# Patient Record
Sex: MALE | Race: WHITE | URBAN - METROPOLITAN AREA
[De-identification: names, ages, dates, MRNs, and addresses within clinical notes are randomized per-mention and may not be internally consistent; named-entity substitution may affect disease eponyms.]

---

## 2017-05-27 ENCOUNTER — OUTPATIENT (OUTPATIENT)
Dept: OUTPATIENT SERVICES | Facility: HOSPITAL | Age: 62
LOS: 1 days | Discharge: HOME | End: 2017-05-27

## 2017-06-28 DIAGNOSIS — E78.5 HYPERLIPIDEMIA, UNSPECIFIED: ICD-10-CM

## 2017-06-28 DIAGNOSIS — E11.65 TYPE 2 DIABETES MELLITUS WITH HYPERGLYCEMIA: ICD-10-CM

## 2017-08-29 ENCOUNTER — OUTPATIENT (OUTPATIENT)
Dept: OUTPATIENT SERVICES | Facility: HOSPITAL | Age: 62
LOS: 1 days | Discharge: HOME | End: 2017-08-29

## 2017-08-29 DIAGNOSIS — E21.5 DISORDER OF PARATHYROID GLAND, UNSPECIFIED: ICD-10-CM

## 2017-08-29 DIAGNOSIS — E11.65 TYPE 2 DIABETES MELLITUS WITH HYPERGLYCEMIA: ICD-10-CM

## 2017-08-29 DIAGNOSIS — E03.9 HYPOTHYROIDISM, UNSPECIFIED: ICD-10-CM

## 2017-08-29 DIAGNOSIS — M10.9 GOUT, UNSPECIFIED: ICD-10-CM

## 2017-08-29 DIAGNOSIS — E61.1 IRON DEFICIENCY: ICD-10-CM

## 2017-09-18 ENCOUNTER — OUTPATIENT (OUTPATIENT)
Dept: OUTPATIENT SERVICES | Facility: HOSPITAL | Age: 62
LOS: 1 days | Discharge: HOME | End: 2017-09-18

## 2017-09-18 DIAGNOSIS — I10 ESSENTIAL (PRIMARY) HYPERTENSION: ICD-10-CM

## 2017-09-18 DIAGNOSIS — E11.9 TYPE 2 DIABETES MELLITUS WITHOUT COMPLICATIONS: ICD-10-CM

## 2017-09-18 DIAGNOSIS — Z90.5 ACQUIRED ABSENCE OF KIDNEY: ICD-10-CM

## 2017-09-18 DIAGNOSIS — N20.0 CALCULUS OF KIDNEY: ICD-10-CM

## 2017-12-02 ENCOUNTER — OUTPATIENT (OUTPATIENT)
Dept: OUTPATIENT SERVICES | Facility: HOSPITAL | Age: 62
LOS: 1 days | Discharge: HOME | End: 2017-12-02

## 2017-12-02 DIAGNOSIS — M10.9 GOUT, UNSPECIFIED: ICD-10-CM

## 2017-12-02 DIAGNOSIS — E78.5 HYPERLIPIDEMIA, UNSPECIFIED: ICD-10-CM

## 2017-12-02 DIAGNOSIS — E11.65 TYPE 2 DIABETES MELLITUS WITH HYPERGLYCEMIA: ICD-10-CM

## 2018-05-26 ENCOUNTER — OUTPATIENT (OUTPATIENT)
Dept: OUTPATIENT SERVICES | Facility: HOSPITAL | Age: 63
LOS: 1 days | Discharge: HOME | End: 2018-05-26

## 2018-05-26 DIAGNOSIS — E78.5 HYPERLIPIDEMIA, UNSPECIFIED: ICD-10-CM

## 2018-05-26 DIAGNOSIS — E03.9 HYPOTHYROIDISM, UNSPECIFIED: ICD-10-CM

## 2018-05-26 DIAGNOSIS — E11.65 TYPE 2 DIABETES MELLITUS WITH HYPERGLYCEMIA: ICD-10-CM

## 2018-06-07 ENCOUNTER — OUTPATIENT (OUTPATIENT)
Dept: OUTPATIENT SERVICES | Facility: HOSPITAL | Age: 63
LOS: 1 days | Discharge: HOME | End: 2018-06-07

## 2018-06-07 DIAGNOSIS — N18.3 CHRONIC KIDNEY DISEASE, STAGE 3 (MODERATE): ICD-10-CM

## 2018-06-07 DIAGNOSIS — I10 ESSENTIAL (PRIMARY) HYPERTENSION: ICD-10-CM

## 2018-06-07 DIAGNOSIS — E87.5 HYPERKALEMIA: ICD-10-CM

## 2018-06-07 DIAGNOSIS — E11.9 TYPE 2 DIABETES MELLITUS WITHOUT COMPLICATIONS: ICD-10-CM

## 2018-06-20 ENCOUNTER — OUTPATIENT (OUTPATIENT)
Dept: OUTPATIENT SERVICES | Facility: HOSPITAL | Age: 63
LOS: 1 days | Discharge: HOME | End: 2018-06-20

## 2018-06-20 DIAGNOSIS — N20.0 CALCULUS OF KIDNEY: ICD-10-CM

## 2018-06-20 DIAGNOSIS — N18.3 CHRONIC KIDNEY DISEASE, STAGE 3 (MODERATE): ICD-10-CM

## 2018-06-20 DIAGNOSIS — I10 ESSENTIAL (PRIMARY) HYPERTENSION: ICD-10-CM

## 2018-08-24 ENCOUNTER — OUTPATIENT (OUTPATIENT)
Dept: OUTPATIENT SERVICES | Facility: HOSPITAL | Age: 63
LOS: 1 days | Discharge: HOME | End: 2018-08-24

## 2018-08-24 DIAGNOSIS — E21.5 DISORDER OF PARATHYROID GLAND, UNSPECIFIED: ICD-10-CM

## 2018-08-24 DIAGNOSIS — M10.9 GOUT, UNSPECIFIED: ICD-10-CM

## 2018-08-24 DIAGNOSIS — E83.42 HYPOMAGNESEMIA: ICD-10-CM

## 2018-08-24 DIAGNOSIS — E78.5 HYPERLIPIDEMIA, UNSPECIFIED: ICD-10-CM

## 2018-08-24 DIAGNOSIS — E11.65 TYPE 2 DIABETES MELLITUS WITH HYPERGLYCEMIA: ICD-10-CM

## 2019-03-07 ENCOUNTER — OUTPATIENT (OUTPATIENT)
Dept: OUTPATIENT SERVICES | Facility: HOSPITAL | Age: 64
LOS: 1 days | Discharge: HOME | End: 2019-03-07

## 2019-03-07 DIAGNOSIS — M10.9 GOUT, UNSPECIFIED: ICD-10-CM

## 2019-03-07 DIAGNOSIS — E11.65 TYPE 2 DIABETES MELLITUS WITH HYPERGLYCEMIA: ICD-10-CM

## 2019-03-07 DIAGNOSIS — E03.9 HYPOTHYROIDISM, UNSPECIFIED: ICD-10-CM

## 2019-03-07 DIAGNOSIS — E83.42 HYPOMAGNESEMIA: ICD-10-CM

## 2019-06-20 ENCOUNTER — OUTPATIENT (OUTPATIENT)
Dept: OUTPATIENT SERVICES | Facility: HOSPITAL | Age: 64
LOS: 1 days | Discharge: HOME | End: 2019-06-20

## 2019-06-20 DIAGNOSIS — E21.5 DISORDER OF PARATHYROID GLAND, UNSPECIFIED: ICD-10-CM

## 2019-06-20 DIAGNOSIS — E83.42 HYPOMAGNESEMIA: ICD-10-CM

## 2019-06-20 DIAGNOSIS — M10.9 GOUT, UNSPECIFIED: ICD-10-CM

## 2019-06-20 DIAGNOSIS — E55.9 VITAMIN D DEFICIENCY, UNSPECIFIED: ICD-10-CM

## 2019-06-20 DIAGNOSIS — E78.5 HYPERLIPIDEMIA, UNSPECIFIED: ICD-10-CM

## 2019-06-20 DIAGNOSIS — E11.65 TYPE 2 DIABETES MELLITUS WITH HYPERGLYCEMIA: ICD-10-CM

## 2019-11-01 ENCOUNTER — INPATIENT (INPATIENT)
Facility: HOSPITAL | Age: 64
LOS: 1 days | Discharge: HOME | End: 2019-11-03
Attending: INTERNAL MEDICINE | Admitting: INTERNAL MEDICINE
Payer: MEDICARE

## 2019-11-01 ENCOUNTER — TRANSCRIPTION ENCOUNTER (OUTPATIENT)
Age: 64
End: 2019-11-01

## 2019-11-01 VITALS
TEMPERATURE: 98 F | OXYGEN SATURATION: 97 % | DIASTOLIC BLOOD PRESSURE: 91 MMHG | RESPIRATION RATE: 20 BRPM | HEART RATE: 101 BPM | SYSTOLIC BLOOD PRESSURE: 187 MMHG

## 2019-11-01 DIAGNOSIS — Z90.5 ACQUIRED ABSENCE OF KIDNEY: Chronic | ICD-10-CM

## 2019-11-01 LAB
ALBUMIN SERPL ELPH-MCNC: 3.9 G/DL — SIGNIFICANT CHANGE UP (ref 3.5–5.2)
ALP SERPL-CCNC: 57 U/L — SIGNIFICANT CHANGE UP (ref 30–115)
ALT FLD-CCNC: 19 U/L — SIGNIFICANT CHANGE UP (ref 0–41)
ANION GAP SERPL CALC-SCNC: 17 MMOL/L — HIGH (ref 7–14)
APPEARANCE UR: CLEAR — SIGNIFICANT CHANGE UP
APTT BLD: 28.4 SEC — SIGNIFICANT CHANGE UP (ref 27–39.2)
AST SERPL-CCNC: 23 U/L — SIGNIFICANT CHANGE UP (ref 0–41)
BACTERIA # UR AUTO: NEGATIVE — SIGNIFICANT CHANGE UP
BASOPHILS # BLD AUTO: 0.05 K/UL — SIGNIFICANT CHANGE UP (ref 0–0.2)
BASOPHILS NFR BLD AUTO: 0.5 % — SIGNIFICANT CHANGE UP (ref 0–1)
BILIRUB SERPL-MCNC: 0.7 MG/DL — SIGNIFICANT CHANGE UP (ref 0.2–1.2)
BILIRUB UR-MCNC: NEGATIVE — SIGNIFICANT CHANGE UP
BUN SERPL-MCNC: 39 MG/DL — HIGH (ref 10–20)
CALCIUM SERPL-MCNC: 9 MG/DL — SIGNIFICANT CHANGE UP (ref 8.5–10.1)
CHLORIDE SERPL-SCNC: 100 MMOL/L — SIGNIFICANT CHANGE UP (ref 98–110)
CO2 SERPL-SCNC: 19 MMOL/L — SIGNIFICANT CHANGE UP (ref 17–32)
COLOR SPEC: YELLOW — SIGNIFICANT CHANGE UP
CREAT SERPL-MCNC: 2.4 MG/DL — HIGH (ref 0.7–1.5)
DIFF PNL FLD: ABNORMAL
EOSINOPHIL # BLD AUTO: 0.05 K/UL — SIGNIFICANT CHANGE UP (ref 0–0.7)
EOSINOPHIL NFR BLD AUTO: 0.5 % — SIGNIFICANT CHANGE UP (ref 0–8)
EPI CELLS # UR: 5 /HPF — SIGNIFICANT CHANGE UP (ref 0–5)
GLUCOSE BLDC GLUCOMTR-MCNC: 173 MG/DL — HIGH (ref 70–99)
GLUCOSE BLDC GLUCOMTR-MCNC: 183 MG/DL — HIGH (ref 70–99)
GLUCOSE SERPL-MCNC: 177 MG/DL — HIGH (ref 70–99)
GLUCOSE UR QL: SIGNIFICANT CHANGE UP
HCT VFR BLD CALC: 41.7 % — LOW (ref 42–52)
HGB BLD-MCNC: 13.6 G/DL — LOW (ref 14–18)
HYALINE CASTS # UR AUTO: 11 /LPF — HIGH (ref 0–7)
IMM GRANULOCYTES NFR BLD AUTO: 0.5 % — HIGH (ref 0.1–0.3)
INR BLD: 1.15 RATIO — SIGNIFICANT CHANGE UP (ref 0.65–1.3)
KETONES UR-MCNC: NEGATIVE — SIGNIFICANT CHANGE UP
LACTATE SERPL-SCNC: 1.5 MMOL/L — SIGNIFICANT CHANGE UP (ref 0.5–2.2)
LEUKOCYTE ESTERASE UR-ACNC: NEGATIVE — SIGNIFICANT CHANGE UP
LYMPHOCYTES # BLD AUTO: 1.3 K/UL — SIGNIFICANT CHANGE UP (ref 1.2–3.4)
LYMPHOCYTES # BLD AUTO: 13.8 % — LOW (ref 20.5–51.1)
MCHC RBC-ENTMCNC: 29.3 PG — SIGNIFICANT CHANGE UP (ref 27–31)
MCHC RBC-ENTMCNC: 32.6 G/DL — SIGNIFICANT CHANGE UP (ref 32–37)
MCV RBC AUTO: 89.9 FL — SIGNIFICANT CHANGE UP (ref 80–94)
MONOCYTES # BLD AUTO: 1.31 K/UL — HIGH (ref 0.1–0.6)
MONOCYTES NFR BLD AUTO: 13.9 % — HIGH (ref 1.7–9.3)
NEUTROPHILS # BLD AUTO: 6.67 K/UL — HIGH (ref 1.4–6.5)
NEUTROPHILS NFR BLD AUTO: 70.8 % — SIGNIFICANT CHANGE UP (ref 42.2–75.2)
NITRITE UR-MCNC: NEGATIVE — SIGNIFICANT CHANGE UP
NRBC # BLD: 0 /100 WBCS — SIGNIFICANT CHANGE UP (ref 0–0)
PH UR: 6 — SIGNIFICANT CHANGE UP (ref 5–8)
PLATELET # BLD AUTO: 164 K/UL — SIGNIFICANT CHANGE UP (ref 130–400)
POTASSIUM SERPL-MCNC: 4.9 MMOL/L — SIGNIFICANT CHANGE UP (ref 3.5–5)
POTASSIUM SERPL-SCNC: 4.9 MMOL/L — SIGNIFICANT CHANGE UP (ref 3.5–5)
PROT SERPL-MCNC: 7 G/DL — SIGNIFICANT CHANGE UP (ref 6–8)
PROT UR-MCNC: ABNORMAL
PROTHROM AB SERPL-ACNC: 13.2 SEC — HIGH (ref 9.95–12.87)
RBC # BLD: 4.64 M/UL — LOW (ref 4.7–6.1)
RBC # FLD: 13.4 % — SIGNIFICANT CHANGE UP (ref 11.5–14.5)
RBC CASTS # UR COMP ASSIST: 5 /HPF — HIGH (ref 0–4)
SODIUM SERPL-SCNC: 136 MMOL/L — SIGNIFICANT CHANGE UP (ref 135–146)
SP GR SPEC: 1.02 — SIGNIFICANT CHANGE UP (ref 1.01–1.02)
UROBILINOGEN FLD QL: SIGNIFICANT CHANGE UP
WBC # BLD: 9.43 K/UL — SIGNIFICANT CHANGE UP (ref 4.8–10.8)
WBC # FLD AUTO: 9.43 K/UL — SIGNIFICANT CHANGE UP (ref 4.8–10.8)
WBC UR QL: 7 /HPF — HIGH (ref 0–5)

## 2019-11-01 PROCEDURE — 93970 EXTREMITY STUDY: CPT | Mod: 26

## 2019-11-01 PROCEDURE — 99285 EMERGENCY DEPT VISIT HI MDM: CPT

## 2019-11-01 PROCEDURE — 73630 X-RAY EXAM OF FOOT: CPT | Mod: 26,RT

## 2019-11-01 PROCEDURE — 93010 ELECTROCARDIOGRAM REPORT: CPT

## 2019-11-01 PROCEDURE — 73590 X-RAY EXAM OF LOWER LEG: CPT | Mod: 26,RT

## 2019-11-01 RX ORDER — AMLODIPINE BESYLATE 2.5 MG/1
10 TABLET ORAL DAILY
Refills: 0 | Status: DISCONTINUED | OUTPATIENT
Start: 2019-11-01 | End: 2019-11-03

## 2019-11-01 RX ORDER — INSULIN LISPRO 100/ML
VIAL (ML) SUBCUTANEOUS
Refills: 0 | Status: DISCONTINUED | OUTPATIENT
Start: 2019-11-01 | End: 2019-11-03

## 2019-11-01 RX ORDER — DEXTROSE 50 % IN WATER 50 %
12.5 SYRINGE (ML) INTRAVENOUS ONCE
Refills: 0 | Status: DISCONTINUED | OUTPATIENT
Start: 2019-11-01 | End: 2019-11-03

## 2019-11-01 RX ORDER — INSULIN GLARGINE 100 [IU]/ML
70 INJECTION, SOLUTION SUBCUTANEOUS EVERY MORNING
Refills: 0 | Status: DISCONTINUED | OUTPATIENT
Start: 2019-11-01 | End: 2019-11-03

## 2019-11-01 RX ORDER — ACETAMINOPHEN 500 MG
650 TABLET ORAL ONCE
Refills: 0 | Status: COMPLETED | OUTPATIENT
Start: 2019-11-01 | End: 2019-11-01

## 2019-11-01 RX ORDER — ATORVASTATIN CALCIUM 80 MG/1
20 TABLET, FILM COATED ORAL AT BEDTIME
Refills: 0 | Status: DISCONTINUED | OUTPATIENT
Start: 2019-11-01 | End: 2019-11-03

## 2019-11-01 RX ORDER — INSULIN GLARGINE 100 [IU]/ML
40 INJECTION, SOLUTION SUBCUTANEOUS AT BEDTIME
Refills: 0 | Status: DISCONTINUED | OUTPATIENT
Start: 2019-11-01 | End: 2019-11-03

## 2019-11-01 RX ORDER — CEFAZOLIN SODIUM 1 G
VIAL (EA) INJECTION
Refills: 0 | Status: DISCONTINUED | OUTPATIENT
Start: 2019-11-01 | End: 2019-11-03

## 2019-11-01 RX ORDER — INFLUENZA VIRUS VACCINE 15; 15; 15; 15 UG/.5ML; UG/.5ML; UG/.5ML; UG/.5ML
0.5 SUSPENSION INTRAMUSCULAR ONCE
Refills: 0 | Status: DISCONTINUED | OUTPATIENT
Start: 2019-11-01 | End: 2019-11-03

## 2019-11-01 RX ORDER — DORZOLAMIDE HYDROCHLORIDE TIMOLOL MALEATE 20; 5 MG/ML; MG/ML
1 SOLUTION/ DROPS OPHTHALMIC
Refills: 0 | Status: DISCONTINUED | OUTPATIENT
Start: 2019-11-01 | End: 2019-11-03

## 2019-11-01 RX ORDER — CEFAZOLIN SODIUM 1 G
1000 VIAL (EA) INJECTION EVERY 12 HOURS
Refills: 0 | Status: DISCONTINUED | OUTPATIENT
Start: 2019-11-02 | End: 2019-11-03

## 2019-11-01 RX ORDER — INSULIN LISPRO 100/ML
30 VIAL (ML) SUBCUTANEOUS
Refills: 0 | Status: DISCONTINUED | OUTPATIENT
Start: 2019-11-01 | End: 2019-11-03

## 2019-11-01 RX ORDER — SODIUM CHLORIDE 9 MG/ML
1000 INJECTION INTRAMUSCULAR; INTRAVENOUS; SUBCUTANEOUS
Refills: 0 | Status: DISCONTINUED | OUTPATIENT
Start: 2019-11-01 | End: 2019-11-02

## 2019-11-01 RX ORDER — SODIUM CHLORIDE 9 MG/ML
1000 INJECTION, SOLUTION INTRAVENOUS
Refills: 0 | Status: DISCONTINUED | OUTPATIENT
Start: 2019-11-01 | End: 2019-11-03

## 2019-11-01 RX ORDER — METRONIDAZOLE 500 MG
500 TABLET ORAL ONCE
Refills: 0 | Status: COMPLETED | OUTPATIENT
Start: 2019-11-01 | End: 2019-11-01

## 2019-11-01 RX ORDER — GLUCAGON INJECTION, SOLUTION 0.5 MG/.1ML
1 INJECTION, SOLUTION SUBCUTANEOUS ONCE
Refills: 0 | Status: DISCONTINUED | OUTPATIENT
Start: 2019-11-01 | End: 2019-11-03

## 2019-11-01 RX ORDER — CEFAZOLIN SODIUM 1 G
1000 VIAL (EA) INJECTION ONCE
Refills: 0 | Status: COMPLETED | OUTPATIENT
Start: 2019-11-01 | End: 2019-11-01

## 2019-11-01 RX ORDER — SODIUM CHLORIDE 9 MG/ML
1000 INJECTION, SOLUTION INTRAVENOUS ONCE
Refills: 0 | Status: COMPLETED | OUTPATIENT
Start: 2019-11-01 | End: 2019-11-01

## 2019-11-01 RX ORDER — DEXTROSE 50 % IN WATER 50 %
15 SYRINGE (ML) INTRAVENOUS ONCE
Refills: 0 | Status: DISCONTINUED | OUTPATIENT
Start: 2019-11-01 | End: 2019-11-03

## 2019-11-01 RX ORDER — CEFTRIAXONE 500 MG/1
2000 INJECTION, POWDER, FOR SOLUTION INTRAMUSCULAR; INTRAVENOUS ONCE
Refills: 0 | Status: COMPLETED | OUTPATIENT
Start: 2019-11-01 | End: 2019-11-01

## 2019-11-01 RX ADMIN — ATORVASTATIN CALCIUM 20 MILLIGRAM(S): 80 TABLET, FILM COATED ORAL at 21:35

## 2019-11-01 RX ADMIN — Medication 650 MILLIGRAM(S): at 18:01

## 2019-11-01 RX ADMIN — Medication 650 MILLIGRAM(S): at 13:15

## 2019-11-01 RX ADMIN — SODIUM CHLORIDE 1000 MILLILITER(S): 9 INJECTION, SOLUTION INTRAVENOUS at 13:15

## 2019-11-01 RX ADMIN — CEFTRIAXONE 100 MILLIGRAM(S): 500 INJECTION, POWDER, FOR SOLUTION INTRAMUSCULAR; INTRAVENOUS at 18:24

## 2019-11-01 RX ADMIN — AMLODIPINE BESYLATE 10 MILLIGRAM(S): 2.5 TABLET ORAL at 18:23

## 2019-11-01 RX ADMIN — INSULIN GLARGINE 40 UNIT(S): 100 INJECTION, SOLUTION SUBCUTANEOUS at 21:36

## 2019-11-01 RX ADMIN — SODIUM CHLORIDE 75 MILLILITER(S): 9 INJECTION INTRAMUSCULAR; INTRAVENOUS; SUBCUTANEOUS at 22:02

## 2019-11-01 RX ADMIN — Medication 100 MILLIGRAM(S): at 17:28

## 2019-11-01 NOTE — ED PROVIDER NOTE - CLINICAL SUMMARY MEDICAL DECISION MAKING FREE TEXT BOX
patient evaluated for lower ext swelling, dvt u/s negative, given how rapid swelling has occurred patient admitted for iv abx, patient already on PO abx. found to have BATOOL on ckd with single kidney. will require iv hydration repeat crt, iv abx.

## 2019-11-01 NOTE — ED ADULT NURSE NOTE - OBJECTIVE STATEMENT
Pt complaint of RLE and R foot red, warm and swollen onset 2 days ago. Pt states he saw podiatrist recently and had shavings done. No fever suspected.

## 2019-11-01 NOTE — H&P ADULT - NSICDXPASTMEDICALHX_GEN_ALL_CORE_FT
PAST MEDICAL HISTORY:  CKD (chronic kidney disease)     Diabetes mellitus     DVT (deep venous thrombosis)     Glaucoma     HTN (hypertension)

## 2019-11-01 NOTE — ED PROVIDER NOTE - PMH
Diabetes mellitus CKD (chronic kidney disease)    Diabetes mellitus    DVT (deep venous thrombosis)    Glaucoma    HTN (hypertension)

## 2019-11-01 NOTE — ED PROVIDER NOTE - NS ED ROS FT
Constitutional: No fevers.   Eyes:  No visual changes, eye pain or discharge.  ENMT:  No hearing changes, pain, no sore throat or runny nose, no difficulty swallowing  Cardiac:  No chest pain, SOB.   Respiratory:  No cough or respiratory distress. No hemoptysis.   GI:  No nausea, vomiting, diarrhea or abdominal pain.  :  No dysuria, frequency or burning. +nephrectomy.   MS:  RLE is edematous, erythematous, painful.   Neuro:  No headache or weakness.  No LOC.  Skin:  erythema to the RLE.   Endocrine: +hx of DM.

## 2019-11-01 NOTE — ED PROVIDER NOTE - ATTENDING CONTRIBUTION TO CARE
right leg swelling for 3 days dx with cellulitis, now with worsening swelling, no orthopnea, no chest pain. no fevers but reports chills. on exam right leg 4+ edema, abd soft, lungs cta, there is skin breakdown medial 1st digit. plan is to evaluate dfor dvt. possible cellulitis.

## 2019-11-01 NOTE — H&P ADULT - ASSESSMENT
63 Y/O male with PMHx of DM, HTN, Glaucoma, s/p right nephrectomy for renal mass, CKD, hx of provoked DVT in the past presented to the hospital for swelling and redness in his RLE X 3 days.    # RLE swelling with associated redness and chronic callus formation.  - likely cellulitis, start Doxycycline 100mg BID  - f/u xray right foot  - warm compresses, leg elevation  - f/u official duplex report  - if no improvement consider podiatry/ID consult.    # BATOOL on CKD III  - cret 2.4 baseline creatinine 1.8  - Hold ARB (olmesartan)  - f/u urine lytes, avoid nephrotoxic meds  - strict Is and Os, f/u retroperitoneal sono  - will start hydration with I/V fluids NS @ 75/hr  - if no improvement consider renal consult for Dr Chung    # HTN  - Increase Amlodipine to 10mg qd, add lhydralazine if needed  - hold ARBs.    # DM on Insulin at home  - c/w lantus/lispro/correctional scale  - monitor fingerstick AC+HS    # Glaucoma  - c/w cosopt 1 drop BID    DVT PPx: Hep 5000 s/c   GI PPX: not indicated  Diet: DASH/Carb consistent  Activity: Increase as tolerated  Dispo: from home, no needs  Code Status: full code 65 Y/O male with PMHx of DM, HTN, Glaucoma, s/p right nephrectomy for renal mass, CKD, hx of provoked DVT in the past presented to the hospital for swelling and redness in his RLE X 3 days.    # RLE swelling with associated redness and chronic callus formation.  - likely cellulitis, start Ancef 1gm BID (renal dose)  - no pain or decreased ROM (diabetic)  - f/u xray right foot to r/o underlying fluid collection or effusion if present consider aspiration.  - warm compresses, leg elevation  - f/u official duplex report  - if no improvement consider podiatry/ID consult.    # BATOOL on CKD III  - cret 2.4 baseline creatinine 1.8  - Hold ARB (olmesartan)  - f/u urine lytes, avoid nephrotoxic meds  - strict Is and Os, f/u retroperitoneal sono  - will start hydration with I/V fluids NS @ 75/hr  - if no improvement consider renal consult for Dr Chung    # HTN  - Increase Amlodipine to 10mg qd, add lhydralazine if needed  - hold ARBs.    # DM on Insulin at home  - c/w lantus/lispro/correctional scale  - monitor fingerstick AC+HS    # Glaucoma  - c/w cosopt 1 drop BID    DVT PPx: Hep 5000 s/c   GI PPX: not indicated  Diet: DASH/Carb consistent  Activity: Increase as tolerated  Dispo: from home, no needs  Code Status: full code

## 2019-11-01 NOTE — H&P ADULT - NSHPLABSRESULTS_GEN_ALL_CORE
CBC Full  -  ( 01 Nov 2019 13:36 )  WBC Count : 9.43 K/uL  RBC Count : 4.64 M/uL  Hemoglobin : 13.6 g/dL  Hematocrit : 41.7 %  Platelet Count - Automated : 164 K/uL  Mean Cell Volume : 89.9 fL  Mean Cell Hemoglobin : 29.3 pg  Mean Cell Hemoglobin Concentration : 32.6 g/dL  Auto Neutrophil # : 6.67 K/uL  Auto Lymphocyte # : 1.30 K/uL  Auto Monocyte # : 1.31 K/uL  Auto Eosinophil # : 0.05 K/uL  Auto Basophil # : 0.05 K/uL  Auto Neutrophil % : 70.8 %  Auto Lymphocyte % : 13.8 %  Auto Monocyte % : 13.9 %  Auto Eosinophil % : 0.5 %  Auto Basophil % : 0.5 %  11-01    136  |  100  |  39<H>  ----------------------------<  177<H>  4.9   |  19  |  2.4<H>    Ca    9.0      01 Nov 2019 13:36    TPro  7.0  /  Alb  3.9  /  TBili  0.7  /  DBili  x   /  AST  23  /  ALT  19  /  AlkPhos  57  11-01  < from: Xray Tibia + Fibula 2 Views, Right (11.01.19 @ 14:37) >    IMPRESSION:  No acute fracture or dislocation.    < end of copied text >

## 2019-11-01 NOTE — H&P ADULT - HISTORY OF PRESENT ILLNESS
65 Y/O male with PMHx of DM, HTN, Glaucoma, s/p right nephrectomy for renal mass, CKD, hx of provoked DVT in the past presented to the hospital for swelling and redness in his RLE X 3 days. Per pt he has chronic callus formation in his RLE and follows up with podiatry for that. Last time he followed up was 1 month ago and they removed the callous. Per pt he noticed increased swelling in his RLE which started 3 days ago, swelling was up to his right knee with some associated redness and chills but no fever so he went to Urgent care and he was given bactrim. His swelling did not improve so he came to the ER. He denied any hx of fever, recent travel, insect bite or recent trauma. He denied any hx of discharge for the callus site. He denied any hx of nausea, vomiting, diarrhea, constipation, melena, pain in abdomen, sob, chest pain, cough, dysuria, headache, lightheadedness, dizziness, vertigo, localized weakness or numbness/tingling.    In the ER his vitals were BP: 187/91 mmHg, /min. R/R 20/min, Temp: 97.9, SPO2 97% on RA. He had duplex LE which was neg for DVT (prelim).

## 2019-11-01 NOTE — ED PROVIDER NOTE - PHYSICAL EXAMINATION
CONSTITUTIONAL: Well-developed; well-nourished; in no acute distress.   SKIN: warm, dry.   HEAD: Normocephalic; atraumatic.  EYES: PERRL, EOMI, no conjunctival erythema  ENT: No nasal discharge; airway clear.  NECK: Supple; non tender.  CARD: S1, S2 normal; no murmurs, gallops, or rubs. Regular rate and rhythm.   RESP: No wheezes, rales or rhonchi.  ABD: soft ntnd  EXT: RLE is edematous, erythema, tense to knee; pulses 2+ in bilateral LEs. Callus to right toe withou tenderness or discharge from the area.   NEURO: sensation to light touch intact to RLE.   PSYCH: Cooperative, appropriate.

## 2019-11-01 NOTE — H&P ADULT - ATTENDING COMMENTS
The patient was seen and examined at bedside independently.  Agree with above except the portion updated on my progress note from today 11/02.    Will follow.

## 2019-11-01 NOTE — H&P ADULT - NSHPPHYSICALEXAM_GEN_ALL_CORE
PHYSICAL EXAM:  GENERAL: NAD, lying in bed comfortably, obese  HEAD:  Atraumatic, Normocephalic  EYES: EOMI, PERRLA, conjunctiva and sclera clear  ENT: Moist mucous membranes  NECK:  No JVD  CHEST/LUNG: Clear to auscultation bilaterally; No rales, rhonchi, wheezing, or rubs. Unlabored respirations  HEART: Regular rate and rhythm; No murmurs, rubs, or gallops  ABDOMEN: Bowel sounds present; Soft, Nontender, Nondistended. No hepatomegaly  EXTREMITIES:  + Peripheral Pulses, brisk capillary refill. No clubbing, cyanosis. RLE swelling with pitting edema, erythematous rash above ankle, tender to palpation, right big toe callus formation with skin break and surrounding erythema on the planter aspect.   NERVOUS SYSTEM:  Alert & Oriented X3, speech clear. No deficits   MSK: FROM all 4 extremities, full and equal strength  SKIN: No rashes or lesions

## 2019-11-01 NOTE — ED PROVIDER NOTE - OBJECTIVE STATEMENT
Patient is a 63 yo M w/ hx of DM, HTN, Glaucoma, s/p right nephrectomy for renal mass, CKD, hx of provoked DVT 2 yrs prior p/w swelling and redness to the RLE. Patient developed gradual onset, progressive, dull, constant, mild RLE pain x 3 days associated with swelling and redness to the skin. Patient went to [facility], prescribed bactrim but was concern for renal safety of bactrim dose; denies fevers, denies recent travel/surgery, chest pain, sob, trauma to the LE, numbness/tingling to the leg.

## 2019-11-02 LAB
ALBUMIN SERPL ELPH-MCNC: 3.5 G/DL — SIGNIFICANT CHANGE UP (ref 3.5–5.2)
ALP SERPL-CCNC: 52 U/L — SIGNIFICANT CHANGE UP (ref 30–115)
ALT FLD-CCNC: 17 U/L — SIGNIFICANT CHANGE UP (ref 0–41)
ANION GAP SERPL CALC-SCNC: 14 MMOL/L — SIGNIFICANT CHANGE UP (ref 7–14)
APPEARANCE UR: CLEAR — SIGNIFICANT CHANGE UP
AST SERPL-CCNC: 23 U/L — SIGNIFICANT CHANGE UP (ref 0–41)
BACTERIA # UR AUTO: NEGATIVE — SIGNIFICANT CHANGE UP
BASOPHILS # BLD AUTO: 0.08 K/UL — SIGNIFICANT CHANGE UP (ref 0–0.2)
BASOPHILS NFR BLD AUTO: 0.9 % — SIGNIFICANT CHANGE UP (ref 0–1)
BILIRUB SERPL-MCNC: 0.5 MG/DL — SIGNIFICANT CHANGE UP (ref 0.2–1.2)
BILIRUB UR-MCNC: NEGATIVE — SIGNIFICANT CHANGE UP
BUN SERPL-MCNC: 39 MG/DL — HIGH (ref 10–20)
CALCIUM SERPL-MCNC: 8.5 MG/DL — SIGNIFICANT CHANGE UP (ref 8.5–10.1)
CHLORIDE SERPL-SCNC: 104 MMOL/L — SIGNIFICANT CHANGE UP (ref 98–110)
CO2 SERPL-SCNC: 20 MMOL/L — SIGNIFICANT CHANGE UP (ref 17–32)
COLOR SPEC: YELLOW — SIGNIFICANT CHANGE UP
CREAT ?TM UR-MCNC: 186 MG/DL — SIGNIFICANT CHANGE UP
CREAT SERPL-MCNC: 2.2 MG/DL — HIGH (ref 0.7–1.5)
DIFF PNL FLD: SIGNIFICANT CHANGE UP
EOSINOPHIL # BLD AUTO: 0.18 K/UL — SIGNIFICANT CHANGE UP (ref 0–0.7)
EOSINOPHIL NFR BLD AUTO: 2.1 % — SIGNIFICANT CHANGE UP (ref 0–8)
EPI CELLS # UR: 2 /HPF — SIGNIFICANT CHANGE UP (ref 0–5)
GLUCOSE BLDC GLUCOMTR-MCNC: 151 MG/DL — HIGH (ref 70–99)
GLUCOSE BLDC GLUCOMTR-MCNC: 155 MG/DL — HIGH (ref 70–99)
GLUCOSE BLDC GLUCOMTR-MCNC: 164 MG/DL — HIGH (ref 70–99)
GLUCOSE BLDC GLUCOMTR-MCNC: 71 MG/DL — SIGNIFICANT CHANGE UP (ref 70–99)
GLUCOSE BLDC GLUCOMTR-MCNC: 86 MG/DL — SIGNIFICANT CHANGE UP (ref 70–99)
GLUCOSE SERPL-MCNC: 175 MG/DL — HIGH (ref 70–99)
GLUCOSE UR QL: NEGATIVE — SIGNIFICANT CHANGE UP
HCT VFR BLD CALC: 37.7 % — LOW (ref 42–52)
HGB BLD-MCNC: 12.1 G/DL — LOW (ref 14–18)
HYALINE CASTS # UR AUTO: 3 /LPF — SIGNIFICANT CHANGE UP (ref 0–7)
IMM GRANULOCYTES NFR BLD AUTO: 0.5 % — HIGH (ref 0.1–0.3)
KETONES UR-MCNC: SIGNIFICANT CHANGE UP
LACTATE SERPL-SCNC: 0.9 MMOL/L — SIGNIFICANT CHANGE UP (ref 0.5–2.2)
LEUKOCYTE ESTERASE UR-ACNC: NEGATIVE — SIGNIFICANT CHANGE UP
LYMPHOCYTES # BLD AUTO: 1.32 K/UL — SIGNIFICANT CHANGE UP (ref 1.2–3.4)
LYMPHOCYTES # BLD AUTO: 15.2 % — LOW (ref 20.5–51.1)
MAGNESIUM SERPL-MCNC: 1.9 MG/DL — SIGNIFICANT CHANGE UP (ref 1.8–2.4)
MCHC RBC-ENTMCNC: 28.7 PG — SIGNIFICANT CHANGE UP (ref 27–31)
MCHC RBC-ENTMCNC: 32.1 G/DL — SIGNIFICANT CHANGE UP (ref 32–37)
MCV RBC AUTO: 89.5 FL — SIGNIFICANT CHANGE UP (ref 80–94)
MONOCYTES # BLD AUTO: 1.07 K/UL — HIGH (ref 0.1–0.6)
MONOCYTES NFR BLD AUTO: 12.3 % — HIGH (ref 1.7–9.3)
NEUTROPHILS # BLD AUTO: 6.01 K/UL — SIGNIFICANT CHANGE UP (ref 1.4–6.5)
NEUTROPHILS NFR BLD AUTO: 69 % — SIGNIFICANT CHANGE UP (ref 42.2–75.2)
NITRITE UR-MCNC: NEGATIVE — SIGNIFICANT CHANGE UP
NRBC # BLD: 0 /100 WBCS — SIGNIFICANT CHANGE UP (ref 0–0)
OSMOLALITY UR: 506 MOS/KG — SIGNIFICANT CHANGE UP (ref 50–1400)
PH UR: 6 — SIGNIFICANT CHANGE UP (ref 5–8)
PLATELET # BLD AUTO: 167 K/UL — SIGNIFICANT CHANGE UP (ref 130–400)
POTASSIUM SERPL-MCNC: 4.7 MMOL/L — SIGNIFICANT CHANGE UP (ref 3.5–5)
POTASSIUM SERPL-SCNC: 4.7 MMOL/L — SIGNIFICANT CHANGE UP (ref 3.5–5)
PROT ?TM UR-MCNC: 83 MG/DLG/24H — SIGNIFICANT CHANGE UP
PROT SERPL-MCNC: 6.5 G/DL — SIGNIFICANT CHANGE UP (ref 6–8)
PROT UR-MCNC: ABNORMAL
PROT/CREAT UR-RTO: 0.4 RATIO — HIGH (ref 0–0.2)
RBC # BLD: 4.21 M/UL — LOW (ref 4.7–6.1)
RBC # FLD: 13.2 % — SIGNIFICANT CHANGE UP (ref 11.5–14.5)
RBC CASTS # UR COMP ASSIST: 6 /HPF — HIGH (ref 0–4)
SODIUM SERPL-SCNC: 138 MMOL/L — SIGNIFICANT CHANGE UP (ref 135–146)
SODIUM UR-SCNC: 40 MMOL/L — SIGNIFICANT CHANGE UP
SP GR SPEC: 1.02 — SIGNIFICANT CHANGE UP (ref 1.01–1.02)
UROBILINOGEN FLD QL: SIGNIFICANT CHANGE UP
WBC # BLD: 8.7 K/UL — SIGNIFICANT CHANGE UP (ref 4.8–10.8)
WBC # FLD AUTO: 8.7 K/UL — SIGNIFICANT CHANGE UP (ref 4.8–10.8)
WBC UR QL: 1 /HPF — SIGNIFICANT CHANGE UP (ref 0–5)

## 2019-11-02 PROCEDURE — 99223 1ST HOSP IP/OBS HIGH 75: CPT | Mod: AI

## 2019-11-02 PROCEDURE — 76770 US EXAM ABDO BACK WALL COMP: CPT | Mod: 26

## 2019-11-02 RX ORDER — HEPARIN SODIUM 5000 [USP'U]/ML
5000 INJECTION INTRAVENOUS; SUBCUTANEOUS EVERY 8 HOURS
Refills: 0 | Status: DISCONTINUED | OUTPATIENT
Start: 2019-11-02 | End: 2019-11-03

## 2019-11-02 RX ORDER — SODIUM CHLORIDE 9 MG/ML
1000 INJECTION INTRAMUSCULAR; INTRAVENOUS; SUBCUTANEOUS
Refills: 0 | Status: DISCONTINUED | OUTPATIENT
Start: 2019-11-02 | End: 2019-11-03

## 2019-11-02 RX ADMIN — Medication 30 UNIT(S): at 11:54

## 2019-11-02 RX ADMIN — HEPARIN SODIUM 5000 UNIT(S): 5000 INJECTION INTRAVENOUS; SUBCUTANEOUS at 21:49

## 2019-11-02 RX ADMIN — SODIUM CHLORIDE 75 MILLILITER(S): 9 INJECTION INTRAMUSCULAR; INTRAVENOUS; SUBCUTANEOUS at 11:54

## 2019-11-02 RX ADMIN — Medication 30 UNIT(S): at 07:48

## 2019-11-02 RX ADMIN — INSULIN GLARGINE 40 UNIT(S): 100 INJECTION, SOLUTION SUBCUTANEOUS at 21:49

## 2019-11-02 RX ADMIN — DORZOLAMIDE HYDROCHLORIDE TIMOLOL MALEATE 1 DROP(S): 20; 5 SOLUTION/ DROPS OPHTHALMIC at 17:07

## 2019-11-02 RX ADMIN — Medication 2: at 07:47

## 2019-11-02 RX ADMIN — Medication 100 MILLIGRAM(S): at 05:24

## 2019-11-02 RX ADMIN — Medication 100 MILLIGRAM(S): at 17:08

## 2019-11-02 RX ADMIN — DORZOLAMIDE HYDROCHLORIDE TIMOLOL MALEATE 1 DROP(S): 20; 5 SOLUTION/ DROPS OPHTHALMIC at 05:25

## 2019-11-02 RX ADMIN — INSULIN GLARGINE 70 UNIT(S): 100 INJECTION, SOLUTION SUBCUTANEOUS at 09:13

## 2019-11-02 RX ADMIN — ATORVASTATIN CALCIUM 20 MILLIGRAM(S): 80 TABLET, FILM COATED ORAL at 21:49

## 2019-11-02 RX ADMIN — AMLODIPINE BESYLATE 10 MILLIGRAM(S): 2.5 TABLET ORAL at 05:25

## 2019-11-02 RX ADMIN — HEPARIN SODIUM 5000 UNIT(S): 5000 INJECTION INTRAVENOUS; SUBCUTANEOUS at 13:49

## 2019-11-02 NOTE — PROGRESS NOTE ADULT - ASSESSMENT
65 Y/O male with PMHx of DM, HTN, Glaucoma, s/p right nephrectomy for renal mass, CKD, hx of provoked DVT in the past presented to the hospital for swelling and redness in his RLE X 3 days.    # RLE swelling with associated redness and chronic callus formation.  - likely cellulitis, started  Ancef 1gm BID (renal dose) >> improving   - no pain or decreased ROM (diabetic)  - f/u xray right foot to r/o underlying fluid collection or effusion if present consider aspiration.  - warm compresses, leg elevation  - f/u official duplex report  - if no improvement consider podiatry/ID consult.    # BATOOL on CKD III  - cret 2.4 >> 2.2 today    baseline creatinine 1.8  - Hold ARB (olmesartan)  - f/u urine lytes, avoid nephrotoxic meds  -  f/u retroperitoneal sono >> no hydro , left staghorn calculi,   - c/w  hydration with I/V fluids NS @ 75/hr  - if no improvement consider renal consult for Dr Chung    # HTN  - Increase Amlodipine to 10mg qd, add hydralazine if needed  - hold ARBs.    # DM on Insulin at home  - c/w lantus/lispro/correctional scale  - monitor fingerstick AC+HS    # Glaucoma  - c/w cosopt 1 drop BID    # Morbid obesity >> Counselled about life style modification.     DVT PPx: Hep 5000 s/c   GI PPX: not indicated  Diet: DASH/Carb consistent  Activity: Increase as tolerated  Dispo: from home, no needs  Code Status: full code    #Progress Note Handoff  Pending (specify):  BATOOL/ CELLULITIS to get better  Family discussion: na, d/w the patient.   Disposition: Home_

## 2019-11-02 NOTE — DIETITIAN INITIAL EVALUATION ADULT. - PHYSICAL APPEARANCE
obese/alert and oriented. BMI: 55.3, IBW: 166#, unsure of UBW, but denies any recent wt loss. 2+ edema to B/L ankle. skin intact.

## 2019-11-02 NOTE — PROGRESS NOTE ADULT - SUBJECTIVE AND OBJECTIVE BOX
JOSHUA HAM  64y  Male      Patient is a 64y old  Male who presents with a chief complaint of right lower extremity swelling and redness. (2019 16:45)      INTERVAL HPI/OVERNIGHT EVENTS:      ******************************* REVIEW OF SYSTEMS:**********************************************    resting ion bed.   All other review of systems negative    *********************** VITALS ******************************************    T(F): 96.3 (19 @ 06:07)  HR: 75 (19 @ 06:07) (72 - 79)  BP: 129/64 (19 @ 06:07) (129/64 - 152/72)  RR: 18 (19 @ 06:07) (18 - 18)  SpO2: 93% (19 @ 07:40) (93% - 98%)            ******************************** PHYSICAL EXAM:**************************************************  GENERAL: NAD    PSYCH: no agitation, baseline mentation  HEENT:     NERVOUS SYSTEM:  Alert & Oriented X3,   PULMONARY: ALEX, CTA    CARDIOVASCULAR: S1S2 RRR    GI: Soft, NT, ND; BS present.    EXTREMITIES:  improved RLE erythema/edema/ soreness    LYMPH: No lymphadenopathy noted    SKIN: No rashes or lesions    ******************************************************************************************      **************************** LABS *******************************************************                          12.1   8.70  )-----------( 167      ( 2019 06:07 )             37.7         138  |  104  |  39<H>  ----------------------------<  175<H>  4.7   |  20  |  2.2<H>    Ca    8.5      2019 06:07  Mg     1.9         TPro  6.5  /  Alb  3.5  /  TBili  0.5  /  DBili  x   /  AST  23  /  ALT  17  /  AlkPhos  52        Urinalysis Basic - ( 2019 07:10 )    Color: Yellow / Appearance: Clear / S.018 / pH: x  Gluc: x / Ketone: Trace  / Bili: Negative / Urobili: <2 mg/dL   Blood: x / Protein: 100 mg/dL / Nitrite: Negative   Leuk Esterase: Negative / RBC: 6 /HPF / WBC 1 /HPF   Sq Epi: x / Non Sq Epi: 2 /HPF / Bacteria: Negative      PT/INR - ( 2019 13:36 )   PT: 13.20 sec;   INR: 1.15 ratio         PTT - ( 2019 13:36 )  PTT:28.4 sec  Lactate Trend   @ 06:07 Lactate:0.9    @ 13:36 Lactate:1.5         CAPILLARY BLOOD GLUCOSE      POCT Blood Glucose.: 86 mg/dL (2019 11:25)          **************************Active Medications *******************************************  No Known Allergies      amLODIPine   Tablet 10 milliGRAM(s) Oral daily  atorvastatin 20 milliGRAM(s) Oral at bedtime  ceFAZolin   IVPB 1000 milliGRAM(s) IV Intermittent every 12 hours  ceFAZolin   IVPB      dextrose 40% Gel 15 Gram(s) Oral once PRN  dextrose 5%. 1000 milliLiter(s) IV Continuous <Continuous>  dextrose 50% Injectable 12.5 Gram(s) IV Push once  dorzolamide 2%/timolol 0.5% Ophthalmic Solution 1 Drop(s) Both EYES two times a day  glucagon  Injectable 1 milliGRAM(s) IntraMuscular once PRN  heparin  Injectable 5000 Unit(s) SubCutaneous every 8 hours  influenza   Vaccine 0.5 milliLiter(s) IntraMuscular once  insulin glargine Injectable (LANTUS) 70 Unit(s) SubCutaneous every morning  insulin glargine Injectable (LANTUS) 40 Unit(s) SubCutaneous at bedtime  insulin lispro (HumaLOG) corrective regimen sliding scale   SubCutaneous three times a day before meals  insulin lispro Injectable (HumaLOG) 30 Unit(s) SubCutaneous three times a day before meals  sodium chloride 0.9%. 1000 milliLiter(s) IV Continuous <Continuous>      ***************************************************  RADIOLOGY & ADDITIONAL TESTS:    Imaging Personally Reviewed:  [ ] YES  [ ] NO    HEALTH ISSUES - PROBLEM Dx:

## 2019-11-02 NOTE — DIETITIAN INITIAL EVALUATION ADULT. - OTHER INFO
Nutrition Hx PTA: Pt with excellent appetite/po intake, typically cnsume s3 meals + snacks daily. Will occasionally drink a plant based protein shake, but not regularly. Denies any cultural/Yazidism restrictions and has NKFA. Pt admits to following a diabetic diet at home.     Pt p/w swelling and redness in his RLE X 3 days likely cellulitis--- f/u official duplex report; if no improvement consider podiatry/ID consult. BATOOL on CKD III--if no improvement consider renal consult for Dr Chung.

## 2019-11-02 NOTE — DIETITIAN INITIAL EVALUATION ADULT. - ENERGY NEEDS
Calories: 3339-2277 kcal/day (25-30 kcal/kg IBW)--aim towards lower end of range as morbid obesity considered  Protein: 60-68 gm/day (0.8-0.9 gm/kg IBW)--BATOOL on CKD III noted, will monitor renal fxn and adjust prn   Fluid: per LIP

## 2019-11-02 NOTE — CONSULT NOTE ADULT - SUBJECTIVE AND OBJECTIVE BOX
HPI:   65 Y/O male with PMHx of DM, HTN, obesity,  Glaucoma, s/p right nephrectomy for renal mass, CKD, hx of provoked DVT in the past presented to the hospital for swelling and redness of  RLE X 3 days. HE has chronic callus of R hallux. Recently seen by podiatry who removed the callus last month. 3 days ago, he developed swelling and redness of right foot and leg.     He denied any hx of nausea, vomiting, diarrhea, fevers or chills.     Allergies: No Known Allergies:     amLODIPine   Tablet 10 milliGRAM(s) Oral daily  atorvastatin 20 milliGRAM(s) Oral at bedtime  ceFAZolin   IVPB 1000 milliGRAM(s) IV Intermittent every 12 hours  ceFAZolin   IVPB      dextrose 40% Gel 15 Gram(s) Oral once PRN  dextrose 5%. 1000 milliLiter(s) IV Continuous <Continuous>  dextrose 50% Injectable 12.5 Gram(s) IV Push once  dorzolamide 2%/timolol 0.5% Ophthalmic Solution 1 Drop(s) Both EYES two times a day  glucagon  Injectable 1 milliGRAM(s) IntraMuscular once PRN  heparin  Injectable 5000 Unit(s) SubCutaneous every 8 hours  influenza   Vaccine 0.5 milliLiter(s) IntraMuscular once  insulin glargine Injectable (LANTUS) 70 Unit(s) SubCutaneous every morning  insulin glargine Injectable (LANTUS) 40 Unit(s) SubCutaneous at bedtime  insulin lispro (HumaLOG) corrective regimen sliding scale   SubCutaneous three times a day before meals  insulin lispro Injectable (HumaLOG) 30 Unit(s) SubCutaneous three times a day before meals  sodium chloride 0.9%. 1000 milliLiter(s) IV Continuous <Continuous>    ceFAZolin   IVPB 1000 milliGRAM(s) IV Intermittent every 12 hours  ceFAZolin   IVPB        AO x 3 , s1s2, lungs clear, obese, abd soft, non tender  R foot mild edema, erythema up to mid calf , warm.   No open wounds or pus.   Erythema is improving.   Callus- dry    T(C): 35.8 (19 @ 13:50), Max: 36.8 (19 @ 21:05)  HR: 72 (19 @ 13:50) (72 - 75)  BP: 129/65 (19 @ 13:50) (129/64 - 152/72)  RR: 18 (19 @ 13:50) (18 - 18)  SpO2: 93% (19 @ 07:40) (93% - 98%)                            12.1   8.70  )-----------( 167      ( 2019 06:07 )             37.7           138  |  104  |  39<H>  ----------------------------<  175<H>  4.7   |  20  |  2.2<H>    Ca    8.5      2019 06:07  Mg     1.9         TPro  6.5  /  Alb  3.5  /  TBili  0.5  /  DBili  x   /  AST  23  /  ALT  17  /  AlkPhos  52          Urinalysis Basic - ( 2019 07:10 )    Color: Yellow / Appearance: Clear / S.018 / pH: x  Gluc: x / Ketone: Trace  / Bili: Negative / Urobili: <2 mg/dL   Blood: x / Protein: 100 mg/dL / Nitrite: Negative   Leuk Esterase: Negative / RBC: 6 /HPF / WBC 1 /HPF   Sq Epi: x / Non Sq Epi: 2 /HPF / Bacteria: Negative      EXAM:  XR FOOT COMP MIN 3 VIEWS RT            PROCEDURE DATE:  2019       INTERPRETATION:  Clinical History / Reason for exam: Evaluate for septic   arthritis, diabetic foot  TECHNIQUE:3 views of the right foot    FINDINGS: Apparent softtissue ulcer present along the medial distal   border of hallux. No definite bone erosion or joint space narrowing to   suggest septic arthritis. There is also abductovalgus deformity of fourth   PIP joint with soft tissue swelling and no radiographic evidence of bone   erosion. Otherwise midfoot degenerative change with neuropathic changes   and vascular calcification. Moderate hallux MTP degenerative changes   present. No subcutaneous emphysema. Plantar calcaneal heel spur and   insertional Achilles enthesopathy. Plantar subcutaneous dystrophic soft   tissue calcification.    IMPRESSION: Hallux distal phalanx soft tissue ulcer without radiographic   evidence of osteomyelitis. Consider MR evaluation as clinically warranted    Fourth PIP deformity with mild soft tissue swelling and no discrete soft   tissue ulcer, correlate clinically    Hind and midfoot neuropathic arthropathy

## 2019-11-02 NOTE — DIETITIAN INITIAL EVALUATION ADULT. - NAME AND PHONE
Pt to maintain 100% po intake of meals and snacks over the next 7 days. RD to monitor energy intake, glucose and renal profiles, nutrition focused physical findings, body composition

## 2019-11-02 NOTE — CONSULT NOTE ADULT - ASSESSMENT
65 Y/O male with PMHx of DM, obesity, HTN, Glaucoma, s/p right nephrectomy for renal mass, CKD, hx of provoked DVT in the past presented to the hospital for swelling and redness of RLE X 3 days.    # RLE cellulitis   - No response to oral Bactrim   - No OM on Xray  - Responding to IV ANcef. Continue for 24 - 48 hours  - Then switch to Keflex 500 mg q 12 for 10 days.    # BATOOL on CKD III / HTN / DM / obesity

## 2019-11-03 ENCOUNTER — TRANSCRIPTION ENCOUNTER (OUTPATIENT)
Age: 64
End: 2019-11-03

## 2019-11-03 VITALS
TEMPERATURE: 96 F | DIASTOLIC BLOOD PRESSURE: 60 MMHG | HEART RATE: 75 BPM | RESPIRATION RATE: 18 BRPM | SYSTOLIC BLOOD PRESSURE: 128 MMHG

## 2019-11-03 LAB
ANION GAP SERPL CALC-SCNC: 13 MMOL/L — SIGNIFICANT CHANGE UP (ref 7–14)
BUN SERPL-MCNC: 39 MG/DL — HIGH (ref 10–20)
CALCIUM SERPL-MCNC: 8.1 MG/DL — LOW (ref 8.5–10.1)
CHLORIDE SERPL-SCNC: 107 MMOL/L — SIGNIFICANT CHANGE UP (ref 98–110)
CO2 SERPL-SCNC: 18 MMOL/L — SIGNIFICANT CHANGE UP (ref 17–32)
CREAT SERPL-MCNC: 2.1 MG/DL — HIGH (ref 0.7–1.5)
GLUCOSE BLDC GLUCOMTR-MCNC: 137 MG/DL — HIGH (ref 70–99)
GLUCOSE BLDC GLUCOMTR-MCNC: 153 MG/DL — HIGH (ref 70–99)
GLUCOSE SERPL-MCNC: 130 MG/DL — HIGH (ref 70–99)
HCV AB S/CO SERPL IA: 0.11 S/CO — SIGNIFICANT CHANGE UP (ref 0–0.99)
HCV AB SERPL-IMP: SIGNIFICANT CHANGE UP
POTASSIUM SERPL-MCNC: 4.5 MMOL/L — SIGNIFICANT CHANGE UP (ref 3.5–5)
POTASSIUM SERPL-SCNC: 4.5 MMOL/L — SIGNIFICANT CHANGE UP (ref 3.5–5)
SODIUM SERPL-SCNC: 138 MMOL/L — SIGNIFICANT CHANGE UP (ref 135–146)

## 2019-11-03 PROCEDURE — 99233 SBSQ HOSP IP/OBS HIGH 50: CPT

## 2019-11-03 RX ORDER — CEPHALEXIN 500 MG
1 CAPSULE ORAL
Qty: 20 | Refills: 0
Start: 2019-11-03 | End: 2019-11-12

## 2019-11-03 RX ADMIN — INSULIN GLARGINE 70 UNIT(S): 100 INJECTION, SOLUTION SUBCUTANEOUS at 07:53

## 2019-11-03 RX ADMIN — DORZOLAMIDE HYDROCHLORIDE TIMOLOL MALEATE 1 DROP(S): 20; 5 SOLUTION/ DROPS OPHTHALMIC at 05:20

## 2019-11-03 RX ADMIN — Medication 2: at 07:52

## 2019-11-03 RX ADMIN — AMLODIPINE BESYLATE 10 MILLIGRAM(S): 2.5 TABLET ORAL at 05:22

## 2019-11-03 RX ADMIN — SODIUM CHLORIDE 75 MILLILITER(S): 9 INJECTION INTRAMUSCULAR; INTRAVENOUS; SUBCUTANEOUS at 07:53

## 2019-11-03 RX ADMIN — HEPARIN SODIUM 5000 UNIT(S): 5000 INJECTION INTRAVENOUS; SUBCUTANEOUS at 05:22

## 2019-11-03 RX ADMIN — Medication 100 MILLIGRAM(S): at 05:22

## 2019-11-03 RX ADMIN — Medication 30 UNIT(S): at 07:52

## 2019-11-03 RX ADMIN — Medication 30 UNIT(S): at 11:47

## 2019-11-03 NOTE — DISCHARGE NOTE PROVIDER - NSDCCPCAREPLAN_GEN_ALL_CORE_FT
PRINCIPAL DISCHARGE DIAGNOSIS  Diagnosis: Cellulitis of leg, right  Assessment and Plan of Treatment: You were diagnosied with cellulitis of your right leg. This is a soft tissue infection, and has been improving with antibiotics. There were no blood clots found in your legs, and your xrays were negative for evidence of infection in your bones. Please continue to take the Keflex two times daily for the next 10 days. Please follow up with your PCP to ensure resolution of the infection and for further management.      SECONDARY DISCHARGE DIAGNOSES  Diagnosis: BATOOL (acute kidney injury)  Assessment and Plan of Treatment: You were noted to have a temporary insult to your kidney function either at the time that you arrived at the hospital or during your stay here. We have monitored your kidney function with blood work during your time here and you are at a level that no longer requires continued hospital level care, but we do recommend that you follow up to continually have your kidney function checked. You can follow up with your primary care doctor, or, if recommended in the discharge paperwork, you should follow up with a Kidney specialist called a Nephrologist.    Diagnosis: Type 2 diabetes mellitus  Assessment and Plan of Treatment: Good control of your blood sugar is important to prevent future infections, including osteomyelitis, an infection of the bone, as well as additional comorbidities associated with diabetes and hyperglycemia. Please continue to follow up with the healthcare provider who manages your diabetes. Please follow up with your podiatrist and your ophthalmologist per routine.    Diagnosis: HTN (hypertension)  Assessment and Plan of Treatment: Hypertension, commonly called high blood pressure, is when the force of blood pumping through your arteries is too strong. Hypertension forces your heart to work harder to pump blood. Your arteries may become narrow or stiff. Having untreated or uncontrolled hypertension for a long period of time can cause heart attack, stroke, kidney disease, and other problems. If started on a medication, take exactly as prescribed by your health care professional. Maintain a healthy lifestyle and follow up with your primary care physician.  SEEK IMMEDIATE MEDICAL CARE IF YOU HAVE ANY OF THE FOLLOWING SYMPTOMS: severe headache, confusion, chest pain, abdominal pain, vomiting, or shortness of breath.

## 2019-11-03 NOTE — PROGRESS NOTE ADULT - SUBJECTIVE AND OBJECTIVE BOX
Hospital Day:  2d    Subjective:    Patient is a 64y old  Male who presents with a chief complaint of right lower extremity swelling and redness. (2019 17:05)    There were no acute overnight events. The patient was seen and examined at the bedside. No complaints. Denies fever, chills, headache, dizziness, chest pain, palpitations, shortness of breath, abdominal pain, nausea, vomiting, diarrhea.    Past Medical Hx:   CKD (chronic kidney disease)  DVT (deep venous thrombosis)  Glaucoma  HTN (hypertension)  Diabetes mellitus    Past Sx:  H/O right nephrectomy    Allergies:  No Known Allergies    Current Meds:   Standng Meds:  amLODIPine   Tablet 10 milliGRAM(s) Oral daily  atorvastatin 20 milliGRAM(s) Oral at bedtime  ceFAZolin   IVPB 1000 milliGRAM(s) IV Intermittent every 12 hours  ceFAZolin   IVPB      dextrose 5%. 1000 milliLiter(s) (50 mL/Hr) IV Continuous <Continuous>  dextrose 50% Injectable 12.5 Gram(s) IV Push once  dorzolamide 2%/timolol 0.5% Ophthalmic Solution 1 Drop(s) Both EYES two times a day  heparin  Injectable 5000 Unit(s) SubCutaneous every 8 hours  influenza   Vaccine 0.5 milliLiter(s) IntraMuscular once  insulin glargine Injectable (LANTUS) 70 Unit(s) SubCutaneous every morning  insulin glargine Injectable (LANTUS) 40 Unit(s) SubCutaneous at bedtime  insulin lispro (HumaLOG) corrective regimen sliding scale   SubCutaneous three times a day before meals  insulin lispro Injectable (HumaLOG) 30 Unit(s) SubCutaneous three times a day before meals  sodium chloride 0.9%. 1000 milliLiter(s) (75 mL/Hr) IV Continuous <Continuous>    PRN Meds:  dextrose 40% Gel 15 Gram(s) Oral once PRN Blood Glucose LESS THAN 70 milliGRAM(s)/deciliter  glucagon  Injectable 1 milliGRAM(s) IntraMuscular once PRN Glucose LESS THAN 70 milligrams/deciliter    HOME MEDICATIONS:  amlodipine-olmesartan 5 mg-40 mg oral tablet: 1 tab(s) orally once a day  atorvastatin 20 mg oral tablet: 1 tab(s) orally once a day  Cosopt 2.23%-0.68% ophthalmic solution: 1 drop(s) to each affected eye 2 times a day  HumaLOG 100 units/mL subcutaneous solution: 30 unit(s) subcutaneous 3 times a day (before meals)  insulin glargine 100 units/mL subcutaneous solution: 70 units in morning and 40 units at night      Vital Signs:   T(F): 97.4 (19 @ 05:30), Max: 97.8 (19 @ 21:31)  HR: 71 (19 @ 05:30) (71 - 75)  BP: 123/64 (19 @ 05:30) (123/64 - 144/72)  RR: 18 (19 @ 05:30) (18 - 18)  SpO2: --        Physical Exam:   General: Patient resting comfortably in bed, NAD  HEENT: NCAT, conjunctiva clear, sclera white, PEERLA, EOMI, moist mucous membranes, normal orophaynx  Neck: No masses, no JVD, no cervical lymphadenopathy  Respiratory: good inspiratory effort; lungs clear to auscultation bilaterally  CV: Normal rate, regular rhythm, normal S1/S2, no rubs, gallops, murmurs  GI: abdomen soft, non-tender, non-distended, no masses, normal bowel sounds  Extremities: Well-perfused, no clubbing, no cyanosis, no lower extremity edema bilaterally  Skin: warm and dry  Neurology: AAOx3, mentating appropriately, follows commands, nonfocal    Labs:                         12.1   8.70  )-----------( 167      ( 2019 06:07 )             37.7       2019 06:07    138    |  104    |  39     ----------------------------<  175    4.7     |  20     |  2.2      Ca    8.5        2019 06:07  Mg     1.9       2019 06:07    TPro  6.5    /  Alb  3.5    /  TBili  0.5    /  DBili  x      /  AST  23     /  ALT  17     /  AlkPhos  52     2019 06:07                    Urinalysis Basic - ( 2019 07:10 )    Color: Yellow / Appearance: Clear / S.018 / pH: x  Gluc: x / Ketone: Trace  / Bili: Negative / Urobili: <2 mg/dL   Blood: x / Protein: 100 mg/dL / Nitrite: Negative   Leuk Esterase: Negative / RBC: 6 /HPF / WBC 1 /HPF   Sq Epi: x / Non Sq Epi: 2 /HPF / Bacteria: Negative          Culture - Blood (collected 19 @ 13:40)  Source: .Blood Blood  Preliminary Report (19 @ 23:01):    No growth to date.    Culture - Blood (collected 19 @ 13:40)  Source: .Blood Blood  Preliminary Report (19 @ 23:01):    No growth to date.        Radiology:   < from: US Retroperitoneal Complete (19 @ 10:11) >    EXAM:  US RETROPERITONEAL COMPLETE            PROCEDURE DATE:  2019    < from: [facility] Duplex Lower Ext Vein Scan, Bilat (19 @ 15:37) >    ******PRELIMINARY REPORT******    ******PRELIMINARY REPORT******          EXAM:  DUPLEX SCAN EXT VEINS LOWER BI            PROCEDURE DATE:  2019          ******PRELIMINARY REPORT******    ******PRELIMINARY REPORT******          INTERPRETATION:  Clinical History / Reason for exam: The patient is a   64-year-old male with lower extremity swelling. A venous duplex   examination was performed to evaluate the patient for deep venous   thrombosis of the lower extremities.    The common femoral,great saphenous, femoral, popliteal and small   saphenous veins were visualized bilaterally with no evidence of deep   venous thrombosis    All veins were fully compressible.  There was presence of spontaneous   flow, augmentation with distal compression and phasicity.    The anterior tibial veins were patent bilaterally    The posterior tibial veins were patent bilaterally     The peroneal veins were patent bilaterally    Impression:    No evidence of deep venous thrombosis or superficial thrombophlebitis in   the bilateral lower extremities.    ICD-10:M79.89    < from: Xray Foot AP + Lateral + Oblique, Right (19 @ 21:51) >    EXAM:  XR FOOT COMP MIN 3 VIEWS RT            PROCEDURE DATE:  2019            INTERPRETATION:  Clinical History / Reason for exam: Evaluate for septic   arthritis, diabetic foot  TECHNIQUE:3 views of the right foot    FINDINGS: Apparent softtissue ulcer present along the medial distal   border of hallux. No definite bone erosion or joint space narrowing to   suggest septic arthritis. There is also abductovalgus deformity of fourth   PIP joint with soft tissue swelling and no radiographic evidence of bone   erosion. Otherwise midfoot degenerative change with neuropathic changes   and vascular calcification. Moderate hallux MTP degenerative changes   present. No subcutaneous emphysema. Plantar calcaneal heel spur and   insertional Achilles enthesopathy. Plantar subcutaneous dystrophic soft   tissue calcification.    IMPRESSION: Hallux distal phalanx soft tissue ulcer without radiographic   evidence of osteomyelitis. Consider MR evaluation as clinically warranted    Fourth PIP deformity with mild soft tissue swelling and no discrete soft   tissue ulcer, correlate clinically    Hind and midfoot neuropathic arthropathy                   ADRIANA KEITA M.D., ATTENDING RADIOLOGIST  This document has been electronically signed. 2019  1:57PM                < end of copied text >          ******PRELIMINARY REPORT******    ******PRELIMINARY REPORT******          JARET KING M.D., RESIDENT RADIOLOGIST                      < end of copied text >          INTERPRETATION:  CLINICAL HISTORY: Acute versus chronic kidney injury.   Right nephrectomy    COMPARISON: Note is made of CT abdomen and pelvis dated 4/3/2017    PROCEDURE: Retroperitoneal Ultrasound was performed.    FINDINGS:    RIGHT KIDNEY: Right nephrectomy.    LEFT KIDNEY: Normal in echogenicity, size measuring 16 cm in length.   There is a duplicated left renal collecting system. There is no   hydronephrosis. There is a staghorn calculus of the lower pole moiety   measuring at least 2.8 cm. 3.5 cm lower pole cyst.    URINARY BLADDER: The urinary bladder is empty, limiting evaluation.    IMPRESSION:    Duplicated left renal collecting system with staghorn calculus of the   lower pole moiety, similar to prior CT examination. No hydronephrosis.   Right nephrectomy.                  KELY HASKINS M.D., ATTENDING RADIOLOGIST  This document has been electronically signed. 2019 11:23AM                < end of copied text > Hospital Day:  2d    Subjective:    Patient is a 64y old  Male who presents with a chief complaint of right lower extremity swelling and redness. (2019 17:05)    There were no acute overnight events. The patient was seen and examined at the bedside. Patient reports feeling better. He was able to ambulate yesterday with minimal discomfort. Denies fever, chills, headache, dizziness, chest pain, palpitations, shortness of breath, abdominal pain, nausea, vomiting, diarrhea.    Past Medical Hx:   CKD (chronic kidney disease)  DVT (deep venous thrombosis)  Glaucoma  HTN (hypertension)  Diabetes mellitus    Past Sx:  H/O right nephrectomy    Allergies:  No Known Allergies    Current Meds:   Standng Meds:  amLODIPine   Tablet 10 milliGRAM(s) Oral daily  atorvastatin 20 milliGRAM(s) Oral at bedtime  ceFAZolin   IVPB 1000 milliGRAM(s) IV Intermittent every 12 hours  ceFAZolin   IVPB      dextrose 5%. 1000 milliLiter(s) (50 mL/Hr) IV Continuous <Continuous>  dextrose 50% Injectable 12.5 Gram(s) IV Push once  dorzolamide 2%/timolol 0.5% Ophthalmic Solution 1 Drop(s) Both EYES two times a day  heparin  Injectable 5000 Unit(s) SubCutaneous every 8 hours  influenza   Vaccine 0.5 milliLiter(s) IntraMuscular once  insulin glargine Injectable (LANTUS) 70 Unit(s) SubCutaneous every morning  insulin glargine Injectable (LANTUS) 40 Unit(s) SubCutaneous at bedtime  insulin lispro (HumaLOG) corrective regimen sliding scale   SubCutaneous three times a day before meals  insulin lispro Injectable (HumaLOG) 30 Unit(s) SubCutaneous three times a day before meals  sodium chloride 0.9%. 1000 milliLiter(s) (75 mL/Hr) IV Continuous <Continuous>    PRN Meds:  dextrose 40% Gel 15 Gram(s) Oral once PRN Blood Glucose LESS THAN 70 milliGRAM(s)/deciliter  glucagon  Injectable 1 milliGRAM(s) IntraMuscular once PRN Glucose LESS THAN 70 milligrams/deciliter    HOME MEDICATIONS:  amlodipine-olmesartan 5 mg-40 mg oral tablet: 1 tab(s) orally once a day  atorvastatin 20 mg oral tablet: 1 tab(s) orally once a day  Cosopt 2.23%-0.68% ophthalmic solution: 1 drop(s) to each affected eye 2 times a day  HumaLOG 100 units/mL subcutaneous solution: 30 unit(s) subcutaneous 3 times a day (before meals)  insulin glargine 100 units/mL subcutaneous solution: 70 units in morning and 40 units at night      Vital Signs:   T(F): 97.4 (19 @ 05:30), Max: 97.8 (19 @ 21:31)  HR: 71 (19 @ 05:30) (71 - 75)  BP: 123/64 (19 @ 05:30) (123/64 - 144/72)  RR: 18 (19 @ 05:30) (18 - 18)  SpO2: --        Physical Exam:   General: Patient resting comfortably in bed, morbidly obses, NAD  HEENT: NCAT, conjunctiva clear, sclera white, PEERLA, EOMI, moist mucous membranes, normal orophaynx  Neck: No masses, no JVD, no cervical lymphadenopathy  Respiratory: lungs clear to auscultation bilaterally  CV: Normal rate, regular rhythm, normal S1/S2, no rubs, gallops, murmurs  GI: abdomen obese, soft, non-tender, non-distended, normal bowel sounds  Extremities: Well-perfused, no clubbing, no cyanosis; no LLE edema; resolving erythema, edema in RLE  Skin: warm and dry  Neurology: AAOx3, mentating appropriately, follows commands, nonfocal    Labs:                         12.1   8.70  )-----------( 167      ( 2019 06:07 )             37.7       2019 06:07    138    |  104    |  39     ----------------------------<  175    4.7     |  20     |  2.2      Ca    8.5        2019 06:07  Mg     1.9       2019 06:07    TPro  6.5    /  Alb  3.5    /  TBili  0.5    /  DBili  x      /  AST  23     /  ALT  17     /  AlkPhos  52     2019 06:07                    Urinalysis Basic - ( 2019 07:10 )    Color: Yellow / Appearance: Clear / S.018 / pH: x  Gluc: x / Ketone: Trace  / Bili: Negative / Urobili: <2 mg/dL   Blood: x / Protein: 100 mg/dL / Nitrite: Negative   Leuk Esterase: Negative / RBC: 6 /HPF / WBC 1 /HPF   Sq Epi: x / Non Sq Epi: 2 /HPF / Bacteria: Negative          Culture - Blood (collected 19 @ 13:40)  Source: .Blood Blood  Preliminary Report (19 @ 23:01):    No growth to date.    Culture - Blood (collected 19 @ 13:40)  Source: .Blood Blood  Preliminary Report (19 @ 23:01):    No growth to date.        Radiology:   < from: US Retroperitoneal Complete (19 @ 10:11) >    EXAM:  US RETROPERITONEAL COMPLETE            PROCEDURE DATE:  2019    < from: [facility] Duplex Lower Ext Vein Scan, Bilat (19 @ 15:37) >    ******PRELIMINARY REPORT******    ******PRELIMINARY REPORT******          EXAM:  DUPLEX SCAN EXT VEINS LOWER BI            PROCEDURE DATE:  2019          ******PRELIMINARY REPORT******    ******PRELIMINARY REPORT******          INTERPRETATION:  Clinical History / Reason for exam: The patient is a   64-year-old male with lower extremity swelling. A venous duplex   examination was performed to evaluate the patient for deep venous   thrombosis of the lower extremities.    The common femoral,great saphenous, femoral, popliteal and small   saphenous veins were visualized bilaterally with no evidence of deep   venous thrombosis    All veins were fully compressible.  There was presence of spontaneous   flow, augmentation with distal compression and phasicity.    The anterior tibial veins were patent bilaterally    The posterior tibial veins were patent bilaterally     The peroneal veins were patent bilaterally    Impression:    No evidence of deep venous thrombosis or superficial thrombophlebitis in   the bilateral lower extremities.    ICD-10:M79.89    < from: Xray Foot AP + Lateral + Oblique, Right (19 @ 21:51) >    EXAM:  XR FOOT COMP MIN 3 VIEWS RT            PROCEDURE DATE:  2019            INTERPRETATION:  Clinical History / Reason for exam: Evaluate for septic   arthritis, diabetic foot  TECHNIQUE:3 views of the right foot    FINDINGS: Apparent softtissue ulcer present along the medial distal   border of hallux. No definite bone erosion or joint space narrowing to   suggest septic arthritis. There is also abductovalgus deformity of fourth   PIP joint with soft tissue swelling and no radiographic evidence of bone   erosion. Otherwise midfoot degenerative change with neuropathic changes   and vascular calcification. Moderate hallux MTP degenerative changes   present. No subcutaneous emphysema. Plantar calcaneal heel spur and   insertional Achilles enthesopathy. Plantar subcutaneous dystrophic soft   tissue calcification.    IMPRESSION: Hallux distal phalanx soft tissue ulcer without radiographic   evidence of osteomyelitis. Consider MR evaluation as clinically warranted    Fourth PIP deformity with mild soft tissue swelling and no discrete soft   tissue ulcer, correlate clinically    Hind and midfoot neuropathic arthropathy                   ADRIANA KEITA M.D., ATTENDING RADIOLOGIST  This document has been electronically signed. 2019  1:57PM                < end of copied text >          ******PRELIMINARY REPORT******    ******PRELIMINARY REPORT******          JARET KING M.D., RESIDENT RADIOLOGIST                      < end of copied text >          INTERPRETATION:  CLINICAL HISTORY: Acute versus chronic kidney injury.   Right nephrectomy    COMPARISON: Note is made of CT abdomen and pelvis dated 4/3/2017    PROCEDURE: Retroperitoneal Ultrasound was performed.    FINDINGS:    RIGHT KIDNEY: Right nephrectomy.    LEFT KIDNEY: Normal in echogenicity, size measuring 16 cm in length.   There is a duplicated left renal collecting system. There is no   hydronephrosis. There is a staghorn calculus of the lower pole moiety   measuring at least 2.8 cm. 3.5 cm lower pole cyst.    URINARY BLADDER: The urinary bladder is empty, limiting evaluation.    IMPRESSION:    Duplicated left renal collecting system with staghorn calculus of the   lower pole moiety, similar to prior CT examination. No hydronephrosis.   Right nephrectomy.                  KELY HASKINS M.D., ATTENDING RADIOLOGIST  This document has been electronically signed. 2019 11:23AM                < end of copied text >

## 2019-11-03 NOTE — DISCHARGE NOTE PROVIDER - HOSPITAL COURSE
63 Y/O male with PMHx of DM, HTN, Glaucoma, s/p right nephrectomy for renal mass, CKD, hx of provoked DVT in the past presented to the hospital for swelling and redness in his RLE X 3 days. Per pt he has chronic callus formation in his RLE and follows up with podiatry for that. Last time he followed up was 1 month ago and they removed the callous. Per pt he noticed increased swelling in his RLE which started 3 days ago, swelling was up to his right knee with some associated redness and chills but no fever so he went to Urgent care and he was given bactrim. His swelling did not improve so he came to the ER. The patient was admitted for RLE cellulitis. He improved on IV Ancef and is currently stable for discharge on Keflex 500 mg q 12 for 10 days.

## 2019-11-03 NOTE — DISCHARGE NOTE PROVIDER - CARE PROVIDER_API CALL
Abdon Dyson)  Family Medicine  [address] [place] [address]  Phone: [phone]  Fax: [phone]  Follow Up Time: 2 weeks
none

## 2019-11-03 NOTE — PROGRESS NOTE ADULT - ASSESSMENT
65 Y/O male with PMHx of DM, HTN, Glaucoma, s/p right nephrectomy for renal mass, CKD, hx of provoked DVT in the past presented to the hospital for swelling and redness in his RLE X 3 days.    # RLE swelling with associated redness and chronic callus formation.  - likely cellulitis, ON Ancef 1gm BID (renal dose) >> improving clinically.  - no pain or decreased ROM (diabetic)  -  xray right foot  >> NO OM, No effusion  - LE duplex  > Neg for DVT  - will switch to PO abx and do d/c planning.     # BATOOL on CKD III  - cret 2.4 >> 2.2 yesterday , f/u BMP today.    baseline creatinine 1.8  - keep  Holding  ARB (olmesartan)  -  retroperitoneal sono >> no hydro , left staghorn calculi,  - on IV hydration .     # HTN  - Increase Amlodipine to 10mg qd, add hydralazine if needed  - hold ARBs.    # DM on Insulin at home  - c/w lantus/lispro/correctional scale  - monitor fingerstick AC+HS    # Glaucoma  - c/w cosopt 1 drop BID    # Morbid obesity >> Counselled about life style modification.     DVT PPx: Hep 5000 s/c   GI PPX: not indicated  Diet: DASH/Carb consistent  Activity: Increase as tolerated  Dispo: from home, no needs  Code Status: full code    #Progress Note Handoff  Pending (specify):  BATOOL/   Family discussion: na, d/w the patient.   Disposition: Home_

## 2019-11-03 NOTE — DISCHARGE NOTE NURSING/CASE MANAGEMENT/SOCIAL WORK - PATIENT PORTAL LINK FT
You can access the FollowMyHealth Patient Portal offered by [facility] by registering at the following website: [email or web address]. By joining [facility]’s FollowMyHealth portal, you will also be able to view your health information using other applications (apps) compatible with our system.

## 2019-11-03 NOTE — PROGRESS NOTE ADULT - SUBJECTIVE AND OBJECTIVE BOX
JOSHUA HAM  64y  Male      Patient is a 64y old  Male who presents with a chief complaint of right lower extremity swelling and redness. (2019 08:55)      INTERVAL HPI/OVERNIGHT EVENTS:      ******************************* REVIEW OF SYSTEMS:**********************************************    keeps feeling better.   All other review of systems negative    *********************** VITALS ******************************************    T(F): 97.4 (19 @ 05:30)  HR: 71 (19 @ 05:30) (71 - 75)  BP: 123/64 (19 @ 05:30) (123/64 - 144/72)  RR: 18 (19 @ 05:30) (18 - 18)  SpO2: --            ******************************** PHYSICAL EXAM:**************************************************  GENERAL: NAD    PSYCH: no agitation, baseline mentation  HEENT:     NERVOUS SYSTEM:  Alert & Oriented X3,   PULMONARY: ALEX, CTA    CARDIOVASCULAR: S1S2 RRR    GI: Soft, NT, ND; BS present.    EXTREMITIES: improving RLE edema/erythema with dry callus over tip of hallux    LYMPH: No lymphadenopathy noted    SKIN: as above    ******************************************************************************************        **************************** LABS *******************************************************                          12.1   8.70  )-----------( 167      ( 2019 06:07 )             37.7         138  |  104  |  39<H>  ----------------------------<  175<H>  4.7   |  20  |  2.2<H>    Ca    8.5      2019 06:07  Mg     1.9         TPro  6.5  /  Alb  3.5  /  TBili  0.5  /  DBili  x   /  AST  23  /  ALT  17  /  AlkPhos  52        Urinalysis Basic - ( 2019 07:10 )    Color: Yellow / Appearance: Clear / S.018 / pH: x  Gluc: x / Ketone: Trace  / Bili: Negative / Urobili: <2 mg/dL   Blood: x / Protein: 100 mg/dL / Nitrite: Negative   Leuk Esterase: Negative / RBC: 6 /HPF / WBC 1 /HPF   Sq Epi: x / Non Sq Epi: 2 /HPF / Bacteria: Negative      PT/INR - ( 2019 13:36 )   PT: 13.20 sec;   INR: 1.15 ratio         PTT - ( 2019 13:36 )  PTT:28.4 sec  Lactate Trend   @ 06:07 Lactate:0.9    @ 13:36 Lactate:1.5         CAPILLARY BLOOD GLUCOSE      POCT Blood Glucose.: 153 mg/dL (2019 07:49)          **************************Active Medications *******************************************  No Known Allergies      amLODIPine   Tablet 10 milliGRAM(s) Oral daily  atorvastatin 20 milliGRAM(s) Oral at bedtime  ceFAZolin   IVPB 1000 milliGRAM(s) IV Intermittent every 12 hours  ceFAZolin   IVPB      dextrose 40% Gel 15 Gram(s) Oral once PRN  dextrose 5%. 1000 milliLiter(s) IV Continuous <Continuous>  dextrose 50% Injectable 12.5 Gram(s) IV Push once  dorzolamide 2%/timolol 0.5% Ophthalmic Solution 1 Drop(s) Both EYES two times a day  glucagon  Injectable 1 milliGRAM(s) IntraMuscular once PRN  heparin  Injectable 5000 Unit(s) SubCutaneous every 8 hours  influenza   Vaccine 0.5 milliLiter(s) IntraMuscular once  insulin glargine Injectable (LANTUS) 70 Unit(s) SubCutaneous every morning  insulin glargine Injectable (LANTUS) 40 Unit(s) SubCutaneous at bedtime  insulin lispro (HumaLOG) corrective regimen sliding scale   SubCutaneous three times a day before meals  insulin lispro Injectable (HumaLOG) 30 Unit(s) SubCutaneous three times a day before meals  sodium chloride 0.9%. 1000 milliLiter(s) IV Continuous <Continuous>      ***************************************************  RADIOLOGY & ADDITIONAL TESTS:    Imaging Personally Reviewed:  [ ] YES  [ ] NO    HEALTH ISSUES - PROBLEM Dx:

## 2019-11-03 NOTE — PROGRESS NOTE ADULT - ASSESSMENT
63 Y/O male with PMHx of DM, HTN, Glaucoma, s/p right nephrectomy for renal mass, CKD, hx of provoked DVT in the past presented to the hospital for swelling and redness in his RLE X 3 days.    # RLE swelling with associated redness and chronic callus formation.  - likely cellulitis, started  Ancef 1gm BID (renal dose) >> improving   - no pain or decreased ROM (diabetic)  - f/u xray right foot to r/o underlying fluid collection or effusion if present consider aspiration.  - warm compresses, leg elevation  - f/u official duplex report  - Per ID: Continue IV ANcef for 24 - 48 hours, then switch to Keflex 500 mg q 12 for 10 days.    # BATOOL on CKD III  - cret 2.4 >> 2.2 today    baseline creatinine 1.8  - Hold ARB (olmesartan)  - f/u urine lytes, avoid nephrotoxic meds  -  f/u retroperitoneal sono >> no hydro , left staghorn calculi,   - c/w  hydration with I/V fluids NS @ 75/hr  - if no improvement consider renal consult for Dr Chung    # HTN  - Increase Amlodipine to 10mg qd, add hydralazine if needed  - hold ARBs.    # DM on Insulin at home  - c/w lantus/lispro/correctional scale  - monitor fingerstick AC+HS    # Glaucoma  - c/w cosopt 1 drop BID    # Morbid obesity >> Counselled about life style modification.     DVT PPx: Hep 5000 s/c   GI PPX: not indicated  Diet: DASH/Carb consistent  Activity: Increase as tolerated  Dispo: from home, no needs  Code Status: full code 63 Y/O male with PMHx of DM, HTN, Glaucoma, s/p right nephrectomy for renal mass, CKD, hx of provoked DVT in the past presented to the hospital for swelling and redness in his RLE X 3 days.    # RLE swelling with associated redness and chronic callus formation.  - likely cellulitis, started  Ancef 1gm BID (renal dose) >> improving   - no pain or decreased ROM (diabetic)  - f/u xray right foot to r/o underlying fluid collection or effusion if present consider aspiration.  - warm compresses, leg elevation  - f/u official duplex report  - Per ID: can switch to Keflex 500 mg q 12 for 10 days.    # BATOOL on CKD III  - cret 2.4 >> 2.2 today; baseline creatinine 1.8  - Hold ARB (olmesartan)  - f/u urine lytes, avoid nephrotoxic meds  - f/u retroperitoneal sono >> no hydro , left staghorn calculi,   - c/w  hydration with I/V fluids NS @ 75/hr  - if no improvement consider renal consult for Dr Chung    # HTN  - continue Amlodipine to 10mg qd  - currently normotensive on increased dose  - add hydralazine if needed  - hold ARBs.    # DM on Insulin at home  - c/w lantus/lispro/correctional scale  - monitor fingerstick AC+HS    # Glaucoma  - c/w cosopt 1 drop BID    # Morbid obesity >> Counselled about life style modification.     DVT PPx: Hep 5000 s/c   GI PPX: not indicated  Diet: DASH/Carb consistent  Activity: Increase as tolerated  Dispo: from home, no needs  Code Status: full code

## 2019-11-05 LAB
ESTIMATED AVERAGE GLUCOSE: 186 MG/DL — HIGH (ref 68–114)
HBA1C BLD-MCNC: 8.1 % — HIGH (ref 4–5.6)

## 2019-11-06 DIAGNOSIS — Z79.4 LONG TERM (CURRENT) USE OF INSULIN: ICD-10-CM

## 2019-11-06 DIAGNOSIS — E66.01 MORBID (SEVERE) OBESITY DUE TO EXCESS CALORIES: ICD-10-CM

## 2019-11-06 DIAGNOSIS — H40.9 UNSPECIFIED GLAUCOMA: ICD-10-CM

## 2019-11-06 DIAGNOSIS — E11.22 TYPE 2 DIABETES MELLITUS WITH DIABETIC CHRONIC KIDNEY DISEASE: ICD-10-CM

## 2019-11-06 DIAGNOSIS — Z90.5 ACQUIRED ABSENCE OF KIDNEY: ICD-10-CM

## 2019-11-06 DIAGNOSIS — Z86.718 PERSONAL HISTORY OF OTHER VENOUS THROMBOSIS AND EMBOLISM: ICD-10-CM

## 2019-11-06 DIAGNOSIS — L03.115 CELLULITIS OF RIGHT LOWER LIMB: ICD-10-CM

## 2019-11-06 DIAGNOSIS — I12.9 HYPERTENSIVE CHRONIC KIDNEY DISEASE WITH STAGE 1 THROUGH STAGE 4 CHRONIC KIDNEY DISEASE, OR UNSPECIFIED CHRONIC KIDNEY DISEASE: ICD-10-CM

## 2019-11-06 DIAGNOSIS — N17.9 ACUTE KIDNEY FAILURE, UNSPECIFIED: ICD-10-CM

## 2019-11-06 DIAGNOSIS — N18.3 CHRONIC KIDNEY DISEASE, STAGE 3 (MODERATE): ICD-10-CM

## 2019-11-06 LAB
CULTURE RESULTS: SIGNIFICANT CHANGE UP
CULTURE RESULTS: SIGNIFICANT CHANGE UP
SPECIMEN SOURCE: SIGNIFICANT CHANGE UP
SPECIMEN SOURCE: SIGNIFICANT CHANGE UP

## 2020-05-20 PROBLEM — I82.409 ACUTE EMBOLISM AND THROMBOSIS OF UNSPECIFIED DEEP VEINS OF UNSPECIFIED LOWER EXTREMITY: Chronic | Status: ACTIVE | Noted: 2019-11-01

## 2020-05-20 PROBLEM — N18.9 CHRONIC KIDNEY DISEASE, UNSPECIFIED: Chronic | Status: ACTIVE | Noted: 2019-11-01

## 2020-05-20 PROBLEM — I10 ESSENTIAL (PRIMARY) HYPERTENSION: Chronic | Status: ACTIVE | Noted: 2019-11-01

## 2020-05-20 PROBLEM — E11.9 TYPE 2 DIABETES MELLITUS WITHOUT COMPLICATIONS: Chronic | Status: ACTIVE | Noted: 2019-11-01

## 2020-05-20 PROBLEM — H40.9 UNSPECIFIED GLAUCOMA: Chronic | Status: ACTIVE | Noted: 2019-11-01

## 2020-08-13 ENCOUNTER — APPOINTMENT (OUTPATIENT)
Dept: OTOLARYNGOLOGY | Facility: CLINIC | Age: 65
End: 2020-08-13

## 2020-11-10 PROBLEM — Z00.00 ENCOUNTER FOR PREVENTIVE HEALTH EXAMINATION: Status: ACTIVE | Noted: 2020-11-10

## 2020-11-16 ENCOUNTER — APPOINTMENT (OUTPATIENT)
Dept: VASCULAR SURGERY | Facility: CLINIC | Age: 65
End: 2020-11-16
Payer: MEDICARE

## 2020-11-16 VITALS
DIASTOLIC BLOOD PRESSURE: 78 MMHG | WEIGHT: 315 LBS | SYSTOLIC BLOOD PRESSURE: 135 MMHG | HEIGHT: 70 IN | BODY MASS INDEX: 45.1 KG/M2 | TEMPERATURE: 97.1 F

## 2020-11-16 DIAGNOSIS — E11.9 TYPE 2 DIABETES MELLITUS W/OUT COMPLICATIONS: ICD-10-CM

## 2020-11-16 DIAGNOSIS — Z85.528 PERSONAL HISTORY OF OTHER MALIGNANT NEOPLASM OF KIDNEY: ICD-10-CM

## 2020-11-16 DIAGNOSIS — I10 ESSENTIAL (PRIMARY) HYPERTENSION: ICD-10-CM

## 2020-11-16 DIAGNOSIS — E78.00 PURE HYPERCHOLESTEROLEMIA, UNSPECIFIED: ICD-10-CM

## 2020-11-16 DIAGNOSIS — M79.89 OTHER SPECIFIED SOFT TISSUE DISORDERS: ICD-10-CM

## 2020-11-16 PROCEDURE — 99072 ADDL SUPL MATRL&STAF TM PHE: CPT

## 2020-11-16 PROCEDURE — 93970 EXTREMITY STUDY: CPT

## 2020-11-16 PROCEDURE — 99203 OFFICE O/P NEW LOW 30 MIN: CPT

## 2020-11-16 RX ORDER — BIMATOPROST 0.1 MG/ML
0.01 SOLUTION/ DROPS OPHTHALMIC
Refills: 0 | Status: ACTIVE | COMMUNITY

## 2020-11-16 RX ORDER — ATORVASTATIN CALCIUM 20 MG/1
20 TABLET, FILM COATED ORAL
Refills: 0 | Status: ACTIVE | COMMUNITY

## 2020-11-16 RX ORDER — INSULIN LISPRO 100 [IU]/ML
INJECTION, SOLUTION INTRAVENOUS; SUBCUTANEOUS
Refills: 0 | Status: ACTIVE | COMMUNITY

## 2020-11-16 RX ORDER — UBIDECARENONE 200 MG
CAPSULE ORAL
Refills: 0 | Status: ACTIVE | COMMUNITY

## 2020-11-16 RX ORDER — AMLODIPINE AND OLMESARTAN MEDOXOMIL 5; 40 MG/1; MG/1
5-40 TABLET ORAL
Refills: 0 | Status: ACTIVE | COMMUNITY

## 2020-11-16 RX ORDER — TIMOLOL MALEATE 2.5 MG/ML
0.25 SOLUTION/ DROPS OPHTHALMIC
Refills: 0 | Status: ACTIVE | COMMUNITY

## 2020-11-16 NOTE — HISTORY OF PRESENT ILLNESS
[FreeTextEntry1] : 64 y/o gentleman with morbid obesity, h/o right leg injury and hematoma evacuation several years ago, presents with chronic right leg swelling intermittently for thepast few years, now worsening on left leg also, has h/o right nephrectomy in 2017 after which he was on anticoagulation for 3 months for right leg DVT. Denies any leg pain. C/o numbness to feet and leg at times.

## 2020-12-04 ENCOUNTER — INPATIENT (INPATIENT)
Facility: HOSPITAL | Age: 65
LOS: 10 days | Discharge: HOME HEALTH | End: 2020-12-15
Attending: HOSPITALIST | Admitting: HOSPITALIST
Payer: MEDICARE

## 2020-12-04 VITALS
OXYGEN SATURATION: 99 % | HEART RATE: 126 BPM | DIASTOLIC BLOOD PRESSURE: 80 MMHG | SYSTOLIC BLOOD PRESSURE: 150 MMHG | HEIGHT: 70 IN | RESPIRATION RATE: 22 BRPM | TEMPERATURE: 103 F | WEIGHT: 315 LBS

## 2020-12-04 DIAGNOSIS — Z90.5 ACQUIRED ABSENCE OF KIDNEY: Chronic | ICD-10-CM

## 2020-12-04 DIAGNOSIS — R09.89 OTHER SPECIFIED SYMPTOMS AND SIGNS INVOLVING THE CIRCULATORY AND RESPIRATORY SYSTEMS: ICD-10-CM

## 2020-12-04 LAB
ALBUMIN SERPL ELPH-MCNC: 4.1 G/DL — SIGNIFICANT CHANGE UP (ref 3.5–5.2)
ALP SERPL-CCNC: 69 U/L — SIGNIFICANT CHANGE UP (ref 30–115)
ALT FLD-CCNC: 27 U/L — SIGNIFICANT CHANGE UP (ref 0–41)
ANION GAP SERPL CALC-SCNC: 14 MMOL/L — SIGNIFICANT CHANGE UP (ref 7–14)
APPEARANCE UR: CLEAR — SIGNIFICANT CHANGE UP
APTT BLD: 31.3 SEC — SIGNIFICANT CHANGE UP (ref 27–39.2)
APTT BLD: 31.4 SEC — SIGNIFICANT CHANGE UP (ref 27–39.2)
AST SERPL-CCNC: 31 U/L — SIGNIFICANT CHANGE UP (ref 0–41)
BACTERIA # UR AUTO: NEGATIVE — SIGNIFICANT CHANGE UP
BASE EXCESS BLDV CALC-SCNC: -4.3 MMOL/L — LOW (ref -2–2)
BASOPHILS # BLD AUTO: 0.04 K/UL — SIGNIFICANT CHANGE UP (ref 0–0.2)
BASOPHILS NFR BLD AUTO: 0.4 % — SIGNIFICANT CHANGE UP (ref 0–1)
BILIRUB SERPL-MCNC: 0.5 MG/DL — SIGNIFICANT CHANGE UP (ref 0.2–1.2)
BILIRUB UR-MCNC: NEGATIVE — SIGNIFICANT CHANGE UP
BUN SERPL-MCNC: 47 MG/DL — HIGH (ref 10–20)
CA-I SERPL-SCNC: 1.2 MMOL/L — SIGNIFICANT CHANGE UP (ref 1.12–1.3)
CALCIUM SERPL-MCNC: 9.3 MG/DL — SIGNIFICANT CHANGE UP (ref 8.5–10.1)
CHLORIDE SERPL-SCNC: 105 MMOL/L — SIGNIFICANT CHANGE UP (ref 98–110)
CO2 SERPL-SCNC: 20 MMOL/L — SIGNIFICANT CHANGE UP (ref 17–32)
COLOR SPEC: YELLOW — SIGNIFICANT CHANGE UP
CREAT SERPL-MCNC: 2.1 MG/DL — HIGH (ref 0.7–1.5)
CRP SERPL-MCNC: 0.61 MG/DL — HIGH (ref 0–0.4)
D DIMER BLD IA.RAPID-MCNC: 848 NG/ML DDU — HIGH (ref 0–230)
DIFF PNL FLD: ABNORMAL
EOSINOPHIL # BLD AUTO: 0.06 K/UL — SIGNIFICANT CHANGE UP (ref 0–0.7)
EOSINOPHIL NFR BLD AUTO: 0.6 % — SIGNIFICANT CHANGE UP (ref 0–8)
EPI CELLS # UR: 3 /HPF — SIGNIFICANT CHANGE UP (ref 0–5)
FERRITIN SERPL-MCNC: 374 NG/ML — SIGNIFICANT CHANGE UP (ref 30–400)
FIBRINOGEN PPP-MCNC: 540 MG/DL — SIGNIFICANT CHANGE UP (ref 204.4–570.6)
GAS PNL BLDV: 138 MMOL/L — SIGNIFICANT CHANGE UP (ref 136–145)
GAS PNL BLDV: SIGNIFICANT CHANGE UP
GLUCOSE BLDC GLUCOMTR-MCNC: 138 MG/DL — HIGH (ref 70–99)
GLUCOSE BLDC GLUCOMTR-MCNC: 163 MG/DL — HIGH (ref 70–99)
GLUCOSE BLDC GLUCOMTR-MCNC: 166 MG/DL — HIGH (ref 70–99)
GLUCOSE BLDC GLUCOMTR-MCNC: 216 MG/DL — HIGH (ref 70–99)
GLUCOSE SERPL-MCNC: 154 MG/DL — HIGH (ref 70–99)
GLUCOSE UR QL: SIGNIFICANT CHANGE UP
HCO3 BLDV-SCNC: 23 MMOL/L — SIGNIFICANT CHANGE UP (ref 22–29)
HCT VFR BLD CALC: 42.1 % — SIGNIFICANT CHANGE UP (ref 42–52)
HCT VFR BLDA CALC: 56.9 % — HIGH (ref 34–44)
HGB BLD CALC-MCNC: 18.6 G/DL — HIGH (ref 14–18)
HGB BLD-MCNC: 13.6 G/DL — LOW (ref 14–18)
HYALINE CASTS # UR AUTO: 6 /LPF — SIGNIFICANT CHANGE UP (ref 0–7)
IMM GRANULOCYTES NFR BLD AUTO: 0.5 % — HIGH (ref 0.1–0.3)
INR BLD: 0.96 RATIO — SIGNIFICANT CHANGE UP (ref 0.65–1.3)
KETONES UR-MCNC: SIGNIFICANT CHANGE UP
LACTATE BLDV-MCNC: 2.6 MMOL/L — HIGH (ref 0.5–1.6)
LACTATE SERPL-SCNC: 1.7 MMOL/L — SIGNIFICANT CHANGE UP (ref 0.7–2)
LACTATE SERPL-SCNC: 2.9 MMOL/L — HIGH (ref 0.7–2)
LDH SERPL L TO P-CCNC: 373 U/L — HIGH (ref 50–242)
LEUKOCYTE ESTERASE UR-ACNC: NEGATIVE — SIGNIFICANT CHANGE UP
LYMPHOCYTES # BLD AUTO: 0.65 K/UL — LOW (ref 1.2–3.4)
LYMPHOCYTES # BLD AUTO: 6 % — LOW (ref 20.5–51.1)
MCHC RBC-ENTMCNC: 29.3 PG — SIGNIFICANT CHANGE UP (ref 27–31)
MCHC RBC-ENTMCNC: 32.3 G/DL — SIGNIFICANT CHANGE UP (ref 32–37)
MCV RBC AUTO: 90.7 FL — SIGNIFICANT CHANGE UP (ref 80–94)
MONOCYTES # BLD AUTO: 0.67 K/UL — HIGH (ref 0.1–0.6)
MONOCYTES NFR BLD AUTO: 6.2 % — SIGNIFICANT CHANGE UP (ref 1.7–9.3)
NEUTROPHILS # BLD AUTO: 9.31 K/UL — HIGH (ref 1.4–6.5)
NEUTROPHILS NFR BLD AUTO: 86.3 % — HIGH (ref 42.2–75.2)
NITRITE UR-MCNC: NEGATIVE — SIGNIFICANT CHANGE UP
NRBC # BLD: 0 /100 WBCS — SIGNIFICANT CHANGE UP (ref 0–0)
NT-PROBNP SERPL-SCNC: 82 PG/ML — SIGNIFICANT CHANGE UP (ref 0–300)
PCO2 BLDV: 47 MMHG — SIGNIFICANT CHANGE UP (ref 41–51)
PH BLDV: 7.29 — SIGNIFICANT CHANGE UP (ref 7.26–7.43)
PH UR: 6 — SIGNIFICANT CHANGE UP (ref 5–8)
PLATELET # BLD AUTO: 195 K/UL — SIGNIFICANT CHANGE UP (ref 130–400)
PO2 BLDV: 27 MMHG — SIGNIFICANT CHANGE UP (ref 20–40)
POTASSIUM BLDV-SCNC: 4.6 MMOL/L — SIGNIFICANT CHANGE UP (ref 3.3–5.6)
POTASSIUM SERPL-MCNC: 5.3 MMOL/L — HIGH (ref 3.5–5)
POTASSIUM SERPL-SCNC: 5.3 MMOL/L — HIGH (ref 3.5–5)
PROT SERPL-MCNC: 7.7 G/DL — SIGNIFICANT CHANGE UP (ref 6–8)
PROT UR-MCNC: ABNORMAL
PROTHROM AB SERPL-ACNC: 11 SEC — SIGNIFICANT CHANGE UP (ref 9.95–12.87)
RAPID RVP RESULT: SIGNIFICANT CHANGE UP
RAPID RVP RESULT: SIGNIFICANT CHANGE UP
RBC # BLD: 4.64 M/UL — LOW (ref 4.7–6.1)
RBC # FLD: 13.9 % — SIGNIFICANT CHANGE UP (ref 11.5–14.5)
RBC CASTS # UR COMP ASSIST: 8 /HPF — HIGH (ref 0–4)
SAO2 % BLDV: 42 % — SIGNIFICANT CHANGE UP
SARS-COV-2 RNA SPEC QL NAA+PROBE: SIGNIFICANT CHANGE UP
SARS-COV-2 RNA SPEC QL NAA+PROBE: SIGNIFICANT CHANGE UP
SODIUM SERPL-SCNC: 139 MMOL/L — SIGNIFICANT CHANGE UP (ref 135–146)
SP GR SPEC: 1.02 — SIGNIFICANT CHANGE UP (ref 1.01–1.03)
TROPONIN T SERPL-MCNC: 0.03 NG/ML — CRITICAL HIGH
TROPONIN T SERPL-MCNC: 0.04 NG/ML — CRITICAL HIGH
UROBILINOGEN FLD QL: SIGNIFICANT CHANGE UP
WBC # BLD: 10.78 K/UL — SIGNIFICANT CHANGE UP (ref 4.8–10.8)
WBC # FLD AUTO: 10.78 K/UL — SIGNIFICANT CHANGE UP (ref 4.8–10.8)
WBC UR QL: 4 /HPF — SIGNIFICANT CHANGE UP (ref 0–5)

## 2020-12-04 PROCEDURE — 74176 CT ABD & PELVIS W/O CONTRAST: CPT | Mod: 26

## 2020-12-04 PROCEDURE — 93010 ELECTROCARDIOGRAM REPORT: CPT

## 2020-12-04 PROCEDURE — 99285 EMERGENCY DEPT VISIT HI MDM: CPT

## 2020-12-04 PROCEDURE — 78580 LUNG PERFUSION IMAGING: CPT | Mod: 26

## 2020-12-04 PROCEDURE — 99223 1ST HOSP IP/OBS HIGH 75: CPT

## 2020-12-04 PROCEDURE — 71045 X-RAY EXAM CHEST 1 VIEW: CPT | Mod: 26

## 2020-12-04 PROCEDURE — 71250 CT THORAX DX C-: CPT | Mod: 26

## 2020-12-04 RX ORDER — SODIUM CHLORIDE 9 MG/ML
1000 INJECTION, SOLUTION INTRAVENOUS
Refills: 0 | Status: DISCONTINUED | OUTPATIENT
Start: 2020-12-04 | End: 2020-12-15

## 2020-12-04 RX ORDER — HEPARIN SODIUM 5000 [USP'U]/ML
10000 INJECTION INTRAVENOUS; SUBCUTANEOUS EVERY 6 HOURS
Refills: 0 | Status: DISCONTINUED | OUTPATIENT
Start: 2020-12-04 | End: 2020-12-04

## 2020-12-04 RX ORDER — SODIUM CHLORIDE 9 MG/ML
1000 INJECTION INTRAMUSCULAR; INTRAVENOUS; SUBCUTANEOUS
Refills: 0 | Status: DISCONTINUED | OUTPATIENT
Start: 2020-12-04 | End: 2020-12-07

## 2020-12-04 RX ORDER — ATORVASTATIN CALCIUM 80 MG/1
20 TABLET, FILM COATED ORAL AT BEDTIME
Refills: 0 | Status: DISCONTINUED | OUTPATIENT
Start: 2020-12-04 | End: 2020-12-05

## 2020-12-04 RX ORDER — ACETAMINOPHEN 500 MG
650 TABLET ORAL ONCE
Refills: 0 | Status: COMPLETED | OUTPATIENT
Start: 2020-12-04 | End: 2020-12-04

## 2020-12-04 RX ORDER — AMLODIPINE BESYLATE 2.5 MG/1
5 TABLET ORAL DAILY
Refills: 0 | Status: DISCONTINUED | OUTPATIENT
Start: 2020-12-04 | End: 2020-12-11

## 2020-12-04 RX ORDER — PANTOPRAZOLE SODIUM 20 MG/1
40 TABLET, DELAYED RELEASE ORAL
Refills: 0 | Status: DISCONTINUED | OUTPATIENT
Start: 2020-12-04 | End: 2020-12-15

## 2020-12-04 RX ORDER — INSULIN LISPRO 100/ML
VIAL (ML) SUBCUTANEOUS
Refills: 0 | Status: DISCONTINUED | OUTPATIENT
Start: 2020-12-04 | End: 2020-12-15

## 2020-12-04 RX ORDER — DEXTROSE 50 % IN WATER 50 %
25 SYRINGE (ML) INTRAVENOUS ONCE
Refills: 0 | Status: DISCONTINUED | OUTPATIENT
Start: 2020-12-04 | End: 2020-12-15

## 2020-12-04 RX ORDER — HEPARIN SODIUM 5000 [USP'U]/ML
1000 INJECTION INTRAVENOUS; SUBCUTANEOUS
Qty: 25000 | Refills: 0 | Status: DISCONTINUED | OUTPATIENT
Start: 2020-12-04 | End: 2020-12-04

## 2020-12-04 RX ORDER — DEXTROSE 50 % IN WATER 50 %
15 SYRINGE (ML) INTRAVENOUS ONCE
Refills: 0 | Status: DISCONTINUED | OUTPATIENT
Start: 2020-12-04 | End: 2020-12-15

## 2020-12-04 RX ORDER — INSULIN LISPRO 100/ML
14 VIAL (ML) SUBCUTANEOUS
Refills: 0 | Status: DISCONTINUED | OUTPATIENT
Start: 2020-12-04 | End: 2020-12-15

## 2020-12-04 RX ORDER — HEPARIN SODIUM 5000 [USP'U]/ML
1200 INJECTION INTRAVENOUS; SUBCUTANEOUS
Qty: 25000 | Refills: 0 | Status: DISCONTINUED | OUTPATIENT
Start: 2020-12-04 | End: 2020-12-05

## 2020-12-04 RX ORDER — CHLORHEXIDINE GLUCONATE 213 G/1000ML
1 SOLUTION TOPICAL
Refills: 0 | Status: DISCONTINUED | OUTPATIENT
Start: 2020-12-04 | End: 2020-12-15

## 2020-12-04 RX ORDER — FAMOTIDINE 10 MG/ML
20 INJECTION INTRAVENOUS ONCE
Refills: 0 | Status: COMPLETED | OUTPATIENT
Start: 2020-12-04 | End: 2020-12-04

## 2020-12-04 RX ORDER — AMLODIPINE BESYLATE AND BENAZEPRIL HYDROCHLORIDE 10; 20 MG/1; MG/1
1 CAPSULE ORAL
Qty: 0 | Refills: 0 | DISCHARGE

## 2020-12-04 RX ORDER — VANCOMYCIN HCL 1 G
1000 VIAL (EA) INTRAVENOUS ONCE
Refills: 0 | Status: COMPLETED | OUTPATIENT
Start: 2020-12-04 | End: 2020-12-04

## 2020-12-04 RX ORDER — CEFEPIME 1 G/1
2000 INJECTION, POWDER, FOR SOLUTION INTRAMUSCULAR; INTRAVENOUS ONCE
Refills: 0 | Status: COMPLETED | OUTPATIENT
Start: 2020-12-04 | End: 2020-12-04

## 2020-12-04 RX ORDER — DORZOLAMIDE HYDROCHLORIDE TIMOLOL MALEATE 20; 5 MG/ML; MG/ML
1 SOLUTION/ DROPS OPHTHALMIC
Refills: 0 | Status: DISCONTINUED | OUTPATIENT
Start: 2020-12-04 | End: 2020-12-05

## 2020-12-04 RX ORDER — ACETAMINOPHEN 500 MG
975 TABLET ORAL ONCE
Refills: 0 | Status: COMPLETED | OUTPATIENT
Start: 2020-12-04 | End: 2020-12-04

## 2020-12-04 RX ORDER — SODIUM CHLORIDE 9 MG/ML
1000 INJECTION, SOLUTION INTRAVENOUS ONCE
Refills: 0 | Status: COMPLETED | OUTPATIENT
Start: 2020-12-04 | End: 2020-12-04

## 2020-12-04 RX ORDER — SODIUM CHLORIDE 9 MG/ML
2000 INJECTION, SOLUTION INTRAVENOUS ONCE
Refills: 0 | Status: DISCONTINUED | OUTPATIENT
Start: 2020-12-04 | End: 2020-12-04

## 2020-12-04 RX ORDER — HEPARIN SODIUM 5000 [USP'U]/ML
5000 INJECTION INTRAVENOUS; SUBCUTANEOUS EVERY 6 HOURS
Refills: 0 | Status: DISCONTINUED | OUTPATIENT
Start: 2020-12-04 | End: 2020-12-04

## 2020-12-04 RX ORDER — DEXTROSE 50 % IN WATER 50 %
12.5 SYRINGE (ML) INTRAVENOUS ONCE
Refills: 0 | Status: DISCONTINUED | OUTPATIENT
Start: 2020-12-04 | End: 2020-12-15

## 2020-12-04 RX ORDER — ONDANSETRON 8 MG/1
4 TABLET, FILM COATED ORAL ONCE
Refills: 0 | Status: COMPLETED | OUTPATIENT
Start: 2020-12-04 | End: 2020-12-04

## 2020-12-04 RX ORDER — GLUCAGON INJECTION, SOLUTION 0.5 MG/.1ML
1 INJECTION, SOLUTION SUBCUTANEOUS ONCE
Refills: 0 | Status: DISCONTINUED | OUTPATIENT
Start: 2020-12-04 | End: 2020-12-15

## 2020-12-04 RX ORDER — INSULIN GLARGINE 100 [IU]/ML
40 INJECTION, SOLUTION SUBCUTANEOUS AT BEDTIME
Refills: 0 | Status: DISCONTINUED | OUTPATIENT
Start: 2020-12-04 | End: 2020-12-14

## 2020-12-04 RX ORDER — INFLUENZA VIRUS VACCINE 15; 15; 15; 15 UG/.5ML; UG/.5ML; UG/.5ML; UG/.5ML
0.5 SUSPENSION INTRAMUSCULAR ONCE
Refills: 0 | Status: COMPLETED | OUTPATIENT
Start: 2020-12-04 | End: 2020-12-04

## 2020-12-04 RX ORDER — LOSARTAN POTASSIUM 100 MG/1
50 TABLET, FILM COATED ORAL DAILY
Refills: 0 | Status: DISCONTINUED | OUTPATIENT
Start: 2020-12-04 | End: 2020-12-09

## 2020-12-04 RX ORDER — SODIUM CHLORIDE 9 MG/ML
1200 INJECTION, SOLUTION INTRAVENOUS ONCE
Refills: 0 | Status: COMPLETED | OUTPATIENT
Start: 2020-12-04 | End: 2020-12-04

## 2020-12-04 RX ADMIN — Medication 975 MILLIGRAM(S): at 02:21

## 2020-12-04 RX ADMIN — Medication 1: at 18:09

## 2020-12-04 RX ADMIN — Medication 250 MILLIGRAM(S): at 07:37

## 2020-12-04 RX ADMIN — Medication 650 MILLIGRAM(S): at 21:10

## 2020-12-04 RX ADMIN — ATORVASTATIN CALCIUM 20 MILLIGRAM(S): 80 TABLET, FILM COATED ORAL at 21:13

## 2020-12-04 RX ADMIN — ONDANSETRON 4 MILLIGRAM(S): 8 TABLET, FILM COATED ORAL at 02:21

## 2020-12-04 RX ADMIN — Medication 2: at 12:06

## 2020-12-04 RX ADMIN — INSULIN GLARGINE 40 UNIT(S): 100 INJECTION, SOLUTION SUBCUTANEOUS at 22:17

## 2020-12-04 RX ADMIN — FAMOTIDINE 20 MILLIGRAM(S): 10 INJECTION INTRAVENOUS at 05:55

## 2020-12-04 RX ADMIN — PANTOPRAZOLE SODIUM 40 MILLIGRAM(S): 20 TABLET, DELAYED RELEASE ORAL at 14:32

## 2020-12-04 RX ADMIN — Medication 14 UNIT(S): at 12:06

## 2020-12-04 RX ADMIN — SODIUM CHLORIDE 75 MILLILITER(S): 9 INJECTION INTRAMUSCULAR; INTRAVENOUS; SUBCUTANEOUS at 22:51

## 2020-12-04 RX ADMIN — SODIUM CHLORIDE 75 MILLILITER(S): 9 INJECTION INTRAMUSCULAR; INTRAVENOUS; SUBCUTANEOUS at 10:58

## 2020-12-04 RX ADMIN — SODIUM CHLORIDE 500 MILLILITER(S): 9 INJECTION, SOLUTION INTRAVENOUS at 04:09

## 2020-12-04 RX ADMIN — LOSARTAN POTASSIUM 50 MILLIGRAM(S): 100 TABLET, FILM COATED ORAL at 14:32

## 2020-12-04 RX ADMIN — Medication 14 UNIT(S): at 18:09

## 2020-12-04 RX ADMIN — AMLODIPINE BESYLATE 5 MILLIGRAM(S): 2.5 TABLET ORAL at 14:32

## 2020-12-04 RX ADMIN — Medication 650 MILLIGRAM(S): at 21:40

## 2020-12-04 RX ADMIN — SODIUM CHLORIDE 1200 MILLILITER(S): 9 INJECTION, SOLUTION INTRAVENOUS at 07:37

## 2020-12-04 RX ADMIN — HEPARIN SODIUM 1000 UNIT(S)/HR: 5000 INJECTION INTRAVENOUS; SUBCUTANEOUS at 07:45

## 2020-12-04 RX ADMIN — CEFEPIME 100 MILLIGRAM(S): 1 INJECTION, POWDER, FOR SOLUTION INTRAMUSCULAR; INTRAVENOUS at 07:37

## 2020-12-04 RX ADMIN — HEPARIN SODIUM 12 UNIT(S)/HR: 5000 INJECTION INTRAVENOUS; SUBCUTANEOUS at 22:52

## 2020-12-04 NOTE — H&P ADULT - ATTENDING COMMENTS
Patient declines need to contact family at this time.    PHYSICAL EXAM:    CONSTITUTIONAL: NAD, obese  ENMT: EOMI, PERRLA, No tonsillar erythema, exudates, or enlargement, neck supple, No JVD  PSYCH: Alert & Oriented X3  RESPIRATORY: Clear to percussion bilaterally; No rales, rhonchi, wheezing, or rubs  CARDIOVASCULAR: Regular rate and rhythm; No murmurs, rubs, or gallops, negative edema  GASTROINTESTINAL: Soft, Nontender, Nondistended; Bowel sounds present  EXTREMITIES:  2+ Peripheral Pulses, No clubbing, cyanosis  SKIN: No rashes or lesions    64 yo M with PMHx of DM II, HTN, HLD, Glaucoma, Renal Mass s/p Right Nephrectomy, Nephrolithiasis, CKD III, RLE Cellulitis , with PMHx of DM, HTN, HLD, 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, s/p right nephrectomy for renal mass, CKD, questionable hx of DVT in the past presented to the hospital for chills, SOB, weakness, and AMS found to have positive SIRS criteria    #Chills  #Weakness  #SIRS  #Lactic acidosis  SIRS + with T 102.6F, , RR 22 on admisison  Required 16L NRB on admisison  Currently afebrile, normal HR, saturating at 93% on RA  Reports that the weakness, SOB, and chills have all resolved in the ED  Lactate on admission 2.9, 2.6 on repeat s/p 1L LR  Unclear source of infection  UA negative for infection, positive for protein and blood (appears to be chronic)  - f/u blood cultures  - f/u chest xray  - f/u crp, ferritin, procalcitonin  - IVF  - Hold off antibiotics until source identified  - Monitor fever curve    #SOB  As per EMS 87% on RA when arrived at house  Currently 93% on RA  DDimer elevated to 848  Patient refused CT with contrast  Discussed with patient about getting v/q scan; patient said he'll consider it  Legs not currently swollen  Hx of bleeding at the surgical site after having a renal mass removed  Wells criteria 1.5  - Start on heparin drip in the setting of CKD  - f/u PTT  - monitor for any signs of bleeding or SOB    #AMS  Likely secondary to infection  Currently resolved  - Monitor    #Troponemia  Trop 0.03  EKG shows sinus tachycardia  Likely secondary to CKD  - trend trops    #DM  - Lantus/ Lispro  - f/u HbA1c  - fs ahs    #HTN  - c/w home amlodipine and ARB    #HLD  -c/w atorvostatin    #Glaucoma  - continue with home meds    #CKD  s/p R nephrectomy  BUN/creatinine today 47/2.1  BUN/creatinine 2019 39/2.1  UA shows protein and blood   - Renally adjust meds  - Avoid nephrotoxic meds  - Monitor BMP    Diet: DASH/CC  Activity: AAT  DVT Prophylaxis: Heparin drip  GI Prophylaxis: PPX  CHG Order YES  Disposition: Acute Patient declines need to contact family at this time.    PHYSICAL EXAM:    CONSTITUTIONAL: NAD, obese  ENMT: EOMI, PERRLA, No tonsillar erythema, exudates, or enlargement, neck supple, No JVD  PSYCH: Alert & Oriented X3  RESPIRATORY: Clear to percussion bilaterally; No rales, rhonchi, wheezing, or rubs  CARDIOVASCULAR: Regular rate and rhythm; No murmurs, rubs, or gallops, negative edema  GASTROINTESTINAL: Soft, Nontender, Nondistended; Bowel sounds present  EXTREMITIES:  2+ Peripheral Pulses, No clubbing, cyanosis, right calf tenderness to palpation, chronically swollen as per patient  SKIN: No rashes or lesions    64 yo M with PMHx of DM II, HTN, HLD, Glaucoma, Renal Mass s/p Right Nephrectomy, Nephrolithiasis, CKD III, RLE Cellulitis, brought in by EMS for altered mental status, as per patient he felt onset of chills and shortness of breath lasting one hour when he became disoriented, as per patient his daughter said he looked pale and unwell thereby prompting EMS, patient denies recent sick contacts however daughters had COVID-19 back in March (daughters are employed at [employer]). Patient was found to be hypoxic to low 80s in ambulance, currently 95% on room air. Patient denies loss of consciousness, headache, history of seizures, states whenever he is "fighting an infection" he experiences intense chills similar to those on day of presentation. Patient's mental status currently at baseline. Remaining ROS unrevealing.    #Sepsis present on Admission, Acute Hypoxemic Respiratory Failure (in conjunction with elevated D-Dimer, patient was treated for possible pulmonary embolism), AMS likely secondary to Delirium resolved: Patient declined CT Angio Chest in light of nephrectomy and CKD, CT reviewed, patient agreeable to V/Q scan, discontinue heparin drip if scan negative, monitor PTT, f/u venous duplex for right calf tenderness on palpation rule out DVT, awaiting ID evaluation for possible COVID-19 infection in light of elevated inflammatory markers, s/p Cefepime and Vancomycin in ED, f/u blood and urine cultures, send RVP, if recurrent AMS proceed with CT Head and EEG     #Elevated Lactic Acid: 2.2L LR bolus in ED, f/u repeat level     #Troponemia: EKG sinus tachycardia, f/u repeat cardiac enzymes, telemetry monitoring     #DM II: monitor fingersticks, basal bolus insulin    #HTN/HLD/Glaucoma: continue home medications     #Left Staghorn Calculi: Awaiting Urology assessment     #CKD III: Creatinine at baseline     Disposition: Acute Patient declines need to contact family at this time.    PHYSICAL EXAM:    CONSTITUTIONAL: NAD, obese  ENMT: EOMI, PERRLA, No tonsillar erythema, exudates, or enlargement, neck supple, No JVD  PSYCH: Alert & Oriented X3  RESPIRATORY: Clear to percussion bilaterally; No rales, rhonchi, wheezing, or rubs  CARDIOVASCULAR: Regular rate and rhythm; No murmurs, rubs, or gallops, negative edema  GASTROINTESTINAL: Soft, Nontender, Nondistended; Bowel sounds present  EXTREMITIES:  2+ Peripheral Pulses, No clubbing, cyanosis, right calf tenderness to palpation, chronically swollen as per patient  SKIN: No rashes or lesions    66 yo M with PMHx of DM II, HTN, HLD, Glaucoma, Renal Mass s/p Right Nephrectomy, Nephrolithiasis, CKD III, RLE Cellulitis, brought in by EMS for altered mental status, as per patient he felt onset of chills and shortness of breath lasting one hour when he became disoriented, as per patient his daughter said he looked pale and unwell thereby prompting EMS, patient denies recent sick contacts however daughters had COVID-19 back in March (daughters are employed at [employer]). Patient was found to be hypoxic to low 80s in ambulance, currently 95% on room air. Patient denies loss of consciousness, headache, history of seizures, states whenever he is "fighting an infection" he experiences intense chills similar to those on day of presentation. Patient's mental status currently at baseline. Remaining ROS unrevealing.    #Sepsis present on Admission, Acute Hypoxemic Respiratory Failure (in conjunction with elevated D-Dimer, treatment initiated for possible pulmonary embolism), AMS likely secondary to Delirium resolved: Patient declined CT Angio Chest in light of nephrectomy and CKD, CT reviewed, patient agreeable to V/Q scan, discontinue heparin drip if scan negative, monitor PTT, f/u venous duplex for right calf tenderness on palpation rule out DVT, awaiting ID evaluation for possible COVID-19 infection in light of elevated inflammatory markers, s/p Cefepime and Vancomycin in ED, f/u blood and urine cultures, send RVP, if recurrent AMS proceed with CT Head and EEG     #Elevated Lactic Acid: 2.2L LR bolus in ED, f/u repeat level     #Troponemia: EKG sinus tachycardia, f/u repeat cardiac enzymes, telemetry monitoring     #DM II: monitor fingersticks, basal bolus insulin    #HTN/HLD/Glaucoma: continue home medications     #Left Staghorn Calculi: Awaiting Urology assessment     #CKD III: Creatinine at baseline     Disposition: Acute Patient declines need to contact family at this time.    PHYSICAL EXAM:    CONSTITUTIONAL: NAD, obese  ENMT: EOMI, PERRLA, No tonsillar erythema, exudates, or enlargement, neck supple, No JVD  PSYCH: Alert & Oriented X3  RESPIRATORY: Clear to percussion bilaterally; No rales, rhonchi, wheezing, or rubs  CARDIOVASCULAR: Regular rate and rhythm; No murmurs, rubs, or gallops, negative edema  GASTROINTESTINAL: Soft, Nontender, Nondistended; Bowel sounds present  EXTREMITIES:  2+ Peripheral Pulses, No clubbing, cyanosis, right calf tenderness to palpation, chronically swollen as per patient  SKIN: No rashes or lesions    66 yo M with PMHx of DM II, HTN, HLD, Glaucoma, Renal Mass s/p Right Nephrectomy, Nephrolithiasis, CKD III, RLE Cellulitis, brought in by EMS for altered mental status, as per patient he felt onset of chills and shortness of breath lasting one hour when he became disoriented, as per patient his daughter said he looked pale and unwell thereby prompting EMS, patient denies recent sick contacts however daughters had COVID-19 back in March (daughters are employed at [employer]). Patient was found to be hypoxic to low 80s in ambulance, currently 95% on room air. Patient denies loss of consciousness, headache, history of seizures, states whenever he is "fighting an infection" he experiences intense chills similar to those on day of presentation. Patient's mental status currently at baseline. Remaining ROS unrevealing.    #Sepsis present on Admission, Acute Hypoxemic Respiratory Failure (in conjunction with elevated D-Dimer, treatment initiated for possible pulmonary embolism), AMS likely secondary to Delirium resolved: Patient declined CT Angio Chest in light of nephrectomy and CKD, CT reviewed, patient agreeable to V/Q scan, discontinue heparin drip if scan negative, monitor PTT, f/u venous duplex for right calf tenderness on palpation rule out DVT, awaiting ID evaluation for possible COVID-19 infection in light of elevated inflammatory markers, lymphopenia--will place in private room, s/p Cefepime and Vancomycin in ED, f/u blood and urine cultures, send RVP, if recurrent AMS proceed with CT Head and EEG     #Elevated Lactic Acid: 2.2L LR bolus in ED, f/u repeat level     #Troponemia: EKG sinus tachycardia, f/u repeat cardiac enzymes, telemetry monitoring     #DM II: monitor fingersticks, basal bolus insulin    #HTN/HLD/Glaucoma: continue home medications     #Left Staghorn Calculi: Awaiting Urology assessment     #CKD III: Creatinine at baseline     Disposition: Acute Patient declines need to contact family at this time.    PHYSICAL EXAM:    CONSTITUTIONAL: NAD, obese  ENMT: EOMI, PERRLA, No tonsillar erythema, exudates, or enlargement, neck supple, No JVD  PSYCH: Alert & Oriented X3  RESPIRATORY: Clear to percussion bilaterally; No rales, rhonchi, wheezing, or rubs  CARDIOVASCULAR: Regular rate and rhythm; No murmurs, rubs, or gallops, negative edema  GASTROINTESTINAL: Soft, Nontender, Nondistended; Bowel sounds present  EXTREMITIES:  2+ Peripheral Pulses, No clubbing, cyanosis, right calf tenderness to palpation, chronically swollen as per patient  SKIN: No rashes or lesions    64 yo M with PMHx of DM II, HTN, HLD, Glaucoma, Renal Mass s/p Right Nephrectomy, Nephrolithiasis, CKD III, RLE Cellulitis, brought in by EMS for altered mental status, as per patient he felt onset of chills and shortness of breath lasting one hour when he became disoriented, as per patient his daughter said he looked pale and unwell thereby prompting EMS, patient denies recent sick contacts however daughters had COVID-19 back in March (daughters are employed at [employer]). Patient was found to be hypoxic to low 80s in ambulance, currently 95% on room air. Patient denies loss of consciousness, headache, history of seizures, states whenever he is "fighting an infection" he experiences intense chills similar to those on day of presentation. Patient's mental status currently at baseline. Remaining ROS unrevealing.    #Sepsis present on Admission, Acute Hypoxemic Respiratory Failure (in conjunction with elevated D-Dimer, treatment initiated for possible pulmonary embolism), AMS likely secondary to Delirium resolved: Patient declined CT Angio Chest in light of nephrectomy and CKD, CT reviewed, patient agreeable to V/Q scan, discontinue heparin drip if scan negative, monitor PTT, f/u venous duplex for right calf tenderness on palpation rule out DVT, awaiting ID evaluation for possible COVID-19 infection in light of elevated inflammatory markers, lymphopenia--will place in private room, s/p Cefepime and Vancomycin in ED, f/u blood and urine cultures, if recurrent AMS proceed with CT Head and EEG     #Elevated Lactic Acid: 2.2L LR bolus in ED, f/u repeat level     #Troponemia: EKG sinus tachycardia, f/u repeat cardiac enzymes, telemetry monitoring     #DM II: monitor fingersticks, basal bolus insulin    #HTN/HLD/Glaucoma: continue home medications     #Left Staghorn Calculi: Awaiting Urology assessment     #CKD III: Creatinine at baseline     Disposition: Acute Patient declines need to contact family at this time.    PHYSICAL EXAM:    CONSTITUTIONAL: NAD, obese  ENMT: EOMI, PERRLA, No tonsillar erythema, exudates, or enlargement, neck supple, No JVD  PSYCH: Alert & Oriented X3  RESPIRATORY: Clear to percussion bilaterally; No rales, rhonchi, wheezing, or rubs  CARDIOVASCULAR: Regular rate and rhythm; No murmurs, rubs, or gallops, negative edema  GASTROINTESTINAL: Soft, Nontender, Nondistended; Bowel sounds present  EXTREMITIES:  2+ Peripheral Pulses, No clubbing, cyanosis, right calf tenderness to palpation, chronically swollen as per patient  SKIN: No rashes or lesions    64 yo M with PMHx of DM II, HTN, HLD, Glaucoma, Renal Mass s/p Right Nephrectomy, Nephrolithiasis, CKD III, RLE Cellulitis, brought in by EMS for altered mental status, as per patient he felt onset of chills and shortness of breath lasting one hour when he became disoriented, as per patient his daughter said he looked pale and unwell thereby prompting EMS, patient denies recent sick contacts however daughters had COVID-19 back in March (daughters are employed at [employer]). Patient was found to be hypoxic to low 80s in ambulance, currently 95% on room air. Patient denies loss of consciousness, headache, history of seizures, states whenever he is "fighting an infection" he experiences intense chills similar to those on day of presentation. Patient's mental status currently at baseline. Remaining ROS unrevealing.    #Sepsis present on Admission, Acute Hypoxemic Respiratory Failure (in conjunction with elevated D-Dimer, treatment initiated for possible pulmonary embolism), AMS likely secondary to Delirium resolved, High Suspicion for COVID-19 in light of elevated inflammatory markers, lymphopenia--will resend COVID-19 PCR: Patient declined CT Angio Chest in light of nephrectomy and CKD, CT reviewed, patient agreeable to V/Q scan, discontinue heparin drip if scan negative, monitor PTT, f/u venous duplex for right calf tenderness on palpation rule out DVT, awaiting ID evaluation, s/p Cefepime and Vancomycin in ED, f/u blood and urine cultures, if recurrent AMS proceed with CT Head and EEG     #Elevated Lactic Acid: 2.2L LR bolus in ED, f/u repeat level     #Troponemia: EKG sinus tachycardia, f/u repeat cardiac enzymes, telemetry monitoring     #DM II: monitor fingersticks, basal bolus insulin    #HTN/HLD/Glaucoma: continue home medications     #Left Staghorn Calculi: Awaiting Urology assessment     #CKD III: Creatinine at baseline     Disposition: Acute

## 2020-12-04 NOTE — H&P ADULT - ASSESSMENT
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, s/p right nephrectomy for renal mass, CKD, questionable hx of DVT in the past presented to the hospital for chills, SOB, weakness, and AMS found to have positive SIRS criteria    #Chills  #Weakness  #SIRS  SIRS + with T 102.6F, , RR 22 on admisison  Required 16L NRB on admisison  Currently afebrile, normal HR, saturating at 93% on RA  Reports that the weakness, SOB, and chills have all resolved in the ED  Lactate on admission 2.9, 2.6 on repeat  Unclear source of infection      #SOB    #AMS    #Troponemia    #DM    #HTN    #HLD    #Glaucoma    #CKD  s/p R nephrectomy    Diet:  Activity:  DVT Prophylaxis:  GI Prophylaxis:  CHG Order  Code Status:  Disposition:   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, s/p right nephrectomy for renal mass, CKD, questionable hx of DVT in the past presented to the hospital for chills, SOB, weakness, and AMS found to have positive SIRS criteria    #Chills  #Weakness  #SIRS  #Lactic acidosis  SIRS + with T 102.6F, , RR 22 on admisison  Required 16L NRB on admisison  Currently afebrile, normal HR, saturating at 93% on RA  Reports that the weakness, SOB, and chills have all resolved in the ED  Lactate on admission 2.9, 2.6 on repeat s/p 1L LR  Unclear source of infection  UA negative for infection, positive for protein and blood (appears to be chronic)  - f/u blood cultures  - f/u chest xray  - f/u crp, ferritin, procalcitonin  - IVF  - Hold off antibiotics until source identified  - Monitor fever curve    #SOB  As per EMS 87% on RA when arrived at house  Currently 93% on RA  DDimer elevated to 848  Patient refused CT with contrast  Discussed with patient about getting v/q scan; patient said he'll consider it  Legs not currently swollen  Hx of bleeding at the surgical site after having a renal mass removed  Wells criteria 1.5  - Start on heparin drip in the setting of CKD  - f/u PTT  - monitor for any signs of bleeding or SOB    #AMS  Likely secondary to infection  Currently resolved  - Monitor    #Troponemia  Trop 0.03  EKG shows sinus tachycardia  Likely secondary to CKD  - trend trops    #DM  - Lantus/ Lispro  - f/u HbA1c  - fs ahs    #HTN  - c/w    #HLD  -c/w atorvostatin    #Glaucoma  - continue with    #CKD  s/p R nephrectomy  BUN/creatinine today 47/2.1  BUN/creatinine 2019 39/2.1  UA shows protein and blood   - Renally adjust meds  - Avoid nephrotoxic meds  - Monitor BMP    Diet: DASH/CC  Activity: AAT  DVT Prophylaxis: Heparin drip  GI Prophylaxis: PPX  CHG Order YES  Disposition: Acute   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, s/p right nephrectomy for renal mass, CKD, questionable hx of DVT in the past presented to the hospital for chills, SOB, weakness, and AMS found to have positive SIRS criteria    #Chills  #Weakness  #SIRS  #Lactic acidosis  SIRS + with T 102.6F, , RR 22 on admisison  Required 16L NRB on admisison  Currently afebrile, normal HR, saturating at 93% on RA  Reports that the weakness, SOB, and chills have all resolved in the ED  Lactate on admission 2.9, 2.6 on repeat s/p 1L LR  Unclear source of infection  UA negative for infection, positive for protein and blood (appears to be chronic)  - f/u blood cultures  - f/u chest xray  - f/u crp, ferritin, procalcitonin  - IVF  - Hold off antibiotics until source identified  - Monitor fever curve    #SOB  As per EMS 87% on RA when arrived at house  Currently 93% on RA  DDimer elevated to 848  Patient refused CT with contrast  Discussed with patient about getting v/q scan; patient said he'll consider it  Legs not currently swollen  Hx of bleeding at the surgical site after having a renal mass removed  Wells criteria 1.5  - Start on heparin drip in the setting of CKD  - f/u PTT  - monitor for any signs of bleeding or SOB    #AMS  Likely secondary to infection  Currently resolved  - Monitor    #Troponemia  Trop 0.03  EKG shows sinus tachycardia  Likely secondary to CKD  - trend trops    #DM  - Lantus/ Lispro  - f/u HbA1c  - fs ahs    #HTN  - c/w home amlodipine and ARB    #HLD  -c/w atorvostatin    #Glaucoma  - continue with home meds    #CKD  s/p R nephrectomy  BUN/creatinine today 47/2.1  BUN/creatinine 2019 39/2.1  UA shows protein and blood   - Renally adjust meds  - Avoid nephrotoxic meds  - Monitor BMP    Diet: DASH/CC  Activity: AAT  DVT Prophylaxis: Heparin drip  GI Prophylaxis: PPX  CHG Order YES  Disposition: Acute   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, s/p right nephrectomy for renal mass, CKD, questionable hx of DVT in the past presented to the hospital for chills, SOB, weakness, and AMS     #Chills  #Weakness  #SIRS  #Lactic acidosis   T 102.6F, , RR 22 on admisison  Required 16L NRB on admisison  Currently afebrile, normal HR, saturating at 93% on RA  Reports that the weakness, SOB, and chills have all resolved in the ED  Lactate on admission 2.9, 2.6 on repeat s/p 1L LR  Unclear source of infection  UA negative for infection, positive for protein and blood (appears to be chronic)  - f/u blood cultures  - f/u chest xray  - f/u crp, ferritin, procalcitonin  - IVF  - Hold off antibiotics until source identified  - Monitor fever curve    #SOB  As per EMS 87% on RA when arrived at house  Currently 93% on RA  DDimer elevated to 848  Patient refused CT with contrast  Discussed with patient about getting v/q scan; patient said he'll consider it  Legs not currently swollen  Hx of bleeding at the surgical site after having a renal mass removed  Wells criteria 1.5  - Start on heparin drip in the setting of CKD  - f/u PTT  - monitor for any signs of bleeding or SOB    #AMS  Likely secondary to infection  Currently resolved  - Monitor    #Troponemia  Trop 0.03  EKG shows sinus tachycardia  Likely secondary to CKD  - trend trops    #DM  - Lantus/ Lispro  - f/u HbA1c  - fs ahs    #HTN  - c/w home amlodipine and ARB    #HLD  -c/w atorvostatin    #Glaucoma  - continue with home meds    #CKD  s/p R nephrectomy  BUN/creatinine today 47/2.1  BUN/creatinine 2019 39/2.1  UA shows protein and blood   - Renally adjust meds  - Avoid nephrotoxic meds  - Monitor BMP    Diet: DASH/CC  Activity: AAT  DVT Prophylaxis: Heparin drip  GI Prophylaxis: PPX  CHG Order YES  Disposition: Acute

## 2020-12-04 NOTE — H&P ADULT - HISTORY OF PRESENT ILLNESS
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 At around 11:30 last night patient developed sudden onset chills while lying in bed. He tried to get out of bed but was unable to as he became very weak and SOB. His daughters called EMS. After that point he does not remember what happened but the chart documents that he was altered, AAOx2. Of note patient reports having a similar episode of chills 5 years ago but that resolved by itself within minutes. Also of note patient reports having history of swelling in his lower extremities but reports that multiple duplex US have been negative. He denied any hx of recent travel, or recent illness. He denied any hx of nausea, vomiting, diarrhea, constipation, melena, pain in abdomen, chest pain, cough, dysuria, headache, lightheadedness, dizziness, or numbness/tingling.    In the ED patient met SIRS criteria with T 102.6F, , RR 22, /80. SpO2 99 % on 16L NRB. Labs were significant for WBC 10.78, DDimer 848, BUN/creatinine 47/2.1, Lactate 2.9 that is 2.6 on repeat ABG, and troponin of 0.03. COVID negative. EKG showed sinus tachycardia, and CT chest, abdomen, and pelvis was negative for acute pathology.     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 At around 11:30 last night patient developed sudden onset chills while lying in bed. He tried to get out of bed but was unable to as he became very weak and SOB. His daughters called EMS. After that point he does not remember what happened but the chart documents that he was altered, AAOx2. Of note patient reports having a similar episode of chills 5 years ago but that resolved by itself within minutes. Also of note patient reports having history of swelling in his lower extremities but reports that multiple duplex US have been negative. He denied any hx of recent travel, or recent illness. He denied any hx of nausea, vomiting, diarrhea, constipation, melena, pain in abdomen, chest pain, cough, dysuria, headache, lightheadedness, dizziness, or numbness/tingling.    In the ED patient met SIRS criteria with T 102.6F, , RR 22, /80. SpO2 99 % on 16L NRB. Labs were significant for WBC 10.78, DDimer 848, BUN/creatinine 47/2.1, Lactate 2.9 that is 2.6 on repeat ABG, and troponin of 0.03. COVID negative. EKG showed sinus tachycardia, and CT chest, abdomen, and pelvis was negative for acute pathology. Patient refused CTA chest. Received 1 dose cefepime and vancomycin and was started on a heparin drip.  Also received 1L LR, zofran, and tylenol.

## 2020-12-04 NOTE — ED PROVIDER NOTE - NS ED ROS FT
Constitutional: (+) fever  Eyes/ENT: (-) blurry vision, (-) epistaxis  Cardiovascular: (-) chest pain, (-) syncope  Respiratory: (-) cough, (+) shortness of breath  Gastrointestinal: (+) vomiting, (-) diarrhea  Musculoskeletal: (-) neck pain, (-) back pain, (-) joint pain  Integumentary: (-) rash, (-) edema  Neurological: (-) headache, (+) altered mental status  Psychiatric: (-) hallucinations  Allergic/Immunologic: (-) pruritus

## 2020-12-04 NOTE — ED PROVIDER NOTE - PROGRESS NOTE DETAILS
Pt meets sepsis criteria, but starting with gentle hydration pending covid status. Pt remains breathing comfortably on RA. D-dimer elevated w/ negative covid test. Pt refused CT contrast due to hx of nephrectomy, understands that we cannot rule out PE. CT non-con chest + abd/pelvis unremarkable. Discussed anticoagulation and admission, pt agrees. Calculated fluids based on ideal body weight since pt is ~400 lbs. Pt ideal body weight 72kg. Calculated sepsis fluids based on IBW

## 2020-12-04 NOTE — ED PROVIDER NOTE - ATTENDING CONTRIBUTION TO CARE
pt with fever, sob, n, v x1 nbnb pta.  no ha, neck pain. sat low with ems. no cp, back pain. no ab pain.  pt in nad, well appearing, perrl, eomi ,neck sup, ctab, rrr, ab soft, nt. no focal def. aaox3.  will get labs, imaging, fluids, antipyretics.

## 2020-12-04 NOTE — ED PROVIDER NOTE - CARE PLAN
Principal Discharge DX:	Sepsis  Secondary Diagnosis:	Emesis  Secondary Diagnosis:	Elevated d-dimer  Secondary Diagnosis:	Shortness of breath

## 2020-12-04 NOTE — ED PROVIDER NOTE - OBJECTIVE STATEMENT
The pt is a 65y M w/ hx of DM, HTN, kidney mass s/p nephrectomy presenting with SOB, fever, AMS x1 days. Per EMS, wife said pt was confused. He was complaining of SOB and experienced 1 episodes of nausea, NBNB emesis on the way to the hospital. He was also initially hypoxic to low 80s. In triage, satting 99% on NRB. Then taken off NRB and satting 98% on RA. In the ED, denies headache, chest pain, SOB, abdominal pain, diarrhea. No sick contacts at home. The pt is a 65y M w/ hx of DM, HTN, HLD, obesity, kidney mass s/p nephrectomy presenting with SOB, fever, AMS x1 days. Per EMS, wife said pt was confused. He was complaining of SOB and experienced 1 episodes of nausea, NBNB emesis on the way to the hospital. He was also initially hypoxic to low 80s. In triage, satting 99% on NRB. Then taken off NRB and satting 98% on RA. In the ED, denies headache, chest pain, SOB, abdominal pain, diarrhea. No sick contacts at home.

## 2020-12-04 NOTE — H&P ADULT - NSHPPHYSICALEXAM_GEN_ALL_CORE
GENERAL: NAD, lying in bed comfortably  HEAD:  Atraumatic, Normocephalic  EYES: EOMI, PERRLA, conjunctiva and sclera clear  ENT: Moist mucous membranes  NECK: Supple, No JVD  CHEST/LUNG: Clear to auscultation bilaterally; No rales, rhonchi, wheezing, or rubs. Unlabored respirations  HEART: Regular rate and rhythm; No murmurs, rubs, or gallops  ABDOMEN: Bowel sounds present; Soft, Nontender, Nondistended. No hepatomegaly  EXTREMITIES:  2+ Peripheral Pulses, brisk capillary refill. No clubbing, cyanosis, or edema  NERVOUS SYSTEM:  Alert & Oriented X3, speech clear. No deficits   MSK: FROM all 4 extremities, full and equal strength  SKIN: No rashes or lesions

## 2020-12-04 NOTE — H&P ADULT - NSHPLABSRESULTS_GEN_ALL_CORE
LABS/RADIOLOGY RESULTS:                          13.6   10.78 )-----------( 195      ( 04 Dec 2020 02:00 )             42.1   12-    139  |  105  |  47<H>  ----------------------------<  154<H>  5.3<H>   |  20  |  2.1<H>    Ca    9.3      04 Dec 2020 02:00    TPro  7.7  /  Alb  4.1  /  TBili  0.5  /  DBili  x   /  AST  31  /  ALT  27  /  AlkPhos  69  12-04  PT/INR - ( 04 Dec 2020 02:00 )   PT: 11.00 sec;   INR: 0.96 ratio         PTT - ( 04 Dec 2020 02:00 )  PTT:31.4 secBlood Cultures    Urinalysis Basic - ( 04 Dec 2020 07:30 )    Color: Yellow / Appearance: Clear / S.021 / pH:   Gluc:  / Ketone: Trace  / Bili: Negative / Urobili: <2 mg/dL   Blood:  / Protein: 100 mg/dL / Nitrite: Negative   Leuk Esterase: Negative / RBC: 8 /HPF / WBC 4 /HPF   Sq Epi:  / Non Sq Epi: 3 /HPF / Bacteria: Negative    < from: CT Abdomen and Pelvis No Cont (20 @ 05:57) >    EXAM:  CT ABDOMEN AND PELVIS        EXAM:  CT CHEST            PROCEDURE DATE:  2020            INTERPRETATION:  CLINICAL HISTORY / REASON FOR EXAM: Sepsis    TECHNIQUE: Contiguous axial CT images were obtained from the thoracic inlet to the pubic symphysis without intravenous contrast. Oral contrast was not administered. Reformatted images in the coronal and sagittal planes were acquired.    COMPARISON CT: Chest dated 2017, abdomen and pelvis dated 4/3/2017      FINDINGS:    CHEST:    LUNGS, PLEURA, AND AIRWAYS: Patent central airways. Scattered areas of atelectasis/scarring. Tubular branching structure in the right lower lobe unchanged from multiple prior studies. Scattered calcified granulomas. No pulmonary consolidation pleural effusion or pneumothorax.    MEDIASTINUM/THORACIC NODES: Elevated right hemidiaphragm. Calcified right hilar lymph nodes again seen. No mediastinal or axillary lymphadenopathy. Nodular enlarged right thyroid lobe. Underdistended esophagus precluding assessment.    HEART/GREAT VESSELS: No pericardial effusion. Heart size unremarkable. Coronary artery and thoracic aorta atherosclerotic calcifications. No aneurysmal dilation of the thoracic aorta.      ABDOMEN/PELVIS:    HEPATOBILIARY: Cholelithiasis.    SPLEEN: Redemonstrated splenic artery embolization coils.    PANCREAS: Unremarkable.    ADRENAL GLANDS: Unremarkable.    KIDNEYS: Prior right nephrectomy. Staghorn calculus in the left renal pelvis redemonstrated measuring up to 3.8 cm. No hydronephrosis. 2.9 cm lower pole cyst. Additional left lower pole renal hypodensity to small to characterize.    ABDOMINOPELVIC NODES: No lymphadenopathy.    PELVIC ORGANS: Unremarkable.    PERITONEUM/MESENTERY/BOWEL:  No evidence of bowel obstruction, ascites or free air. Normal caliber appendix. Status post gastric banding.    BONES/SOFT TISSUES:  Degenerative changes of the spine and hips.    VASCULAR: Calcified atherosclerotic disease of the abdominal aorta.      IMPRESSION:    1.  No CT evidence of acute intrathoracic or abdominopelvic pathology.  2.  Nodular enlarged right thyroid lobe. Recommend evaluation outpatient sonography.  3.  Additional findings in the body of the report.    < end of copied text >

## 2020-12-04 NOTE — ED ADULT TRIAGE NOTE - CHIEF COMPLAINT QUOTE
pt BIBA for AMS SOB TACHY. wife called ems for ams. pt onseen pt was a/ox2, o2 sat 87 on ra. bgt 300, insulin provided, placed on 100% NRB. tachy from 120-170 abd SOB.

## 2020-12-04 NOTE — ED PROVIDER NOTE - PHYSICAL EXAMINATION
Vital Signs: I have reviewed the initial vital signs.  Constitutional: well-nourished, appears stated age, no acute distress  Cardiovascular: (+) tachycardic, regular rhythm, well-perfused extremities  Respiratory: unlabored respiratory effort, clear to auscultation bilaterally  Gastrointestinal: soft, non-tender abdomen  Musculoskeletal: supple neck, (+) mild lower extremity edema  Integumentary: warm, dry, no rash  Neurologic: awake, alert, cranial nerves II-XII grossly intact, extremities’ motor and sensory functions grossly intact  Psychiatric: appropriate mood, appropriate affect

## 2020-12-04 NOTE — H&P ADULT - NSICDXPASTMEDICALHX_GEN_ALL_CORE_FT
PAST MEDICAL HISTORY:  CKD (chronic kidney disease)     Diabetes mellitus     DVT (deep venous thrombosis)     Glaucoma     HLD (hyperlipidemia)     HTN (hypertension)

## 2020-12-04 NOTE — CONSULT NOTE ADULT - SUBJECTIVE AND OBJECTIVE BOX
JOSHUA HAM  65y, Male  Allergy: No Known Allergies      CHIEF COMPLAINT: SOB (04 Dec 2020 08:21)      LOS      HPI:  65 yr old male with PMHx of DM, HTN, 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, s/p right nephrectomy for bleeding from surgical sites s/p renal mass resection, CKD, questionable hx of DVT in the past presented to the hospital for chills, SOB, weakness, and AMS that started last night. At around 11:30 last night patient developed sudden onset chills while lying in bed. He tried to get out of bed but was unable to as he became very weak and SOB. His daughters called EMS. After that point he does not remember what happened but the chart documents that he was altered, AAOx2. Of note patient reports having a similar episode of chills 5 years ago but that resolved by itself within minutes. Also of note patient reports having history of swelling in his lower extremities but reports that multiple duplex US have been negative. He denied any hx of recent travel, or recent illness. He denied any hx of nausea, vomiting, diarrhea, constipation, melena, pain in abdomen, chest pain, cough, dysuria, headache, lightheadedness, dizziness, or numbness/tingling.    In the ED patient met SIRS criteria with T 102.6F, , RR 22, /80. SpO2 99 % on 16L NRB. Labs were significant for WBC 10.78, DDimer 848, BUN/creatinine 47/2.1, Lactate 2.9 that is 2.6 on repeat ABG, and troponin of 0.03. COVID negative. EKG showed sinus tachycardia, and CT chest, abdomen, and pelvis was negative for acute pathology. Patient refused CTA chest. Received 1 dose cefepime and vancomycin and was started on a heparin drip.  Also received 1L LR, zofran, and tylenol.     (04 Dec 2020 08:21)      INFECTIOUS DISEASE HISTORY:  History as above.  Reports he was in normal health prior to events.   Had [place] food last night.   That evening, had sudden onset chills associated with dyspnea.   Febrile in ED.     PAST MEDICAL & SURGICAL HISTORY:  HLD (hyperlipidemia)  CKD (chronic kidney disease)  DVT (deep venous thrombosis)  Glaucoma  HTN (hypertension)  Diabetes mellitus  H/O right nephrectomy        FAMILY HISTORY  FH: lung cancer    No pertinent family history in first degree relatives        SOCIAL HISTORY  Social History:  Living Situation: Lives with daughters  Occupation: Retired  Tobacco Use: Denies  Alcohol Use: Denies  Drug Use: Denies (04 Dec 2020 08:21)        ROS  General: Denies rigors, nightsweats  HEENT: Denies headache, rhinorrhea, sore throat, eye pain  CV: Denies CP, palpitations  PULM: Denies wheezing, hemoptysis  GI: Denies hematemesis, hematochezia, melena  : Denies discharge, hematuria  MSK: Denies arthralgias, myalgias  SKIN: Denies rash, lesions  NEURO: Denies paresthesias, weakness  PSYCH: Denies depression, anxiety    VITALS:  T(F): 99.3, Max: 102.6 (20 @ 01:47)  HR: 87  BP: 151/67  RR: 20Vital Signs Last 24 Hrs  T(C): 37.4 (04 Dec 2020 14:05), Max: 39.2 (04 Dec 2020 01:47)  T(F): 99.3 (04 Dec 2020 14:05), Max: 102.6 (04 Dec 2020 01:47)  HR: 87 (04 Dec 2020 14:05) (85 - 126)  BP: 151/67 (04 Dec 2020 14:05) (103/51 - 151/67)  BP(mean): 72 (04 Dec 2020 07:40) (72 - 72)  RR: 20 (04 Dec 2020 14:05) (16 - 22)  SpO2: 97% (04 Dec 2020 10:30) (93% - 99%)    PHYSICAL EXAM:  Gen: NAD, resting in bed  HEENT: Normocephalic, atraumatic  Neck: supple, no lymphadenopathy  CV: Regular rate & regular rhythm  Lungs: decreased BS at bases, no fremitus  Abdomen: Soft, BS present  Ext: Warm, well perfused  Neuro: non focal, awake  Skin: no rash, no erythema  Lines: no phlebitis    TESTS & MEASUREMENTS:                        13.6   10.78 )-----------( 195      ( 04 Dec 2020 02:00 )             42.1     12    139  |  105  |  47<H>  ----------------------------<  154<H>  5.3<H>   |  20  |  2.1<H>    Ca    9.3      04 Dec 2020 02:00    TPro  7.7  /  Alb  4.1  /  TBili  0.5  /  DBili  x   /  AST  31  /  ALT  27  /  AlkPhos  69      eGFR if Non African American: 32 mL/min/1.73M2 (20 @ 02:00)  eGFR if African American: 37 mL/min/1.73M2 (20 @ 02:00)    LIVER FUNCTIONS - ( 04 Dec 2020 02:00 )  Alb: 4.1 g/dL / Pro: 7.7 g/dL / ALK PHOS: 69 U/L / ALT: 27 U/L / AST: 31 U/L / GGT: x           Urinalysis Basic - ( 04 Dec 2020 07:30 )    Color: Yellow / Appearance: Clear / S.021 / pH: x  Gluc: x / Ketone: Trace  / Bili: Negative / Urobili: <2 mg/dL   Blood: x / Protein: 100 mg/dL / Nitrite: Negative   Leuk Esterase: Negative / RBC: 8 /HPF / WBC 4 /HPF   Sq Epi: x / Non Sq Epi: 3 /HPF / Bacteria: Negative          Lactate, Blood: 1.7 mmol/L (20 @ 11:48)  Blood Gas Venous - Lactate: 2.6 mmoL/L (20 @ 02:19)  Lactate, Blood: 2.9 mmol/L (20 @ 02:00)      INFECTIOUS DISEASES TESTING      RADIOLOGY & ADDITIONAL TESTS:  I have personally reviewed the last Chest xray  CXR  Xray Chest 1 View- PORTABLE-Urgent:   EXAM:  XR CHEST PORTABLE URGENT 1V            PROCEDURE DATE:  2020            INTERPRETATION:  Clinical History / Reason for exam: Shortness of breath, fever.    Comparison : Chest radiograph April 3, 2017.    Technique/Positioning: Frontal portable, low lung volumes.    Findings:    Support devices: Telemetry leads overlie the thorax.    Cardiac/mediastinum/hilum: Unremarkable.    Lung parenchyma/Pleura: Elevation of the right hemidiaphragm    Skeleton/soft tissues: Unremarkable.    Impression:    Elevation the right hemidiaphragm. Unchanged.                  SPARKLE CORRALES MD; Attending Interventional Radiologist  This document has been electronically signed. Dec  4 2020  9:52AM (20 @ 04:15)      CT  CT Chest No Cont:   EXAM:  CT ABDOMEN AND PELVIS        EXAM:  CT CHEST            PROCEDURE DATE:  2020            INTERPRETATION:  CLINICAL HISTORY / REASON FOR EXAM: Sepsis    TECHNIQUE: Contiguous axial CT images were obtained from the thoracic inlet to the pubic symphysis without intravenous contrast. Oral contrast was not administered. Reformatted images in the coronal and sagittal planes were acquired.    COMPARISON CT: Chest dated 2017, abdomen and pelvis dated 4/3/2017      FINDINGS:    CHEST:    LUNGS, PLEURA, AND AIRWAYS: Patent central airways. Scattered areas of atelectasis/scarring. Tubular branching structure in the right lower lobe unchanged from multiple prior studies. Scattered calcified granulomas. No pulmonary consolidation pleural effusion or pneumothorax.    MEDIASTINUM/THORACIC NODES: Elevated right hemidiaphragm. Calcified right hilar lymph nodes again seen. No mediastinal or axillary lymphadenopathy. Nodular enlarged right thyroid lobe. Underdistended esophagus precluding assessment.    HEART/GREAT VESSELS: No pericardial effusion. Heart size unremarkable. Coronary artery and thoracic aorta atherosclerotic calcifications. No aneurysmal dilation of the thoracic aorta.      ABDOMEN/PELVIS:    HEPATOBILIARY: Cholelithiasis.    SPLEEN: Redemonstrated splenic artery embolization coils.    PANCREAS: Unremarkable.    ADRENAL GLANDS: Unremarkable.    KIDNEYS: Prior right nephrectomy. Staghorn calculus in the left renal pelvis redemonstrated measuring up to 3.8 cm. No hydronephrosis. 2.9 cm lower pole cyst. Additional left lower pole renal hypodensity to small to characterize.    ABDOMINOPELVIC NODES: No lymphadenopathy.    PELVIC ORGANS: Unremarkable.    PERITONEUM/MESENTERY/BOWEL:  No evidence of bowel obstruction, ascites or free air. Normal caliber appendix. Status post gastric banding.    BONES/SOFT TISSUES:  Degenerative changes of the spine and hips.    VASCULAR: Calcified atherosclerotic disease of the abdominal aorta.      IMPRESSION:    1.  No CT evidence of acute intrathoracic or abdominopelvic pathology.  2.  Nodular enlarged right thyroid lobe. Recommend evaluation outpatient sonography.  3.  Additional findings in the body of the report.                    FRANCHESKA HORNE M.D., RESIDENT RADIOLOGIST  This document has been electronically signed.  ANTHONY MEZA MD; Attending Radiologist  This document has been electronically signed. Dec  4 2020  6:55AM (20 @ 05:57)      CARDIOLOGY TESTING  12 Lead ECG:   Ventricular Rate 110 BPM    Atrial Rate 110 BPM    P-R Interval 216 ms    QRS Duration 174 ms    Q-T Interval 344 ms    QTC Calculation(Bazett) 465 ms    P Axis 11 degrees    R Axis -50 degrees    T Axis 2 degrees    Diagnosis Line Sinus tachycardia with 1st degree A-V block  Right bundle branch block  Left anterior fascicular block  *** Bifascicular block ***  Inferior infarct , age undetermined  Abnormal ECG    Confirmed by JANNETH MENON MD ([id number]) on 2020 7:45:29 AM (20 @ 04:36)      MEDICATIONS  amLODIPine   Tablet 5 Oral daily  atorvastatin 20 Oral at bedtime  chlorhexidine 4% Liquid 1 Topical <User Schedule>  dextrose 40% Gel 15 Oral once  dextrose 5%. 1000 IV Continuous <Continuous>  dextrose 5%. 1000 IV Continuous <Continuous>  dextrose 50% Injectable 25 IV Push once  dextrose 50% Injectable 12.5 IV Push once  dextrose 50% Injectable 25 IV Push once  dorzolamide 2%/timolol 0.5% Ophthalmic Solution 1 Right EYE two times a day  glucagon  Injectable 1 IntraMuscular once  heparin  Infusion 1200 IV Continuous <Continuous>  influenza   Vaccine 0.5 IntraMuscular once  insulin glargine Injectable (LANTUS) 40 SubCutaneous at bedtime  insulin lispro (ADMELOG) corrective regimen sliding scale  SubCutaneous three times a day before meals  insulin lispro Injectable (ADMELOG) 14 SubCutaneous three times a day before meals  losartan 50 Oral daily  pantoprazole    Tablet 40 Oral before breakfast  sodium chloride 0.9%. 1000 IV Continuous <Continuous>      Weight  Weight (kg): 175 (20 @ 10:30)    ANTIBIOTICS:      ALLERGIES:  No Known Allergies

## 2020-12-04 NOTE — ED ADULT NURSE NOTE - OBJECTIVE STATEMENT
Pt presents to ED with complaints of SOB and altered mental status @ home. Pt a and o x 2 upon arrival, now a and o x 3 upon assessment. Denies any complaints of cough or congestion. Pt sating 98% on room air. Denies any SOB, no distress noted. Complains of nausea, episode of vomiting noted x 1.

## 2020-12-04 NOTE — ED ADULT NURSE NOTE - PMH
CKD (chronic kidney disease)    Diabetes mellitus    DVT (deep venous thrombosis)    Glaucoma    HTN (hypertension)

## 2020-12-04 NOTE — H&P ADULT - NSHPSOCIALHISTORY_GEN_ALL_CORE
Living Situation: Lives with daughters  Occupation: Retired  Tobacco Use: Denies  Alcohol Use: Denies  Drug Use: Denies

## 2020-12-04 NOTE — CHART NOTE - NSCHARTNOTEFT_GEN_A_CORE
Patient refused V/Q, rediscussed with patient who is no agreeable, spoke to nuclear medicine will resend transport for him, asked patient if I may update one of his daughters, contacted patient's daughter Sumaya and discussed case with him, Sumaya will advise patient to comply with further imaging and testing if needed. Patient refused V/Q, rediscussed with patient who is no agreeable, spoke to nuclear medicine will resend transport for him, asked patient if I may update one of his daughters, contacted patient's daughter Sumaya and discussed case with her, Sumaya will advise patient to comply with further imaging and testing if needed.

## 2020-12-04 NOTE — H&P ADULT - NSHPREVIEWOFSYSTEMS_GEN_ALL_CORE
CONSTITUTIONAL: No weakness or fevers  EYES/ENT: No visual changes;  No vertigo or throat pain   NECK: No pain or stiffness  RESPIRATORY: No cough, wheezing, hemoptysis  CARDIOVASCULAR: No chest pain or palpitations  GASTROINTESTINAL: No abdominal or epigastric pain. No nausea, vomiting, or hematemesis; No diarrhea or constipation. No melena or hematochezia.  GENITOURINARY: No dysuria, frequency or hematuria  NEUROLOGICAL: No numbness or weakness  SKIN: No itching, rashes

## 2020-12-05 LAB
A1C WITH ESTIMATED AVERAGE GLUCOSE RESULT: 7.8 % — HIGH (ref 4–5.6)
ALBUMIN SERPL ELPH-MCNC: 3.6 G/DL — SIGNIFICANT CHANGE UP (ref 3.5–5.2)
ALP SERPL-CCNC: 50 U/L — SIGNIFICANT CHANGE UP (ref 30–115)
ALT FLD-CCNC: 29 U/L — SIGNIFICANT CHANGE UP (ref 0–41)
ANION GAP SERPL CALC-SCNC: 16 MMOL/L — HIGH (ref 7–14)
APTT BLD: 185.9 SEC — CRITICAL HIGH (ref 27–39.2)
AST SERPL-CCNC: 45 U/L — HIGH (ref 0–41)
BASOPHILS # BLD AUTO: 0.05 K/UL — SIGNIFICANT CHANGE UP (ref 0–0.2)
BASOPHILS NFR BLD AUTO: 0.7 % — SIGNIFICANT CHANGE UP (ref 0–1)
BILIRUB SERPL-MCNC: 0.7 MG/DL — SIGNIFICANT CHANGE UP (ref 0.2–1.2)
BUN SERPL-MCNC: 38 MG/DL — HIGH (ref 10–20)
CALCIUM SERPL-MCNC: 8.6 MG/DL — SIGNIFICANT CHANGE UP (ref 8.5–10.1)
CHLORIDE SERPL-SCNC: 101 MMOL/L — SIGNIFICANT CHANGE UP (ref 98–110)
CK MB CFR SERPL CALC: 5.4 NG/ML — SIGNIFICANT CHANGE UP (ref 0.6–6.3)
CK SERPL-CCNC: 873 U/L — HIGH (ref 0–225)
CO2 SERPL-SCNC: 16 MMOL/L — LOW (ref 17–32)
CREAT SERPL-MCNC: 1.9 MG/DL — HIGH (ref 0.7–1.5)
EOSINOPHIL # BLD AUTO: 0 K/UL — SIGNIFICANT CHANGE UP (ref 0–0.7)
EOSINOPHIL NFR BLD AUTO: 0 % — SIGNIFICANT CHANGE UP (ref 0–8)
ESTIMATED AVERAGE GLUCOSE: 177 MG/DL — HIGH (ref 68–114)
GLUCOSE BLDC GLUCOMTR-MCNC: 174 MG/DL — HIGH (ref 70–99)
GLUCOSE BLDC GLUCOMTR-MCNC: 183 MG/DL — HIGH (ref 70–99)
GLUCOSE BLDC GLUCOMTR-MCNC: 194 MG/DL — HIGH (ref 70–99)
GLUCOSE BLDC GLUCOMTR-MCNC: 230 MG/DL — HIGH (ref 70–99)
GLUCOSE SERPL-MCNC: 279 MG/DL — HIGH (ref 70–99)
GRAM STN FLD: SIGNIFICANT CHANGE UP
GRAM STN FLD: SIGNIFICANT CHANGE UP
HCT VFR BLD CALC: 38.6 % — LOW (ref 42–52)
HGB BLD-MCNC: 12.4 G/DL — LOW (ref 14–18)
IMM GRANULOCYTES NFR BLD AUTO: 0.5 % — HIGH (ref 0.1–0.3)
INR BLD: 1.16 RATIO — SIGNIFICANT CHANGE UP (ref 0.65–1.3)
LYMPHOCYTES # BLD AUTO: 0.46 K/UL — LOW (ref 1.2–3.4)
LYMPHOCYTES # BLD AUTO: 6.1 % — LOW (ref 20.5–51.1)
MAGNESIUM SERPL-MCNC: 1.7 MG/DL — LOW (ref 1.8–2.4)
MCHC RBC-ENTMCNC: 29.1 PG — SIGNIFICANT CHANGE UP (ref 27–31)
MCHC RBC-ENTMCNC: 32.1 G/DL — SIGNIFICANT CHANGE UP (ref 32–37)
MCV RBC AUTO: 90.6 FL — SIGNIFICANT CHANGE UP (ref 80–94)
METHOD TYPE: SIGNIFICANT CHANGE UP
MONOCYTES # BLD AUTO: 0.74 K/UL — HIGH (ref 0.1–0.6)
MONOCYTES NFR BLD AUTO: 9.7 % — HIGH (ref 1.7–9.3)
MSSA DNA SPEC QL NAA+PROBE: SIGNIFICANT CHANGE UP
NEUTROPHILS # BLD AUTO: 6.3 K/UL — SIGNIFICANT CHANGE UP (ref 1.4–6.5)
NEUTROPHILS NFR BLD AUTO: 83 % — HIGH (ref 42.2–75.2)
NRBC # BLD: 0 /100 WBCS — SIGNIFICANT CHANGE UP (ref 0–0)
PLATELET # BLD AUTO: 152 K/UL — SIGNIFICANT CHANGE UP (ref 130–400)
POTASSIUM SERPL-MCNC: 4.5 MMOL/L — SIGNIFICANT CHANGE UP (ref 3.5–5)
POTASSIUM SERPL-SCNC: 4.5 MMOL/L — SIGNIFICANT CHANGE UP (ref 3.5–5)
PROCALCITONIN SERPL-MCNC: 0.18 NG/ML — HIGH (ref 0.02–0.1)
PROT SERPL-MCNC: 6.8 G/DL — SIGNIFICANT CHANGE UP (ref 6–8)
PROTHROM AB SERPL-ACNC: 13.3 SEC — HIGH (ref 9.95–12.87)
RBC # BLD: 4.26 M/UL — LOW (ref 4.7–6.1)
RBC # FLD: 14.5 % — SIGNIFICANT CHANGE UP (ref 11.5–14.5)
SODIUM SERPL-SCNC: 133 MMOL/L — LOW (ref 135–146)
SPECIMEN SOURCE: SIGNIFICANT CHANGE UP
TROPONIN T SERPL-MCNC: 0.06 NG/ML — CRITICAL HIGH
WBC # BLD: 7.59 K/UL — SIGNIFICANT CHANGE UP (ref 4.8–10.8)
WBC # FLD AUTO: 7.59 K/UL — SIGNIFICANT CHANGE UP (ref 4.8–10.8)

## 2020-12-05 PROCEDURE — 99233 SBSQ HOSP IP/OBS HIGH 50: CPT

## 2020-12-05 RX ORDER — ACETAMINOPHEN 500 MG
650 TABLET ORAL EVERY 6 HOURS
Refills: 0 | Status: DISCONTINUED | OUTPATIENT
Start: 2020-12-05 | End: 2020-12-05

## 2020-12-05 RX ORDER — ACETAMINOPHEN 500 MG
650 TABLET ORAL EVERY 6 HOURS
Refills: 0 | Status: DISCONTINUED | OUTPATIENT
Start: 2020-12-05 | End: 2020-12-15

## 2020-12-05 RX ORDER — METOPROLOL TARTRATE 50 MG
25 TABLET ORAL DAILY
Refills: 0 | Status: DISCONTINUED | OUTPATIENT
Start: 2020-12-05 | End: 2020-12-06

## 2020-12-05 RX ORDER — ACETAMINOPHEN 500 MG
650 TABLET ORAL ONCE
Refills: 0 | Status: COMPLETED | OUTPATIENT
Start: 2020-12-05 | End: 2020-12-05

## 2020-12-05 RX ORDER — TIMOLOL 0.5 %
1 DROPS OPHTHALMIC (EYE) DAILY
Refills: 0 | Status: DISCONTINUED | OUTPATIENT
Start: 2020-12-05 | End: 2020-12-08

## 2020-12-05 RX ORDER — MAGNESIUM SULFATE 500 MG/ML
2 VIAL (ML) INJECTION ONCE
Refills: 0 | Status: COMPLETED | OUTPATIENT
Start: 2020-12-05 | End: 2020-12-05

## 2020-12-05 RX ORDER — VANCOMYCIN HCL 1 G
1000 VIAL (EA) INTRAVENOUS EVERY 12 HOURS
Refills: 0 | Status: DISCONTINUED | OUTPATIENT
Start: 2020-12-05 | End: 2020-12-07

## 2020-12-05 RX ORDER — VANCOMYCIN HCL 1 G
VIAL (EA) INTRAVENOUS
Refills: 0 | Status: DISCONTINUED | OUTPATIENT
Start: 2020-12-05 | End: 2020-12-05

## 2020-12-05 RX ORDER — DORZOLAMIDE HYDROCHLORIDE 20 MG/ML
1 SOLUTION/ DROPS OPHTHALMIC ONCE
Refills: 0 | Status: COMPLETED | OUTPATIENT
Start: 2020-12-05 | End: 2020-12-05

## 2020-12-05 RX ORDER — ATORVASTATIN CALCIUM 80 MG/1
80 TABLET, FILM COATED ORAL AT BEDTIME
Refills: 0 | Status: DISCONTINUED | OUTPATIENT
Start: 2020-12-05 | End: 2020-12-15

## 2020-12-05 RX ADMIN — Medication 250 MILLIGRAM(S): at 17:40

## 2020-12-05 RX ADMIN — LOSARTAN POTASSIUM 50 MILLIGRAM(S): 100 TABLET, FILM COATED ORAL at 05:33

## 2020-12-05 RX ADMIN — Medication 650 MILLIGRAM(S): at 12:30

## 2020-12-05 RX ADMIN — Medication 650 MILLIGRAM(S): at 06:52

## 2020-12-05 RX ADMIN — DORZOLAMIDE HYDROCHLORIDE 1 DROP(S): 20 SOLUTION/ DROPS OPHTHALMIC at 02:20

## 2020-12-05 RX ADMIN — Medication 1: at 11:30

## 2020-12-05 RX ADMIN — CHLORHEXIDINE GLUCONATE 1 APPLICATION(S): 213 SOLUTION TOPICAL at 05:33

## 2020-12-05 RX ADMIN — Medication 650 MILLIGRAM(S): at 05:32

## 2020-12-05 RX ADMIN — Medication 1: at 08:05

## 2020-12-05 RX ADMIN — Medication 650 MILLIGRAM(S): at 14:08

## 2020-12-05 RX ADMIN — Medication 14 UNIT(S): at 11:30

## 2020-12-05 RX ADMIN — Medication 650 MILLIGRAM(S): at 21:01

## 2020-12-05 RX ADMIN — Medication 50 GRAM(S): at 11:28

## 2020-12-05 RX ADMIN — PANTOPRAZOLE SODIUM 40 MILLIGRAM(S): 20 TABLET, DELAYED RELEASE ORAL at 05:33

## 2020-12-05 RX ADMIN — ATORVASTATIN CALCIUM 80 MILLIGRAM(S): 80 TABLET, FILM COATED ORAL at 21:39

## 2020-12-05 RX ADMIN — Medication 14 UNIT(S): at 16:28

## 2020-12-05 RX ADMIN — Medication 1: at 16:28

## 2020-12-05 RX ADMIN — Medication 1 DROP(S): at 02:20

## 2020-12-05 RX ADMIN — INSULIN GLARGINE 40 UNIT(S): 100 INJECTION, SOLUTION SUBCUTANEOUS at 21:39

## 2020-12-05 RX ADMIN — AMLODIPINE BESYLATE 5 MILLIGRAM(S): 2.5 TABLET ORAL at 05:33

## 2020-12-05 RX ADMIN — Medication 14 UNIT(S): at 08:04

## 2020-12-05 RX ADMIN — Medication 650 MILLIGRAM(S): at 21:31

## 2020-12-05 NOTE — PROGRESS NOTE ADULT - ASSESSMENT
66 yo M PMHx DM II, HTN, HLD, glaucoma, renal mass s/p right nephrectomy, nephrolithiasis, CKD III, and RLE Cellulitis was brought in by EMS for evaluation of altered mental status. As per EMR, patient developed chills and shortness of breath (which he attributed to the shivering) for about one hour. After that he became confused and doesn't remember anything until he received o2 in the ER. Upon EMS arrival, patient was found to be hypoxic to low 80s. He has had 2 negative covid tests and was found to have staph aureus in blood.    Sepsis due to staph bacteremia  - c/w vancomycin  - f/u MRSA vs MSSA  - check vanco trough  - pt remains febrile  - repeat cultures until negative  - check echo r/o endocarditis  - unclear source of infection. Pt states last week he had an injection for glaucoma and his optho noticed he had an eczema outbreak. Perhaps cracked skin with diabetes it the source? Pt states 3 weeks ago he had a callus removed by his podiatrist. It currently is oozing blooding. This could be another source of infection.    Altered mental status  - likely metabolic encephalopathy due to sepsis  - resolved    Acute hypoxic respiratory failure  - unclear etiology  - resolved  - perhaps related to sepsis  - v/q scan negative for PE (was on heparin gtt but was d/c)    Callus removed 3 weeks ago:  - Still oozing blood.   - podiatry consult appreciated, check xray of foot, attending to round    Troponemia:  - EKG sinus tachycardia  - f/u repeat cardiac enzymes 0.03-0.04-0.06  - telemetry monitoring   - likely due to increased demand  - will increase lipitor dose and start metoprolol    Hypomagnesmia  - replenish mg  - check am levels    Left Staghorn Calculi  - asymptomatic    DM II  - c/w insulin  - FS controlled  - goal 110-180    HTN  - controlled  - c/w amlodipine, losartan    HLD  -c/w atorvastatin    Glaucoma  - continue with timolol    CKD III  - s/p R nephrectomy  - stable at baseline  - Renally adjust meds  - Avoid nephrotoxic meds  - Monitor BMP    DVT px  Full Code  From home    #Progress Note Handoff:  Pending (specify):  podiatry, xray, echo, negative blood cultures  Family discussion: discussed blood cultured, podiatry  Disposition: Home__x_/SNF___/Other________/Unknown at this time________

## 2020-12-05 NOTE — PROGRESS NOTE ADULT - SUBJECTIVE AND OBJECTIVE BOX
JOSHUA HAM 65y Male  MRN#: [id number]   CODE STATUS: Full code      SUBJECTIVE  Patient is a 65y old Male who presents with a chief complaint of SOB (04 Dec 2020 18:59)  Currently admitted to medicine with the primary diagnosis of Sepsis    Today is hospital day 1d, and this morning he is still having fever. Blood culture positive for staph aureus, pending sensitivities.   pt is started on Vancomycin for now.   covid tests negative*2. will repeat test one more time.   pt refused Duplex US. VQ scan negative for PE. Heparin drip stopped.           OBJECTIVE  PAST MEDICAL & SURGICAL HISTORY  HLD (hyperlipidemia)    CKD (chronic kidney disease)    DVT (deep venous thrombosis)    Glaucoma    HTN (hypertension)    Diabetes mellitus    H/O right nephrectomy      ALLERGIES:  No Known Allergies    MEDICATIONS:  STANDING MEDICATIONS  amLODIPine   Tablet 5 milliGRAM(s) Oral daily  atorvastatin 20 milliGRAM(s) Oral at bedtime  chlorhexidine 4% Liquid 1 Application(s) Topical <User Schedule>  dextrose 40% Gel 15 Gram(s) Oral once  dextrose 5%. 1000 milliLiter(s) IV Continuous <Continuous>  dextrose 5%. 1000 milliLiter(s) IV Continuous <Continuous>  dextrose 50% Injectable 25 Gram(s) IV Push once  dextrose 50% Injectable 12.5 Gram(s) IV Push once  dextrose 50% Injectable 25 Gram(s) IV Push once  glucagon  Injectable 1 milliGRAM(s) IntraMuscular once  influenza   Vaccine 0.5 milliLiter(s) IntraMuscular once  insulin glargine Injectable (LANTUS) 40 Unit(s) SubCutaneous at bedtime  insulin lispro (ADMELOG) corrective regimen sliding scale   SubCutaneous three times a day before meals  insulin lispro Injectable (ADMELOG) 14 Unit(s) SubCutaneous three times a day before meals  losartan 50 milliGRAM(s) Oral daily  pantoprazole    Tablet 40 milliGRAM(s) Oral before breakfast  sodium chloride 0.9%. 1000 milliLiter(s) IV Continuous <Continuous>  timolol 0.5% Solution 1 Drop(s) Right EYE daily  vancomycin  IVPB        PRN MEDICATIONS      VITAL SIGNS: Last 24 Hours  T(C): 37.7 (05 Dec 2020 06:52), Max: 38.8 (04 Dec 2020 20:45)  T(F): 99.8 (05 Dec 2020 06:52), Max: 101.9 (04 Dec 2020 20:45)  HR: 104 (05 Dec 2020 05:24) (85 - 104)  BP: 158/67 (05 Dec 2020 05:24) (141/64 - 158/67)  BP(mean): --  RR: 18 (05 Dec 2020 05:24) (18 - 20)  SpO2: 96% (04 Dec 2020 22:05) (96% - 97%)    LABS:                        12.4   7.59  )-----------( 152      ( 05 Dec 2020 04:30 )             38.6     12-    139  |  105  |  47<H>  ----------------------------<  154<H>  5.3<H>   |  20  |  2.1<H>    Ca    9.3      04 Dec 2020 02:00    TPro  7.7  /  Alb  4.1  /  TBili  0.5  /  DBili  x   /  AST  31  /  ALT  27  /  AlkPhos  69  12-04    PT/INR - ( 05 Dec 2020 04:30 )   PT: 13.30 sec;   INR: 1.16 ratio         PTT - ( 05 Dec 2020 04:30 )  PTT:185.9 sec  Urinalysis Basic - ( 04 Dec 2020 07:30 )    Color: Yellow / Appearance: Clear / S.021 / pH: x  Gluc: x / Ketone: Trace  / Bili: Negative / Urobili: <2 mg/dL   Blood: x / Protein: 100 mg/dL / Nitrite: Negative   Leuk Esterase: Negative / RBC: 8 /HPF / WBC 4 /HPF   Sq Epi: x / Non Sq Epi: 3 /HPF / Bacteria: Negative        Troponin T, Serum: 0.06 ng/mL <HH> (20 @ 04:30)  Lactate, Blood: 1.7 mmol/L (20 @ 11:48)  Troponin T, Serum: 0.04 ng/mL <HH> (20 @ 11:48)      Culture - Blood (collected 04 Dec 2020 02:00)  Source: .Blood Blood-Peripheral  Gram Stain (05 Dec 2020 03:48):    Growth in anaerobic bottle:    Gram Positive Cocci in Clusters  Preliminary Report (05 Dec 2020 03:48):    Growth in anaerobic bottle:    Gram Positive Cocci in Clusters    "Due to technical problems, Proteus sp. will Not be reported as part of    the BCID panel until further notice"    ***Blood Panel PCR results on this specimen are available    approximately 3 hours after the Gram stain result.***    Gram stain, PCR, and/or culture results may not always    correspond due to difference in methodologies.    ************************************************************    This PCR assay was performed using [employer].    The following targets are tested for: Enterococcus,    vancomycin resistant enterococci, Listeria monocytogenes,    coagulase negative staphylococci, S. aureus,    methicillin resistant S. aureus, Streptococcus agalactiae    (Group B), S. pneumoniae,S. pyogenes (Group A),    Acinetobacter baumannii, Enterobacter cloacae, E. coli,    Klebsiella oxytoca, K. pneumoniae, Proteus sp.,    Serratia marcescens, Haemophilus influenzae,    Neisseria meningitidis, Pseudomonas aeruginosa, Candida    albicans, C. glabrata, C krusei, C parapsilosis,    C. tropicalis and the KPC resistance gene.  Organism: Blood Culture PCR (05 Dec 2020 04:58)  Organism: Blood Culture PCR (05 Dec 2020 04:58)      CARDIAC MARKERS ( 05 Dec 2020 04:30 )  x     / 0.06 ng/mL / x     / x     / 5.4 ng/mL  CARDIAC MARKERS ( 04 Dec 2020 11:48 )  x     / 0.04 ng/mL / x     / x     / x      CARDIAC MARKERS ( 04 Dec 2020 02:00 )  x     / 0.03 ng/mL / x     / x     / x          RADIOLOGY:      PHYSICAL EXAM:    GENERAL: NAD, well-developed, AAOx3, morbidly obese  HEENT:  Atraumatic, Normocephalic. EOMI, PERRLA, conjunctiva and sclera clear, No JVD  PULMONARY: Clear to auscultation bilaterally; No wheeze  CARDIOVASCULAR: Regular rate and rhythm; No murmurs, rubs, or gallops  GASTROINTESTINAL: Soft, Nontender, Nondistended; Bowel sounds present  MUSCULOSKELETAL:  2+ Peripheral Pulses, No clubbing, cyanosis, or edema  NEUROLOGY: non-focal  SKIN: No rashes or lesions      ADMISSION SUMMARY    64 yo M with PMHx of DM II, HTN, HLD, Glaucoma, Renal Mass s/p Right Nephrectomy, Nephrolithiasis, CKD III, RLE Cellulitis, brought in by EMS for altered mental status, as per patient he felt onset of chills and shortness of breath lasting one hour when he became disoriented, as per patient his daughter said he looked pale and unwell thereby prompting EMS, patient denies recent sick contacts however daughters had COVID-19 back in March (daughters are employed at [employer]). Patient was found to be hypoxic to low 80s in ambulance, currently 95% on room air. Patient denies loss of consciousness, headache, history of seizures, states whenever he is "fighting an infection" he experiences intense chills similar to those on day of presentation. Patient's mental status currently at baseline. Remaining ROS unrevealing.      #Sepsis present on Admission, Acute Hypoxemic Respiratory Failure (in conjunction with elevated D-Dimer:  #Lactic acidosis   T 102.6F, , RR 22 on admisison  Required 16L NRB on admisison  Currently afebrile, normal HR, saturating at 93% on RA  Reports that the weakness, SOB, and chills have all resolved in the ED  Lactate on admission 2.9, 2.6 on repeat s/p 1L LR  Blood culture positive for Staph aureus.   UA negative for infection, positive for protein and blood (appears to be chronic)  VQ scan negative   - f/u blood cultures  - f/u crp, ferritin, procalcitonin  - IVF  - Started on Vancomycin   - F/U with ID  - Vanc trough before 4 th dose.     #SOB  As per EMS 87% on RA when arrived at house  Currently 93% on RA  DDimer elevated to 848  Patient refused CT with contrast  VQ scan negative  Legs not currently swollen  Hx of bleeding at the surgical site after having a renal mass removed  Wells criteria 1.5  - Start on heparin drip in the setting of CKD, stopped since vq is negative  - monitor for any signs of bleeding or SOB      #Troponemia:  - EKG sinus tachycardia  - f/u repeat cardiac enzymes 0.03-0.04-0.06  - telemetry monitoring     #Left Staghorn Calculi: Awaiting Urology assessment     #AMS  Likely secondary to infection  Currently resolved  - Monitor    #DM  - Lantus/ Lispro  - f/u HbA1c  - fs ahs    #HTN  - c/w home amlodipine and ARB    #HLD  -c/w atorvostatin    #Glaucoma  - continue with home meds    #CKD  s/p R nephrectomy  BUN/creatinine today 47/2.1  BUN/creatinine 2019 39/2.1  UA shows protein and blood   - Renally adjust meds  - Avoid nephrotoxic meds  - Monitor BMP    Diet: DASH/CC  Activity: AAT  DVT Prophylaxis: Heparin drip  GI Prophylaxis: PPX  CHG Order YES  Disposition: Acute         JOSHUA HAM 65y Male  MRN#: [id number]   CODE STATUS: Full code      SUBJECTIVE  Patient is a 65y old Male who presents with a chief complaint of SOB (04 Dec 2020 18:59)  Currently admitted to medicine with the primary diagnosis of Sepsis    Today is hospital day 1d, and this morning he is still having fever. Blood culture positive for staph aureus, pending sensitivities.   pt is started on Vancomycin for now.   covid tests negative*2. will repeat test one more time.   pt refused Duplex US. VQ scan negative for PE. Heparin drip stopped.   TTE ordered.         OBJECTIVE  PAST MEDICAL & SURGICAL HISTORY  HLD (hyperlipidemia)    CKD (chronic kidney disease)    DVT (deep venous thrombosis)    Glaucoma    HTN (hypertension)    Diabetes mellitus    H/O right nephrectomy      ALLERGIES:  No Known Allergies    MEDICATIONS:  STANDING MEDICATIONS  amLODIPine   Tablet 5 milliGRAM(s) Oral daily  atorvastatin 20 milliGRAM(s) Oral at bedtime  chlorhexidine 4% Liquid 1 Application(s) Topical <User Schedule>  dextrose 40% Gel 15 Gram(s) Oral once  dextrose 5%. 1000 milliLiter(s) IV Continuous <Continuous>  dextrose 5%. 1000 milliLiter(s) IV Continuous <Continuous>  dextrose 50% Injectable 25 Gram(s) IV Push once  dextrose 50% Injectable 12.5 Gram(s) IV Push once  dextrose 50% Injectable 25 Gram(s) IV Push once  glucagon  Injectable 1 milliGRAM(s) IntraMuscular once  influenza   Vaccine 0.5 milliLiter(s) IntraMuscular once  insulin glargine Injectable (LANTUS) 40 Unit(s) SubCutaneous at bedtime  insulin lispro (ADMELOG) corrective regimen sliding scale   SubCutaneous three times a day before meals  insulin lispro Injectable (ADMELOG) 14 Unit(s) SubCutaneous three times a day before meals  losartan 50 milliGRAM(s) Oral daily  pantoprazole    Tablet 40 milliGRAM(s) Oral before breakfast  sodium chloride 0.9%. 1000 milliLiter(s) IV Continuous <Continuous>  timolol 0.5% Solution 1 Drop(s) Right EYE daily  vancomycin  IVPB        PRN MEDICATIONS      VITAL SIGNS: Last 24 Hours  T(C): 37.7 (05 Dec 2020 06:52), Max: 38.8 (04 Dec 2020 20:45)  T(F): 99.8 (05 Dec 2020 06:52), Max: 101.9 (04 Dec 2020 20:45)  HR: 104 (05 Dec 2020 05:24) (85 - 104)  BP: 158/67 (05 Dec 2020 05:24) (141/64 - 158/67)  BP(mean): --  RR: 18 (05 Dec 2020 05:24) (18 - 20)  SpO2: 96% (04 Dec 2020 22:05) (96% - 97%)    LABS:                        12.4   7.59  )-----------( 152      ( 05 Dec 2020 04:30 )             38.6     12-    139  |  105  |  47<H>  ----------------------------<  154<H>  5.3<H>   |  20  |  2.1<H>    Ca    9.3      04 Dec 2020 02:00    TPro  7.7  /  Alb  4.1  /  TBili  0.5  /  DBili  x   /  AST  31  /  ALT  27  /  AlkPhos  69  12-04    PT/INR - ( 05 Dec 2020 04:30 )   PT: 13.30 sec;   INR: 1.16 ratio         PTT - ( 05 Dec 2020 04:30 )  PTT:185.9 sec  Urinalysis Basic - ( 04 Dec 2020 07:30 )    Color: Yellow / Appearance: Clear / S.021 / pH: x  Gluc: x / Ketone: Trace  / Bili: Negative / Urobili: <2 mg/dL   Blood: x / Protein: 100 mg/dL / Nitrite: Negative   Leuk Esterase: Negative / RBC: 8 /HPF / WBC 4 /HPF   Sq Epi: x / Non Sq Epi: 3 /HPF / Bacteria: Negative        Troponin T, Serum: 0.06 ng/mL <HH> (20 @ 04:30)  Lactate, Blood: 1.7 mmol/L (20 @ 11:48)  Troponin T, Serum: 0.04 ng/mL <HH> (20 @ 11:48)      Culture - Blood (collected 04 Dec 2020 02:00)  Source: .Blood Blood-Peripheral  Gram Stain (05 Dec 2020 03:48):    Growth in anaerobic bottle:    Gram Positive Cocci in Clusters  Preliminary Report (05 Dec 2020 03:48):    Growth in anaerobic bottle:    Gram Positive Cocci in Clusters    "Due to technical problems, Proteus sp. will Not be reported as part of    the BCID panel until further notice"    ***Blood Panel PCR results on this specimen are available    approximately 3 hours after the Gram stain result.***    Gram stain, PCR, and/or culture results may not always    correspond due to difference in methodologies.    ************************************************************    This PCR assay was performed using "[employer].    The following targets are tested for: Enterococcus,    vancomycin resistant enterococci, Listeria monocytogenes,    coagulase negative staphylococci, S. aureus,    methicillin resistant S. aureus, Streptococcus agalactiae    (Group B), S. pneumoniae,S. pyogenes (Group A),    Acinetobacter baumannii, Enterobacter cloacae, E. coli,    Klebsiella oxytoca, K. pneumoniae, Proteus sp.,    Serratia marcescens, Haemophilus influenzae,    Neisseria meningitidis, Pseudomonas aeruginosa, Candida    albicans, C. glabrata, C krusei, C parapsilosis,    C. tropicalis and the KPC resistance gene.  Organism: Blood Culture PCR (05 Dec 2020 04:58)  Organism: Blood Culture PCR (05 Dec 2020 04:58)      CARDIAC MARKERS ( 05 Dec 2020 04:30 )  x     / 0.06 ng/mL / x     / x     / 5.4 ng/mL  CARDIAC MARKERS ( 04 Dec 2020 11:48 )  x     / 0.04 ng/mL / x     / x     / x      CARDIAC MARKERS ( 04 Dec 2020 02:00 )  x     / 0.03 ng/mL / x     / x     / x          RADIOLOGY:      PHYSICAL EXAM:    GENERAL: NAD, well-developed, AAOx3, morbidly obese  HEENT:  Atraumatic, Normocephalic. EOMI, PERRLA, conjunctiva and sclera clear, No JVD  PULMONARY: Clear to auscultation bilaterally; No wheeze  CARDIOVASCULAR: Regular rate and rhythm; No murmurs, rubs, or gallops  GASTROINTESTINAL: Soft, Nontender, Nondistended; Bowel sounds present  MUSCULOSKELETAL:  2+ Peripheral Pulses, No clubbing, cyanosis, or edema  NEUROLOGY: non-focal  SKIN: No rashes or lesions      ADMISSION SUMMARY    64 yo M with PMHx of DM II, HTN, HLD, Glaucoma, Renal Mass s/p Right Nephrectomy, Nephrolithiasis, CKD III, RLE Cellulitis, brought in by EMS for altered mental status, as per patient he felt onset of chills and shortness of breath lasting one hour when he became disoriented, as per patient his daughter said he looked pale and unwell thereby prompting EMS, patient denies recent sick contacts however daughters had COVID-19 back in March (daughters are employed at [employer]). Patient was found to be hypoxic to low 80s in ambulance, currently 95% on room air. Patient denies loss of consciousness, headache, history of seizures, states whenever he is "fighting an infection" he experiences intense chills similar to those on day of presentation. Patient's mental status currently at baseline. Remaining ROS unrevealing.      #Sepsis present on Admission, Acute Hypoxemic Respiratory Failure (in conjunction with elevated D-Dimer:  #Lactic acidosis   T 102.6F, , RR 22 on admisison  Required 16L NRB on admisison  Currently afebrile, normal HR, saturating at 93% on RA  Reports that the weakness, SOB, and chills have all resolved in the ED  Lactate on admission 2.9, 2.6 on repeat s/p 1L LR  Blood culture positive for Staph aureus.   UA negative for infection, positive for protein and blood (appears to be chronic)  VQ scan negative   - f/u blood cultures  - f/u crp, ferritin, procalcitonin  - IVF  - Started on Vancomycin   - F/U with ID  - Vanc trough before 4 th dose.   - f/u TTE     #SOB  As per EMS 87% on RA when arrived at house  Currently 93% on RA  DDimer elevated to 848  Patient refused CT with contrast  VQ scan negative  Legs not currently swollen  Hx of bleeding at the surgical site after having a renal mass removed  Wells criteria 1.5  - Start on heparin drip in the setting of CKD, stopped since vq is negative  - monitor for any signs of bleeding or SOB      #Troponemia:  - EKG sinus tachycardia  - f/u repeat cardiac enzymes 0.03-0.04-0.06  - telemetry monitoring     #Left Staghorn Calculi: Awaiting Urology assessment     #AMS  Likely secondary to infection  Currently resolved  - Monitor    #DM  - Lantus/ Lispro  - f/u HbA1c  - fs ahs    #HTN  - c/w home amlodipine and ARB    #HLD  -c/w atorvostatin    #Glaucoma  - continue with home meds    #CKD  s/p R nephrectomy  BUN/creatinine today 47/2.1  BUN/creatinine  39/2.1  UA shows protein and blood   - Renally adjust meds  - Avoid nephrotoxic meds  - Monitor BMP    Diet: DASH/CC  Activity: AAT  DVT Prophylaxis: Heparin drip  GI Prophylaxis: PPX  CHG Order YES  Disposition: Acute         JOSHUA HAM 65y Male  MRN#: [id number]   CODE STATUS: Full code      SUBJECTIVE  Patient is a 65y old Male who presents with a chief complaint of SOB (04 Dec 2020 18:59)  Currently admitted to medicine with the primary diagnosis of Sepsis    Today is hospital day 1d, and this morning he is still having fever. Blood culture positive for staph aureus, pending sensitivities.   pt is started on Vancomycin for now.   covid tests negative*2. will repeat test one more time.   pt refused Duplex US. VQ scan negative for PE. Heparin drip stopped.   TTE ordered.         OBJECTIVE  PAST MEDICAL & SURGICAL HISTORY  HLD (hyperlipidemia)    CKD (chronic kidney disease)    DVT (deep venous thrombosis)    Glaucoma    HTN (hypertension)    Diabetes mellitus    H/O right nephrectomy      ALLERGIES:  No Known Allergies    MEDICATIONS:  STANDING MEDICATIONS  amLODIPine   Tablet 5 milliGRAM(s) Oral daily  atorvastatin 20 milliGRAM(s) Oral at bedtime  chlorhexidine 4% Liquid 1 Application(s) Topical <User Schedule>  dextrose 40% Gel 15 Gram(s) Oral once  dextrose 5%. 1000 milliLiter(s) IV Continuous <Continuous>  dextrose 5%. 1000 milliLiter(s) IV Continuous <Continuous>  dextrose 50% Injectable 25 Gram(s) IV Push once  dextrose 50% Injectable 12.5 Gram(s) IV Push once  dextrose 50% Injectable 25 Gram(s) IV Push once  glucagon  Injectable 1 milliGRAM(s) IntraMuscular once  influenza   Vaccine 0.5 milliLiter(s) IntraMuscular once  insulin glargine Injectable (LANTUS) 40 Unit(s) SubCutaneous at bedtime  insulin lispro (ADMELOG) corrective regimen sliding scale   SubCutaneous three times a day before meals  insulin lispro Injectable (ADMELOG) 14 Unit(s) SubCutaneous three times a day before meals  losartan 50 milliGRAM(s) Oral daily  pantoprazole    Tablet 40 milliGRAM(s) Oral before breakfast  sodium chloride 0.9%. 1000 milliLiter(s) IV Continuous <Continuous>  timolol 0.5% Solution 1 Drop(s) Right EYE daily  vancomycin  IVPB        PRN MEDICATIONS      VITAL SIGNS: Last 24 Hours  T(C): 37.7 (05 Dec 2020 06:52), Max: 38.8 (04 Dec 2020 20:45)  T(F): 99.8 (05 Dec 2020 06:52), Max: 101.9 (04 Dec 2020 20:45)  HR: 104 (05 Dec 2020 05:24) (85 - 104)  BP: 158/67 (05 Dec 2020 05:24) (141/64 - 158/67)  BP(mean): --  RR: 18 (05 Dec 2020 05:24) (18 - 20)  SpO2: 96% (04 Dec 2020 22:05) (96% - 97%)    LABS:                        12.4   7.59  )-----------( 152      ( 05 Dec 2020 04:30 )             38.6     12-    139  |  105  |  47<H>  ----------------------------<  154<H>  5.3<H>   |  20  |  2.1<H>    Ca    9.3      04 Dec 2020 02:00    TPro  7.7  /  Alb  4.1  /  TBili  0.5  /  DBili  x   /  AST  31  /  ALT  27  /  AlkPhos  69  12-04    PT/INR - ( 05 Dec 2020 04:30 )   PT: 13.30 sec;   INR: 1.16 ratio         PTT - ( 05 Dec 2020 04:30 )  PTT:185.9 sec  Urinalysis Basic - ( 04 Dec 2020 07:30 )    Color: Yellow / Appearance: Clear / S.021 / pH: x  Gluc: x / Ketone: Trace  / Bili: Negative / Urobili: <2 mg/dL   Blood: x / Protein: 100 mg/dL / Nitrite: Negative   Leuk Esterase: Negative / RBC: 8 /HPF / WBC 4 /HPF   Sq Epi: x / Non Sq Epi: 3 /HPF / Bacteria: Negative        Troponin T, Serum: 0.06 ng/mL <HH> (20 @ 04:30)  Lactate, Blood: 1.7 mmol/L (20 @ 11:48)  Troponin T, Serum: 0.04 ng/mL <HH> (20 @ 11:48)      Culture - Blood (collected 04 Dec 2020 02:00)  Source: .Blood Blood-Peripheral  Gram Stain (05 Dec 2020 03:48):    Growth in anaerobic bottle:    Gram Positive Cocci in Clusters  Preliminary Report (05 Dec 2020 03:48):    Growth in anaerobic bottle:    Gram Positive Cocci in Clusters    "Due to technical problems, Proteus sp. will Not be reported as part of    the BCID panel until further notice"    ***Blood Panel PCR results on this specimen are available    approximately 3 hours after the Gram stain result.***    Gram stain, PCR, and/or culture results may not always    correspond due to difference in methodologies.    ************************************************************    This PCR assay was performed using [employer].    The following targets are tested for: Enterococcus,    vancomycin resistant enterococci, Listeria monocytogenes,    coagulase negative staphylococci, S. aureus,    methicillin resistant S. aureus, Streptococcus agalactiae    (Group B), S. pneumoniae,S. pyogenes (Group A),    Acinetobacter baumannii, Enterobacter cloacae, E. coli,    Klebsiella oxytoca, K. pneumoniae, Proteus sp.,    Serratia marcescens, Haemophilus influenzae,    Neisseria meningitidis, Pseudomonas aeruginosa, Candida    albicans, C. glabrata, C krusei, C parapsilosis,    C. tropicalis and the KPC resistance gene.  Organism: Blood Culture PCR (05 Dec 2020 04:58)  Organism: Blood Culture PCR (05 Dec 2020 04:58)      CARDIAC MARKERS ( 05 Dec 2020 04:30 )  x     / 0.06 ng/mL / x     / x     / 5.4 ng/mL  CARDIAC MARKERS ( 04 Dec 2020 11:48 )  x     / 0.04 ng/mL / x     / x     / x      CARDIAC MARKERS ( 04 Dec 2020 02:00 )  x     / 0.03 ng/mL / x     / x     / x          RADIOLOGY:      PHYSICAL EXAM:    GENERAL: NAD, well-developed, AAOx3, morbidly obese  HEENT:  Atraumatic, Normocephalic. EOMI, PERRLA, conjunctiva and sclera clear, No JVD  PULMONARY: Clear to auscultation bilaterally; No wheeze  CARDIOVASCULAR: Regular rate and rhythm; No murmurs, rubs, or gallops  GASTROINTESTINAL: Soft, Nontender, Nondistended; Bowel sounds present  MUSCULOSKELETAL:  2+ Peripheral Pulses, No clubbing, cyanosis, or edema  NEUROLOGY: non-focal  SKIN: No rashes or lesions      ADMISSION SUMMARY    64 yo M with PMHx of DM II, HTN, HLD, Glaucoma, Renal Mass s/p Right Nephrectomy, Nephrolithiasis, CKD III, RLE Cellulitis, brought in by EMS for altered mental status, as per patient he felt onset of chills and shortness of breath lasting one hour when he became disoriented, as per patient his daughter said he looked pale and unwell thereby prompting EMS, patient denies recent sick contacts however daughters had COVID-19 back in March (daughters are employed at [employer]). Patient was found to be hypoxic to low 80s in ambulance, currently 95% on room air. Patient denies loss of consciousness, headache, history of seizures, states whenever he is "fighting an infection" he experiences intense chills similar to those on day of presentation. Patient's mental status currently at baseline. Remaining ROS unrevealing.      #Sepsis present on Admission, Acute Hypoxemic Respiratory Failure (in conjunction with elevated D-Dimer:  #Lactic acidosis   T 102.6F, , RR 22 on admisison  Required 16L NRB on admisison  Currently afebrile, normal HR, saturating at 93% on RA  Reports that the weakness, SOB, and chills have all resolved in the ED  Lactate on admission 2.9, 2.6 on repeat s/p 1L LR  Blood culture positive for Staph aureus.   UA negative for infection, positive for protein and blood (appears to be chronic)  VQ scan negative   - f/u blood cultures  - f/u crp, ferritin, procalcitonin  - IVF  - Started on Vancomycin   - F/U with ID  - Vanc trough before 4 th dose.   - f/u TTE     #Callus removed 3 weeks ago:  - Still oozing blood.   - Will consult podiatry.    #SOB  As per EMS 87% on RA when arrived at house  Currently 93% on RA  DDimer elevated to 848  Patient refused CT with contrast  VQ scan negative  Legs not currently swollen  Hx of bleeding at the surgical site after having a renal mass removed  Wells criteria 1.5  - Start on heparin drip in the setting of CKD, stopped since vq is negative  - monitor for any signs of bleeding or SOB      #Troponemia:  - EKG sinus tachycardia  - f/u repeat cardiac enzymes 0.03-0.04-0.06  - telemetry monitoring     #Left Staghorn Calculi: Awaiting Urology assessment     #AMS  Likely secondary to infection  Currently resolved  - Monitor    #DM  - Lantus/ Lispro  - f/u HbA1c  - fs ahs    #HTN  - c/w home amlodipine and ARB    #HLD  -c/w atorvostatin    #Glaucoma  - continue with home meds    #CKD  s/p R nephrectomy  BUN/creatinine today 47/2.1  BUN/creatinine  39/2.1  UA shows protein and blood   - Renally adjust meds  - Avoid nephrotoxic meds  - Monitor BMP    Diet: DASH/CC  Activity: AAT  DVT Prophylaxis: Heparin drip  GI Prophylaxis: PPX  CHG Order YES  Disposition: Acute

## 2020-12-05 NOTE — PROGRESS NOTE ADULT - SUBJECTIVE AND OBJECTIVE BOX
CHIEF COMPLAINT:    Patient is a 65y old  Male who presents with a chief complaint of SOB       INTERVAL HPI/OVERNIGHT EVENTS:    Patient seen and examined at bedside. No acute overnight events occurred.    ROS: Denies SOB, chest pain. All other systems are negative.    Vital Signs:    T(F): 100.7 (20 @ 12:29), Max: 101.9 (20 @ 20:45)  HR: 90 (20 @ 13:17) (90 - 104)  BP: 142/64 (20 @ 13:17) (142/64 - 158/67)  RR: 20 (20 @ 13:17) (18 - 20)  SpO2: 96% (20 @ 22:05) (96% - 96%)  I&O's Summary    04 Dec 2020 07:01  -  05 Dec 2020 07:00  --------------------------------------------------------  IN: 300 mL / OUT: 0 mL / NET: 300 mL    05 Dec 2020 07:01  -  05 Dec 2020 15:35  --------------------------------------------------------  IN: 210 mL / OUT: 0 mL / NET: 210 mL      Daily     Daily Weight in k (05 Dec 2020 05:24)  CAPILLARY BLOOD GLUCOSE      POCT Blood Glucose.: 174 mg/dL (05 Dec 2020 11:19)  POCT Blood Glucose.: 194 mg/dL (05 Dec 2020 07:21)  POCT Blood Glucose.: 138 mg/dL (04 Dec 2020 21:47)  POCT Blood Glucose.: 166 mg/dL (04 Dec 2020 17:56)  POCT Blood Glucose.: 163 mg/dL (04 Dec 2020 16:41)      PHYSICAL EXAM:  GENERAL:  NAD, obese  SKIN: No rashes or lesions  HEENT: Atraumatic. Normocephalic. Anicteric  NECK:  No JVD.   PULMONARY: Clear to ausculation bilaterally. No wheezing. No rales  CVS: Normal S1, S2. Regular rate and rhythm. No murmurs.  ABDOMEN/GI: Soft, Nontender, Nondistended; Bowel sounds are present  EXTREMITIES:  No edema B/L LE. Right great toe callus with central scabbing. overlying sock with blood.  NEUROLOGIC:  No motor deficit.  PSYCH: Alert & oriented x 3, normal affect    Consultant(s) Notes Reviewed:  [x ] YES  [ ] NO      LABS:                        12.4   7.59  )-----------( 152      ( 05 Dec 2020 04:30 )             38.6     12    133<L>  |  101  |  38<H>  ----------------------------<  279<H>  4.5   |  16<L>  |  1.9<H>    Ca    8.6      05 Dec 2020 04:30  Mg     1.7         TPro  6.8  /  Alb  3.6  /  TBili  0.7  /  DBili  x   /  AST  45<H>  /  ALT  29  /  AlkPhos  50  12    PT/INR - ( 05 Dec 2020 04:30 )   PT: 13.30 sec;   INR: 1.16 ratio         PTT - ( 05 Dec 2020 04:30 )  PTT:185.9 sec  Serum Pro-Brain Natriuretic Peptide: 82 pg/mL (20 @ 02:00)    Trop 0.06, CKMB 5.4, , 20 @ 04:30  Trop 0.04, CKMB --, CK --, 20 @ 11:48  Trop 0.03, CKMB --, CK --, 20 @ 02:00      Culture - Blood (collected 04 Dec 2020 02:00)  Source: .Blood Blood-Peripheral  Gram Stain (05 Dec 2020 03:48):    Growth in anaerobic bottle:    Gram Positive Cocci in Clusters  Preliminary Report (05 Dec 2020 03:48):    Growth in anaerobic bottle:    Gram Positive Cocci in Clusters    "Due to technical problems, Proteus sp. will Not be reported as part of    the BCID panel until further notice"    ***Blood Panel PCR results on this specimen are available    approximately 3 hours after the Gram stain result.***    Gram stain, PCR, and/or culture results may not always    correspond due to difference in methodologies.    ************************************************************    This PCR assay was performed using [employer].    The following targets are tested for: Enterococcus,    vancomycin resistant enterococci, Listeria monocytogenes,    coagulase negative staphylococci, S. aureus,    methicillin resistant S. aureus, Streptococcus agalactiae    (Group B), S. pneumoniae,S. pyogenes (Group A),    Acinetobacter baumannii, Enterobacter cloacae, E. coli,    Klebsiella oxytoca, K. pneumoniae, Proteus sp.,    Serratia marcescens, Haemophilus influenzae,    Neisseria meningitidis, Pseudomonas aeruginosa, Candida    albicans, C. glabrata, C krusei, C parapsilosis,    C. tropicalis and the KPC resistance gene.  Organism: Blood Culture PCR (05 Dec 2020 04:58)  Organism: Blood Culture PCR (05 Dec 2020 04:58)    Culture - Blood (collected 04 Dec 2020 02:00)  Source: .Blood Blood-Peripheral  Preliminary Report (05 Dec 2020 13:01):    No growth to date.        RADIOLOGY & ADDITIONAL TESTS:  No new imaging    Medications:  Standing  amLODIPine   Tablet 5 milliGRAM(s) Oral daily  atorvastatin 20 milliGRAM(s) Oral at bedtime  chlorhexidine 4% Liquid 1 Application(s) Topical <User Schedule>  dextrose 40% Gel 15 Gram(s) Oral once  dextrose 5%. 1000 milliLiter(s) IV Continuous <Continuous>  dextrose 5%. 1000 milliLiter(s) IV Continuous <Continuous>  dextrose 50% Injectable 25 Gram(s) IV Push once  dextrose 50% Injectable 12.5 Gram(s) IV Push once  dextrose 50% Injectable 25 Gram(s) IV Push once  glucagon  Injectable 1 milliGRAM(s) IntraMuscular once  influenza   Vaccine 0.5 milliLiter(s) IntraMuscular once  insulin glargine Injectable (LANTUS) 40 Unit(s) SubCutaneous at bedtime  insulin lispro (ADMELOG) corrective regimen sliding scale   SubCutaneous three times a day before meals  insulin lispro Injectable (ADMELOG) 14 Unit(s) SubCutaneous three times a day before meals  losartan 50 milliGRAM(s) Oral daily  pantoprazole    Tablet 40 milliGRAM(s) Oral before breakfast  sodium chloride 0.9%. 1000 milliLiter(s) IV Continuous <Continuous>  timolol 0.5% Solution 1 Drop(s) Right EYE daily  vancomycin  IVPB 1000 milliGRAM(s) IV Intermittent every 12 hours    PRN Meds  acetaminophen   Tablet .. 650 milliGRAM(s) Oral every 6 hours PRN      Case discussed with resident  Care discussed with pt

## 2020-12-05 NOTE — CONSULT NOTE ADULT - SUBJECTIVE AND OBJECTIVE BOX
Podiatry Consult Note    Subjective:  JOSHUA HAM is a pleasant well-nourished, well developed 65y Male in no acute distress, alert awake, and oriented to person, place and time.   Patient is a 65y old  Male who presents with a chief complaint of SOB (05 Dec 2020 08:33)  Seen by bedside; has been seeing Dr. Gutierrez as a podiatrist for 6 weeks to manage right hallux ulcer; patient is saying the size and severity of right hallux ulcer is decreasing, and has upcoming appointment with Dr. Gutierrez; however, patient wants second opinion from in-house podiatrist as well.     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 At around 11:30 last night patient developed sudden onset chills while lying in bed. He tried to get out of bed but was unable to as he became very weak and SOB. His daughters called EMS. After that point he does not remember what happened but the chart documents that he was altered, AAOx2. Of note patient reports having a similar episode of chills 5 years ago but that resolved by itself within minutes. Also of note patient reports having history of swelling in his lower extremities but reports that multiple duplex US have been negative. He denied any hx of recent travel, or recent illness. He denied any hx of nausea, vomiting, diarrhea, constipation, melena, pain in abdomen, chest pain, cough, dysuria, headache, lightheadedness, dizziness, or numbness/tingling.    In the ED patient met SIRS criteria with T 102.6F, , RR 22, /80. SpO2 99 % on 16L NRB. Labs were significant for WBC 10.78, DDimer 848, BUN/creatinine 47/2.1, Lactate 2.9 that is 2.6 on repeat ABG, and troponin of 0.03. COVID negative. EKG showed sinus tachycardia, and CT chest, abdomen, and pelvis was negative for acute pathology. Patient refused CTA chest. Received 1 dose cefepime and vancomycin and was started on a heparin drip.  Also received 1L LR, zofran, and tylenol.     (04 Dec 2020 08:21)    PAST MEDICAL & SURGICAL HISTORY:  HLD (hyperlipidemia)  CKD (chronic kidney disease)  DVT (deep venous thrombosis)  Glaucoma  HTN (hypertension)  Diabetes mellitus  H/O right nephrectomy    Objective:  Vital Signs Last 24 Hrs  T(C): 38.2 (05 Dec 2020 12:29), Max: 38.8 (04 Dec 2020 20:45)  T(F): 100.7 (05 Dec 2020 12:29), Max: 101.9 (04 Dec 2020 20:45)  HR: 90 (05 Dec 2020 13:17) (90 - 104)  BP: 142/64 (05 Dec 2020 13:17) (142/64 - 158/67)  BP(mean): --  RR: 20 (05 Dec 2020 13:17) (18 - 20)  SpO2: 96% (04 Dec 2020 22:05) (96% - 96%)                        12.4   7.59  )-----------( 152      ( 05 Dec 2020 04:30 )             38.6                 12-05    133<L>  |  101  |  38<H>  ----------------------------<  279<H>  4.5   |  16<L>  |  1.9<H>    Ca    8.6      05 Dec 2020 04:30  Mg     1.7     12-05    TPro  6.8  /  Alb  3.6  /  TBili  0.7  /  DBili  x   /  AST  45<H>  /  ALT  29  /  AlkPhos  50  12-05    Physical Exam - Lower Extremity Focused:   Derm:   Right medioplantar hallux ulcer; mild drainage, no active bleeding, no malodor; hyperkeratotic periwound; not probing to bone;     Vascular: DP and PT Pulses Diminished;   Neuro: Protective Sensation Diminished;  MSK: Mild discomfort on right hallux ulcer upon palpation    Assessment:  Right hallux plantomedial ulcer; not probing to bone;     Plan:  Chart reviewed and Patient evaluated. All Questions and Concerns Addressed and Answered  Discussed diagnosis and treatment with patient  Right hallux ulcer debridement; not probing to bone;   Wound Flushed w/ normal saline; betadine / DSD / Misty;   Local Wound Care; As Stated Above; q24  Request XR Imaging Right Foot to rule out possible osteomyelitis; will follow up with Xray report;   If positive for osteomyelitis; possible surgical intervention;  If negative for osteomyelitis; will continue with local wound care;   Attending to round on Monday 12/7;  Discussed Plan w/ Dr. Tong    Podiatry

## 2020-12-06 LAB
ANION GAP SERPL CALC-SCNC: 9 MMOL/L — SIGNIFICANT CHANGE UP (ref 7–14)
BASOPHILS # BLD AUTO: 0.06 K/UL — SIGNIFICANT CHANGE UP (ref 0–0.2)
BASOPHILS NFR BLD AUTO: 0.9 % — SIGNIFICANT CHANGE UP (ref 0–1)
BUN SERPL-MCNC: 35 MG/DL — HIGH (ref 10–20)
BURR CELLS BLD QL SMEAR: PRESENT — SIGNIFICANT CHANGE UP
CALCIUM SERPL-MCNC: 8.2 MG/DL — LOW (ref 8.5–10.1)
CHLORIDE SERPL-SCNC: 104 MMOL/L — SIGNIFICANT CHANGE UP (ref 98–110)
CO2 SERPL-SCNC: 22 MMOL/L — SIGNIFICANT CHANGE UP (ref 17–32)
CREAT SERPL-MCNC: 1.9 MG/DL — HIGH (ref 0.7–1.5)
EOSINOPHIL # BLD AUTO: 0.12 K/UL — SIGNIFICANT CHANGE UP (ref 0–0.7)
EOSINOPHIL NFR BLD AUTO: 1.7 % — SIGNIFICANT CHANGE UP (ref 0–8)
GLUCOSE BLDC GLUCOMTR-MCNC: 167 MG/DL — HIGH (ref 70–99)
GLUCOSE BLDC GLUCOMTR-MCNC: 190 MG/DL — HIGH (ref 70–99)
GLUCOSE BLDC GLUCOMTR-MCNC: 226 MG/DL — HIGH (ref 70–99)
GLUCOSE BLDC GLUCOMTR-MCNC: 226 MG/DL — HIGH (ref 70–99)
GLUCOSE SERPL-MCNC: 237 MG/DL — HIGH (ref 70–99)
HCT VFR BLD CALC: 37 % — LOW (ref 42–52)
HGB BLD-MCNC: 11.8 G/DL — LOW (ref 14–18)
LYMPHOCYTES # BLD AUTO: 0.65 K/UL — LOW (ref 1.2–3.4)
LYMPHOCYTES # BLD AUTO: 9.4 % — LOW (ref 20.5–51.1)
MAGNESIUM SERPL-MCNC: 2.2 MG/DL — SIGNIFICANT CHANGE UP (ref 1.8–2.4)
MANUAL SMEAR VERIFICATION: SIGNIFICANT CHANGE UP
MCHC RBC-ENTMCNC: 28.7 PG — SIGNIFICANT CHANGE UP (ref 27–31)
MCHC RBC-ENTMCNC: 31.9 G/DL — LOW (ref 32–37)
MCV RBC AUTO: 90 FL — SIGNIFICANT CHANGE UP (ref 80–94)
MONOCYTES # BLD AUTO: 1.12 K/UL — HIGH (ref 0.1–0.6)
MONOCYTES NFR BLD AUTO: 16.2 % — HIGH (ref 1.7–9.3)
NEUTROPHILS # BLD AUTO: 4.95 K/UL — SIGNIFICANT CHANGE UP (ref 1.4–6.5)
NEUTROPHILS NFR BLD AUTO: 70.9 % — SIGNIFICANT CHANGE UP (ref 42.2–75.2)
NEUTS BAND # BLD: 0.9 % — SIGNIFICANT CHANGE UP (ref 0–6)
PLAT MORPH BLD: NORMAL — SIGNIFICANT CHANGE UP
PLATELET # BLD AUTO: 146 K/UL — SIGNIFICANT CHANGE UP (ref 130–400)
POIKILOCYTOSIS BLD QL AUTO: SIGNIFICANT CHANGE UP
POLYCHROMASIA BLD QL SMEAR: SLIGHT — SIGNIFICANT CHANGE UP
POTASSIUM SERPL-MCNC: 4.9 MMOL/L — SIGNIFICANT CHANGE UP (ref 3.5–5)
POTASSIUM SERPL-SCNC: 4.9 MMOL/L — SIGNIFICANT CHANGE UP (ref 3.5–5)
RBC # BLD: 4.11 M/UL — LOW (ref 4.7–6.1)
RBC # FLD: 14.4 % — SIGNIFICANT CHANGE UP (ref 11.5–14.5)
RBC BLD AUTO: ABNORMAL
SODIUM SERPL-SCNC: 135 MMOL/L — SIGNIFICANT CHANGE UP (ref 135–146)
TROPONIN T SERPL-MCNC: 0.03 NG/ML — CRITICAL HIGH
WBC # BLD: 6.9 K/UL — SIGNIFICANT CHANGE UP (ref 4.8–10.8)
WBC # FLD AUTO: 6.9 K/UL — SIGNIFICANT CHANGE UP (ref 4.8–10.8)

## 2020-12-06 PROCEDURE — 99233 SBSQ HOSP IP/OBS HIGH 50: CPT

## 2020-12-06 PROCEDURE — 99222 1ST HOSP IP/OBS MODERATE 55: CPT

## 2020-12-06 PROCEDURE — 93010 ELECTROCARDIOGRAM REPORT: CPT

## 2020-12-06 RX ADMIN — Medication 1: at 17:16

## 2020-12-06 RX ADMIN — CHLORHEXIDINE GLUCONATE 1 APPLICATION(S): 213 SOLUTION TOPICAL at 05:16

## 2020-12-06 RX ADMIN — Medication 14 UNIT(S): at 17:17

## 2020-12-06 RX ADMIN — Medication 250 MILLIGRAM(S): at 05:16

## 2020-12-06 RX ADMIN — AMLODIPINE BESYLATE 5 MILLIGRAM(S): 2.5 TABLET ORAL at 05:17

## 2020-12-06 RX ADMIN — Medication 1: at 11:28

## 2020-12-06 RX ADMIN — Medication 25 MILLIGRAM(S): at 05:17

## 2020-12-06 RX ADMIN — INSULIN GLARGINE 40 UNIT(S): 100 INJECTION, SOLUTION SUBCUTANEOUS at 21:26

## 2020-12-06 RX ADMIN — LOSARTAN POTASSIUM 50 MILLIGRAM(S): 100 TABLET, FILM COATED ORAL at 05:17

## 2020-12-06 RX ADMIN — Medication 14 UNIT(S): at 11:28

## 2020-12-06 RX ADMIN — SODIUM CHLORIDE 75 MILLILITER(S): 9 INJECTION INTRAMUSCULAR; INTRAVENOUS; SUBCUTANEOUS at 05:16

## 2020-12-06 RX ADMIN — PANTOPRAZOLE SODIUM 40 MILLIGRAM(S): 20 TABLET, DELAYED RELEASE ORAL at 05:17

## 2020-12-06 RX ADMIN — Medication 14 UNIT(S): at 07:58

## 2020-12-06 RX ADMIN — ATORVASTATIN CALCIUM 80 MILLIGRAM(S): 80 TABLET, FILM COATED ORAL at 21:26

## 2020-12-06 RX ADMIN — Medication 2: at 07:58

## 2020-12-06 RX ADMIN — Medication 1 DROP(S): at 11:28

## 2020-12-06 RX ADMIN — Medication 250 MILLIGRAM(S): at 17:16

## 2020-12-06 NOTE — PROGRESS NOTE ADULT - SUBJECTIVE AND OBJECTIVE BOX
CHIEF COMPLAINT:    Patient is a 65y old  Male who presents with a chief complaint of SOB     INTERVAL HPI/OVERNIGHT EVENTS:    Patient seen and examined at bedside. No acute overnight events occurred.    ROS: Denies SOB, foot pain. All other systems are negative.    Vital Signs:    T(F): 97 (20 @ 05:32), Max: 100.7 (20 @ 12:29)  HR: 80 (20 @ 05:32) (80 - 90)  BP: 133/70 (20 @ 05:32) (133/70 - 153/67)  RR: 19 (20 @ 20:37) (19 - 20)  SpO2: --  I&O's Summary    05 Dec 2020 07:01  -  06 Dec 2020 07:00  --------------------------------------------------------  IN: 210 mL / OUT: 475 mL / NET: -265 mL      Daily     Daily Weight in k.6 (06 Dec 2020 05:32)  CAPILLARY BLOOD GLUCOSE      POCT Blood Glucose.: 190 mg/dL (06 Dec 2020 11:20)  POCT Blood Glucose.: 226 mg/dL (06 Dec 2020 07:17)  POCT Blood Glucose.: 230 mg/dL (05 Dec 2020 21:38)  POCT Blood Glucose.: 183 mg/dL (05 Dec 2020 16:17)      PHYSICAL EXAM:  GENERAL:  NAD, obese  SKIN: No rashes or lesions  HEENT: Atraumatic. Normocephalic. Anicteric  NECK:  No JVD.   PULMONARY: Clear to ausculation bilaterally. No wheezing. No rales  CVS: Normal S1, S2. Regular rate and rhythm. No murmurs.  ABDOMEN/GI: Soft, Nontender, Nondistended; Bowel sounds are present  EXTREMITIES:  No edema B/L LE. Right hallux callus  NEUROLOGIC:  No motor deficit.  PSYCH: Alert & oriented x 3, normal affect    Consultant(s) Notes Reviewed:  [x ] YES  [ ] NO    LABS:                        11.8   6.90  )-----------( 146      ( 06 Dec 2020 05:37 )             37.0     12-06    135  |  104  |  35<H>  ----------------------------<  237<H>  4.9   |  22  |  1.9<H>    Ca    8.2<L>      06 Dec 2020 05:37  Mg     2.2         TPro  6.8  /  Alb  3.6  /  TBili  0.7  /  DBili  x   /  AST  45<H>  /  ALT  29  /  AlkPhos  50      PT/INR - ( 05 Dec 2020 04:30 )   PT: 13.30 sec;   INR: 1.16 ratio         PTT - ( 05 Dec 2020 04:30 )  PTT:185.9 sec  Serum Pro-Brain Natriuretic Peptide: 82 pg/mL (20 @ 02:00)    Trop 0.03, CKMB --, CK --, 20 @ 05:37  Trop 0.06, CKMB 5.4, , 20 @ 04:30  Trop 0.04, CKMB --, CK --, 20 @ 11:48  Trop 0.03, CKMB --, CK --, 20 @ 02:00      Culture - Blood (collected 04 Dec 2020 02:00)  Source: .Blood Blood-Peripheral  Gram Stain (05 Dec 2020 22:46):    Growth in anaerobic bottle:    Gram Positive Cocci in Clusters    Growth in aerobic bottle: Gram Positive Cocci in Clusters  Preliminary Report (06 Dec 2020 10:55):    Growth in anaerobic bottle: Staphylococcus aureus    Growth in aerobic bottle: Gram Positive Cocci in Clusters    "Due to technical problems, Proteus sp. will Not be reported as part of    the BCID panel until further notice"    ***Blood Panel PCR results on this specimen are available    approximately 3 hours after the Gram stain result.***    Gram stain, PCR, and/or culture results may not always    correspond due to difference in methodologies.    ************************************************************    This PCR assay was performed using "[employer].    The following targets are tested for: Enterococcus,    vancomycin resistant enterococci, Listeria monocytogenes,    coagulase negative staphylococci, S. aureus,    methicillin resistant S. aureus, Streptococcus agalactiae    (Group B), S. pneumoniae, S. pyogenes (Group A),    Acinetobacter baumannii, Enterobacter cloacae, E. coli,    Klebsiella oxytoca, K. pneumoniae, Proteus sp.,    Serratia marcescens, Haemophilus influenzae,    Neisseria meningitidis, Pseudomonas aeruginosa, Candida    albicans, C. glabrata, C krusei, C parapsilosis,    C. tropicalis and the KPC resistance gene.  Organism: Blood Culture PCR (05 Dec 2020 04:58)  Organism: Blood Culture PCR (05 Dec 2020 04:58)    Culture - Blood (collected 04 Dec 2020 02:00)  Source: .Blood Blood-Peripheral  Preliminary Report (05 Dec 2020 13:01):    No growth to date.        RADIOLOGY & ADDITIONAL TESTS:  Imaging or report Personally Reviewed:  [ ] YES  [ ] NO    Telemetry reviewed independently - no events    Medications:  Standing  amLODIPine   Tablet 5 milliGRAM(s) Oral daily  atorvastatin 80 milliGRAM(s) Oral at bedtime  chlorhexidine 4% Liquid 1 Application(s) Topical <User Schedule>  dextrose 40% Gel 15 Gram(s) Oral once  dextrose 5%. 1000 milliLiter(s) IV Continuous <Continuous>  dextrose 5%. 1000 milliLiter(s) IV Continuous <Continuous>  dextrose 50% Injectable 25 Gram(s) IV Push once  dextrose 50% Injectable 12.5 Gram(s) IV Push once  dextrose 50% Injectable 25 Gram(s) IV Push once  glucagon  Injectable 1 milliGRAM(s) IntraMuscular once  influenza   Vaccine 0.5 milliLiter(s) IntraMuscular once  insulin glargine Injectable (LANTUS) 40 Unit(s) SubCutaneous at bedtime  insulin lispro (ADMELOG) corrective regimen sliding scale   SubCutaneous three times a day before meals  insulin lispro Injectable (ADMELOG) 14 Unit(s) SubCutaneous three times a day before meals  losartan 50 milliGRAM(s) Oral daily  metoprolol succinate ER 25 milliGRAM(s) Oral daily  pantoprazole    Tablet 40 milliGRAM(s) Oral before breakfast  sodium chloride 0.9%. 1000 milliLiter(s) IV Continuous <Continuous>  timolol 0.5% Solution 1 Drop(s) Right EYE daily  vancomycin  IVPB 1000 milliGRAM(s) IV Intermittent every 12 hours    PRN Meds  acetaminophen   Tablet .. 650 milliGRAM(s) Oral every 6 hours PRN

## 2020-12-06 NOTE — CONSULT NOTE ADULT - SUBJECTIVE AND OBJECTIVE BOX
Date of Admission: 12-04-20    CHIEF COMPLAINT: Patient is a 65y old  Male who presents with a chief complaint of SOB (06 Dec 2020 11:47)      HPI:  65 yr old male with PMHx of DM, HTN, 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, s/p right nephrectomy for bleeding from surgical sites s/p renal mass resection, CKD, questionable hx of DVT in the past presented to the hospital for chills, SOB, weakness, and AMS that started last night. At around 11:30 last night patient developed sudden onset chills while lying in bed. He tried to get out of bed but was unable to as he became very weak and SOB. His daughters called EMS. After that point he does not remember what happened but the chart documents that he was altered, AAOx2. Of note patient reports having a similar episode of chills 5 years ago but that resolved by itself within minutes. Also of note patient reports having history of swelling in his lower extremities but reports that multiple duplex US have been negative. He denied any hx of recent travel, or recent illness. He denied any hx of nausea, vomiting, diarrhea, constipation, melena, pain in abdomen, chest pain, cough, dysuria, headache, lightheadedness, dizziness, or numbness/tingling.    In the ED patient met SIRS criteria with T 102.6F, , RR 22, /80. SpO2 99 % on 16L NRB. Labs were significant for WBC 10.78, DDimer 848, BUN/creatinine 47/2.1, Lactate 2.9 that is 2.6 on repeat ABG, and troponin of 0.03. COVID negative. EKG showed sinus tachycardia, and CT chest, abdomen, and pelvis was negative for acute pathology. Patient refused CTA chest. Received 1 dose cefepime and vancomycin and was started on a heparin drip.  Also received 1L LR, zofran, and tylenol.     (04 Dec 2020 08:21)      PAST MEDICAL & SURGICAL HISTORY:  HLD (hyperlipidemia)    CKD (chronic kidney disease)    DVT (deep venous thrombosis)    Glaucoma    HTN (hypertension)    Diabetes mellitus    H/O right nephrectomy        FAMILY HISTORY:  FH: lung cancer  Father    SOCIAL HISTORY:    [x] Non-smoker  [x] No alcohol use  [x] No illicit drug use    Allergies    No Known Allergies    Intolerances    	    REVIEW OF SYSTEMS:  CONSTITUTIONAL: (+) Fever, chills, weakness. No weight loss, or fatigue  CARDIOLOGY: No chest pain, dyspnea of exertion, or syncopal episodes.   RESPIRATORY: (+) SOB. No cough, wheezing.   NEUROLOGICAL: No weakness, no focal deficits to report.  GI: No BRBPR, no N,V,diarrhea.    PSYCHIATRY: Normal mood and affect.  HEENT: No nasal discharge, no ecchymosis  SKIN: No ecchymosis, no breakdown  MUSCULOSKELETAL: Full range of motion x4.   EXTREM: No leg swelling or erythema. (+) Right toe wound.    PHYSICAL EXAM:  T(C): 36.3 (12-06-20 @ 13:32), Max: 38.1 (12-05-20 @ 21:00)  HR: 77 (12-06-20 @ 13:32) (77 - 86)  BP: 117/81 (12-06-20 @ 13:32) (117/81 - 153/67)  RR: 18 (12-06-20 @ 13:32) (18 - 19)  SpO2: --  Wt(kg): --  I&O's Summary    05 Dec 2020 07:01  -  06 Dec 2020 07:00  --------------------------------------------------------  IN: 210 mL / OUT: 475 mL / NET: -265 mL        General Appearance: NAD, normal for age and gender. 	  Neck: Normal JVP, no bruit.   Eyes: No xanthomalasia, Extra Ocular muscles intact.   Cardiovascular: Regular rate and rhythm S1 S2, No JVD, No murmurs.  Respiratory: Lungs clear to auscultation. No wheezes, rales or rhonchi.  Psychiatry: Alert and oriented x 3, Mood & affect appropriate  Gastrointestinal:  Soft, Non-tender  Skin/Integumen: No ecchymoses, No cyanosis. (+) Right hallux wound. 	  Neurologic: Non-focal deficits.  Musculoskeletal/ extremities: Normal range of motion, No clubbing, cyanosis or edema  Vascular: Peripheral pulses palpable bilaterally    LABS:	 	                        11.8   6.90  )-----------( 146      ( 06 Dec 2020 05:37 )             37.0     12-06    135  |  104  |  35<H>  ----------------------------<  237<H>  4.9   |  22  |  1.9<H>    Ca    8.2<L>      06 Dec 2020 05:37  Mg     2.2     12-06    TPro  6.8  /  Alb  3.6  /  TBili  0.7  /  DBili  x   /  AST  45<H>  /  ALT  29  /  AlkPhos  50  12-05    CARDIAC MARKERS ( 06 Dec 2020 05:37 )  x     / 0.03 ng/mL / x     / x     / x      CARDIAC MARKERS ( 05 Dec 2020 04:30 )  x     / 0.06 ng/mL / 873 U/L / x     / 5.4 ng/mL      PT/INR - ( 05 Dec 2020 04:30 )   PT: 13.30 sec;   INR: 1.16 ratio    PTT - ( 05 Dec 2020 04:30 )  PTT:185.9 sec    TELEMETRY EVENTS: 	    ECG: < from: 12 Lead ECG (12.06.20 @ 01:53) >  Diagnosis Line Sinus rhythm [id number] degree A-V block  Indeterminate axis  Right bundle branch block  Abnormal ECG   	  RADIOLOGY: < from: CT Chest No Cont (12.04.20 @ 05:57) >  1.  No CT evidence of acute intrathoracic or abdominopelvic pathology.  2.  Nodular enlarged right thyroid lobe. Recommend evaluation outpatient sonography.  3.  Additional findings in the body of the report.    < from: Xray Chest 1 View- PORTABLE-Urgent (12.04.20 @ 04:15) >  Elevation the right hemidiaphragm. Unchanged.    OTHER: 	    PREVIOUS DIAGNOSTIC TESTING:    [ ] Echocardiogram:   [ ] Catheterization:   [ ] Stress Test:  	  	  Home Medications:  amlodipine-olmesartan 5 mg-40 mg oral tablet: 1 tab(s) orally once a day (04 Dec 2020 09:48)  atorvastatin 20 mg oral tablet: 1 tab(s) orally once a day (04 Dec 2020 09:48)  Cosopt 2.23%-0.68% ophthalmic solution: 1 drop(s) to each affected eye 2 times a day (04 Dec 2020 09:48)  HumaLOG 100 units/mL subcutaneous solution: 30 unit(s) subcutaneous 3 times a day (before meals) (04 Dec 2020 09:48)  insulin glargine 100 units/mL subcutaneous solution: 70 units in morning and 40 units at night (04 Dec 2020 09:48)    MEDICATIONS  (STANDING):  amLODIPine   Tablet 5 milliGRAM(s) Oral daily  atorvastatin 80 milliGRAM(s) Oral at bedtime  chlorhexidine 4% Liquid 1 Application(s) Topical <User Schedule>  dextrose 40% Gel 15 Gram(s) Oral once  dextrose 5%. 1000 milliLiter(s) (50 mL/Hr) IV Continuous <Continuous>  dextrose 5%. 1000 milliLiter(s) (100 mL/Hr) IV Continuous <Continuous>  dextrose 50% Injectable 25 Gram(s) IV Push once  dextrose 50% Injectable 12.5 Gram(s) IV Push once  dextrose 50% Injectable 25 Gram(s) IV Push once  glucagon  Injectable 1 milliGRAM(s) IntraMuscular once  influenza   Vaccine 0.5 milliLiter(s) IntraMuscular once  insulin glargine Injectable (LANTUS) 40 Unit(s) SubCutaneous at bedtime  insulin lispro (ADMELOG) corrective regimen sliding scale   SubCutaneous three times a day before meals  insulin lispro Injectable (ADMELOG) 14 Unit(s) SubCutaneous three times a day before meals  losartan 50 milliGRAM(s) Oral daily  pantoprazole    Tablet 40 milliGRAM(s) Oral before breakfast  sodium chloride 0.9%. 1000 milliLiter(s) (75 mL/Hr) IV Continuous <Continuous>  timolol 0.5% Solution 1 Drop(s) Right EYE daily  vancomycin  IVPB 1000 milliGRAM(s) IV Intermittent every 12 hours    MEDICATIONS  (PRN):  acetaminophen   Tablet .. 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 At around 11:30 last night patient developed sudden onset chills while lying in bed. He tried to get out of bed but was unable to as he became very weak and SOB. His daughters called EMS. After that point he does not remember what happened but the chart documents that he was altered, AAOx2. Of note patient reports having a similar episode of chills 5 years ago but that resolved by itself within minutes. Also of note patient reports having history of swelling in his lower extremities but reports that multiple duplex US have been negative. He denied any hx of recent travel, or recent illness. He denied any hx of nausea, vomiting, diarrhea, constipation, melena, pain in abdomen, chest pain, cough, dysuria, headache, lightheadedness, dizziness, or numbness/tingling.    In the ED patient met SIRS criteria with T 102.6F, , RR 22, /80. SpO2 99 % on 16L NRB. Labs were significant for WBC 10.78, DDimer 848, BUN/creatinine 47/2.1, Lactate 2.9 that is 2.6 on repeat ABG, and troponin of 0.03. COVID negative. EKG showed sinus tachycardia, and CT chest, abdomen, and pelvis was negative for acute pathology. Patient refused CTA chest. Received 1 dose cefepime and vancomycin and was started on a heparin drip.  Also received 1L LR, zofran, and tylenol.     (04 Dec 2020 08:21)      PAST MEDICAL & SURGICAL HISTORY:  HLD (hyperlipidemia)    CKD (chronic kidney disease)    DVT (deep venous thrombosis)    Glaucoma    HTN (hypertension)    Diabetes mellitus    H/O right nephrectomy        FAMILY HISTORY:  FH: lung cancer  Father    SOCIAL HISTORY:    [x] Non-smoker  [x] No alcohol use  [x] No illicit drug use    Allergies    No Known Allergies    Intolerances    	    REVIEW OF SYSTEMS:  CONSTITUTIONAL: (+) Fever, chills, weakness. No weight loss, or fatigue  CARDIOLOGY: No chest pain, dyspnea of exertion, or syncopal episodes.   RESPIRATORY: (+) SOB. No cough, wheezing.   NEUROLOGICAL: No weakness, no focal deficits to report.  GI: No BRBPR, no N,V,diarrhea.    PSYCHIATRY: Normal mood and affect.  HEENT: No nasal discharge, no ecchymosis  SKIN: No ecchymosis, no breakdown  MUSCULOSKELETAL: Full range of motion x4.   EXTREM: No leg swelling or erythema. (+) Right toe wound.      PHYSICAL EXAM:  T(C): 36.3 (12-06-20 @ 13:32), Max: 38.1 (12-05-20 @ 21:00)  HR: 77 (12-06-20 @ 13:32) (77 - 86)  BP: 117/81 (12-06-20 @ 13:32) (117/81 - 153/67)  RR: 18 (12-06-20 @ 13:32) (18 - 19)  SpO2: --  Wt(kg): --  I&O's Summary    05 Dec 2020 07:01  -  06 Dec 2020 07:00  --------------------------------------------------------  IN: 210 mL / OUT: 475 mL / NET: -265 mL        General Appearance: NAD, normal for age and gender. 	  Neck: Normal JVP, no bruit.   Eyes: No xanthomalasia, Extra Ocular muscles intact.   Cardiovascular: Regular rate and rhythm S1 S2, No JVD, No murmurs.  Respiratory: Lungs clear to auscultation. No wheezes, rales or rhonchi.  Psychiatry: Alert and oriented x 3, Mood & affect appropriate  Gastrointestinal:  Soft, Non-tender  Skin/Integumen: No ecchymoses, No cyanosis. (+) Right hallux wound. 	  Neurologic: Non-focal deficits.  Musculoskeletal/ extremities: Normal range of motion, No clubbing, cyanosis or edema  Vascular: Peripheral pulses palpable bilaterally    LABS:	 	                        11.8   6.90  )-----------( 146      ( 06 Dec 2020 05:37 )             37.0     12-06    135  |  104  |  35<H>  ----------------------------<  237<H>  4.9   |  22  |  1.9<H>    Ca    8.2<L>      06 Dec 2020 05:37  Mg     2.2     12-06    TPro  6.8  /  Alb  3.6  /  TBili  0.7  /  DBili  x   /  AST  45<H>  /  ALT  29  /  AlkPhos  50  12-05    CARDIAC MARKERS ( 06 Dec 2020 05:37 )  x     / 0.03 ng/mL / x     / x     / x      CARDIAC MARKERS ( 05 Dec 2020 04:30 )  x     / 0.06 ng/mL / 873 U/L / x     / 5.4 ng/mL      PT/INR - ( 05 Dec 2020 04:30 )   PT: 13.30 sec;   INR: 1.16 ratio    PTT - ( 05 Dec 2020 04:30 )  PTT:185.9 sec    TELEMETRY EVENTS: 	    ECG: < from: 12 Lead ECG (12.06.20 @ 01:53) >  Diagnosis Line Sinus rhythm [id number] degree A-V block  Indeterminate axis  Right bundle branch block  Abnormal ECG   	  RADIOLOGY: < from: CT Chest No Cont (12.04.20 @ 05:57) >  1.  No CT evidence of acute intrathoracic or abdominopelvic pathology.  2.  Nodular enlarged right thyroid lobe. Recommend evaluation outpatient sonography.  3.  Additional findings in the body of the report.    < from: Xray Chest 1 View- PORTABLE-Urgent (12.04.20 @ 04:15) >  Elevation the right hemidiaphragm. Unchanged.    OTHER: 	    PREVIOUS DIAGNOSTIC TESTING:    [ ] Echocardiogram:   [ ] Catheterization:   [ ] Stress Test:  	  	  Home Medications:  amlodipine-olmesartan 5 mg-40 mg oral tablet: 1 tab(s) orally once a day (04 Dec 2020 09:48)  atorvastatin 20 mg oral tablet: 1 tab(s) orally once a day (04 Dec 2020 09:48)  Cosopt 2.23%-0.68% ophthalmic solution: 1 drop(s) to each affected eye 2 times a day (04 Dec 2020 09:48)  HumaLOG 100 units/mL subcutaneous solution: 30 unit(s) subcutaneous 3 times a day (before meals) (04 Dec 2020 09:48)  insulin glargine 100 units/mL subcutaneous solution: 70 units in morning and 40 units at night (04 Dec 2020 09:48)    MEDICATIONS  (STANDING):  amLODIPine   Tablet 5 milliGRAM(s) Oral daily  atorvastatin 80 milliGRAM(s) Oral at bedtime  chlorhexidine 4% Liquid 1 Application(s) Topical <User Schedule>  dextrose 40% Gel 15 Gram(s) Oral once  dextrose 5%. 1000 milliLiter(s) (50 mL/Hr) IV Continuous <Continuous>  dextrose 5%. 1000 milliLiter(s) (100 mL/Hr) IV Continuous <Continuous>  dextrose 50% Injectable 25 Gram(s) IV Push once  dextrose 50% Injectable 12.5 Gram(s) IV Push once  dextrose 50% Injectable 25 Gram(s) IV Push once  glucagon  Injectable 1 milliGRAM(s) IntraMuscular once  influenza   Vaccine 0.5 milliLiter(s) IntraMuscular once  insulin glargine Injectable (LANTUS) 40 Unit(s) SubCutaneous at bedtime  insulin lispro (ADMELOG) corrective regimen sliding scale   SubCutaneous three times a day before meals  insulin lispro Injectable (ADMELOG) 14 Unit(s) SubCutaneous three times a day before meals  losartan 50 milliGRAM(s) Oral daily  pantoprazole    Tablet 40 milliGRAM(s) Oral before breakfast  sodium chloride 0.9%. 1000 milliLiter(s) (75 mL/Hr) IV Continuous <Continuous>  timolol 0.5% Solution 1 Drop(s) Right EYE daily  vancomycin  IVPB 1000 milliGRAM(s) IV Intermittent every 12 hours    MEDICATIONS  (PRN):  acetaminophen   Tablet .. 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 At around 11:30 last night patient developed sudden onset chills while lying in bed. He tried to get out of bed but was unable to as he became very weak and SOB. His daughters called EMS. After that point he does not remember what happened but the chart documents that he was altered, AAOx2. Of note patient reports having a similar episode of chills 5 years ago but that resolved by itself within minutes. Also of note patient reports having history of swelling in his lower extremities but reports that multiple duplex US have been negative. He denied any hx of recent travel, or recent illness. He denied any hx of nausea, vomiting, diarrhea, constipation, melena, pain in abdomen, chest pain, cough, dysuria, headache, lightheadedness, dizziness, or numbness/tingling.    In the ED patient met SIRS criteria with T 102.6F, , RR 22, /80. SpO2 99 % on 16L NRB. Labs were significant for WBC 10.78, DDimer 848, BUN/creatinine 47/2.1, Lactate 2.9 that is 2.6 on repeat ABG, and troponin of 0.03. COVID negative. EKG showed sinus tachycardia, and CT chest, abdomen, and pelvis was negative for acute pathology. Patient refused CTA chest. Received 1 dose cefepime and vancomycin and was started on a heparin drip.  Also received 1L LR, zofran, and tylenol.     (04 Dec 2020 08:21)      PAST MEDICAL & SURGICAL HISTORY:  HLD (hyperlipidemia)    CKD (chronic kidney disease)    DVT (deep venous thrombosis)    Glaucoma    HTN (hypertension)    Diabetes mellitus    H/O right nephrectomy        FAMILY HISTORY:  FH: lung cancer  Father    SOCIAL HISTORY:    [x] Non-smoker  [x] No alcohol use  [x] No illicit drug use    Allergies    No Known Allergies    Intolerances    	    REVIEW OF SYSTEMS:  CONSTITUTIONAL: (+) Fever, chills, weakness. No weight loss, or fatigue  CARDIOLOGY: No chest pain, dyspnea of exertion, or syncopal episodes.   RESPIRATORY: (+) SOB. No cough, wheezing.   NEUROLOGICAL: No weakness, no focal deficits to report.  GI: No BRBPR, no N,V,diarrhea.    PSYCHIATRY: Normal mood and affect.  HEENT: No nasal discharge, no ecchymosis  SKIN: No ecchymosis, no breakdown  MUSCULOSKELETAL: Full range of motion x4.   EXTREM: No leg swelling or erythema. (+) Right toe wound.      PHYSICAL EXAM:  T(C): 36.3 (12-06-20 @ 13:32), Max: 38.1 (12-05-20 @ 21:00)  HR: 77 (12-06-20 @ 13:32) (77 - 86)  BP: 117/81 (12-06-20 @ 13:32) (117/81 - 153/67)  RR: 18 (12-06-20 @ 13:32) (18 - 19)  SpO2: --  Wt(kg): --  I&O's Summary    05 Dec 2020 07:01  -  06 Dec 2020 07:00  --------------------------------------------------------  IN: 210 mL / OUT: 475 mL / NET: -265 mL        General Appearance: NAD, obese, normal for age and gender. 	  Neck: Normal JVP, no bruit.   Eyes: No xanthomalasia, Extra Ocular muscles intact.   Cardiovascular: Regular rate and rhythm S1 S2, No JVD, No murmurs.  Respiratory: Lungs clear to auscultation. No wheezes, rales or rhonchi.  Psychiatry: Alert and oriented x 3, Mood & affect appropriate  Gastrointestinal:  Soft, Non-tender, Non-distended.  Skin/Integumen: No ecchymoses, No cyanosis. (+) Right hallux wound. 	  Neurologic: Non-focal deficits.  Musculoskeletal/ extremities: Normal range of motion, No clubbing, cyanosis or edema  Vascular: Peripheral pulses palpable bilaterally    LABS:	 	                        11.8   6.90  )-----------( 146      ( 06 Dec 2020 05:37 )             37.0     12-06    135  |  104  |  35<H>  ----------------------------<  237<H>  4.9   |  22  |  1.9<H>    Ca    8.2<L>      06 Dec 2020 05:37  Mg     2.2     12-06    TPro  6.8  /  Alb  3.6  /  TBili  0.7  /  DBili  x   /  AST  45<H>  /  ALT  29  /  AlkPhos  50  12-05    CARDIAC MARKERS ( 06 Dec 2020 05:37 )  x     / 0.03 ng/mL / x     / x     / x      CARDIAC MARKERS ( 05 Dec 2020 04:30 )  x     / 0.06 ng/mL / 873 U/L / x     / 5.4 ng/mL      PT/INR - ( 05 Dec 2020 04:30 )   PT: 13.30 sec;   INR: 1.16 ratio    PTT - ( 05 Dec 2020 04:30 )  PTT:185.9 sec    TELEMETRY EVENTS: 	    ECG: < from: 12 Lead ECG (12.06.20 @ 01:53) >  Diagnosis Line Sinus rhythm [id number] degree A-V block  Indeterminate axis  Right bundle branch block  Abnormal ECG   	  RADIOLOGY: < from: CT Chest No Cont (12.04.20 @ 05:57) >  1.  No CT evidence of acute intrathoracic or abdominopelvic pathology.  2.  Nodular enlarged right thyroid lobe. Recommend evaluation outpatient sonography.  3.  Additional findings in the body of the report.    < from: Xray Chest 1 View- PORTABLE-Urgent (12.04.20 @ 04:15) >  Elevation the right hemidiaphragm. Unchanged.    OTHER: 	    PREVIOUS DIAGNOSTIC TESTING:    [ ] Echocardiogram:   [ ] Catheterization:   [ ] Stress Test:  	  	  Home Medications:  amlodipine-olmesartan 5 mg-40 mg oral tablet: 1 tab(s) orally once a day (04 Dec 2020 09:48)  atorvastatin 20 mg oral tablet: 1 tab(s) orally once a day (04 Dec 2020 09:48)  Cosopt 2.23%-0.68% ophthalmic solution: 1 drop(s) to each affected eye 2 times a day (04 Dec 2020 09:48)  HumaLOG 100 units/mL subcutaneous solution: 30 unit(s) subcutaneous 3 times a day (before meals) (04 Dec 2020 09:48)  insulin glargine 100 units/mL subcutaneous solution: 70 units in morning and 40 units at night (04 Dec 2020 09:48)    MEDICATIONS  (STANDING):  amLODIPine   Tablet 5 milliGRAM(s) Oral daily  atorvastatin 80 milliGRAM(s) Oral at bedtime  chlorhexidine 4% Liquid 1 Application(s) Topical <User Schedule>  dextrose 40% Gel 15 Gram(s) Oral once  dextrose 5%. 1000 milliLiter(s) (50 mL/Hr) IV Continuous <Continuous>  dextrose 5%. 1000 milliLiter(s) (100 mL/Hr) IV Continuous <Continuous>  dextrose 50% Injectable 25 Gram(s) IV Push once  dextrose 50% Injectable 12.5 Gram(s) IV Push once  dextrose 50% Injectable 25 Gram(s) IV Push once  glucagon  Injectable 1 milliGRAM(s) IntraMuscular once  influenza   Vaccine 0.5 milliLiter(s) IntraMuscular once  insulin glargine Injectable (LANTUS) 40 Unit(s) SubCutaneous at bedtime  insulin lispro (ADMELOG) corrective regimen sliding scale   SubCutaneous three times a day before meals  insulin lispro Injectable (ADMELOG) 14 Unit(s) SubCutaneous three times a day before meals  losartan 50 milliGRAM(s) Oral daily  pantoprazole    Tablet 40 milliGRAM(s) Oral before breakfast  sodium chloride 0.9%. 1000 milliLiter(s) (75 mL/Hr) IV Continuous <Continuous>  timolol 0.5% Solution 1 Drop(s) Right EYE daily  vancomycin  IVPB 1000 milliGRAM(s) IV Intermittent every 12 hours    MEDICATIONS  (PRN):  acetaminophen   Tablet .. 650 milliGRAM(s) Oral every 6 hours PRN Temp greater or equal to 38C (100.4F)

## 2020-12-06 NOTE — CONSULT NOTE ADULT - ASSESSMENT
Assessment:  Troponinemia likely 2/2 underlying CKD  CKD, stable    Plan: Assessment:  Troponinemia likely 2/2 underlying CKD, demand ischemia  CKD, stable    Plan:    No plan for cardiac cath at this time  C/w antibiotic therapy  F/u blood cultures  F/u COVID PCR (history of exposure)  Nephrology f/u  Obtain 2D echo  Once stable - Lexiscan nuclear stress test.

## 2020-12-06 NOTE — PROGRESS NOTE ADULT - ASSESSMENT
64 yo M PMHx DM II, HTN, HLD, glaucoma, renal mass s/p right nephrectomy, nephrolithiasis, CKD III, and RLE Cellulitis was brought in by EMS for evaluation of altered mental status. As per EMR, patient developed chills and shortness of breath (which he attributed to the shivering) for about one hour. After that he became confused and doesn't remember anything until he received o2 in the ER. Upon EMS arrival, patient was found to be hypoxic to low 80s. He has had 2 negative covid tests and was found to have staph aureus in blood.    Sepsis due to staph bacteremia  - c/w vancomycin  - f/u MRSA vs MSSA  - check vanco trough  - pt remains febrile  - repeat cultures until negative  - check echo r/o endocarditis  - unclear source of infection. Pt states last week he had an injection for glaucoma and his optho noticed he had an eczema outbreak. Perhaps cracked skin with diabetes it the source? Pt states 3 weeks ago he had a callus removed by his podiatrist. It currently is oozing blooding. This could be another source of infection.    Altered mental status  - likely metabolic encephalopathy due to sepsis  - resolved    Acute hypoxic respiratory failure  - unclear etiology  - resolved  - perhaps related to sepsis  - v/q scan negative for PE (was on heparin gtt but was d/c)    Callus removed 3 weeks ago:  - Still oozing blood.   - podiatry consult appreciated, check xray of foot, attending to round    Troponemia:  - EKG sinus tachycardia  - f/u repeat cardiac enzymes 0.03-0.04-0.06  - telemetry monitoring   - likely due to increased demand  - increased lipitor dose   - ekg from 2019 shows RBBB. EKG from 12/2020 shows bifasciular block  - repeat EKG  - cardio consult    Hypomagnesmia  - replenish mg  - check am levels    Left Staghorn Calculi  - asymptomatic    DM II  - c/w insulin  - FS controlled  - goal 110-180    HTN  - controlled  - c/w amlodipine, losartan    HLD  -c/w atorvastatin    Glaucoma  - continue with timolol    CKD III  - s/p R nephrectomy  - stable at baseline  - Renally adjust meds  - Avoid nephrotoxic meds  - Monitor BMP    DVT px  Full Code  From home    #Progress Note Handoff:  Pending (specify):  podiatry, xray, echo, negative blood cultures  Family discussion: discussed blood cultures, podiatry  Disposition: Home__x_/SNF___/Other________/Unknown at this time________

## 2020-12-07 LAB
-  AMPICILLIN/SULBACTAM: SIGNIFICANT CHANGE UP
-  CEFAZOLIN: SIGNIFICANT CHANGE UP
-  CLINDAMYCIN: SIGNIFICANT CHANGE UP
-  ERYTHROMYCIN: SIGNIFICANT CHANGE UP
-  GENTAMICIN: SIGNIFICANT CHANGE UP
-  OXACILLIN: SIGNIFICANT CHANGE UP
-  PENICILLIN: SIGNIFICANT CHANGE UP
-  RIFAMPIN: SIGNIFICANT CHANGE UP
-  TETRACYCLINE: SIGNIFICANT CHANGE UP
-  TRIMETHOPRIM/SULFAMETHOXAZOLE: SIGNIFICANT CHANGE UP
-  VANCOMYCIN: SIGNIFICANT CHANGE UP
ALBUMIN SERPL ELPH-MCNC: 3.2 G/DL — LOW (ref 3.5–5.2)
ALP SERPL-CCNC: 54 U/L — SIGNIFICANT CHANGE UP (ref 30–115)
ALT FLD-CCNC: 35 U/L — SIGNIFICANT CHANGE UP (ref 0–41)
ANION GAP SERPL CALC-SCNC: 10 MMOL/L — SIGNIFICANT CHANGE UP (ref 7–14)
AST SERPL-CCNC: 37 U/L — SIGNIFICANT CHANGE UP (ref 0–41)
BASOPHILS # BLD AUTO: 0.05 K/UL — SIGNIFICANT CHANGE UP (ref 0–0.2)
BASOPHILS NFR BLD AUTO: 0.8 % — SIGNIFICANT CHANGE UP (ref 0–1)
BILIRUB SERPL-MCNC: 0.6 MG/DL — SIGNIFICANT CHANGE UP (ref 0.2–1.2)
BUN SERPL-MCNC: 31 MG/DL — HIGH (ref 10–20)
CALCIUM SERPL-MCNC: 8.4 MG/DL — LOW (ref 8.5–10.1)
CHLORIDE SERPL-SCNC: 106 MMOL/L — SIGNIFICANT CHANGE UP (ref 98–110)
CO2 SERPL-SCNC: 18 MMOL/L — SIGNIFICANT CHANGE UP (ref 17–32)
CREAT SERPL-MCNC: 1.7 MG/DL — HIGH (ref 0.7–1.5)
CULTURE RESULTS: SIGNIFICANT CHANGE UP
EOSINOPHIL # BLD AUTO: 0.15 K/UL — SIGNIFICANT CHANGE UP (ref 0–0.7)
EOSINOPHIL NFR BLD AUTO: 2.4 % — SIGNIFICANT CHANGE UP (ref 0–8)
GLUCOSE BLDC GLUCOMTR-MCNC: 145 MG/DL — HIGH (ref 70–99)
GLUCOSE BLDC GLUCOMTR-MCNC: 147 MG/DL — HIGH (ref 70–99)
GLUCOSE BLDC GLUCOMTR-MCNC: 166 MG/DL — HIGH (ref 70–99)
GLUCOSE BLDC GLUCOMTR-MCNC: 239 MG/DL — HIGH (ref 70–99)
GLUCOSE SERPL-MCNC: 229 MG/DL — HIGH (ref 70–99)
GRAM STN FLD: SIGNIFICANT CHANGE UP
GRAM STN FLD: SIGNIFICANT CHANGE UP
HCT VFR BLD CALC: 35 % — LOW (ref 42–52)
HGB BLD-MCNC: 11.2 G/DL — LOW (ref 14–18)
IMM GRANULOCYTES NFR BLD AUTO: 0.8 % — HIGH (ref 0.1–0.3)
LYMPHOCYTES # BLD AUTO: 1.05 K/UL — LOW (ref 1.2–3.4)
LYMPHOCYTES # BLD AUTO: 16.9 % — LOW (ref 20.5–51.1)
MAGNESIUM SERPL-MCNC: 2.1 MG/DL — SIGNIFICANT CHANGE UP (ref 1.8–2.4)
MCHC RBC-ENTMCNC: 28.7 PG — SIGNIFICANT CHANGE UP (ref 27–31)
MCHC RBC-ENTMCNC: 32 G/DL — SIGNIFICANT CHANGE UP (ref 32–37)
MCV RBC AUTO: 89.7 FL — SIGNIFICANT CHANGE UP (ref 80–94)
METHOD TYPE: SIGNIFICANT CHANGE UP
MONOCYTES # BLD AUTO: 0.92 K/UL — HIGH (ref 0.1–0.6)
MONOCYTES NFR BLD AUTO: 14.8 % — HIGH (ref 1.7–9.3)
NEUTROPHILS # BLD AUTO: 3.98 K/UL — SIGNIFICANT CHANGE UP (ref 1.4–6.5)
NEUTROPHILS NFR BLD AUTO: 64.3 % — SIGNIFICANT CHANGE UP (ref 42.2–75.2)
NRBC # BLD: 0 /100 WBCS — SIGNIFICANT CHANGE UP (ref 0–0)
ORGANISM # SPEC MICROSCOPIC CNT: SIGNIFICANT CHANGE UP
PLATELET # BLD AUTO: 152 K/UL — SIGNIFICANT CHANGE UP (ref 130–400)
POTASSIUM SERPL-MCNC: 4.5 MMOL/L — SIGNIFICANT CHANGE UP (ref 3.5–5)
POTASSIUM SERPL-SCNC: 4.5 MMOL/L — SIGNIFICANT CHANGE UP (ref 3.5–5)
PROT SERPL-MCNC: 5.8 G/DL — LOW (ref 6–8)
RBC # BLD: 3.9 M/UL — LOW (ref 4.7–6.1)
RBC # FLD: 14.2 % — SIGNIFICANT CHANGE UP (ref 11.5–14.5)
SODIUM SERPL-SCNC: 134 MMOL/L — LOW (ref 135–146)
SPECIMEN SOURCE: SIGNIFICANT CHANGE UP
WBC # BLD: 6.2 K/UL — SIGNIFICANT CHANGE UP (ref 4.8–10.8)
WBC # FLD AUTO: 6.2 K/UL — SIGNIFICANT CHANGE UP (ref 4.8–10.8)

## 2020-12-07 PROCEDURE — 99233 SBSQ HOSP IP/OBS HIGH 50: CPT

## 2020-12-07 PROCEDURE — 73630 X-RAY EXAM OF FOOT: CPT | Mod: 26,RT

## 2020-12-07 PROCEDURE — 99222 1ST HOSP IP/OBS MODERATE 55: CPT

## 2020-12-07 RX ORDER — CEFAZOLIN SODIUM 1 G
VIAL (EA) INJECTION
Refills: 0 | Status: DISCONTINUED | OUTPATIENT
Start: 2020-12-07 | End: 2020-12-08

## 2020-12-07 RX ORDER — HEPARIN SODIUM 5000 [USP'U]/ML
5000 INJECTION INTRAVENOUS; SUBCUTANEOUS EVERY 8 HOURS
Refills: 0 | Status: DISCONTINUED | OUTPATIENT
Start: 2020-12-07 | End: 2020-12-15

## 2020-12-07 RX ORDER — CEFAZOLIN SODIUM 1 G
2000 VIAL (EA) INJECTION ONCE
Refills: 0 | Status: COMPLETED | OUTPATIENT
Start: 2020-12-07 | End: 2020-12-07

## 2020-12-07 RX ORDER — CEFAZOLIN SODIUM 1 G
2000 VIAL (EA) INJECTION EVERY 8 HOURS
Refills: 0 | Status: DISCONTINUED | OUTPATIENT
Start: 2020-12-07 | End: 2020-12-08

## 2020-12-07 RX ADMIN — INSULIN GLARGINE 40 UNIT(S): 100 INJECTION, SOLUTION SUBCUTANEOUS at 22:02

## 2020-12-07 RX ADMIN — Medication 250 MILLIGRAM(S): at 06:27

## 2020-12-07 RX ADMIN — AMLODIPINE BESYLATE 5 MILLIGRAM(S): 2.5 TABLET ORAL at 06:26

## 2020-12-07 RX ADMIN — Medication 2: at 07:39

## 2020-12-07 RX ADMIN — Medication 1: at 11:50

## 2020-12-07 RX ADMIN — Medication 14 UNIT(S): at 11:49

## 2020-12-07 RX ADMIN — Medication 100 MILLIGRAM(S): at 15:04

## 2020-12-07 RX ADMIN — HEPARIN SODIUM 5000 UNIT(S): 5000 INJECTION INTRAVENOUS; SUBCUTANEOUS at 22:01

## 2020-12-07 RX ADMIN — ATORVASTATIN CALCIUM 80 MILLIGRAM(S): 80 TABLET, FILM COATED ORAL at 22:02

## 2020-12-07 RX ADMIN — PANTOPRAZOLE SODIUM 40 MILLIGRAM(S): 20 TABLET, DELAYED RELEASE ORAL at 06:26

## 2020-12-07 RX ADMIN — Medication 1200 MILLIGRAM(S): at 15:04

## 2020-12-07 RX ADMIN — LOSARTAN POTASSIUM 50 MILLIGRAM(S): 100 TABLET, FILM COATED ORAL at 06:26

## 2020-12-07 RX ADMIN — Medication 14 UNIT(S): at 16:31

## 2020-12-07 RX ADMIN — Medication 1 DROP(S): at 11:42

## 2020-12-07 RX ADMIN — Medication 14 UNIT(S): at 07:38

## 2020-12-07 RX ADMIN — CHLORHEXIDINE GLUCONATE 1 APPLICATION(S): 213 SOLUTION TOPICAL at 06:26

## 2020-12-07 RX ADMIN — Medication 100 MILLIGRAM(S): at 22:01

## 2020-12-07 NOTE — PROGRESS NOTE ADULT - ASSESSMENT
66 yo M PMHx DM II, HTN, HLD, glaucoma, renal mass s/p right nephrectomy, nephrolithiasis, CKD III, and RLE Cellulitis was brought in by EMS for evaluation of altered mental status. As per EMR, patient developed chills and shortness of breath (which he attributed to the shivering) for about one hour. After that he became confused and doesn't remember anything until he received o2 in the ER. Upon EMS arrival, patient was found to be hypoxic to low 80s. He has had 2 negative covid tests and was found to have staph aureus in blood.    Sepsis due to staph bacteremia  - c/w vancomycin  - f/u MRSA vs MSSA  - check vanco trough  - pt remains febrile  - repeat cultures until negative  - check echo r/o endocarditis  - unclear source of infection. Pt states last week he had an injection for glaucoma and his optho noticed he had an eczema outbreak. Perhaps cracked skin with diabetes it the source? Pt states 3 weeks ago he had a callus removed by his podiatrist. It currently is oozing blooding. This could be another source of infection.    Altered mental status  - likely metabolic encephalopathy due to sepsis  - resolved    Acute hypoxic respiratory failure  - unclear etiology  - resolved  - perhaps related to sepsis  - v/q scan negative for PE (was on heparin gtt but was d/c)    Callus removed 3 weeks ago:  - Still oozing blood.   - podiatry consult appreciated, check xray of foot, attending to round    Troponemia:  - EKG sinus tachycardia  - f/u repeat cardiac enzymes 0.03-0.04-0.06  - telemetry monitoring   - likely due to increased demand  - increased lipitor dose   - ekg from 2019 shows RBBB. EKG from 12/2020 shows bifasciular block  - repeat EKG  - cardio consult--> stress test  f/u echo to ro endocarditis        Left Staghorn Calculi  - asymptomatic    DM II  - c/w insulin  - FS controlled  - goal 110-180    HTN  - controlled  - c/w amlodipine, losartan    HLD  -c/w atorvastatin    Glaucoma  - continue with timolol    CKD III  - s/p R nephrectomy  - stable at baseline  - Renally adjust meds  - Avoid nephrotoxic meds  - Monitor BMP    DVT px  Full Code   66 yo M PMHx DM II, HTN, HLD, glaucoma, renal mass s/p right nephrectomy, nephrolithiasis, CKD III, and RLE Cellulitis was brought in by EMS for evaluation of altered mental status. As per EMR, patient developed chills and shortness of breath (which he attributed to the shivering) for about one hour. After that he became confused and doesn't remember anything until he received o2 in the ER. Upon EMS arrival, patient was found to be hypoxic to low 80s. He has had 2 negative covid tests and was found to have staph aureus in blood.    # Sepsis due to mssa bacteremia  - seen by ID, changed to cefazolin 2g q8h  - pt remains febrile  - check echo r/o endocarditis  - unclear source of infection, likely from skin break    # Acute hypoxic respiratory failure resolved  - unclear etiology  - resolved  - perhaps related to sepsis  - v/q scan negative for PE (was on heparin gtt but was d/c)    # Callus removed 3 weeks ago:  - Still oozing blood.   - podiatry consult appreciated, check xray of foot, attending to round    # Troponemia:  - EKG sinus tachycardia  - f/u repeat cardiac enzymes 0.03-0.04-0.06  - telemetry monitoring   - likely due to increased demand  - increased lipitor dose   - seen by cardio, no intervention at this time    # DM II  - c/w insulin  - FS controlled  - goal 110-180    # HTN  - controlled  - c/w amlodipine, losartan    # HLD  -c/w atorvastatin    # Glaucoma  - continue with timolol    # CKD III  - s/p R nephrectomy  - stable at baseline  - Renally adjust meds  - Avoid nephrotoxic meds  - Monitor BMP    DVT px  Full Code

## 2020-12-07 NOTE — PROGRESS NOTE ADULT - ASSESSMENT
66 yo M PMHx DM II, HTN, HLD, glaucoma, renal mass s/p right nephrectomy, nephrolithiasis, CKD III, and RLE Cellulitis was brought in by EMS for evaluation of altered mental status. As per EMR, patient developed chills and shortness of breath (which he attributed to the shivering) for about one hour. After that he became confused and doesn't remember anything until he received o2 in the ER. Upon EMS arrival, patient was found to be hypoxic to low 80s. He has had 2 negative covid tests and was found to have staph aureus in blood.    Sepsis due to staph bacteremia  - Id c/s appreciated - change vanco to cefazolin  - f/u echo  - repeat cultures until negative  - unclear source of infection. Pt states last week he had an injection for glaucoma and his optho noticed he had an eczema outbreak. Perhaps cracked skin with diabetes it the source? Pt states 3 weeks ago he had a callus removed by his podiatrist. It currently is oozing blooding. This could be another source of infection.    Altered mental status  - likely metabolic encephalopathy due to sepsis  - resolved    Acute hypoxic respiratory failure  - unclear etiology  - resolved  - perhaps related to sepsis  - v/q scan negative for PE (was on heparin gtt but was d/c)    Callus removed 3 weeks ago:  - Still oozing blood.   - podiatry consult appreciated, check xray of foot, attending to round  - xray not done due to r/o covid status-obtain today    Troponemia:  - EKG sinus tachycardia  - f/u repeat cardiac enzymes 0.03-0.04-0.06  - likely due to increased demand  - increased lipitor dose   - ekg from 2019 shows RBBB. EKG from 12/2020 shows bifasciular block  - repeat EKG pending  - cardio appreciated  - c/w telemetry monitoring  - will need nuclear stress test, clarify with cardio for timing    Hypomagnesmia  - replenish mg  - check am levels    Left Staghorn Calculi  - asymptomatic    DM II  - c/w insulin  - FS controlled  - goal 110-180    HTN  - controlled  - c/w amlodipine, losartan    HLD  -c/w atorvastatin    Glaucoma  - continue with timolol    CKD III  - s/p R nephrectomy  - stable at baseline  - Renally adjust meds  - Avoid nephrotoxic meds  - Monitor BMP    Cough  - pt declining xray   - expressed concerns about potential pna developing vs atelectasis    DVT px  Full Code  From home    #Progress Note Handoff:  Pending (specify):  podiatry, xray, echo, negative blood cultures  Family discussion: discussed blood cultures, podiatry  Disposition: Home__x_/SNF___/Other________/Unknown at this time________

## 2020-12-07 NOTE — PROGRESS NOTE ADULT - SUBJECTIVE AND OBJECTIVE BOX
CHIEF COMPLAINT:    Patient is a 65y old  Male who presents with a chief complaint of SOB    INTERVAL HPI/OVERNIGHT EVENTS:    Patient seen and examined at bedside. No acute overnight events occurred.    ROS: Reports cough. All other systems are negative.    Vital Signs:    T(F): 96.9 (20 @ 05:05), Max: 97.3 (20 @ 13:32)  HR: 73 (20 @ 05:05) (72 - 77)  BP: 156/72 (20 @ 05:05) (117/81 - 156/72)  RR: 18 (20 @ 05:05) (18 - 18)  SpO2: 96% (20 @ 09:05) (96% - 96%)  I&O's Summary    06 Dec 2020 07:01  -  07 Dec 2020 07:00  --------------------------------------------------------  IN: 0 mL / OUT: 2450 mL / NET: -2450 mL    07 Dec 2020 07:01  -  07 Dec 2020 12:22  --------------------------------------------------------  IN: 480 mL / OUT: 0 mL / NET: 480 mL      Daily     Daily Weight in k.3 (07 Dec 2020 05:05)  CAPILLARY BLOOD GLUCOSE      POCT Blood Glucose.: 166 mg/dL (07 Dec 2020 11:45)  POCT Blood Glucose.: 239 mg/dL (07 Dec 2020 07:14)  POCT Blood Glucose.: 226 mg/dL (06 Dec 2020 21:09)  POCT Blood Glucose.: 167 mg/dL (06 Dec 2020 16:21)      PHYSICAL EXAM:  GENERAL:  NAD, obese  SKIN: No rashes or lesions  HEENT: Atraumatic. Normocephalic. Anicteric  NECK:  No JVD.   PULMONARY: Clear to ausculation bilaterally. No wheezing. No rales  CVS: Normal S1, S2. Regular rate and rhythm. No murmurs.  ABDOMEN/GI: Soft, Nontender, Nondistended; Bowel sounds are present  EXTREMITIES:  No edema B/L LE. Right foot wrapped  NEUROLOGIC:  No motor deficit.  PSYCH: Alert & oriented x 3, normal affect    Consultant(s) Notes Reviewed:  [x ] YES  [ ] NO      LABS:                        11.2   6.20  )-----------( 152      ( 07 Dec 2020 05:20 )             35.0         134<L>  |  106  |  31<H>  ----------------------------<  229<H>  4.5   |  18  |  1.7<H>    Ca    8.4<L>      07 Dec 2020 05:20  Mg     2.1         TPro  5.8<L>  /  Alb  3.2<L>  /  TBili  0.6  /  DBili  x   /  AST  37  /  ALT  35  /  AlkPhos  54        Serum Pro-Brain Natriuretic Peptide: 82 pg/mL (20 @ 02:00)    Trop 0.03, CKMB --, CK --, 20 @ 05:37  Trop 0.06, CKMB 5.4, , 20 @ 04:30      Culture - Blood (collected 05 Dec 2020 12:50)  Source: .Blood None  Preliminary Report (06 Dec 2020 19:01):    No growth to date.        RADIOLOGY & ADDITIONAL TESTS:  Imaging or report Personally Reviewed:  [ ] YES  [ ] NO    Telemetry reviewed independently - no acute events    Medications:  Standing  amLODIPine   Tablet 5 milliGRAM(s) Oral daily  atorvastatin 80 milliGRAM(s) Oral at bedtime  chlorhexidine 4% Liquid 1 Application(s) Topical <User Schedule>  dextrose 40% Gel 15 Gram(s) Oral once  dextrose 5%. 1000 milliLiter(s) IV Continuous <Continuous>  dextrose 5%. 1000 milliLiter(s) IV Continuous <Continuous>  dextrose 50% Injectable 25 Gram(s) IV Push once  dextrose 50% Injectable 12.5 Gram(s) IV Push once  dextrose 50% Injectable 25 Gram(s) IV Push once  glucagon  Injectable 1 milliGRAM(s) IntraMuscular once  influenza   Vaccine 0.5 milliLiter(s) IntraMuscular once  insulin glargine Injectable (LANTUS) 40 Unit(s) SubCutaneous at bedtime  insulin lispro (ADMELOG) corrective regimen sliding scale   SubCutaneous three times a day before meals  insulin lispro Injectable (ADMELOG) 14 Unit(s) SubCutaneous three times a day before meals  losartan 50 milliGRAM(s) Oral daily  pantoprazole    Tablet 40 milliGRAM(s) Oral before breakfast  sodium chloride 0.9%. 1000 milliLiter(s) IV Continuous <Continuous>  timolol 0.5% Solution 1 Drop(s) Right EYE daily  vancomycin  IVPB 1000 milliGRAM(s) IV Intermittent every 12 hours    PRN Meds  acetaminophen   Tablet .. 650 milliGRAM(s) Oral every 6 hours PRN      Case discussed with resident  Care discussed with pt

## 2020-12-07 NOTE — PROGRESS NOTE ADULT - SUBJECTIVE AND OBJECTIVE BOX
SUBJECTIVE:    Patient is a 65y old Male who presents with a chief complaint of SOB (06 Dec 2020 15:58)    Currently admitted to medicine with the primary diagnosis of Sepsis       Today is hospital day 3d. This morning he is resting comfortably in bed and reports no new issues or overnight events.     PAST MEDICAL & SURGICAL HISTORY  HLD (hyperlipidemia)    CKD (chronic kidney disease)    DVT (deep venous thrombosis)    Glaucoma    HTN (hypertension)    Diabetes mellitus    H/O right nephrectomy      SOCIAL HISTORY:  Negative for smoking/alcohol/drug use.     ALLERGIES:  No Known Allergies    MEDICATIONS:  STANDING MEDICATIONS  amLODIPine   Tablet 5 milliGRAM(s) Oral daily  atorvastatin 80 milliGRAM(s) Oral at bedtime  chlorhexidine 4% Liquid 1 Application(s) Topical <User Schedule>  dextrose 40% Gel 15 Gram(s) Oral once  dextrose 5%. 1000 milliLiter(s) IV Continuous <Continuous>  dextrose 5%. 1000 milliLiter(s) IV Continuous <Continuous>  dextrose 50% Injectable 25 Gram(s) IV Push once  dextrose 50% Injectable 12.5 Gram(s) IV Push once  dextrose 50% Injectable 25 Gram(s) IV Push once  glucagon  Injectable 1 milliGRAM(s) IntraMuscular once  influenza   Vaccine 0.5 milliLiter(s) IntraMuscular once  insulin glargine Injectable (LANTUS) 40 Unit(s) SubCutaneous at bedtime  insulin lispro (ADMELOG) corrective regimen sliding scale   SubCutaneous three times a day before meals  insulin lispro Injectable (ADMELOG) 14 Unit(s) SubCutaneous three times a day before meals  losartan 50 milliGRAM(s) Oral daily  pantoprazole    Tablet 40 milliGRAM(s) Oral before breakfast  sodium chloride 0.9%. 1000 milliLiter(s) IV Continuous <Continuous>  timolol 0.5% Solution 1 Drop(s) Right EYE daily  vancomycin  IVPB 1000 milliGRAM(s) IV Intermittent every 12 hours    PRN MEDICATIONS  acetaminophen   Tablet .. 650 milliGRAM(s) Oral every 6 hours PRN    VITALS:   T(F): 96.9  HR: 73  BP: 156/72  RR: 18  SpO2: 96%    LABS:                        11.2   6.20  )-----------( 152      ( 07 Dec 2020 05:20 )             35.0     12-07    134<L>  |  106  |  31<H>  ----------------------------<  229<H>  4.5   |  18  |  1.7<H>    Ca    8.4<L>      07 Dec 2020 05:20  Mg     2.1     12-07    TPro  5.8<L>  /  Alb  3.2<L>  /  TBili  0.6  /  DBili  x   /  AST  37  /  ALT  35  /  AlkPhos  54  12-07              Culture - Blood (collected 05 Dec 2020 12:50)  Source: .Blood None  Preliminary Report (06 Dec 2020 19:01):    No growth to date.      CARDIAC MARKERS ( 06 Dec 2020 05:37 )  x     / 0.03 ng/mL / x     / x     / x          RADIOLOGY:    PHYSICAL EXAM:  GEN: No acute distress  LUNGS: Clear to auscultation bilaterally   HEART: S1/S2 present. RRR.   ABD: Soft, non-tender, non-distended. Bowel sounds present  EXT: NC/NC/NE/2+PP/KEARNEY/Skin Intact.   NEURO: AAOX3    Intravenous access:   NG tube:   Nj Catheter:

## 2020-12-07 NOTE — PROGRESS NOTE ADULT - SUBJECTIVE AND OBJECTIVE BOX
JOSHUA HAM  65y, Male  Allergy: No Known Allergies      LOS  3d    CHIEF COMPLAINT: SOB (07 Dec 2020 09:09)      INTERVAL EVENTS/HPI  - No acute events overnight  - T(F): , Max: 97.3 (12-06-20 @ 13:32)  - Denies any worsening symptoms  - Tolerating medication  - WBC Count: 6.20 (12-07-20 @ 05:20)  WBC Count: 6.90 (12-06-20 @ 05:37)     - Creatinine, Serum: 1.7 (12-07-20 @ 05:20)  Creatinine, Serum: 1.9 (12-06-20 @ 05:37)       ROS  General: Denies rigors, nightsweats  HEENT: Denies headache, rhinorrhea, sore throat, eye pain  CV: Denies CP, palpitations  PULM: Denies wheezing, hemoptysis  GI: Denies hematemesis, hematochezia, melena  : Denies discharge, hematuria  MSK: Denies arthralgias, myalgias  SKIN: Denies rash, lesions  NEURO: Denies paresthesias, weakness  PSYCH: Denies depression, anxiety    VITALS:  T(F): 96.9, Max: 97.3 (12-06-20 @ 13:32)  HR: 73  BP: 156/72  RR: 18Vital Signs Last 24 Hrs  T(C): 36.1 (07 Dec 2020 05:05), Max: 36.3 (06 Dec 2020 13:32)  T(F): 96.9 (07 Dec 2020 05:05), Max: 97.3 (06 Dec 2020 13:32)  HR: 73 (07 Dec 2020 05:05) (72 - 77)  BP: 156/72 (07 Dec 2020 05:05) (117/81 - 156/72)  BP(mean): --  RR: 18 (07 Dec 2020 05:05) (18 - 18)  SpO2: 96% (07 Dec 2020 09:05) (96% - 96%)    PHYSICAL EXAM:  Gen: NAD, resting in bed  HEENT: Normocephalic, atraumatic  Neck: supple, no lymphadenopathy  CV: Regular rate & regular rhythm  Lungs: decreased BS at bases, no fremitus  Abdomen: Soft, BS present  Ext: Warm, well perfused  Neuro: non focal, awake  Skin: no rash, no erythema  Lines: no phlebitis    FH: Non-contributory  Social Hx: Non-contributory    TESTS & MEASUREMENTS:                        11.2   6.20  )-----------( 152      ( 07 Dec 2020 05:20 )             35.0     12-07    134<L>  |  106  |  31<H>  ----------------------------<  229<H>  4.5   |  18  |  1.7<H>    Ca    8.4<L>      07 Dec 2020 05:20  Mg     2.1     12-07    TPro  5.8<L>  /  Alb  3.2<L>  /  TBili  0.6  /  DBili  x   /  AST  37  /  ALT  35  /  AlkPhos  54  12-07    eGFR if Non African American: 41 mL/min/1.73M2 (12-07-20 @ 05:20)  eGFR if African American: 47 mL/min/1.73M2 (12-07-20 @ 05:20)    LIVER FUNCTIONS - ( 07 Dec 2020 05:20 )  Alb: 3.2 g/dL / Pro: 5.8 g/dL / ALK PHOS: 54 U/L / ALT: 35 U/L / AST: 37 U/L / GGT: x               Culture - Blood (collected 12-05-20 @ 12:50)  Source: .Blood None  Preliminary Report (12-06-20 @ 19:01):    No growth to date.    Culture - Blood (collected 12-04-20 @ 02:00)  Source: .Blood Blood-Peripheral  Gram Stain (12-05-20 @ 22:46):    Growth in anaerobic bottle:    Gram Positive Cocci in Clusters    Growth in aerobic bottle: Gram Positive Cocci in Clusters  Final Report (12-07-20 @ 07:26):    Growth in aerobic and anaerobic bottles: Staphylococcus aureus    "Due to technical problems, Proteus sp. will Not be reported as part of    the BCID panel until further notice"    ***Blood Panel PCR results on this specimen are available    approximately 3 hours after the Gram stain result.***    Gram stain, PCR, and/or culture results may not always    correspond due to difference in methodologies.    ************************************************************    This PCR assay was performed using [employer].    The following targets are tested for: Enterococcus,    vancomycin resistant enterococci, Listeria monocytogenes,    coagulase negative staphylococci, S. aureus,    methicillin resistant S. aureus, Streptococcus agalactiae    (Group B), S. pneumoniae, S. pyogenes (Group A),    Acinetobacter baumannii, Enterobacter cloacae, E. coli,    Klebsiella oxytoca, K. pneumoniae, Proteus sp.,    Serratia marcescens, Haemophilus influenzae,    Neisseria meningitidis, Pseudomonas aeruginosa, Candida    albicans, C. glabrata, C krusei, C parapsilosis,    C. tropicalis and the KPC resistance gene.  Organism: Blood Culture PCR  Staphylococcus aureus (12-07-20 @ 07:26)  Organism: Staphylococcus aureus (12-07-20 @ 07:26)      -  Ampicillin/Sulbactam: S <=8/4      -  Cefazolin: S <=4      -  Clindamycin: R <=0.25 This isolate is presumed to be clindamycin resistant based on detection of inducible resistance. Clindamycin may still be effective in some patients.      -  Erythromycin: R >4      -  Gentamicin: S <=1 Should not be used as monotherapy      -  Oxacillin: S <=0.25      -  Penicillin: R >8      -  RIF- Rifampin: S <=1 Should not be used as monotherapy      -  Tetra/Doxy: S <=1      -  Trimethoprim/Sulfamethoxazole: S <=0.5/9.5      -  Vancomycin: S 1      Method Type: RIYA  Organism: Blood Culture PCR (12-07-20 @ 07:26)      -  Staphylococcus aureus: Detec Any isolate of Staphylococcus aureus from a blood culture is NOT considered a contaminant.      Method Type: PCR    Culture - Blood (collected 12-04-20 @ 02:00)  Source: .Blood Blood-Peripheral  Gram Stain (12-07-20 @ 08:40):    Growth in aerobic bottle: Gram Positive Cocci in Clusters  Preliminary Report (12-07-20 @ 08:40):    Growth in aerobic bottle: Gram Positive Cocci in Clusters        Lactate, Blood: 1.7 mmol/L (12-04-20 @ 11:48)  Blood Gas Venous - Lactate: 2.6 mmoL/L (12-04-20 @ 02:19)  Lactate, Blood: 2.9 mmol/L (12-04-20 @ 02:00)      INFECTIOUS DISEASES TESTING  Rapid RVP Result: NotDetec (12-04-20 @ 11:20)  Rapid RVP Result: NotDetec (12-04-20 @ 02:00)  Procalcitonin, Serum: 0.18 (12-04-20 @ 02:00)      INFLAMMATORY MARKERS  C-Reactive Protein, Serum: 0.61 mg/dL (12-04-20 @ 02:00)      RADIOLOGY & ADDITIONAL TESTS:  I have personally reviewed the last available Chest xray  CXR      CT      CARDIOLOGY TESTING  12 Lead ECG:   Ventricular Rate 80 BPM    Atrial Rate 80 BPM    P-R Interval 256 ms    QRS Duration 182 ms    Q-T Interval 426 ms    QTC Calculation(Bazett) 491 ms    P Axis 33 degrees    R Axis -37 degrees    T Axis 14 degrees    Diagnosis Line Sinus rhythm [id number] degree A-V block  Indeterminate axis  Right bundle branch block  Abnormal ECG    Confirmed by VIBHA GUILLERMO MD ([id number]) on 12/6/2020 12:58:49 PM (12-06-20 @ 01:53)  12 Lead ECG:   Ventricular Rate 110 BPM    Atrial Rate 110 BPM    P-R Interval 216 ms    QRS Duration 174 ms    Q-T Interval 344 ms    QTC Calculation(Bazett) 465 ms    P Axis 11 degrees    R Axis -50 degrees    T Axis 2 degrees    Diagnosis Line Sinus tachycardia with 1st degree A-V block  Right bundle branch block  Left anterior fascicular block  *** Bifascicular block ***  Inferior infarct , age undetermined  Abnormal ECG    Confirmed by JANNETH MENON MD ([id number]) on 12/4/2020 7:45:29 AM (12-04-20 @ 04:36)      MEDICATIONS  amLODIPine   Tablet 5 Oral daily  atorvastatin 80 Oral at bedtime  chlorhexidine 4% Liquid 1 Topical <User Schedule>  dextrose 40% Gel 15 Oral once  dextrose 5%. 1000 IV Continuous <Continuous>  dextrose 5%. 1000 IV Continuous <Continuous>  dextrose 50% Injectable 25 IV Push once  dextrose 50% Injectable 12.5 IV Push once  dextrose 50% Injectable 25 IV Push once  glucagon  Injectable 1 IntraMuscular once  influenza   Vaccine 0.5 IntraMuscular once  insulin glargine Injectable (LANTUS) 40 SubCutaneous at bedtime  insulin lispro (ADMELOG) corrective regimen sliding scale  SubCutaneous three times a day before meals  insulin lispro Injectable (ADMELOG) 14 SubCutaneous three times a day before meals  losartan 50 Oral daily  pantoprazole    Tablet 40 Oral before breakfast  sodium chloride 0.9%. 1000 IV Continuous <Continuous>  timolol 0.5% Solution 1 Right EYE daily  vancomycin  IVPB 1000 IV Intermittent every 12 hours      WEIGHT  Weight (kg): 175 (12-04-20 @ 10:30)  Creatinine, Serum: 1.7 mg/dL (12-07-20 @ 05:20)      ANTIBIOTICS:  vancomycin  IVPB 1000 milliGRAM(s) IV Intermittent every 12 hours      All available historical records have been reviewed

## 2020-12-07 NOTE — PROGRESS NOTE ADULT - ASSESSMENT
ASSESSMENT  65 yr old male with PMHx of DM, HTN, 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, s/p right nephrectomy for bleeding from surgical sites s/p renal mass resection, CKD, questionable hx of DVT in the past presented to the hospital for chills, SOB, weakness, and AMS     IMPRESSION  #Sepsis on admission, secondary to MSSA bacteremia  - unclear source -- had recent callus removal by podiatry 3 weeks prior to admission  - CT CAP 12/4: No CT evidence of acute intrathoracic or abdominopelvic pathology. Nodular enlarged right thyroid lobe. Recommend evaluation outpatient sonography. Additional findings in the body of the report  - COVID negative x 2 on 12/4  - Blood Cx 12/4 MSSA  - Blood Cx 12/5 NG    #Elevated D-DImer  #AMS - resolved    #CKD  #Hx of DVT  #Obesity BMI (kg/m2): 53.8  #Abx allergy: NKDA    Creatinine, Serum: 1.7 mg/dL (12.07.20 @ 05:20)  Weight (kg): 175 (04 Dec 2020 10:30)  CrCl 104    RECOMMENDATIONS  - can vancomycin to cefazolin 2g q 8 hours  - follow-up TTE  - follow-up X rays Left foot  - will likely plan for 4 weeks antibiotics from culture negative; discussed with patient regarding CHEIKH to evaluate for source and possibly shorten course of antibiotics if negative, however patient reluctant as this point  - follow-up surveillance blood cultures  - repeat blood cx if febrile    Please call or message on [employer] Teams if with any questions.  Spectra [phone]

## 2020-12-08 LAB
-  CANDIDA ALBICANS: SIGNIFICANT CHANGE UP
-  CANDIDA GLABRATA: SIGNIFICANT CHANGE UP
-  CANDIDA KRUSEI: SIGNIFICANT CHANGE UP
-  CANDIDA PARAPSILOSIS: SIGNIFICANT CHANGE UP
-  CANDIDA TROPICALIS: SIGNIFICANT CHANGE UP
-  COAGULASE NEGATIVE STAPHYLOCOCCUS: SIGNIFICANT CHANGE UP
-  K. PNEUMONIAE GROUP: SIGNIFICANT CHANGE UP
-  KPC RESISTANCE GENE: SIGNIFICANT CHANGE UP
-  STREPTOCOCCUS SP. (NOT GRP A, B OR S PNEUMONIAE): SIGNIFICANT CHANGE UP
A BAUMANNII DNA SPEC QL NAA+PROBE: SIGNIFICANT CHANGE UP
E CLOAC COMP DNA BLD POS QL NAA+PROBE: SIGNIFICANT CHANGE UP
E COLI DNA BLD POS QL NAA+NON-PROBE: SIGNIFICANT CHANGE UP
ENTEROCOC DNA BLD POS QL NAA+NON-PROBE: SIGNIFICANT CHANGE UP
ENTEROCOC DNA BLD POS QL NAA+NON-PROBE: SIGNIFICANT CHANGE UP
GLUCOSE BLDC GLUCOMTR-MCNC: 142 MG/DL — HIGH (ref 70–99)
GLUCOSE BLDC GLUCOMTR-MCNC: 146 MG/DL — HIGH (ref 70–99)
GLUCOSE BLDC GLUCOMTR-MCNC: 163 MG/DL — HIGH (ref 70–99)
GLUCOSE BLDC GLUCOMTR-MCNC: 165 MG/DL — HIGH (ref 70–99)
GLUCOSE BLDC GLUCOMTR-MCNC: 179 MG/DL — HIGH (ref 70–99)
GP B STREP DNA BLD POS QL NAA+NON-PROBE: SIGNIFICANT CHANGE UP
HAEM INFLU DNA BLD POS QL NAA+NON-PROBE: SIGNIFICANT CHANGE UP
K OXYTOCA DNA BLD POS QL NAA+NON-PROBE: SIGNIFICANT CHANGE UP
L MONOCYTOG DNA BLD POS QL NAA+NON-PROBE: SIGNIFICANT CHANGE UP
METHOD TYPE: SIGNIFICANT CHANGE UP
MRSA SPEC QL CULT: SIGNIFICANT CHANGE UP
MSSA DNA SPEC QL NAA+PROBE: SIGNIFICANT CHANGE UP
N MEN ISLT CULT: SIGNIFICANT CHANGE UP
P AERUGINOSA DNA BLD POS NAA+NON-PROBE: SIGNIFICANT CHANGE UP
S MARCESCENS DNA BLD POS NAA+NON-PROBE: SIGNIFICANT CHANGE UP
S PNEUM DNA BLD POS QL NAA+NON-PROBE: SIGNIFICANT CHANGE UP
S PYO DNA BLD POS QL NAA+NON-PROBE: SIGNIFICANT CHANGE UP

## 2020-12-08 PROCEDURE — 99233 SBSQ HOSP IP/OBS HIGH 50: CPT

## 2020-12-08 PROCEDURE — 99232 SBSQ HOSP IP/OBS MODERATE 35: CPT

## 2020-12-08 PROCEDURE — 93306 TTE W/DOPPLER COMPLETE: CPT | Mod: 26

## 2020-12-08 RX ORDER — CEFEPIME 1 G/1
1000 INJECTION, POWDER, FOR SOLUTION INTRAMUSCULAR; INTRAVENOUS EVERY 8 HOURS
Refills: 0 | Status: DISCONTINUED | OUTPATIENT
Start: 2020-12-08 | End: 2020-12-10

## 2020-12-08 RX ORDER — TIMOLOL 0.5 %
1 DROPS OPHTHALMIC (EYE) EVERY 24 HOURS
Refills: 0 | Status: DISCONTINUED | OUTPATIENT
Start: 2020-12-08 | End: 2020-12-15

## 2020-12-08 RX ORDER — DORZOLAMIDE HYDROCHLORIDE 20 MG/ML
1 SOLUTION/ DROPS OPHTHALMIC
Refills: 0 | Status: DISCONTINUED | OUTPATIENT
Start: 2020-12-08 | End: 2020-12-15

## 2020-12-08 RX ORDER — CEFEPIME 1 G/1
1000 INJECTION, POWDER, FOR SOLUTION INTRAMUSCULAR; INTRAVENOUS ONCE
Refills: 0 | Status: COMPLETED | OUTPATIENT
Start: 2020-12-08 | End: 2020-12-08

## 2020-12-08 RX ORDER — CEFEPIME 1 G/1
INJECTION, POWDER, FOR SOLUTION INTRAMUSCULAR; INTRAVENOUS
Refills: 0 | Status: DISCONTINUED | OUTPATIENT
Start: 2020-12-08 | End: 2020-12-10

## 2020-12-08 RX ORDER — CEFTRIAXONE 500 MG/1
1000 INJECTION, POWDER, FOR SOLUTION INTRAMUSCULAR; INTRAVENOUS EVERY 12 HOURS
Refills: 0 | Status: DISCONTINUED | OUTPATIENT
Start: 2020-12-08 | End: 2020-12-08

## 2020-12-08 RX ORDER — LATANOPROST 0.05 MG/ML
1 SOLUTION/ DROPS OPHTHALMIC; TOPICAL AT BEDTIME
Refills: 0 | Status: DISCONTINUED | OUTPATIENT
Start: 2020-12-08 | End: 2020-12-15

## 2020-12-08 RX ADMIN — PANTOPRAZOLE SODIUM 40 MILLIGRAM(S): 20 TABLET, DELAYED RELEASE ORAL at 05:38

## 2020-12-08 RX ADMIN — AMLODIPINE BESYLATE 5 MILLIGRAM(S): 2.5 TABLET ORAL at 05:39

## 2020-12-08 RX ADMIN — CEFEPIME 100 MILLIGRAM(S): 1 INJECTION, POWDER, FOR SOLUTION INTRAMUSCULAR; INTRAVENOUS at 13:19

## 2020-12-08 RX ADMIN — Medication 1: at 13:16

## 2020-12-08 RX ADMIN — Medication 1200 MILLIGRAM(S): at 17:28

## 2020-12-08 RX ADMIN — HEPARIN SODIUM 5000 UNIT(S): 5000 INJECTION INTRAVENOUS; SUBCUTANEOUS at 14:54

## 2020-12-08 RX ADMIN — DORZOLAMIDE HYDROCHLORIDE 1 DROP(S): 20 SOLUTION/ DROPS OPHTHALMIC at 17:29

## 2020-12-08 RX ADMIN — Medication 14 UNIT(S): at 17:28

## 2020-12-08 RX ADMIN — Medication 650 MILLIGRAM(S): at 16:06

## 2020-12-08 RX ADMIN — Medication 1: at 08:18

## 2020-12-08 RX ADMIN — CHLORHEXIDINE GLUCONATE 1 APPLICATION(S): 213 SOLUTION TOPICAL at 05:39

## 2020-12-08 RX ADMIN — Medication 1 DROP(S): at 12:45

## 2020-12-08 RX ADMIN — Medication 1 DROP(S): at 17:29

## 2020-12-08 RX ADMIN — ATORVASTATIN CALCIUM 80 MILLIGRAM(S): 80 TABLET, FILM COATED ORAL at 21:28

## 2020-12-08 RX ADMIN — INSULIN GLARGINE 40 UNIT(S): 100 INJECTION, SOLUTION SUBCUTANEOUS at 21:32

## 2020-12-08 RX ADMIN — LOSARTAN POTASSIUM 50 MILLIGRAM(S): 100 TABLET, FILM COATED ORAL at 05:39

## 2020-12-08 RX ADMIN — Medication 650 MILLIGRAM(S): at 16:30

## 2020-12-08 RX ADMIN — Medication 14 UNIT(S): at 12:44

## 2020-12-08 RX ADMIN — HEPARIN SODIUM 5000 UNIT(S): 5000 INJECTION INTRAVENOUS; SUBCUTANEOUS at 05:39

## 2020-12-08 RX ADMIN — CEFTRIAXONE 100 MILLIGRAM(S): 500 INJECTION, POWDER, FOR SOLUTION INTRAMUSCULAR; INTRAVENOUS at 05:08

## 2020-12-08 RX ADMIN — Medication 100 MILLIGRAM(S): at 05:40

## 2020-12-08 RX ADMIN — LATANOPROST 1 DROP(S): 0.05 SOLUTION/ DROPS OPHTHALMIC; TOPICAL at 21:28

## 2020-12-08 RX ADMIN — Medication 1200 MILLIGRAM(S): at 05:39

## 2020-12-08 RX ADMIN — HEPARIN SODIUM 5000 UNIT(S): 5000 INJECTION INTRAVENOUS; SUBCUTANEOUS at 21:28

## 2020-12-08 RX ADMIN — CEFEPIME 100 MILLIGRAM(S): 1 INJECTION, POWDER, FOR SOLUTION INTRAMUSCULAR; INTRAVENOUS at 21:27

## 2020-12-08 RX ADMIN — Medication 14 UNIT(S): at 08:18

## 2020-12-08 NOTE — CHART NOTE - NSCHARTNOTEFT_GEN_A_CORE
Vitals, labs, culture, imaging reviewed.     Blood Cx 12/4 also growing GNR, as well as MSSA     Xray Foot AP + Lateral + Oblique, Right (12.07.20 @ 14:32)  Impression: Plantar hallux distal tuft soft tissue ulcer with periostitis compatible with osteitis/osteomyelitis. Consider MRI of the great toe to evaluate extent of disease    Can change cefazolin to cefepime 1g q 8 hours while awaiting speciation of GNR  Agree with MRI Right foot to evaluate for OM; may be source of polymicrobial bacteremia    Please call or message on [employer] Teams if with any questions.  Spectra [phone]

## 2020-12-08 NOTE — CHART NOTE - NSCHARTNOTEFT_GEN_A_CORE
I was called from the lab and informed about the patient's blood culture results taken from 12/4/2020:     1) Aerobic bottle: Gram positive cocci in clusters ( MSSA, patient is already on cefazolin)     2) Anaerobic Bottle: Gram negative rods ( The patient has no gram negative antibiotic coverage). PCR did not detect the species.     The patient does not seem sick or ill clinically.   Vitals: Afebrile, Blood pressure: 150/70mmHg.     Will start antibiotic coverage for gram negative rods with ceftriaxone ( no need for meropenem) given the patients stable clinical picture.

## 2020-12-08 NOTE — PROGRESS NOTE ADULT - SUBJECTIVE AND OBJECTIVE BOX
PROGRESS NOTE   Pt seen @ bedside during AM rounds, w/ Attending, Dr. Tong. Dressing  +Clean, +Dry, +Intact. Pt. denies any recent n/f/v/c/sob. Pt. denies any other pedal complaints at this time.       Vital Signs Last 24 Hrs  T(C): 37.7 (08 Dec 2020 05:18), Max: 37.7 (08 Dec 2020 05:18)  T(F): 99.9 (08 Dec 2020 05:18), Max: 99.9 (08 Dec 2020 05:18)  HR: 97 (08 Dec 2020 05:18) (78 - 97)  BP: 162/85 (08 Dec 2020 05:18) (150/70 - 162/85)  BP(mean): --  RR: 19 (08 Dec 2020 05:18) (18 - 19)  SpO2: 96% (07 Dec 2020 09:05) (96% - 96%)                          11.2   6.20  )-----------( 152      ( 07 Dec 2020 05:20 )             35.0               12-07    134<L>  |  106  |  31<H>  ----------------------------<  229<H>  4.5   |  18  |  1.7<H>    Ca    8.4<L>      07 Dec 2020 05:20  Mg     2.1     12-07    TPro  5.8<L>  /  Alb  3.2<L>  /  TBili  0.6  /  DBili  x   /  AST  37  /  ALT  35  /  AlkPhos  54  12-07    < from: Xray Foot AP + Lateral + Oblique, Right (12.07.20 @ 14:32) >  EXAM:  XR FOOT COMP MIN 3 VIEWS RT            PROCEDURE DATE:  12/07/2020            INTERPRETATION:  Clinical History / Reason for exam: Evaluate hallux osteomyelitis    Comparison 11/1/2019    Technique: 3 views of the right foot    Findings: Plantar soft tissue lucency along the soft tissues of great toe compatible with soft tissue ulcer. There are erosions along the medial aspect of distal tuft compatible with osteitis/possible osteomyelitis.    Otherwise bony structures demonstrate midfootdegenerative change with fusion between the base of third and fourth metatarsals. There is forefoot neuropathic change with toe claw deformities. Vascular calcifications are present. Hindfoot degenerative change with tibiotalar and talonavicular osteoarthritis. Insertional Achilles enthesopathy and plantar heel spur present.    Impression: Plantar hallux distal tuft soft tissue ulcer with periostitis compatible with osteitis/osteomyelitis. Consider MRI of the great toe to evaluate extent of disease                ADRIANA KEITA MD; Attending Radiologist  This document has been electronically signed. Dec  7 2020  4:55PM    < end of copied text >          Assesment:  Right hallux plantomedial ulcer -PTB    Plan:  Pt. seen w/ Attending Dr. Tong  Discussed treatment and condition w/ patient  Xray reviewed: Stated above  Pt. refused to have MRI done a couple of days ago  Discussed in detail the necessity of MRI to be done in order to identify the extent of the bone infection  Pt. states that he will think over it   Will f/u w/ patient's decision tomorrow, 12/9; if pt's agrees will proceed for sx planning of amputation of the R. hallux  Cont. w/ LWC for now: Betadine / DSD /mary  Discussed plan w/ Attending, Dr. Tong    Spectra; [phone]

## 2020-12-08 NOTE — PROGRESS NOTE ADULT - SUBJECTIVE AND OBJECTIVE BOX
JOSHUA HAM 65y Male  MRN#: [id number]   CODE STATUS:________    SUBJECTIVE  Patient is a 65y old Male who presents with a chief complaint of SOB (08 Dec 2020 09:03)  Currently admitted to medicine with the primary diagnosis of Sepsis    Today is hospital day 4d, and this morning he is resting comfortably and reports no overnight events.       OBJECTIVE  PAST MEDICAL & SURGICAL HISTORY  HLD (hyperlipidemia)    CKD (chronic kidney disease)    DVT (deep venous thrombosis)    Glaucoma    HTN (hypertension)    Diabetes mellitus    H/O right nephrectomy      ALLERGIES:  No Known Allergies    MEDICATIONS:  STANDING MEDICATIONS  amLODIPine   Tablet 5 milliGRAM(s) Oral daily  atorvastatin 80 milliGRAM(s) Oral at bedtime  cefTRIAXone   IVPB 1000 milliGRAM(s) IV Intermittent every 12 hours  chlorhexidine 4% Liquid 1 Application(s) Topical <User Schedule>  dextrose 40% Gel 15 Gram(s) Oral once  dextrose 5%. 1000 milliLiter(s) IV Continuous <Continuous>  dextrose 5%. 1000 milliLiter(s) IV Continuous <Continuous>  dextrose 50% Injectable 25 Gram(s) IV Push once  dextrose 50% Injectable 12.5 Gram(s) IV Push once  dextrose 50% Injectable 25 Gram(s) IV Push once  glucagon  Injectable 1 milliGRAM(s) IntraMuscular once  guaiFENesin ER 1200 milliGRAM(s) Oral every 12 hours  heparin   Injectable 5000 Unit(s) SubCutaneous every 8 hours  influenza   Vaccine 0.5 milliLiter(s) IntraMuscular once  insulin glargine Injectable (LANTUS) 40 Unit(s) SubCutaneous at bedtime  insulin lispro (ADMELOG) corrective regimen sliding scale   SubCutaneous three times a day before meals  insulin lispro Injectable (ADMELOG) 14 Unit(s) SubCutaneous three times a day before meals  losartan 50 milliGRAM(s) Oral daily  pantoprazole    Tablet 40 milliGRAM(s) Oral before breakfast  timolol 0.5% Solution 1 Drop(s) Right EYE daily    PRN MEDICATIONS  acetaminophen   Tablet .. 650 milliGRAM(s) Oral every 6 hours PRN      VITAL SIGNS: Last 24 Hours  T(C): 37.7 (08 Dec 2020 05:18), Max: 37.7 (08 Dec 2020 05:18)  T(F): 99.9 (08 Dec 2020 05:18), Max: 99.9 (08 Dec 2020 05:18)  HR: 97 (08 Dec 2020 05:18) (78 - 97)  BP: 162/85 (08 Dec 2020 05:18) (150/70 - 162/85)  BP(mean): --  RR: 19 (08 Dec 2020 05:18) (18 - 19)  SpO2: --    LABS:                        11.2   6.20  )-----------( 152      ( 07 Dec 2020 05:20 )             35.0     12-07    134<L>  |  106  |  31<H>  ----------------------------<  229<H>  4.5   |  18  |  1.7<H>    Ca    8.4<L>      07 Dec 2020 05:20  Mg     2.1     12-07    TPro  5.8<L>  /  Alb  3.2<L>  /  TBili  0.6  /  DBili  x   /  AST  37  /  ALT  35  /  AlkPhos  54  12-07              Culture - Blood (collected 06 Dec 2020 05:37)  Source: .Blood None  Preliminary Report (07 Dec 2020 18:01):    No growth to date.    Culture - Blood (collected 05 Dec 2020 12:50)  Source: .Blood None  Preliminary Report (06 Dec 2020 19:01):    No growth to date.          RADIOLOGY:    PHYSICAL EXAM:    GENERAL: NAD, alert  HEENT:  Atraumatic, Normocephalic. EOMI,   PULMONARY: Clear to auscultation bilaterally; No wheeze  CARDIOVASCULAR: Regular rate and rhythm; No murmurs  GASTROINTESTINAL: Soft, Nontender, Nondistended; Bowel sounds present  MUSCULOSKELETAL: , No clubbing, cyanosis, or edema  NEUROLOGY: non-focal  SKIN: No rashes or lesions      ASSESSMENT & PLAN  66 yo M PMHx DM II, HTN, HLD, glaucoma, renal mass s/p right nephrectomy, nephrolithiasis, CKD III, and RLE Cellulitis was brought in by EMS for evaluation of altered mental status. As per EMR, patient developed chills and shortness of breath (which he attributed to the shivering) for about one hour. After that he became confused and doesn't remember anything until he received o2 in the ER. Upon EMS arrival, patient was found to be hypoxic to low 80s. He has had 2 negative covid tests and was found to have staph aureus in blood.    #Sepsis due to mssa bacteremia  - seen by ID, changed to cefazolin 2g q8h  - patient afebrile for 24 hourse  - f/u echo r/o endocarditis  - unclear source of infection, likely from bone infection of R hallux vs skin break  - Overnight found to have positive blood clx G+ cocci and GNR  - Given one dose of Rocephin for GNR overnight  - f/u ID recs for GNR coverage--spoke to Dr. Reveles and can start patient on cefepime 1 g q8h    # Acute hypoxic respiratory failure resolved  - unclear etiology  - resolved  - perhaps related to sepsis  - v/q scan negative for PE (was on heparin gtt but was d/c)    # Callus removed 3 weeks ago:  - Still oozing blood.   - podiatry consulted, recs: MRI foot, patient refusing MRI to identify extent of bone infection, will f/u tomorrow with patient decision and if patient agress will proceed with sx planning of amputation of R hallux    # Troponemia:  - EKG sinus tachycardia  - f/u repeat cardiac enzymes 0.03-0.04-0.06  - telemetry monitoring   - likely due to increased demand  - increased Lipitor dose   - seen by cardio, no intervention at this time    # DM II  - c/w insulin  - FS controlled  - goal 110-180    # HTN  - controlled  - c/w amlodipine, losartan    # HLD  -c/w atorvastatin    # Glaucoma  - continue with timolol    # CKD III  - s/p R nephrectomy  - stable at baseline  - Renally adjust meds  - Avoid nephrotoxic meds  - Monitor BMP    Diet DASH  DVT px  Full Code  Dispo: continue to monitor, will f/u additional ID recs

## 2020-12-08 NOTE — PROGRESS NOTE ADULT - ATTENDING COMMENTS
Pt seen and examined independently at bedside. Xray shows periosteitis. Pt was agreeable to MRI this morning but based on podiatry note pt declining. Offer MRI again. Blood cultures now with gram negative rods. Cefepime added. Echo still pending. Pt seems to be in disbelieve about current clinical condition. I explained the source of his bacteremia is likely from DFU with osteomyelitis and that bacteremia is a very serious condition.

## 2020-12-08 NOTE — PROGRESS NOTE ADULT - ATTENDING COMMENTS
xrays reviewed and discussed with patient  recommend MRI to r/o osteo-->If pt is agreeable and + for osteomyelitis, recommend ID consult for abx rec's  continue w/ local wound care for now

## 2020-12-09 ENCOUNTER — TRANSCRIPTION ENCOUNTER (OUTPATIENT)
Age: 65
End: 2020-12-09

## 2020-12-09 LAB
ALBUMIN SERPL ELPH-MCNC: 3.5 G/DL — SIGNIFICANT CHANGE UP (ref 3.5–5.2)
ALP SERPL-CCNC: 67 U/L — SIGNIFICANT CHANGE UP (ref 30–115)
ALT FLD-CCNC: 49 U/L — HIGH (ref 0–41)
ANION GAP SERPL CALC-SCNC: 15 MMOL/L — HIGH (ref 7–14)
AST SERPL-CCNC: 80 U/L — HIGH (ref 0–41)
BILIRUB SERPL-MCNC: 0.5 MG/DL — SIGNIFICANT CHANGE UP (ref 0.2–1.2)
BUN SERPL-MCNC: 27 MG/DL — HIGH (ref 10–20)
CALCIUM SERPL-MCNC: 8.3 MG/DL — LOW (ref 8.5–10.1)
CHLORIDE SERPL-SCNC: 102 MMOL/L — SIGNIFICANT CHANGE UP (ref 98–110)
CO2 SERPL-SCNC: 18 MMOL/L — SIGNIFICANT CHANGE UP (ref 17–32)
CREAT SERPL-MCNC: 2 MG/DL — HIGH (ref 0.7–1.5)
GLUCOSE BLDC GLUCOMTR-MCNC: 161 MG/DL — HIGH (ref 70–99)
GLUCOSE BLDC GLUCOMTR-MCNC: 173 MG/DL — HIGH (ref 70–99)
GLUCOSE BLDC GLUCOMTR-MCNC: 173 MG/DL — HIGH (ref 70–99)
GLUCOSE BLDC GLUCOMTR-MCNC: 177 MG/DL — HIGH (ref 70–99)
GLUCOSE SERPL-MCNC: 171 MG/DL — HIGH (ref 70–99)
HCT VFR BLD CALC: 35.8 % — LOW (ref 42–52)
HGB BLD-MCNC: 11.5 G/DL — LOW (ref 14–18)
MAGNESIUM SERPL-MCNC: 1.8 MG/DL — SIGNIFICANT CHANGE UP (ref 1.8–2.4)
MCHC RBC-ENTMCNC: 29.3 PG — SIGNIFICANT CHANGE UP (ref 27–31)
MCHC RBC-ENTMCNC: 32.1 G/DL — SIGNIFICANT CHANGE UP (ref 32–37)
MCV RBC AUTO: 91.3 FL — SIGNIFICANT CHANGE UP (ref 80–94)
NRBC # BLD: 0 /100 WBCS — SIGNIFICANT CHANGE UP (ref 0–0)
PLATELET # BLD AUTO: 160 K/UL — SIGNIFICANT CHANGE UP (ref 130–400)
POTASSIUM SERPL-MCNC: 4.7 MMOL/L — SIGNIFICANT CHANGE UP (ref 3.5–5)
POTASSIUM SERPL-SCNC: 4.7 MMOL/L — SIGNIFICANT CHANGE UP (ref 3.5–5)
PROT SERPL-MCNC: 6.2 G/DL — SIGNIFICANT CHANGE UP (ref 6–8)
RBC # BLD: 3.92 M/UL — LOW (ref 4.7–6.1)
RBC # FLD: 14.4 % — SIGNIFICANT CHANGE UP (ref 11.5–14.5)
SODIUM SERPL-SCNC: 135 MMOL/L — SIGNIFICANT CHANGE UP (ref 135–146)
TROPONIN T SERPL-MCNC: 0.06 NG/ML — CRITICAL HIGH
WBC # BLD: 4.72 K/UL — LOW (ref 4.8–10.8)
WBC # FLD AUTO: 4.72 K/UL — LOW (ref 4.8–10.8)

## 2020-12-09 PROCEDURE — 73718 MRI LOWER EXTREMITY W/O DYE: CPT | Mod: 26,RT

## 2020-12-09 PROCEDURE — 99233 SBSQ HOSP IP/OBS HIGH 50: CPT

## 2020-12-09 RX ORDER — LOSARTAN POTASSIUM 100 MG/1
50 TABLET, FILM COATED ORAL ONCE
Refills: 0 | Status: COMPLETED | OUTPATIENT
Start: 2020-12-09 | End: 2020-12-09

## 2020-12-09 RX ORDER — METRONIDAZOLE 500 MG
500 TABLET ORAL EVERY 8 HOURS
Refills: 0 | Status: DISCONTINUED | OUTPATIENT
Start: 2020-12-09 | End: 2020-12-10

## 2020-12-09 RX ORDER — LOSARTAN POTASSIUM 100 MG/1
100 TABLET, FILM COATED ORAL DAILY
Refills: 0 | Status: DISCONTINUED | OUTPATIENT
Start: 2020-12-10 | End: 2020-12-15

## 2020-12-09 RX ADMIN — LOSARTAN POTASSIUM 50 MILLIGRAM(S): 100 TABLET, FILM COATED ORAL at 05:45

## 2020-12-09 RX ADMIN — DORZOLAMIDE HYDROCHLORIDE 1 DROP(S): 20 SOLUTION/ DROPS OPHTHALMIC at 05:45

## 2020-12-09 RX ADMIN — Medication 1200 MILLIGRAM(S): at 17:03

## 2020-12-09 RX ADMIN — CHLORHEXIDINE GLUCONATE 1 APPLICATION(S): 213 SOLUTION TOPICAL at 05:46

## 2020-12-09 RX ADMIN — Medication 14 UNIT(S): at 08:03

## 2020-12-09 RX ADMIN — ATORVASTATIN CALCIUM 80 MILLIGRAM(S): 80 TABLET, FILM COATED ORAL at 21:51

## 2020-12-09 RX ADMIN — Medication 14 UNIT(S): at 12:03

## 2020-12-09 RX ADMIN — HEPARIN SODIUM 5000 UNIT(S): 5000 INJECTION INTRAVENOUS; SUBCUTANEOUS at 21:51

## 2020-12-09 RX ADMIN — Medication 1 DROP(S): at 17:03

## 2020-12-09 RX ADMIN — PANTOPRAZOLE SODIUM 40 MILLIGRAM(S): 20 TABLET, DELAYED RELEASE ORAL at 05:47

## 2020-12-09 RX ADMIN — Medication 1: at 17:02

## 2020-12-09 RX ADMIN — Medication 1: at 08:03

## 2020-12-09 RX ADMIN — CEFEPIME 100 MILLIGRAM(S): 1 INJECTION, POWDER, FOR SOLUTION INTRAMUSCULAR; INTRAVENOUS at 21:37

## 2020-12-09 RX ADMIN — Medication 1: at 12:03

## 2020-12-09 RX ADMIN — HEPARIN SODIUM 5000 UNIT(S): 5000 INJECTION INTRAVENOUS; SUBCUTANEOUS at 05:45

## 2020-12-09 RX ADMIN — Medication 100 MILLIGRAM(S): at 21:52

## 2020-12-09 RX ADMIN — HEPARIN SODIUM 5000 UNIT(S): 5000 INJECTION INTRAVENOUS; SUBCUTANEOUS at 14:57

## 2020-12-09 RX ADMIN — INSULIN GLARGINE 40 UNIT(S): 100 INJECTION, SOLUTION SUBCUTANEOUS at 21:51

## 2020-12-09 RX ADMIN — Medication 14 UNIT(S): at 17:02

## 2020-12-09 RX ADMIN — LOSARTAN POTASSIUM 50 MILLIGRAM(S): 100 TABLET, FILM COATED ORAL at 11:44

## 2020-12-09 RX ADMIN — Medication 100 MILLIGRAM(S): at 14:56

## 2020-12-09 RX ADMIN — AMLODIPINE BESYLATE 5 MILLIGRAM(S): 2.5 TABLET ORAL at 05:46

## 2020-12-09 RX ADMIN — DORZOLAMIDE HYDROCHLORIDE 1 DROP(S): 20 SOLUTION/ DROPS OPHTHALMIC at 17:03

## 2020-12-09 RX ADMIN — CEFEPIME 100 MILLIGRAM(S): 1 INJECTION, POWDER, FOR SOLUTION INTRAMUSCULAR; INTRAVENOUS at 14:56

## 2020-12-09 RX ADMIN — CEFEPIME 100 MILLIGRAM(S): 1 INJECTION, POWDER, FOR SOLUTION INTRAMUSCULAR; INTRAVENOUS at 05:47

## 2020-12-09 RX ADMIN — Medication 1200 MILLIGRAM(S): at 05:45

## 2020-12-09 NOTE — PROGRESS NOTE ADULT - SUBJECTIVE AND OBJECTIVE BOX
JOSHUA HAM  65y Male    CHIEF COMPLAINT:    Patient is a 65y old  Male who presents with a chief complaint of SOB (09 Dec 2020 10:15)      INTERVAL HPI/OVERNIGHT EVENTS:    Patient seen and examined. Still spiking temperature. No cough. No sob. No cp. No urinary complaints. BP is also running high.     ROS: All other systems are negative.    Vital Signs:    T(F): 98 (20 @ 05:29), Max: 101.4 (20 @ 17:33)  HR: 91 (20 @ 05:29) (76 - 95)  BP: 148/81 (20 @ 05:29) (148/81 - 196/86)  RR: 18 (20 @ 05:29) (18 - 20)  SpO2: --  I&O's Summary    08 Dec 2020 07:01  -  09 Dec 2020 07:00  --------------------------------------------------------  IN: 820 mL / OUT: 0 mL / NET: 820 mL      Daily     Daily Weight in k (09 Dec 2020 05:29)  CAPILLARY BLOOD GLUCOSE      POCT Blood Glucose.: 161 mg/dL (09 Dec 2020 07:42)  POCT Blood Glucose.: 142 mg/dL (08 Dec 2020 21:30)  POCT Blood Glucose.: 146 mg/dL (08 Dec 2020 16:51)  POCT Blood Glucose.: 165 mg/dL (08 Dec 2020 13:15)  POCT Blood Glucose.: 163 mg/dL (08 Dec 2020 11:37)      PHYSICAL EXAM:    GENERAL:  NAD  SKIN: Chronic skin changes B/L LE  HENT: Atraumatic Normocephalic. PERRL. Moist membranes.  NECK: Supple, No JVD. No lymphadenopathy.  PULMONARY: CTA B/L. No wheezing. No rales  CVS: Normal S1, S2. Rate and Rhythm are regular. No murmurs.  ABDOMEN/GI: Soft, Nontender, Nondistended; BS present  EXTREMITIES: Peripheral pulses intact. Trace pitting edema B/L LE. An ulcer on R hallux.   NEUROLOGIC:  No motor or sensory deficit.  PSYCH: Alert & oriented x 3    Consultant(s) Notes Reviewed:  [x ] YES  [ ] NO  Care Discussed with Consultants/Other Providers [ x] YES  [ ] NO    EKG reviewed  Telemetry reviewed    LABS:                        11.5   4.72  )-----------( 160      ( 09 Dec 2020 06:11 )             35.8         135  |  102  |  27<H>  ----------------------------<  171<H>   Creatinine Trend: 2.0<--, 1.7<--, 1.9<--, 1.9<--, 2.1<--  4.7   |  18  |  2.0<H>    Ca    8.3<L>      09 Dec 2020 06:11  Mg     1.8         TPro  6.2  /  Alb  3.5  /  TBili  0.5  /  DBili  x   /  AST  80<H>  /  ALT  49<H>  /  AlkPhos  67        Serum Pro-Brain Natriuretic Peptide: 82 pg/mL (20 @ 02:00)    Trop 0.06, CKMB --, CK --, 20 @ 06:11        RADIOLOGY & ADDITIONAL TESTS:    < from: TTE Echo Complete w/o Contrast w/ Doppler (20 @ 12:06) >    Summary:   1. LV Ejection Fraction by Gutierrez's Method with a biplane EF of 66 %.   2. Normal left ventricular size and wall thicknesses, with normal systolic and diastolic function.   3. Normal left atrial size.   4. Normal right atrial size.   5. No evidence of mitral valve regurgitation.   6. LA volume Index is 19.9 ml/m² ml/m2.    < end of copied text >    Imaging or report Personally Reviewed:  [ ] YES  [ ] NO    Medications:  Standing  amLODIPine   Tablet 5 milliGRAM(s) Oral daily  atorvastatin 80 milliGRAM(s) Oral at bedtime  cefepime   IVPB      cefepime   IVPB 1000 milliGRAM(s) IV Intermittent every 8 hours  chlorhexidine 4% Liquid 1 Application(s) Topical <User Schedule>  dextrose 40% Gel 15 Gram(s) Oral once  dextrose 5%. 1000 milliLiter(s) IV Continuous <Continuous>  dextrose 5%. 1000 milliLiter(s) IV Continuous <Continuous>  dextrose 50% Injectable 25 Gram(s) IV Push once  dextrose 50% Injectable 12.5 Gram(s) IV Push once  dextrose 50% Injectable 25 Gram(s) IV Push once  dorzolamide 2% Ophthalmic Solution 1 Drop(s) Right EYE <User Schedule>  glucagon  Injectable 1 milliGRAM(s) IntraMuscular once  guaiFENesin ER 1200 milliGRAM(s) Oral every 12 hours  heparin   Injectable 5000 Unit(s) SubCutaneous every 8 hours  insulin glargine Injectable (LANTUS) 40 Unit(s) SubCutaneous at bedtime  insulin lispro (ADMELOG) corrective regimen sliding scale   SubCutaneous three times a day before meals  insulin lispro Injectable (ADMELOG) 14 Unit(s) SubCutaneous three times a day before meals  latanoprost 0.005% Ophthalmic Solution 1 Drop(s) Right EYE at bedtime  losartan 50 milliGRAM(s) Oral daily  pantoprazole    Tablet 40 milliGRAM(s) Oral before breakfast  timolol 0.5% Solution 1 Drop(s) Right EYE every 24 hours    PRN Meds  acetaminophen   Tablet .. 650 milliGRAM(s) Oral every 6 hours PRN      Case discussed with resident    Care discussed with pt/family

## 2020-12-09 NOTE — DISCHARGE NOTE NURSING/CASE MANAGEMENT/SOCIAL WORK - PATIENT PORTAL LINK FT
You can access the FollowMyHealth Patient Portal offered by [facility] by registering at the following website: [email or web address]. By joining [facility]’s FollowMyHealth portal, you will also be able to view your health information using other applications (apps) compatible with our system.

## 2020-12-09 NOTE — PROGRESS NOTE ADULT - ASSESSMENT
ASSESSMENT  65 yr old male with PMHx of DM, HTN, 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, s/p right nephrectomy for bleeding from surgical sites s/p renal mass resection, CKD, questionable hx of DVT in the past presented to the hospital for chills, SOB, weakness, and AMS     IMPRESSION  #Sepsis on admission, secondary to MSSA bacteremia  - unclear source -- had recent callus removal by podiatry 3 weeks prior to admission  - CT CAP 12/4: No CT evidence of acute intrathoracic or abdominopelvic pathology. Nodular enlarged right thyroid lobe. Recommend evaluation outpatient sonography. Additional findings in the body of the report  - MR Foot No Cont, Right (12.09.20): Impression: Soft tissue ulcer at the medial base of hallux distal phalanx with osteitis; no osteomyelitis or drainable collection  - COVID negative x 2 on 12/4  - Blood Cx 12/4 MSSA, GNR on stain   - Blood Cx 12/5 NG  - TTE negative for vegetation    #Elevated D-DImer  #AMS - resolved    #CKD  #Hx of DVT  #Obesity BMI (kg/m2): 53.8  #Abx allergy: NKDA    Creatinine, Serum: 2.0 mg/dL (12.09.20 @ 06:11)  Weight (kg): 175 (04 Dec 2020 10:30)  CrCl 104    RECOMMENDATIONS  - please repeat blood cultures  - add flagyl 500 mg TID since GNR seen in stain was in anaerobic bottle  - follow-up GNR speciation and susceptibilities  - continue cefepime 1g q 8 hours  - will likely plan for 4 weeks antibiotics from culture negative; discussed with patient regarding CHEIKH to evaluate for source and possibly shorten course of antibiotics if negative, however patient reluctant as this point  - follow-up surveillance blood cultures    Please call or message on [employer] Teams if with any questions.  Spectra [phone]

## 2020-12-09 NOTE — PROGRESS NOTE ADULT - SUBJECTIVE AND OBJECTIVE BOX
SUBJECTIVE:    Patient is a 65y old Male who presents with a chief complaint of SOB (08 Dec 2020 10:42)    Currently admitted to medicine with the primary diagnosis of Sepsis       Today is hospital day 5d. This morning he is resting comfortably in bed and reports no new issues or overnight events.     PAST MEDICAL & SURGICAL HISTORY  HLD (hyperlipidemia)    CKD (chronic kidney disease)    DVT (deep venous thrombosis)    Glaucoma    HTN (hypertension)    Diabetes mellitus    H/O right nephrectomy      SOCIAL HISTORY:  Negative for smoking/alcohol/drug use.     ALLERGIES:  No Known Allergies    MEDICATIONS:  STANDING MEDICATIONS  amLODIPine   Tablet 5 milliGRAM(s) Oral daily  atorvastatin 80 milliGRAM(s) Oral at bedtime  cefepime   IVPB      cefepime   IVPB 1000 milliGRAM(s) IV Intermittent every 8 hours  chlorhexidine 4% Liquid 1 Application(s) Topical <User Schedule>  dextrose 40% Gel 15 Gram(s) Oral once  dextrose 5%. 1000 milliLiter(s) IV Continuous <Continuous>  dextrose 5%. 1000 milliLiter(s) IV Continuous <Continuous>  dextrose 50% Injectable 25 Gram(s) IV Push once  dextrose 50% Injectable 12.5 Gram(s) IV Push once  dextrose 50% Injectable 25 Gram(s) IV Push once  dorzolamide 2% Ophthalmic Solution 1 Drop(s) Right EYE <User Schedule>  glucagon  Injectable 1 milliGRAM(s) IntraMuscular once  guaiFENesin ER 1200 milliGRAM(s) Oral every 12 hours  heparin   Injectable 5000 Unit(s) SubCutaneous every 8 hours  insulin glargine Injectable (LANTUS) 40 Unit(s) SubCutaneous at bedtime  insulin lispro (ADMELOG) corrective regimen sliding scale   SubCutaneous three times a day before meals  insulin lispro Injectable (ADMELOG) 14 Unit(s) SubCutaneous three times a day before meals  latanoprost 0.005% Ophthalmic Solution 1 Drop(s) Right EYE at bedtime  losartan 50 milliGRAM(s) Oral daily  pantoprazole    Tablet 40 milliGRAM(s) Oral before breakfast  timolol 0.5% Solution 1 Drop(s) Right EYE every 24 hours    PRN MEDICATIONS  acetaminophen   Tablet .. 650 milliGRAM(s) Oral every 6 hours PRN    VITALS:   T(F): 98  HR: 91  BP: 148/81  RR: 18  SpO2: --    LABS:                        11.5   4.72  )-----------( 160      ( 09 Dec 2020 06:11 )             35.8     12-09    135  |  102  |  27<H>  ----------------------------<  171<H>  4.7   |  18  |  2.0<H>    Ca    8.3<L>      09 Dec 2020 06:11  Mg     1.8     12-09    TPro  6.2  /  Alb  3.5  /  TBili  0.5  /  DBili  x   /  AST  80<H>  /  ALT  49<H>  /  AlkPhos  67  12-09          Troponin T, Serum: 0.06 ng/mL <HH> (12-09-20 @ 06:11)      CARDIAC MARKERS ( 09 Dec 2020 06:11 )  x     / 0.06 ng/mL / x     / x     / x          RADIOLOGY:    PHYSICAL EXAM:  GEN: No acute distress  LUNGS: Clear to auscultation bilaterally   HEART: S1/S2 present. RRR.   ABD: Soft, non-tender, non-distended. Bowel sounds present  EXT: NC/NC/NE/2+PP/KEARNEY/Skin Intact.   NEURO: AAOX3    Intravenous access:   NG tube:   Nj Catheter:

## 2020-12-09 NOTE — PROGRESS NOTE ADULT - ASSESSMENT
66 yo M PMHx DM II, HTN, HLD, glaucoma, renal mass s/p right nephrectomy, nephrolithiasis, CKD III, and RLE Cellulitis was brought in by EMS for evaluation of altered mental status. As per EMR, patient developed chills and shortness of breath (which he attributed to the shivering) for about one hour. After that he became confused and doesn't remember anything until he received o2 in the ER. Upon EMS arrival, patient was found to be hypoxic to low 80s. He has had 2 negative covid tests and was found to have staph aureus in blood.    #Sepsis due to mssa bacteremia  - seen by ID, changed to cefazolin 2g q8h  - febrile  - cefepime added  - r/o osteomylitis  - Overnight found to have positive blood clx G+ cocci and GNR      # Acute hypoxic respiratory failure resolved  - resolved  - perhaps related to sepsis  - v/q scan negative for PE (was on heparin gtt but was d/c)    # Callus removed 3 weeks ago:  - Still oozing blood.   - podiatry consulted, recs: MRI foot, patient refusing MRI to identify extent of bone infection, will f/u tomorrow with patient decision and if patient agress will proceed with sx planning of amputation of R hallux  mri to r/o osteomylitis    # Troponemia:  - EKG sinus tachycardia  - f/u repeat cardiac enzymes 0.03-0.04-0.06  - telemetry monitoring   - likely due to increased demand  - increased Lipitor dose   - seen by cardio, no intervention at this time    # DM II  - c/w insulin  - FS controlled  - goal 110-180    # HTN  - controlled  - c/w amlodipine, losartan    # HLD  -c/w atorvastatin    # Glaucoma  - continue with timolol    # CKD III  - s/p R nephrectomy  - stable at baseline  - Renally adjust meds  - Avoid nephrotoxic meds  - Monitor BMP    Diet DASH  DVT px  Full Code  Dispo: continue to monitor, will f/u additional ID recs

## 2020-12-09 NOTE — PROGRESS NOTE ADULT - ASSESSMENT
64 yo M PMHx DM II, HTN, HLD, glaucoma, renal mass s/p right nephrectomy, nephrolithiasis, CKD III, and RLE Cellulitis was brought in by EMS for evaluation of altered mental status. As per EMR, patient developed chills and shortness of breath (which he attributed to the shivering) for about one hour. After that he became confused and doesn't remember anything until he received o2 in the ER.     Sepsis on admission  Metabolic encephalopathy on admission  MSSA bactremia / GNR in the blood  R Hallux ulcer  Morbid obesity / Likely ISIDRA  DM-2 / HTN / DL  CKD-3  H/O Renal mass S/P Nephrectomy  H/O chronic DVT          PLAN:    ·	Cont IV Cefepime  ·	ID F/U  ·	Scheduled for R foot MRI to R/O OM  ·	ECHO reviewed. EF is 66%. No valvular endocarditis  ·	Repeat blood cxs x 2 are negative  ·	Monitor FS. Cont Insulin.   ·	BP is running high. Increase Losartan to 100 mg po daily  ·	Monitor renal function  ·	Cont his other meds.

## 2020-12-10 LAB
ALBUMIN SERPL ELPH-MCNC: 3.3 G/DL — LOW (ref 3.5–5.2)
ALP SERPL-CCNC: 65 U/L — SIGNIFICANT CHANGE UP (ref 30–115)
ALT FLD-CCNC: 64 U/L — HIGH (ref 0–41)
ANION GAP SERPL CALC-SCNC: 13 MMOL/L — SIGNIFICANT CHANGE UP (ref 7–14)
AST SERPL-CCNC: 97 U/L — HIGH (ref 0–41)
BILIRUB SERPL-MCNC: 0.4 MG/DL — SIGNIFICANT CHANGE UP (ref 0.2–1.2)
BUN SERPL-MCNC: 32 MG/DL — HIGH (ref 10–20)
CALCIUM SERPL-MCNC: 8.5 MG/DL — SIGNIFICANT CHANGE UP (ref 8.5–10.1)
CHLORIDE SERPL-SCNC: 104 MMOL/L — SIGNIFICANT CHANGE UP (ref 98–110)
CO2 SERPL-SCNC: 17 MMOL/L — SIGNIFICANT CHANGE UP (ref 17–32)
CREAT SERPL-MCNC: 1.9 MG/DL — HIGH (ref 0.7–1.5)
CULTURE RESULTS: SIGNIFICANT CHANGE UP
GLUCOSE BLDC GLUCOMTR-MCNC: 154 MG/DL — HIGH (ref 70–99)
GLUCOSE BLDC GLUCOMTR-MCNC: 156 MG/DL — HIGH (ref 70–99)
GLUCOSE BLDC GLUCOMTR-MCNC: 161 MG/DL — HIGH (ref 70–99)
GLUCOSE BLDC GLUCOMTR-MCNC: 168 MG/DL — HIGH (ref 70–99)
GLUCOSE SERPL-MCNC: 175 MG/DL — HIGH (ref 70–99)
HCT VFR BLD CALC: 37.1 % — LOW (ref 42–52)
HGB BLD-MCNC: 12 G/DL — LOW (ref 14–18)
MCHC RBC-ENTMCNC: 29.3 PG — SIGNIFICANT CHANGE UP (ref 27–31)
MCHC RBC-ENTMCNC: 32.3 G/DL — SIGNIFICANT CHANGE UP (ref 32–37)
MCV RBC AUTO: 90.5 FL — SIGNIFICANT CHANGE UP (ref 80–94)
NRBC # BLD: 0 /100 WBCS — SIGNIFICANT CHANGE UP (ref 0–0)
PLATELET # BLD AUTO: 155 K/UL — SIGNIFICANT CHANGE UP (ref 130–400)
POTASSIUM SERPL-MCNC: 4.7 MMOL/L — SIGNIFICANT CHANGE UP (ref 3.5–5)
POTASSIUM SERPL-SCNC: 4.7 MMOL/L — SIGNIFICANT CHANGE UP (ref 3.5–5)
PROT SERPL-MCNC: 6.7 G/DL — SIGNIFICANT CHANGE UP (ref 6–8)
RBC # BLD: 4.1 M/UL — LOW (ref 4.7–6.1)
RBC # FLD: 14.2 % — SIGNIFICANT CHANGE UP (ref 11.5–14.5)
SODIUM SERPL-SCNC: 134 MMOL/L — LOW (ref 135–146)
SPECIMEN SOURCE: SIGNIFICANT CHANGE UP
WBC # BLD: 4.11 K/UL — LOW (ref 4.8–10.8)
WBC # FLD AUTO: 4.11 K/UL — LOW (ref 4.8–10.8)

## 2020-12-10 PROCEDURE — 99233 SBSQ HOSP IP/OBS HIGH 50: CPT

## 2020-12-10 RX ORDER — CEFAZOLIN SODIUM 1 G
2000 VIAL (EA) INJECTION EVERY 8 HOURS
Refills: 0 | Status: DISCONTINUED | OUTPATIENT
Start: 2020-12-10 | End: 2020-12-15

## 2020-12-10 RX ORDER — METRONIDAZOLE 500 MG
500 TABLET ORAL EVERY 8 HOURS
Refills: 0 | Status: DISCONTINUED | OUTPATIENT
Start: 2020-12-10 | End: 2020-12-15

## 2020-12-10 RX ADMIN — Medication 14 UNIT(S): at 07:57

## 2020-12-10 RX ADMIN — Medication 1: at 07:57

## 2020-12-10 RX ADMIN — LATANOPROST 1 DROP(S): 0.05 SOLUTION/ DROPS OPHTHALMIC; TOPICAL at 21:41

## 2020-12-10 RX ADMIN — CEFEPIME 100 MILLIGRAM(S): 1 INJECTION, POWDER, FOR SOLUTION INTRAMUSCULAR; INTRAVENOUS at 14:35

## 2020-12-10 RX ADMIN — CEFEPIME 100 MILLIGRAM(S): 1 INJECTION, POWDER, FOR SOLUTION INTRAMUSCULAR; INTRAVENOUS at 05:33

## 2020-12-10 RX ADMIN — LOSARTAN POTASSIUM 100 MILLIGRAM(S): 100 TABLET, FILM COATED ORAL at 05:33

## 2020-12-10 RX ADMIN — DORZOLAMIDE HYDROCHLORIDE 1 DROP(S): 20 SOLUTION/ DROPS OPHTHALMIC at 17:18

## 2020-12-10 RX ADMIN — Medication 1200 MILLIGRAM(S): at 05:33

## 2020-12-10 RX ADMIN — Medication 500 MILLIGRAM(S): at 21:42

## 2020-12-10 RX ADMIN — Medication 650 MILLIGRAM(S): at 19:01

## 2020-12-10 RX ADMIN — DORZOLAMIDE HYDROCHLORIDE 1 DROP(S): 20 SOLUTION/ DROPS OPHTHALMIC at 05:33

## 2020-12-10 RX ADMIN — Medication 100 MILLIGRAM(S): at 14:31

## 2020-12-10 RX ADMIN — PANTOPRAZOLE SODIUM 40 MILLIGRAM(S): 20 TABLET, DELAYED RELEASE ORAL at 06:01

## 2020-12-10 RX ADMIN — ATORVASTATIN CALCIUM 80 MILLIGRAM(S): 80 TABLET, FILM COATED ORAL at 21:42

## 2020-12-10 RX ADMIN — HEPARIN SODIUM 5000 UNIT(S): 5000 INJECTION INTRAVENOUS; SUBCUTANEOUS at 05:34

## 2020-12-10 RX ADMIN — INSULIN GLARGINE 40 UNIT(S): 100 INJECTION, SOLUTION SUBCUTANEOUS at 21:41

## 2020-12-10 RX ADMIN — Medication 14 UNIT(S): at 12:01

## 2020-12-10 RX ADMIN — AMLODIPINE BESYLATE 5 MILLIGRAM(S): 2.5 TABLET ORAL at 05:33

## 2020-12-10 RX ADMIN — Medication 100 MILLIGRAM(S): at 05:32

## 2020-12-10 RX ADMIN — HEPARIN SODIUM 5000 UNIT(S): 5000 INJECTION INTRAVENOUS; SUBCUTANEOUS at 21:42

## 2020-12-10 RX ADMIN — Medication 1 DROP(S): at 17:19

## 2020-12-10 RX ADMIN — Medication 100 MILLIGRAM(S): at 21:41

## 2020-12-10 RX ADMIN — Medication 1200 MILLIGRAM(S): at 17:17

## 2020-12-10 RX ADMIN — Medication 1: at 12:01

## 2020-12-10 NOTE — PROGRESS NOTE ADULT - SUBJECTIVE AND OBJECTIVE BOX
JOSHUA HAM  65y, Male  Allergy: No Known Allergies      LOS  6d    CHIEF COMPLAINT: SOB (10 Dec 2020 11:59)      INTERVAL EVENTS/HPI  - No acute events overnight  - T(F): , Max: 99.7 (12-09-20 @ 19:56)  - complaining about tooth ache; seen by dental today; has been having periodontitis for several weeks, but unable to get care due to COVID   - Denies any worsening symptoms  - Tolerating medication  - WBC Count: 4.11 (12-10-20 @ 04:30)  WBC Count: 4.72 (12-09-20 @ 06:11)     - Creatinine, Serum: 1.9 (12-10-20 @ 04:30)  Creatinine, Serum: 2.0 (12-09-20 @ 06:11)       ROS  General: Denies rigors, nightsweats  HEENT: Denies headache, rhinorrhea, sore throat, eye pain  CV: Denies CP, palpitations  PULM: Denies wheezing, hemoptysis  GI: Denies hematemesis, hematochezia, melena  : Denies discharge, hematuria  MSK: Denies arthralgias, myalgias  SKIN: Denies rash, lesions  NEURO: Denies paresthesias, weakness  PSYCH: Denies depression, anxiety    VITALS:  T(F): 98.9, Max: 99.7 (12-09-20 @ 19:56)  HR: 75  BP: 160/73  RR: 18Vital Signs Last 24 Hrs  T(C): 37.2 (10 Dec 2020 14:30), Max: 37.6 (09 Dec 2020 19:56)  T(F): 98.9 (10 Dec 2020 14:30), Max: 99.7 (09 Dec 2020 19:56)  HR: 75 (10 Dec 2020 14:30) (74 - 80)  BP: 160/73 (10 Dec 2020 14:30) (143/65 - 163/71)  BP(mean): --  RR: 18 (10 Dec 2020 14:30) (18 - 18)  SpO2: --    PHYSICAL EXAM:  Gen: NAD, resting in bed  HEENT: Normocephalic, atraumatic  Neck: supple, no lymphadenopathy  CV: Regular rate & regular rhythm  Lungs: decreased BS at bases, no fremitus  Abdomen: Soft, BS present  Ext: Warm, well perfused  Neuro: non focal, awake  Skin: no rash, no erythema  Lines: no phlebitis    FH: Non-contributory  Social Hx: Non-contributory    TESTS & MEASUREMENTS:                        12.0   4.11  )-----------( 155      ( 10 Dec 2020 04:30 )             37.1     12-10    134<L>  |  104  |  32<H>  ----------------------------<  175<H>  4.7   |  17  |  1.9<H>    Ca    8.5      10 Dec 2020 04:30  Mg     1.8     12-09    TPro  6.7  /  Alb  3.3<L>  /  TBili  0.4  /  DBili  x   /  AST  97<H>  /  ALT  64<H>  /  AlkPhos  65  12-10    eGFR if Non African American: 36 mL/min/1.73M2 (12-10-20 @ 04:30)  eGFR if African American: 41 mL/min/1.73M2 (12-10-20 @ 04:30)    LIVER FUNCTIONS - ( 10 Dec 2020 04:30 )  Alb: 3.3 g/dL / Pro: 6.7 g/dL / ALK PHOS: 65 U/L / ALT: 64 U/L / AST: 97 U/L / GGT: x               Culture - Blood (collected 12-06-20 @ 05:37)  Source: .Blood None  Preliminary Report (12-07-20 @ 18:01):    No growth to date.    Culture - Blood (collected 12-05-20 @ 12:50)  Source: .Blood None  Preliminary Report (12-06-20 @ 19:01):    No growth to date.    Culture - Blood (collected 12-04-20 @ 02:00)  Source: .Blood Blood-Peripheral  Gram Stain (12-05-20 @ 22:46):    Growth in anaerobic bottle:    Gram Positive Cocci in Clusters    Growth in aerobic bottle: Gram Positive Cocci in Clusters  Final Report (12-07-20 @ 07:26):    Growth in aerobic and anaerobic bottles: Staphylococcus aureus    "Due to technical problems, Proteus sp. will Not be reported as part of    the BCID panel until further notice"    ***Blood Panel PCR results on this specimen are available    approximately 3 hours after the Gram stain result.***    Gram stain, PCR, and/or culture results may not always    correspond due to difference in methodologies.    ************************************************************    This PCR assay was performed using [employer].    The following targets are tested for: Enterococcus,    vancomycin resistant enterococci, Listeria monocytogenes,    coagulase negative staphylococci, S. aureus,    methicillin resistant S. aureus, Streptococcus agalactiae    (Group B), S. pneumoniae, S. pyogenes (Group A),    Acinetobacter baumannii, Enterobacter cloacae, E. coli,    Klebsiella oxytoca, K. pneumoniae, Proteus sp.,    Serratia marcescens, Haemophilus influenzae,    Neisseria meningitidis, Pseudomonas aeruginosa, Candida    albicans, C. glabrata, C krusei, C parapsilosis,    C. tropicalis and the KPC resistance gene.  Organism: Blood Culture PCR  Staphylococcus aureus (12-07-20 @ 07:26)  Organism: Staphylococcus aureus (12-07-20 @ 07:26)      -  Ampicillin/Sulbactam: S <=8/4      -  Cefazolin: S <=4      -  Clindamycin: R <=0.25 This isolate is presumed to be clindamycin resistant based on detection of inducible resistance. Clindamycin may still be effective in some patients.      -  Erythromycin: R >4      -  Gentamicin: S <=1 Should not be used as monotherapy      -  Oxacillin: S <=0.25      -  Penicillin: R >8      -  RIF- Rifampin: S <=1 Should not be used as monotherapy      -  Tetra/Doxy: S <=1      -  Trimethoprim/Sulfamethoxazole: S <=0.5/9.5      -  Vancomycin: S 1      Method Type: RIYA  Organism: Blood Culture PCR (12-07-20 @ 07:26)      -  Staphylococcus aureus: Detec Any isolate of Staphylococcus aureus from a blood culture is NOT considered a contaminant.      Method Type: PCR    Culture - Blood (collected 12-04-20 @ 02:00)  Source: .Blood Blood-Peripheral  Gram Stain (12-07-20 @ 22:01):    Growth in aerobic bottle: Gram Positive Cocci in Clusters    Growth in anaerobic bottle: Gram Negative Rods  Preliminary Report (12-08-20 @ 11:50):    Growth in aerobic bottle: Staphylococcus aureus See previous culture    [id number]    Growth in anaerobic bottle: Gram Negative Rods    "Due to technical problems, Proteus sp. will Not be reported as part of    the BCID panel until further notice"    ***Blood Panel PCR results on this specimen are available    approximately 3 hours after the Gram stain result.***    Gram stain, PCR, and/or culture results may not always    correspond due to difference in methodologies.    ************************************************************    This PCR assay was performed using [employer].    The following targets are tested for: Enterococcus,    vancomycin resistant enterococci, Listeria monocytogenes,    coagulase negative staphylococci, S. aureus,    methicillinresistant S. aureus, Streptococcus agalactiae    (Group B), S. pneumoniae, S. pyogenes (Group A),    Acinetobacter baumannii, Enterobacter cloacae, E. coli,    Klebsiella oxytoca, K. pneumoniae, Proteus sp.,    Serratia marcescens, Haemophilus influenzae,    Neisseria meningitidis, Pseudomonas aeruginosa, Candida    albicans, C. glabrata, C krusei, C parapsilosis,    C. tropicalis and the KPC resistance gene.  Organism: Blood Culture PCR (12-08-20 @ 00:09)  Organism: Blood Culture PCR (12-08-20 @ 00:09)      -  Acinetobacter baumanii: Nondet      -  Candida albicans: Nondet      -  Candida glabrata: Nondet      -  Candida krusei: Nondet      -  Candida parapsilosis: Nondet      -  Candida tropicalis: Nondet      -  Coagulase negative Staphylococcus: Nondet      -  Enterobacter cloacae complex: Nondet      -  Enterococcus species: Nondet      -  Escherichia coli: Nondet      -  Haemophilus influenzae: Nondet      -  Klebsiella oxytoca: Nondet      -  Klebsiella pneumoniae: Nondet      -  Listeria monocytogenes: Nondet      -  Methicillin resistant Staphylococcus aureus (MRSA): Nondet      -  Multidrug (KPC pos) resistant organism: Nondet      -  Neisseria meningitidis: Nondet      -  Pseudomonas aeruginosa: Nondet      -  Serratia marcescens: Nondet      -  Staphylococcus aureus: Nondet      -  Streptococcus agalactiae (Group B): Nondet      -  Streptococcus pneumoniae: Nondet      -  Streptococcus pyogenes (Group A): Nondet      -  Streptococcus sp. (Not Grp A, B or S pneumoniae): Nondet      -  Vancomycin resistant Enterococcus sp.: Nondet      Method Type: PCR            INFECTIOUS DISEASES TESTING  Rapid RVP Result: NotDetec (12-04-20 @ 11:20)  Rapid RVP Result: NotDetec (12-04-20 @ 02:00)  Procalcitonin, Serum: 0.18 (12-04-20 @ 02:00)      INFLAMMATORY MARKERS  C-Reactive Protein, Serum: 0.61 mg/dL (12-04-20 @ 02:00)      RADIOLOGY & ADDITIONAL TESTS:  I have personally reviewed the last available Chest xray  CXR      CT      CARDIOLOGY TESTING  12 Lead ECG:   Ventricular Rate 80 BPM    Atrial Rate 80 BPM    P-R Interval 256 ms    QRS Duration 182 ms    Q-T Interval 426 ms    QTC Calculation(Bazett) 491 ms    P Axis 33 degrees    R Axis -37 degrees    T Axis 14 degrees    Diagnosis Line Sinus rhythm [id number] degree A-V block  Indeterminate axis  Right bundle branch block  Abnormal ECG    Confirmed by VIBHA GUILLERMO MD ([id number]) on 12/6/2020 12:58:49 PM (12-06-20 @ 01:53)  12 Lead ECG:   Ventricular Rate 110 BPM    Atrial Rate 110 BPM    P-R Interval 216 ms    QRS Duration 174 ms    Q-T Interval 344 ms    QTC Calculation(Bazett) 465 ms    P Axis 11 degrees    R Axis -50 degrees    T Axis 2 degrees    Diagnosis Line Sinus tachycardia with 1st degree A-V block  Right bundle branch block  Left anterior fascicular block  *** Bifascicular block ***  Inferior infarct , age undetermined  Abnormal ECG    Confirmed by JANNETH MENON MD ([id number]) on 12/4/2020 7:45:29 AM (12-04-20 @ 04:36)      MEDICATIONS  amLODIPine   Tablet 5 Oral daily  atorvastatin 80 Oral at bedtime  cefepime   IVPB     cefepime   IVPB 1000 IV Intermittent every 8 hours  chlorhexidine 4% Liquid 1 Topical <User Schedule>  dextrose 40% Gel 15 Oral once  dextrose 5%. 1000 IV Continuous <Continuous>  dextrose 5%. 1000 IV Continuous <Continuous>  dextrose 50% Injectable 25 IV Push once  dextrose 50% Injectable 12.5 IV Push once  dextrose 50% Injectable 25 IV Push once  dorzolamide 2% Ophthalmic Solution 1 Right EYE <User Schedule>  glucagon  Injectable 1 IntraMuscular once  guaiFENesin ER 1200 Oral every 12 hours  heparin   Injectable 5000 SubCutaneous every 8 hours  insulin glargine Injectable (LANTUS) 40 SubCutaneous at bedtime  insulin lispro (ADMELOG) corrective regimen sliding scale  SubCutaneous three times a day before meals  insulin lispro Injectable (ADMELOG) 14 SubCutaneous three times a day before meals  latanoprost 0.005% Ophthalmic Solution 1 Right EYE at bedtime  losartan 100 Oral daily  metroNIDAZOLE  IVPB 500 IV Intermittent every 8 hours  pantoprazole    Tablet 40 Oral before breakfast  timolol 0.5% Solution 1 Right EYE every 24 hours      WEIGHT  Weight (kg): 175 (12-04-20 @ 10:30)  Creatinine, Serum: 1.9 mg/dL (12-10-20 @ 04:30)      ANTIBIOTICS:  cefepime   IVPB      cefepime   IVPB 1000 milliGRAM(s) IV Intermittent every 8 hours  metroNIDAZOLE  IVPB 500 milliGRAM(s) IV Intermittent every 8 hours      All available historical records have been reviewed

## 2020-12-10 NOTE — PROGRESS NOTE ADULT - ASSESSMENT
66 yo M PMHx DM II, HTN, HLD, glaucoma, renal mass s/p right nephrectomy, nephrolithiasis, CKD III, and RLE Cellulitis was brought in by EMS for evaluation of altered mental status. As per EMR, patient developed chills and shortness of breath (which he attributed to the shivering) for about one hour. After that he became confused and doesn't remember anything until he received o2 in the ER. Upon EMS arrival, patient was found to be hypoxic to low 80s. He has had 2 negative covid tests and was found to have staph aureus in blood.    #Sepsis due to mssa bacteremia  - cefepime added  - r/o osteomylitis--> mri negative   tte was negative  rich recommended --> the pt did not take a discission yet       # Acute hypoxic respiratory failure resolved  - resolved  - perhaps related to sepsis  - v/q scan negative for PE (was on heparin gtt but was d/c)    # Callus removed 3 weeks ago:  - Still oozing blood.   - podiatry consulted, recs: MRI foot, patient refusing MRI to identify extent of bone infection, will f/u tomorrow with patient decision and if patient agress will proceed with sx planning of amputation of R hallux  mri to r/o osteomylitis-> negative    # Troponemia:  - EKG sinus tachycardia  - f/u repeat cardiac enzymes 0.03-0.04-0.06  - telemetry monitoring   - likely due to increased demand  - increased Lipitor dose   - seen by cardio, no intervention at this time    # DM II  - c/w insulin  - FS controlled  - goal 110-180    # HTN  - controlled  - c/w amlodipine, losartan    # HLD  -c/w atorvastatin    # Glaucoma  - continue with timolol    # CKD III  - s/p R nephrectomy  - stable at baseline  - Renally adjust meds  - Avoid nephrotoxic meds  - Monitor BMP    Diet DASH  DVT px  Full Code  Dispo: continue to monitor, will f/u additional ID recs

## 2020-12-10 NOTE — PROGRESS NOTE ADULT - ASSESSMENT
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 Nodular enlarged right thyroid lobe. Recommend evaluation outpatient sonography. Additional findings in the body of the report  - MR Foot No Cont, Right (12.09.20): Impression: Soft tissue ulcer at the medial base of hallux distal phalanx with osteitis; no osteomyelitis or drainable collection  - COVID negative x 2 on 12/4  - Blood Cx 12/4 MSSA, GNR on stain   - Blood Cx 12/5 NG  - TTE negative for vegetation  - dental procedure 12/10    #Elevated D-DImer  #AMS - resolved    #CKD  #Hx of DVT  #Obesity BMI (kg/m2): 53.8  #Abx allergy: NKDA    Creatinine, Serum: 2.0 mg/dL (12.09.20 @ 06:11)  Weight (kg): 175 (04 Dec 2020 10:30)  CrCl 104    RECOMMENDATIONS  - given hx of dental infection, recommend CHEIKH for evaluation for endocarditis; patient seemed agreeable today  - can switch cefepime back to cefazolin 2g q 8 hours  - continue flagyl 500 mg TID; can switch to PO   - follow-up GNR speciation and susceptibilities- unable to culture for 5 days; isolated was re-typed and cultured  - antibiotic plan pending CHEIKH; if negative, can plan for 2 weeks; if positive, will plan for 6 weeks of antibiotics from time of dental procedure  - follow-up blood cx 12/9     Please call or message on [employer] Teams if with any questions.  Spectra [phone]

## 2020-12-10 NOTE — PROGRESS NOTE ADULT - SUBJECTIVE AND OBJECTIVE BOX
SUBJECTIVE:    Patient is a 65y old Male who presents with a chief complaint of SOB (09 Dec 2020 11:31)    Currently admitted to medicine with the primary diagnosis of Sepsis       Today is hospital day 6d. This morning he is resting comfortably in bed and reports no new issues or overnight events.     PAST MEDICAL & SURGICAL HISTORY  HLD (hyperlipidemia)    CKD (chronic kidney disease)    DVT (deep venous thrombosis)    Glaucoma    HTN (hypertension)    Diabetes mellitus    H/O right nephrectomy      SOCIAL HISTORY:  Negative for smoking/alcohol/drug use.     ALLERGIES:  No Known Allergies    MEDICATIONS:  STANDING MEDICATIONS  amLODIPine   Tablet 5 milliGRAM(s) Oral daily  atorvastatin 80 milliGRAM(s) Oral at bedtime  cefepime   IVPB      cefepime   IVPB 1000 milliGRAM(s) IV Intermittent every 8 hours  chlorhexidine 4% Liquid 1 Application(s) Topical <User Schedule>  dextrose 40% Gel 15 Gram(s) Oral once  dextrose 5%. 1000 milliLiter(s) IV Continuous <Continuous>  dextrose 5%. 1000 milliLiter(s) IV Continuous <Continuous>  dextrose 50% Injectable 25 Gram(s) IV Push once  dextrose 50% Injectable 12.5 Gram(s) IV Push once  dextrose 50% Injectable 25 Gram(s) IV Push once  dorzolamide 2% Ophthalmic Solution 1 Drop(s) Right EYE <User Schedule>  glucagon  Injectable 1 milliGRAM(s) IntraMuscular once  guaiFENesin ER 1200 milliGRAM(s) Oral every 12 hours  heparin   Injectable 5000 Unit(s) SubCutaneous every 8 hours  insulin glargine Injectable (LANTUS) 40 Unit(s) SubCutaneous at bedtime  insulin lispro (ADMELOG) corrective regimen sliding scale   SubCutaneous three times a day before meals  insulin lispro Injectable (ADMELOG) 14 Unit(s) SubCutaneous three times a day before meals  latanoprost 0.005% Ophthalmic Solution 1 Drop(s) Right EYE at bedtime  losartan 100 milliGRAM(s) Oral daily  metroNIDAZOLE  IVPB 500 milliGRAM(s) IV Intermittent every 8 hours  pantoprazole    Tablet 40 milliGRAM(s) Oral before breakfast  timolol 0.5% Solution 1 Drop(s) Right EYE every 24 hours    PRN MEDICATIONS  acetaminophen   Tablet .. 650 milliGRAM(s) Oral every 6 hours PRN    VITALS:   T(F): 99  HR: 80  BP: 143/65  RR: 18  SpO2: --    LABS:                        12.0   4.11  )-----------( 155      ( 10 Dec 2020 04:30 )             37.1     12-10    134<L>  |  104  |  32<H>  ----------------------------<  175<H>  4.7   |  17  |  1.9<H>    Ca    8.5      10 Dec 2020 04:30  Mg     1.8     12-09    TPro  6.7  /  Alb  3.3<L>  /  TBili  0.4  /  DBili  x   /  AST  97<H>  /  ALT  64<H>  /  AlkPhos  65  12-10              CARDIAC MARKERS ( 09 Dec 2020 06:11 )  x     / 0.06 ng/mL / x     / x     / x          RADIOLOGY:    PHYSICAL EXAM:  GEN: No acute distress  LUNGS: Clear to auscultation bilaterally   HEART: S1/S2 present. RRR.   ABD: Soft, non-tender, non-distended. Bowel sounds present  EXT: NC/NC/NE/2+PP/KEARNEY/Skin Intact.   NEURO: AAOX3    Intravenous access:   NG tube:   Nj Catheter:

## 2020-12-10 NOTE — PROGRESS NOTE ADULT - ASSESSMENT
64 yo M PMHx DM II, HTN, HLD, glaucoma, renal mass s/p right nephrectomy, nephrolithiasis, CKD III, and RLE Cellulitis was brought in by EMS for evaluation of altered mental status. As per EMR, patient developed chills and shortness of breath (which he attributed to the shivering) for about one hour. After that he became confused and doesn't remember anything until he received o2 in the ER.     Sepsis on admission  Metabolic encephalopathy on admission  MSSA bactremia / GNR in the blood  R Hallux ulcer  Morbid obesity / Likely ISIDRA  DM-2 / HTN / DL  CKD-3  H/O Renal mass S/P Nephrectomy  H/O chronic DVT          PLAN:    ·	ID F/U noted. Cont IV Cefepime. Add Flagyl  ·	Check for sensitivity  ·	MRI foot reviewed. No OM  ·	No obvious source of infection. Discussed again about doing CHEIKH to R/O endocarditis. Continues to refuse the test.   ·	ECHO reviewed. EF is 66%. No valvular endocarditis  ·	ID recommended to repeat blood cxs.   ·	Monitor FS. Cont Insulin.   ·	Cont Losartan 100 mg po daily and Amlodipine 5 mg po daily. Monitor BP. If elevated, can increase Amlodipine to 10 mg po daily  ·	Monitor renal function  ·	Dental surgery eval for tooth ache.   ·	Cont his other meds.

## 2020-12-10 NOTE — CONSULT NOTE ADULT - SUBJECTIVE AND OBJECTIVE BOX
Patient is a 65y old  Male who presents with a chief complaint of SOB (10 Dec 2020 15:49) gram positive bacteremia. Presents to dental clinic for evaluation of dentition and oral cavity to determine if any infection is present.       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 At around 11:30 last night patient developed sudden onset chills while lying in bed. He tried to get out of bed but was unable to as he became very weak and SOB. His daughters called EMS. After that point he does not remember what happened but the chart documents that he was altered, AAOx2. Of note patient reports having a similar episode of chills 5 years ago but that resolved by itself within minutes. Also of note patient reports having history of swelling in his lower extremities but reports that multiple duplex US have been negative. He denied any hx of recent travel, or recent illness. He denied any hx of nausea, vomiting, diarrhea, constipation, melena, pain in abdomen, chest pain, cough, dysuria, headache, lightheadedness, dizziness, or numbness/tingling.    In the ED patient met SIRS criteria with T 102.6F, , RR 22, /80. SpO2 99 % on 16L NRB. Labs were significant for WBC 10.78, DDimer 848, BUN/creatinine 47/2.1, Lactate 2.9 that is 2.6 on repeat ABG, and troponin of 0.03. COVID negative. EKG showed sinus tachycardia, and CT chest, abdomen, and pelvis was negative for acute pathology. Patient refused CTA chest. Received 1 dose cefepime and vancomycin and was started on a heparin drip.  Also received 1L LR, zofran, and tylenol.     (04 Dec 2020 08:21)      PAST MEDICAL & SURGICAL HISTORY:  HLD (hyperlipidemia)    CKD (chronic kidney disease)    DVT (deep venous thrombosis)    Glaucoma    HTN (hypertension)    Diabetes mellitus    H/O right nephrectomy      (   ) heart valve replacement  (   ) joint replacement  (   ) pregnancy    MEDICATIONS  (STANDING):  amLODIPine   Tablet 5 milliGRAM(s) Oral daily  atorvastatin 80 milliGRAM(s) Oral at bedtime  ceFAZolin   IVPB 2000 milliGRAM(s) IV Intermittent every 8 hours  chlorhexidine 4% Liquid 1 Application(s) Topical <User Schedule>  dextrose 40% Gel 15 Gram(s) Oral once  dextrose 5%. 1000 milliLiter(s) (50 mL/Hr) IV Continuous <Continuous>  dextrose 5%. 1000 milliLiter(s) (100 mL/Hr) IV Continuous <Continuous>  dextrose 50% Injectable 25 Gram(s) IV Push once  dextrose 50% Injectable 12.5 Gram(s) IV Push once  dextrose 50% Injectable 25 Gram(s) IV Push once  dorzolamide 2% Ophthalmic Solution 1 Drop(s) Right EYE <User Schedule>  glucagon  Injectable 1 milliGRAM(s) IntraMuscular once  guaiFENesin ER 1200 milliGRAM(s) Oral every 12 hours  heparin   Injectable 5000 Unit(s) SubCutaneous every 8 hours  insulin glargine Injectable (LANTUS) 40 Unit(s) SubCutaneous at bedtime  insulin lispro (ADMELOG) corrective regimen sliding scale   SubCutaneous three times a day before meals  insulin lispro Injectable (ADMELOG) 14 Unit(s) SubCutaneous three times a day before meals  latanoprost 0.005% Ophthalmic Solution 1 Drop(s) Right EYE at bedtime  losartan 100 milliGRAM(s) Oral daily  metroNIDAZOLE    Tablet 500 milliGRAM(s) Oral every 8 hours  pantoprazole    Tablet 40 milliGRAM(s) Oral before breakfast  timolol 0.5% Solution 1 Drop(s) Right EYE every 24 hours    MEDICATIONS  (PRN):  acetaminophen   Tablet .. 650 milliGRAM(s) Oral every 6 hours PRN Temp greater or equal to 38C (100.4F)      Allergies    No Known Allergies    Intolerances        FAMILY HISTORY:  FH: lung cancer  Father        *SOCIAL HISTORY: (   ) Tobacco; (   ) ETOH    *Last Dental Visit:    Vital Signs Last 24 Hrs  T(C): 37.2 (10 Dec 2020 14:30), Max: 37.6 (09 Dec 2020 19:56)  T(F): 98.9 (10 Dec 2020 14:30), Max: 99.7 (09 Dec 2020 19:56)  HR: 75 (10 Dec 2020 14:30) (74 - 80)  BP: 160/73 (10 Dec 2020 14:30) (143/65 - 163/71)  BP(mean): --  RR: 18 (10 Dec 2020 14:30) (18 - 18)  SpO2: --    LABS:                        12.0   4.11  )-----------( 155      ( 10 Dec 2020 04:30 )             37.1     12-10    134<L>  |  104  |  32<H>  ----------------------------<  175<H>  4.7   |  17  |  1.9<H>    Ca    8.5      10 Dec 2020 04:30  Mg     1.8     12-09    TPro  6.7  /  Alb  3.3<L>  /  TBili  0.4  /  DBili  x   /  AST  97<H>  /  ALT  64<H>  /  AlkPhos  65  12-10    WBC Count: 4.11 K/uL <L> [4.80 - 10.80] (12-10 @ 04:30)  Platelet Count - Automated: 155 K/uL [130 - 400] (12-10 @ 04:30)  WBC Count: 4.72 K/uL <L> [4.80 - 10.80] (12-09 @ 06:11)  Platelet Count - Automated: 160 K/uL [130 - 400] (12-09 @ 06:11)          EOE:  TMJ ( -  ) clicks                     ( -  ) pops                     (  - ) crepitus             Mandib-le <<FROM>>             Facial bones and MOM <<grossly intact>>             ( -  ) trismus             ( -  ) lymphadenopathy             ( -  ) swelling             ( -  ) asymmetry             ( -  ) palpation             ( -  ) dyspnea             ( -  ) dysphagia             ( -  ) loss of consciousness    IOE:  <<permanent/primary/mixed>> dentition: <<grossly intact>>            hard/soft palate:  ( -  ) palatal torus, <<No pathology noted>>           tongue/FOM <<No pathology noted>>           labial/buccal mucosa <<No pathology noted>>           (  - ) percussion           (  - ) palpation           (  - ) swelling            (  - ) abscess           (  - ) sinus tract            *DENTAL RADIOGRAPHS: 8 periapicals taken.         *ASSESSMENT: Upon clinical evaluation, poor oral hygiene noted. Heavy, generalized plaque and calculus build up. Root tips #12 and #13. #2 sensitive to percussion. Radiographic evaluation indicated advanced bone loss, and periapical infection of #2.       *PLAN: Extract #2 due to presence of periapical infection.     PROCEDURE: Risks and benefits discussed as per OS sheet dated 07/13/2000. Consent signed and side site verified. Anesthestized with 2 carpules 2% lidocaine 1:100,000 epinephrine via local infiltration. Tissue elevated and #2 luxated with upper forceps. Socket curetted and irrigated. Gauze placed and hemostasis achieved. Post operative radiograph obtained. Post operative instructions provided.       RECOMMENDATIONS:  1)Post operative instructions provided.   2) Dental F/U with outpatient dentist for comprehensive dental care.   3) If any difficulty swallowing/breathing, fever occur, return to ER.     Sher Ruiz DDS

## 2020-12-10 NOTE — PROGRESS NOTE ADULT - SUBJECTIVE AND OBJECTIVE BOX
FATOUMATA JOSHUA  65y Male    CHIEF COMPLAINT:    Patient is a 65y old  Male who presents with a chief complaint of SOB (10 Dec 2020 10:03)      INTERVAL HPI/OVERNIGHT EVENTS:    Patient seen and examined.  No fever over the last 24 hrs. No sob. On IV Abx. C/O tooth ache.     ROS: All other systems are negative.    Vital Signs:    T(F): 99 (12-10-20 @ 05:27), Max: 99.7 (20 @ 19:56)  HR: 80 (12-10-20 @ 05:27) (74 - 88)  BP: 143/65 (12-10-20 @ 05:27) (143/65 - 165/78)  RR: 18 (12-10-20 @ 05:27) (18 - 18)  SpO2: --  I&O's Summary    09 Dec 2020 07:01  -  10 Dec 2020 07:00  --------------------------------------------------------  IN: 1040 mL / OUT: 550 mL / NET: 490 mL    10 Dec 2020 07:01  -  10 Dec 2020 11:59  --------------------------------------------------------  IN: 460 mL / OUT: 0 mL / NET: 460 mL      Daily     Daily Weight in k (10 Dec 2020 05:27)  CAPILLARY BLOOD GLUCOSE      POCT Blood Glucose.: 154 mg/dL (10 Dec 2020 11:02)  POCT Blood Glucose.: 168 mg/dL (10 Dec 2020 07:28)  POCT Blood Glucose.: 173 mg/dL (09 Dec 2020 21:41)  POCT Blood Glucose.: 173 mg/dL (09 Dec 2020 16:33)      PHYSICAL EXAM:    GENERAL:  NAD  SKIN: No rashes or lesions  HENT: Atraumatic Normocephalic. PERRL. Moist membranes.  NECK: Supple, No JVD. No lymphadenopathy.  PULMONARY: CTA B/L. No wheezing. No rales  CVS: Normal S1, S2. Rate and Rhythm are regular. No murmurs.  ABDOMEN/GI: Soft, Nontender, Nondistended; BS present  EXTREMITIES: Peripheral pulses intact. +ve edema B/L LE. R hallux ulcer  NEUROLOGIC:  No motor or sensory deficit.  PSYCH: Alert & oriented x 3    Consultant(s) Notes Reviewed:  [x ] YES  [ ] NO  Care Discussed with Consultants/Other Providers [ x] YES  [ ] NO    EKG reviewed  Telemetry reviewed    LABS:                        12.0   4.11  )-----------( 155      ( 10 Dec 2020 04:30 )             37.1     12-10    134<L>  |  104  |  32<H>  ----------------------------<  175<H>   Creatinine Trend: 1.9<--, 2.0<--, 1.7<--, 1.9<--, 1.9<--, 2.1<--  4.7   |  17  |  1.9<H>    Ca    8.5      10 Dec 2020 04:30  Mg     1.8         TPro  6.7  /  Alb  3.3<L>  /  TBili  0.4  /  DBili  x   /  AST  97<H>  /  ALT  64<H>  /  AlkPhos  65  12-10      Serum Pro-Brain Natriuretic Peptide: 82 pg/mL (20 @ 02:00)    Trop 0.06, CKMB --, CK --, 20 @ 06:11        RADIOLOGY & ADDITIONAL TESTS:    < from: MR Foot No Cont, Right (20 @ 10:17) >  Impression: Soft tissue ulcer at the medial base of hallux distal phalanx with osteitis; no osteomyelitis or drainable collection    < end of copied text >    Imaging or report Personally Reviewed:  [ ] YES  [ ] NO    Medications:  Standing  amLODIPine   Tablet 5 milliGRAM(s) Oral daily  atorvastatin 80 milliGRAM(s) Oral at bedtime  cefepime   IVPB      cefepime   IVPB 1000 milliGRAM(s) IV Intermittent every 8 hours  chlorhexidine 4% Liquid 1 Application(s) Topical <User Schedule>  dextrose 40% Gel 15 Gram(s) Oral once  dextrose 5%. 1000 milliLiter(s) IV Continuous <Continuous>  dextrose 5%. 1000 milliLiter(s) IV Continuous <Continuous>  dextrose 50% Injectable 25 Gram(s) IV Push once  dextrose 50% Injectable 12.5 Gram(s) IV Push once  dextrose 50% Injectable 25 Gram(s) IV Push once  dorzolamide 2% Ophthalmic Solution 1 Drop(s) Right EYE <User Schedule>  glucagon  Injectable 1 milliGRAM(s) IntraMuscular once  guaiFENesin ER 1200 milliGRAM(s) Oral every 12 hours  heparin   Injectable 5000 Unit(s) SubCutaneous every 8 hours  insulin glargine Injectable (LANTUS) 40 Unit(s) SubCutaneous at bedtime  insulin lispro (ADMELOG) corrective regimen sliding scale   SubCutaneous three times a day before meals  insulin lispro Injectable (ADMELOG) 14 Unit(s) SubCutaneous three times a day before meals  latanoprost 0.005% Ophthalmic Solution 1 Drop(s) Right EYE at bedtime  losartan 100 milliGRAM(s) Oral daily  metroNIDAZOLE  IVPB 500 milliGRAM(s) IV Intermittent every 8 hours  pantoprazole    Tablet 40 milliGRAM(s) Oral before breakfast  timolol 0.5% Solution 1 Drop(s) Right EYE every 24 hours    PRN Meds  acetaminophen   Tablet .. 650 milliGRAM(s) Oral every 6 hours PRN      Case discussed with resident    Care discussed with pt/family

## 2020-12-11 ENCOUNTER — TRANSCRIPTION ENCOUNTER (OUTPATIENT)
Age: 65
End: 2020-12-11

## 2020-12-11 LAB
CULTURE RESULTS: SIGNIFICANT CHANGE UP
GLUCOSE BLDC GLUCOMTR-MCNC: 173 MG/DL — HIGH (ref 70–99)
GLUCOSE BLDC GLUCOMTR-MCNC: 175 MG/DL — HIGH (ref 70–99)
GLUCOSE BLDC GLUCOMTR-MCNC: 197 MG/DL — HIGH (ref 70–99)
GLUCOSE BLDC GLUCOMTR-MCNC: 215 MG/DL — HIGH (ref 70–99)
HCT VFR BLD CALC: 37.6 % — LOW (ref 42–52)
HGB BLD-MCNC: 11.9 G/DL — LOW (ref 14–18)
MCHC RBC-ENTMCNC: 28.7 PG — SIGNIFICANT CHANGE UP (ref 27–31)
MCHC RBC-ENTMCNC: 31.6 G/DL — LOW (ref 32–37)
MCV RBC AUTO: 90.8 FL — SIGNIFICANT CHANGE UP (ref 80–94)
NRBC # BLD: 0 /100 WBCS — SIGNIFICANT CHANGE UP (ref 0–0)
PLATELET # BLD AUTO: 189 K/UL — SIGNIFICANT CHANGE UP (ref 130–400)
RBC # BLD: 4.14 M/UL — LOW (ref 4.7–6.1)
RBC # FLD: 14.3 % — SIGNIFICANT CHANGE UP (ref 11.5–14.5)
SARS-COV-2 RNA SPEC QL NAA+PROBE: DETECTED
SPECIMEN SOURCE: SIGNIFICANT CHANGE UP
WBC # BLD: 4.22 K/UL — LOW (ref 4.8–10.8)
WBC # FLD AUTO: 4.22 K/UL — LOW (ref 4.8–10.8)

## 2020-12-11 PROCEDURE — 99233 SBSQ HOSP IP/OBS HIGH 50: CPT

## 2020-12-11 RX ORDER — AMLODIPINE BESYLATE 2.5 MG/1
10 TABLET ORAL DAILY
Refills: 0 | Status: DISCONTINUED | OUTPATIENT
Start: 2020-12-11 | End: 2020-12-15

## 2020-12-11 RX ADMIN — Medication 1: at 11:27

## 2020-12-11 RX ADMIN — DORZOLAMIDE HYDROCHLORIDE 1 DROP(S): 20 SOLUTION/ DROPS OPHTHALMIC at 05:35

## 2020-12-11 RX ADMIN — Medication 500 MILLIGRAM(S): at 05:36

## 2020-12-11 RX ADMIN — Medication 100 MILLIGRAM(S): at 13:00

## 2020-12-11 RX ADMIN — LOSARTAN POTASSIUM 100 MILLIGRAM(S): 100 TABLET, FILM COATED ORAL at 05:36

## 2020-12-11 RX ADMIN — PANTOPRAZOLE SODIUM 40 MILLIGRAM(S): 20 TABLET, DELAYED RELEASE ORAL at 05:36

## 2020-12-11 RX ADMIN — Medication 14 UNIT(S): at 17:03

## 2020-12-11 RX ADMIN — Medication 1200 MILLIGRAM(S): at 17:04

## 2020-12-11 RX ADMIN — Medication 1 DROP(S): at 17:03

## 2020-12-11 RX ADMIN — ATORVASTATIN CALCIUM 80 MILLIGRAM(S): 80 TABLET, FILM COATED ORAL at 22:48

## 2020-12-11 RX ADMIN — Medication 1200 MILLIGRAM(S): at 05:36

## 2020-12-11 RX ADMIN — Medication 500 MILLIGRAM(S): at 22:48

## 2020-12-11 RX ADMIN — Medication 100 MILLIGRAM(S): at 23:15

## 2020-12-11 RX ADMIN — AMLODIPINE BESYLATE 5 MILLIGRAM(S): 2.5 TABLET ORAL at 05:36

## 2020-12-11 RX ADMIN — LATANOPROST 1 DROP(S): 0.05 SOLUTION/ DROPS OPHTHALMIC; TOPICAL at 22:49

## 2020-12-11 RX ADMIN — Medication 500 MILLIGRAM(S): at 13:01

## 2020-12-11 RX ADMIN — Medication 100 MILLIGRAM(S): at 05:34

## 2020-12-11 RX ADMIN — HEPARIN SODIUM 5000 UNIT(S): 5000 INJECTION INTRAVENOUS; SUBCUTANEOUS at 13:01

## 2020-12-11 RX ADMIN — Medication 1: at 17:02

## 2020-12-11 RX ADMIN — DORZOLAMIDE HYDROCHLORIDE 1 DROP(S): 20 SOLUTION/ DROPS OPHTHALMIC at 17:03

## 2020-12-11 RX ADMIN — INSULIN GLARGINE 40 UNIT(S): 100 INJECTION, SOLUTION SUBCUTANEOUS at 22:48

## 2020-12-11 RX ADMIN — HEPARIN SODIUM 5000 UNIT(S): 5000 INJECTION INTRAVENOUS; SUBCUTANEOUS at 22:48

## 2020-12-11 RX ADMIN — Medication 2: at 08:00

## 2020-12-11 RX ADMIN — HEPARIN SODIUM 5000 UNIT(S): 5000 INJECTION INTRAVENOUS; SUBCUTANEOUS at 05:37

## 2020-12-11 RX ADMIN — AMLODIPINE BESYLATE 10 MILLIGRAM(S): 2.5 TABLET ORAL at 17:07

## 2020-12-11 NOTE — PROGRESS NOTE ADULT - ASSESSMENT
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 Nodular enlarged right thyroid lobe. Recommend evaluation outpatient sonography. Additional findings in the body of the report  - MR Foot No Cont, Right (12.09.20): Impression: Soft tissue ulcer at the medial base of hallux distal phalanx with osteitis; no osteomyelitis or drainable collection  - COVID negative x 2 on 12/4  - Blood Cx 12/4 MSSA, GNR on stain   - Blood Cx 12/5-9 NG  - TTE negative for vegetation  - dental procedure 12/10    #Elevated D-DImer  #AMS - resolved    #CKD  #Hx of DVT  #Obesity BMI (kg/m2): 53.8  #Abx allergy: NKDA    Creatinine, Serum: 2.0 mg/dL (12.09.20 @ 06:11)  Weight (kg): 175 (04 Dec 2020 10:30)  CrCl 104    RECOMMENDATIONS  - If CHEIKH negative, can plan for cefazolin 2g q 8 hours with PO flagyl 500 mg TID for 2 weeks (12/5-12/18) via midline   - will arrange ID follow-up with Dr. Jeramy Farnsworth for Telehealth. We will call the patient between 10:30-1:30      [id number]       [phone]       Fax [phone]    Please call or message on [employer] Teams if with any questions.  Spectra [phone]

## 2020-12-11 NOTE — PROGRESS NOTE ADULT - SUBJECTIVE AND OBJECTIVE BOX
JOSHUA HAM  65y, Male  Allergy: No Known Allergies      LOS  7d    CHIEF COMPLAINT: SOB (11 Dec 2020 11:17)      INTERVAL EVENTS/HPI  - No acute events overnight  - T(F): , Max: 99.3 (12-11-20 @ 05:56)  - Denies any worsening symptoms  - Tolerating medication  - WBC Count: 4.22 (12-11-20 @ 05:19)  WBC Count: 4.11 (12-10-20 @ 04:30)     - Creatinine, Serum: 1.9 (12-10-20 @ 04:30)       ROS  General: Denies rigors, nightsweats  HEENT: Denies headache, rhinorrhea, sore throat, eye pain  CV: Denies CP, palpitations  PULM: Denies wheezing, hemoptysis  GI: Denies hematemesis, hematochezia, melena  : Denies discharge, hematuria  MSK: Denies arthralgias, myalgias  SKIN: Denies rash, lesions  NEURO: Denies paresthesias, weakness  PSYCH: Denies depression, anxiety    VITALS:  T(F): 98.7, Max: 99.3 (12-11-20 @ 05:56)  HR: 80  BP: 159/74  RR: 19Vital Signs Last 24 Hrs  T(C): 37.1 (11 Dec 2020 14:00), Max: 37.4 (11 Dec 2020 05:56)  T(F): 98.7 (11 Dec 2020 14:00), Max: 99.3 (11 Dec 2020 05:56)  HR: 80 (11 Dec 2020 14:00) (76 - 82)  BP: 159/74 (11 Dec 2020 14:00) (141/60 - 163/72)  BP(mean): --  RR: 19 (11 Dec 2020 08:02) (18 - 19)  SpO2: 97% (11 Dec 2020 14:00) (96% - 97%)    PHYSICAL EXAM:  Gen: NAD, resting in bed  HEENT: Normocephalic, atraumatic  Neck: supple, no lymphadenopathy  CV: Regular rate & regular rhythm  Lungs: decreased BS at bases, no fremitus  Abdomen: Soft, BS present  Ext: Warm, well perfused  Neuro: non focal, awake  Skin: no rash, no erythema  Lines: no phlebitis    FH: Non-contributory  Social Hx: Non-contributory    TESTS & MEASUREMENTS:                        11.9   4.22  )-----------( 189      ( 11 Dec 2020 05:19 )             37.6     12-10    134<L>  |  104  |  32<H>  ----------------------------<  175<H>  4.7   |  17  |  1.9<H>    Ca    8.5      10 Dec 2020 04:30    TPro  6.7  /  Alb  3.3<L>  /  TBili  0.4  /  DBili  x   /  AST  97<H>  /  ALT  64<H>  /  AlkPhos  65  12-10      LIVER FUNCTIONS - ( 10 Dec 2020 04:30 )  Alb: 3.3 g/dL / Pro: 6.7 g/dL / ALK PHOS: 65 U/L / ALT: 64 U/L / AST: 97 U/L / GGT: x               Culture - Blood (collected 12-09-20 @ 17:47)  Source: .Blood None  Preliminary Report (12-11-20 @ 01:02):    No growth to date.    Culture - Blood (collected 12-06-20 @ 05:37)  Source: .Blood None  Final Report (12-11-20 @ 18:01):    No Growth Final    Culture - Blood (collected 12-05-20 @ 12:50)  Source: .Blood None  Final Report (12-10-20 @ 19:01):    No Growth Final    Culture - Blood (collected 12-04-20 @ 02:00)  Source: .Blood Blood-Peripheral  Gram Stain (12-05-20 @ 22:46):    Growth in anaerobic bottle:    Gram Positive Cocci in Clusters    Growth in aerobic bottle: Gram Positive Cocci in Clusters  Final Report (12-07-20 @ 07:26):    Growth in aerobic and anaerobic bottles: Staphylococcus aureus    "Due to technical problems, Proteus sp. will Not be reported as part of    the BCID panel until further notice"    ***Blood Panel PCR results on this specimen are available    approximately 3 hours after the Gram stain result.***    Gram stain, PCR, and/or culture results may not always    correspond due to difference in methodologies.    ************************************************************    This PCR assay was performed using [employer].    The following targets are tested for: Enterococcus,    vancomycin resistant enterococci, Listeria monocytogenes,    coagulase negative staphylococci, S. aureus,    methicillin resistant S. aureus, Streptococcus agalactiae    (Group B), S. pneumoniae, S. pyogenes (Group A),    Acinetobacter baumannii, Enterobacter cloacae, E. coli,    Klebsiella oxytoca, K. pneumoniae, Proteus sp.,    Serratia marcescens, Haemophilus influenzae,    Neisseria meningitidis, Pseudomonas aeruginosa, Candida    albicans, C. glabrata, C krusei, C parapsilosis,    C. tropicalis and the KPC resistance gene.  Organism: Blood Culture PCR  Staphylococcus aureus (12-07-20 @ 07:26)  Organism: Staphylococcus aureus (12-07-20 @ 07:26)      -  Ampicillin/Sulbactam: S <=8/4      -  Cefazolin: S <=4      -  Clindamycin: R <=0.25 This isolate is presumed to be clindamycin resistant based on detection of inducible resistance. Clindamycin may still be effective in some patients.      -  Erythromycin: R >4      -  Gentamicin: S <=1 Should not be used as monotherapy      -  Oxacillin: S <=0.25      -  Penicillin: R >8      -  RIF- Rifampin: S <=1 Should not be used as monotherapy      -  Tetra/Doxy: S <=1      -  Trimethoprim/Sulfamethoxazole: S <=0.5/9.5      -  Vancomycin: S 1      Method Type: RIYA  Organism: Blood Culture PCR (12-07-20 @ 07:26)      -  Staphylococcus aureus: Detec Any isolate of Staphylococcus aureus from a blood culture is NOT considered a contaminant.      Method Type: PCR    Culture - Blood (collected 12-04-20 @ 02:00)  Source: .Blood Blood-Peripheral  Gram Stain (12-07-20 @ 22:01):    Growth in aerobic bottle: Gram Positive Cocci in Clusters    Growth in anaerobic bottle: Gram Negative Rods  Preliminary Report (12-08-20 @ 11:50):    Growth in aerobic bottle: Staphylococcus aureus See previous culture    [id number]    Growth in anaerobic bottle: Gram Negative Rods    "Due to technical problems, Proteus sp. will Not be reported as part of    the BCID panel until further notice"    ***Blood Panel PCR results on this specimen are available    approximately 3 hours after the Gram stain result.***    Gram stain, PCR, and/or culture results may not always    correspond due to difference in methodologies.    ************************************************************    This PCR assay was performed using [employer].    The following targets are tested for: Enterococcus,    vancomycin resistant enterococci, Listeria monocytogenes,    coagulase negative staphylococci, S. aureus,    methicillinresistant S. aureus, Streptococcus agalactiae    (Group B), S. pneumoniae, S. pyogenes (Group A),    Acinetobacter baumannii, Enterobacter cloacae, E. coli,    Klebsiella oxytoca, K. pneumoniae, Proteus sp.,    Serratia marcescens, Haemophilus influenzae,    Neisseria meningitidis, Pseudomonas aeruginosa, Candida    albicans, C. glabrata, C krusei, C parapsilosis,    C. tropicalis and the KPC resistance gene.  Organism: Blood Culture PCR (12-08-20 @ 00:09)  Organism: Blood Culture PCR (12-08-20 @ 00:09)      -  Acinetobacter baumanii: Nondet      -  Candida albicans: Nondet      -  Candida glabrata: Nondet      -  Candida krusei: Nondet      -  Candida parapsilosis: Nondet      -  Candida tropicalis: Nondet      -  Coagulase negative Staphylococcus: Nondet      -  Enterobacter cloacae complex: Nondet      -  Enterococcus species: Nondet      -  Escherichia coli: Nondet      -  Haemophilus influenzae: Nondet      -  Klebsiella oxytoca: Nondet      -  Klebsiella pneumoniae: Nondet      -  Listeria monocytogenes: Nondet      -  Methicillin resistant Staphylococcus aureus (MRSA): Nondet      -  Multidrug (KPC pos) resistant organism: Nondet      -  Neisseria meningitidis: Nondet      -  Pseudomonas aeruginosa: Nondet      -  Serratia marcescens: Nondet      -  Staphylococcus aureus: Nondet      -  Streptococcus agalactiae (Group B): Nondet      -  Streptococcus pneumoniae: Nondet      -  Streptococcus pyogenes (Group A): Nondet      -  Streptococcus sp. (Not Grp A, B or S pneumoniae): Nondet      -  Vancomycin resistant Enterococcus sp.: Nondet      Method Type: PCR            INFECTIOUS DISEASES TESTING  COVID-19 PCR: Detected (12-10-20 @ 23:19)  Rapid RVP Result: NotDetec (12-04-20 @ 11:20)  Rapid RVP Result: NotDetec (12-04-20 @ 02:00)  Procalcitonin, Serum: 0.18 (12-04-20 @ 02:00)      INFLAMMATORY MARKERS  C-Reactive Protein, Serum: 0.61 mg/dL (12-04-20 @ 02:00)      RADIOLOGY & ADDITIONAL TESTS:  I have personally reviewed the last available Chest xray  CXR      CT      CARDIOLOGY TESTING  12 Lead ECG:   Ventricular Rate 80 BPM    Atrial Rate 80 BPM    P-R Interval 256 ms    QRS Duration 182 ms    Q-T Interval 426 ms    QTC Calculation(Bazett) 491 ms    P Axis 33 degrees    R Axis -37 degrees    T Axis 14 degrees    Diagnosis Line Sinus rhythm [id number] degree A-V block  Indeterminate axis  Right bundle branch block  Abnormal ECG    Confirmed by VIBHA GUILLERMO MD ([id number]) on 12/6/2020 12:58:49 PM (12-06-20 @ 01:53)  12 Lead ECG:   Ventricular Rate 110 BPM    Atrial Rate 110 BPM    P-R Interval 216 ms    QRS Duration 174 ms    Q-T Interval 344 ms    QTC Calculation(Bazett) 465 ms    P Axis 11 degrees    R Axis -50 degrees    T Axis 2 degrees    Diagnosis Line Sinus tachycardia with 1st degree A-V block  Right bundle branch block  Left anterior fascicular block  *** Bifascicular block ***  Inferior infarct , age undetermined  Abnormal ECG    Confirmed by JANNETH MENON MD ([id number]) on 12/4/2020 7:45:29 AM (12-04-20 @ 04:36)      MEDICATIONS  amLODIPine   Tablet 10 Oral daily  atorvastatin 80 Oral at bedtime  ceFAZolin   IVPB 2000 IV Intermittent every 8 hours  chlorhexidine 4% Liquid 1 Topical <User Schedule>  dextrose 40% Gel 15 Oral once  dextrose 5%. 1000 IV Continuous <Continuous>  dextrose 5%. 1000 IV Continuous <Continuous>  dextrose 50% Injectable 25 IV Push once  dextrose 50% Injectable 12.5 IV Push once  dextrose 50% Injectable 25 IV Push once  dorzolamide 2% Ophthalmic Solution 1 Right EYE <User Schedule>  glucagon  Injectable 1 IntraMuscular once  guaiFENesin ER 1200 Oral every 12 hours  heparin   Injectable 5000 SubCutaneous every 8 hours  insulin glargine Injectable (LANTUS) 40 SubCutaneous at bedtime  insulin lispro (ADMELOG) corrective regimen sliding scale  SubCutaneous three times a day before meals  insulin lispro Injectable (ADMELOG) 14 SubCutaneous three times a day before meals  latanoprost 0.005% Ophthalmic Solution 1 Right EYE at bedtime  losartan 100 Oral daily  metroNIDAZOLE    Tablet 500 Oral every 8 hours  pantoprazole    Tablet 40 Oral before breakfast  timolol 0.5% Solution 1 Right EYE every 24 hours      WEIGHT  Weight (kg): 175 (12-11-20 @ 08:02)      ANTIBIOTICS:  ceFAZolin   IVPB 2000 milliGRAM(s) IV Intermittent every 8 hours  metroNIDAZOLE    Tablet 500 milliGRAM(s) Oral every 8 hours      All available historical records have been reviewed

## 2020-12-11 NOTE — DISCHARGE NOTE PROVIDER - NSDCFUADDAPPT_GEN_ALL_CORE_FT
ID follow-up with Dr. Jeramy Farnsworth for Telehealth. We will call the patient between 10:30-1:30      [id number]       [phone]       Fax [phone]

## 2020-12-11 NOTE — DISCHARGE NOTE PROVIDER - PROVIDER TOKENS
PROVIDER:[TOKEN:[[id number]],FOLLOWUP:[2 weeks]],PROVIDER:[TOKEN:[[id number]],FOLLOWUP:[2 weeks]]

## 2020-12-11 NOTE — DISCHARGE NOTE PROVIDER - NSDCMRMEDTOKEN_GEN_ALL_CORE_FT
amlodipine-olmesartan 5 mg-40 mg oral tablet: 1 tab(s) orally once a day  atorvastatin 20 mg oral tablet: 1 tab(s) orally once a day  Cosopt 2.23%-0.68% ophthalmic solution: 1 drop(s) to each affected eye 2 times a day  HumaLOG 100 units/mL subcutaneous solution: 30 unit(s) subcutaneous 3 times a day (before meals)  insulin glargine 100 units/mL subcutaneous solution: 70 units in morning and 40 units at night   amLODIPine 10 mg oral tablet: 1 tab(s) orally once a day  amlodipine-olmesartan 5 mg-40 mg oral tablet: 1 tab(s) orally once a day  atorvastatin 20 mg oral tablet: 1 tab(s) orally once a day  Cosopt 2.23%-0.68% ophthalmic solution: 1 drop(s) to each affected eye 2 times a day  Flagyl 500 mg oral tablet: 1 tab(s) orally every 8 hours   HumaLOG 100 units/mL subcutaneous solution: 30 unit(s) subcutaneous 3 times a day (before meals)  insulin glargine 100 units/mL subcutaneous solution: 70 units in morning and 40 units at night   amLODIPine 10 mg oral tablet: 1 tab(s) orally once a day  atorvastatin 20 mg oral tablet: 1 tab(s) orally once a day  ceFAZolin 2 g intravenous injection: 2 gram(s) intravenous every 8 hours  till 01/05/2021  Cosopt 2.23%-0.68% ophthalmic solution: 1 drop(s) to each affected eye 2 times a day  Flagyl 500 mg oral tablet: 1 tab(s) orally every 8 hours   HumaLOG 100 units/mL subcutaneous solution: 30 unit(s) subcutaneous 3 times a day (before meals)  insulin glargine 100 units/mL subcutaneous solution: 70 units in morning and 40 units at night   amLODIPine 10 mg oral tablet: 1 tab(s) orally once a day  aspirin 81 mg oral delayed release tablet: 1 tab(s) orally once a day   atorvastatin 20 mg oral tablet: 1 tab(s) orally once a day  ceFAZolin 2 g intravenous injection: 2 gram(s) intravenous every 8 hours  till 01/05/2021  Cosopt 2.23%-0.68% ophthalmic solution: 1 drop(s) to each affected eye 2 times a day  Flagyl 500 mg oral tablet: 1 tab(s) orally every 8 hours   HumaLOG 100 units/mL subcutaneous solution: 30 unit(s) subcutaneous 3 times a day (before meals)  insulin glargine 100 units/mL subcutaneous solution: 70 units in morning and 40 units at night  pantoprazole 40 mg oral delayed release tablet: 1 tab(s) orally once a day

## 2020-12-11 NOTE — DIETITIAN INITIAL EVALUATION ADULT. - OTHER CALCULATIONS
ideal 78kg    1950-2340kcal (25-30kcal/kg IBW) for obesity  protein 78-94g (1-1.2g/kg IBW) as above fluid 1ml/kcal or per LIP

## 2020-12-11 NOTE — PROGRESS NOTE ADULT - ASSESSMENT
64 yo M PMHx DM II, HTN, HLD, glaucoma, renal mass s/p right nephrectomy, nephrolithiasis, CKD III, and RLE Cellulitis was brought in by EMS for evaluation of altered mental status. As per EMR, patient developed chills and shortness of breath (which he attributed to the shivering) for about one hour. After that he became confused and doesn't remember anything until he received o2 in the ER. Upon EMS arrival, patient was found to be hypoxic to low 80s. He has had 2 negative covid tests and was found to have staph aureus in blood.    #Sepsis due to mssa bacteremia  - cefepime switched back to cefazolin  - r/o osteomylitis--> mri negative   tte was negative  rich recommended --> will be done today  f/u id recommendations   dental extraction done       # Acute hypoxic respiratory failure resolved  - resolved  - perhaps related to sepsis  - v/q scan negative for PE (was on heparin gtt but was d/c)    # Callus removed 3 weeks ago:  - Still oozing blood.   - podiatry consulted, recs: MRI foot, patient refusing MRI to identify extent of bone infection, will f/u tomorrow with patient decision and if patient agress will proceed with sx planning of amputation of R hallux  mri to r/o osteomylitis-> negative      # DM II  - c/w insulin  - FS controlled  - goal 110-180    # HTN  - controlled  - c/w amlodipine, losartan    # HLD  -c/w atorvastatin    # Glaucoma  - continue with timolol    # CKD III  - s/p R nephrectomy  - stable at baseline  - Renally adjust meds  - Avoid nephrotoxic meds  - Monitor BMP    Diet DASH  DVT px  Full Code  Dispo: continue to monitor, will f/u additional ID recs

## 2020-12-11 NOTE — DIETITIAN INITIAL EVALUATION ADULT. - CONTINUE CURRENT NUTRITION CARE PLAN
Nutrition intervention: meals and snacks. Continue DASH/TLC, consistent carb diet add evening snack./yes

## 2020-12-11 NOTE — PROGRESS NOTE ADULT - SUBJECTIVE AND OBJECTIVE BOX
SUBJECTIVE:    Patient is a 65y old Male who presents with a chief complaint of SOB (10 Dec 2020 16:14)    Currently admitted to medicine with the primary diagnosis of Sepsis       Today is hospital day 7d. This morning he is resting comfortably in bed and reports no new issues or overnight events.     PAST MEDICAL & SURGICAL HISTORY  HLD (hyperlipidemia)    CKD (chronic kidney disease)    DVT (deep venous thrombosis)    Glaucoma    HTN (hypertension)    Diabetes mellitus    H/O right nephrectomy      SOCIAL HISTORY:  Negative for smoking/alcohol/drug use.     ALLERGIES:  No Known Allergies    MEDICATIONS:  STANDING MEDICATIONS  amLODIPine   Tablet 5 milliGRAM(s) Oral daily  atorvastatin 80 milliGRAM(s) Oral at bedtime  ceFAZolin   IVPB 2000 milliGRAM(s) IV Intermittent every 8 hours  chlorhexidine 4% Liquid 1 Application(s) Topical <User Schedule>  dextrose 40% Gel 15 Gram(s) Oral once  dextrose 5%. 1000 milliLiter(s) IV Continuous <Continuous>  dextrose 5%. 1000 milliLiter(s) IV Continuous <Continuous>  dextrose 50% Injectable 25 Gram(s) IV Push once  dextrose 50% Injectable 12.5 Gram(s) IV Push once  dextrose 50% Injectable 25 Gram(s) IV Push once  dorzolamide 2% Ophthalmic Solution 1 Drop(s) Right EYE <User Schedule>  glucagon  Injectable 1 milliGRAM(s) IntraMuscular once  guaiFENesin ER 1200 milliGRAM(s) Oral every 12 hours  heparin   Injectable 5000 Unit(s) SubCutaneous every 8 hours  insulin glargine Injectable (LANTUS) 40 Unit(s) SubCutaneous at bedtime  insulin lispro (ADMELOG) corrective regimen sliding scale   SubCutaneous three times a day before meals  insulin lispro Injectable (ADMELOG) 14 Unit(s) SubCutaneous three times a day before meals  latanoprost 0.005% Ophthalmic Solution 1 Drop(s) Right EYE at bedtime  losartan 100 milliGRAM(s) Oral daily  metroNIDAZOLE    Tablet 500 milliGRAM(s) Oral every 8 hours  pantoprazole    Tablet 40 milliGRAM(s) Oral before breakfast  timolol 0.5% Solution 1 Drop(s) Right EYE every 24 hours    PRN MEDICATIONS  acetaminophen   Tablet .. 650 milliGRAM(s) Oral every 6 hours PRN    VITALS:   T(F): 99.3  HR: 82  BP: 163/72  RR: 19  SpO2: 96%    LABS:                        11.9   4.22  )-----------( 189      ( 11 Dec 2020 05:19 )             37.6     12-10    134<L>  |  104  |  32<H>  ----------------------------<  175<H>  4.7   |  17  |  1.9<H>    Ca    8.5      10 Dec 2020 04:30    TPro  6.7  /  Alb  3.3<L>  /  TBili  0.4  /  DBili  x   /  AST  97<H>  /  ALT  64<H>  /  AlkPhos  65  12-10              Culture - Blood (collected 09 Dec 2020 17:47)  Source: .Blood None  Preliminary Report (11 Dec 2020 01:02):    No growth to date.          RADIOLOGY:    PHYSICAL EXAM:  GEN: No acute distress  LUNGS: Clear to auscultation bilaterally   HEART: S1/S2 present. RRR.   ABD: Soft, non-tender, non-distended. Bowel sounds present  EXT: NC/NC/NE/2+PP/KEARNEY/Skin Intact.   NEURO: AAOX3    Intravenous access:   NG tube:   Nj Catheter:

## 2020-12-11 NOTE — DIETITIAN INITIAL EVALUATION ADULT. - ORAL INTAKE PTA/DIET HISTORY
Pt. not very receptive to RD today as he's upset over NPO orders and procedure scheduling. Reports overall eating well, follows diabetic diet. Takes tumeric supplement sometimes- instructed pt. to avoid ck doses of supplements. NKFA. Stable weight trends.

## 2020-12-11 NOTE — DISCHARGE NOTE PROVIDER - NSDCCPCAREPLAN_GEN_ALL_CORE_FT
PRINCIPAL DISCHARGE DIAGNOSIS  Diagnosis: Sepsis  Assessment and Plan of Treatment: You presented to the hospital for fever , low oxygen levels and tachycardia. An imaging was done to check for lung clots and it was normal. MRI of you foot showed no bone infection However there was bacteria in your blood and an you started on antibiotics. Your were suspected o have an endocarditis so an echo of your heart was done and showed   . You wll be sent home on antibiotics Please follow up with the infectious disease specialist       PRINCIPAL DISCHARGE DIAGNOSIS  Diagnosis: Sepsis  Assessment and Plan of Treatment: You presented to the hospital for fever , low oxygen levels and tachycardia. An imaging was done to check for lung clots and it was normal. MRI of you foot showed no bone infection However there was bacteria in your blood and an you started on antibiotics. Your were suspected o have an endocarditis so an echo of your heart was done and showed   . You wll be sent home on antibiotics Please follow up with the infectious disease specialist  methycillin senstive staph aureus in blood  continue Iv cefazolin till 01/05/2021 and PO flagyl  No evidence of endocarditis on TTE  CHEIKH was not done      SECONDARY DISCHARGE DIAGNOSES  Diagnosis: COVID-19  Assessment and Plan of Treatment: on room air  Not desaturation  Not hypoxemic not requiring treatment  COVID-19 stands for "coronavirus disease 2019." It is caused by a virus called SARS-CoV-2. The virus first appeared in late 2019 and quickly spread around the world.  People with COVID-19 can have fever, cough, trouble breathing, and other symptoms. Problems with breathing happen when the infection affects the lungs and causes pneumonia  If someone in your home has COVID-19, there are additional things you can do to protect yourself and others:  ?Keep the sick person away from others – The sick person should stay in a separate room, and use a different bathroom if possible. They should also eat in their own room.  Experts also recommend that the person stay away from pets in the house until they are better.  ?Have them wear a mask – The sick person should wear a mask when they are in the same room as other people. If they can't wear a mask, you can help protect yourself by covering your face when you are in the room with them.  ?Wash hands – Wash your hands with soap and water often.  ?Clean often – Here are some specific things that can help:  •Wear disposable gloves when you clean. It's also a good idea to wear gloves when you have to touch the sick person's laundry, dishes, utensils, or trash. Wash your hands after removing your gloves.  •Regularly clean things that are touched a lot. This includes counters, bedside tables, doorknobs, computers, phones, and bathroom surfaces.  •Clean things in your home with soap and water, but also use disinfectants on appropriate surfaces. Some cleaning products work well to kill bacteria, but not viruses, so it's important to check labels.

## 2020-12-11 NOTE — DIETITIAN INITIAL EVALUATION ADULT. - PERTINENT LABORATORY DATA
(12/11) WBC 4.22, RBC 4.14, Hg 11.9, Hct 37.6 POCT glu 215  (12/10) BUN 32, creat 1.9, glu 175, AST 97, ALT 64, eGFR 36

## 2020-12-11 NOTE — DISCHARGE NOTE PROVIDER - HOSPITAL COURSE
66 yo M PMHx DM II, HTN, HLD, glaucoma, renal mass s/p right nephrectomy, nephrolithiasis, CKD III, and RLE Cellulitis was brought in by EMS for evaluation of altered mental status. As per EMR, patient developed chills and shortness of breath (which he attributed to the shivering) for about one hour. After that he became confused and doesn't remember anything until he received o2 in the ER. Upon EMS arrival, patient was found to be hypoxic to low 80s. He has had 2 negative covid tests and was found to have staph aureus in blood in addition to GNR   he was started on antibiotic  MRI of the right foot done and showed no ostoemylitis   TTE done showed no vegetation   CHEKIH done and showed     BLood cx are negative now  the patient is stable and ready for dc. Will be discharged on iv antibiotics        64 yo M PMHx DM II, HTN, HLD, glaucoma, renal mass s/p right nephrectomy, nephrolithiasis, CKD III, and RLE Cellulitis was brought in by EMS for evaluation of altered mental status. As per EMR, patient developed chills and shortness of breath (which he attributed to the shivering) for about one hour. After that he became confused and doesn't remember anything until he received o2 in the ER. Upon EMS arrival, patient was found to be hypoxic to low 80s. He has had 2 negative covid tests and was found to have staph aureus in blood.    #Sepsis due to mssa bacteremia    -dental extraction done  - cefepime switched back to cefazolin and flagyl till 5 January  - r/o osteomylitis--> mri negative   - tte was negative      # Covid 19 positive 12/10    - on room air  - BMI 53;  - monitor O2 for now         # Acute hypoxic respiratory failure resolved  - resolved  - perhaps related to sepsis  - v/q scan negative for PE (was on heparin gtt but was d/c)    # Callus removed 3 weeks ago    - Still oozing blood.   - podiatry consulted, recs: MRI foot, patient refusing MRI to identify extent of bone infection, will f/u tomorrow with patient decision and if patient agress will proceed with sx planning of amputation of R hallux  mri to r/o osteomylitis-> negative  - wound care      # DM II    - c/w insulin  - FS controlled  - goal 110-180    # HTN    - controlled  - c/w amlodipine, losartan    # HLD    -c/w atorvastatin    # Glaucoma    - continue with timolol    # CKD III    - s/p R nephrectomy  - stable at baseline  - Renally adjust meds  - Avoid nephrotoxic meds  - Monitor BMP

## 2020-12-11 NOTE — DIETITIAN INITIAL EVALUATION ADULT. - RD TO REMAIN AVAILABLE
RD to monitor diet roder, energy intake, body composition, NFPF, electrolyte/renal profile, glucose profile/yes

## 2020-12-11 NOTE — PRE-ANESTHESIA EVALUATION ADULT - HEIGHT IN INCHES
Called patient left a message that her appointment for 8/28/18 at 1 am with dr Fab Wong is cancelled he will not be in the office is for a 6 mo appointment left a message for her to call the office back to make another appointment 11

## 2020-12-11 NOTE — PROGRESS NOTE ADULT - ASSESSMENT
64 yo M PMHx DM II, HTN, HLD, glaucoma, renal mass s/p right nephrectomy, nephrolithiasis, CKD III, and RLE Cellulitis was brought in by EMS for evaluation of altered mental status. As per EMR, patient developed chills and shortness of breath (which he attributed to the shivering) for about one hour. After that he became confused and doesn't remember anything until he received o2 in the ER.     Sepsis on admission  Metabolic encephalopathy on admission  MSSA bactremia / GNR in the blood  R Hallux ulcer  Morbid obesity / Likely ISIDRA  DM-2 / HTN / DL  CKD-3  H/O Renal mass S/P Nephrectomy  H/O chronic DVT          PLAN:    ·	Scheduled for CHEIKH today  ·	Cont IV Cefepime and IV Flagyl for now. Will discuss plan of care with ID after CHEIKH  ·	MRI foot reviewed. No OM  ·	ECHO reviewed. EF is 66%. No valvular endocarditis  ·	Check repeat blood cxs.   ·	Monitor FS. Cont Insulin.   ·	Cont Losartan 100 mg po daily and increase Amlodipine to 10 mg po daily.   ·	Monitor renal function  ·	S/P tooth extraction by dental surgery. Out pt F/U with dental after discharge  ·	Cont his other meds. 64 yo M PMHx DM II, HTN, HLD, glaucoma, renal mass s/p right nephrectomy, nephrolithiasis, CKD III, and RLE Cellulitis was brought in by EMS for evaluation of altered mental status. As per EMR, patient developed chills and shortness of breath (which he attributed to the shivering) for about one hour. After that he became confused and doesn't remember anything until he received o2 in the ER.     Sepsis on admission  Metabolic encephalopathy on admission  MSSA bactremia / GNR in the blood  R Hallux ulcer  Morbid obesity / Likely ISIDRA  DM-2 / HTN / DL  CKD-3  H/O Renal mass S/P Nephrectomy  H/O chronic DVT          PLAN:    ·	Scheduled for CHEIKH today  ·	Cont IV Cefazoline and IV Flagyl for now. Will discuss plan of care with ID after CHEIKH  ·	MRI foot reviewed. No OM  ·	ECHO reviewed. EF is 66%. No valvular endocarditis  ·	Check repeat blood cxs.   ·	Monitor FS. Cont Insulin.   ·	Cont Losartan 100 mg po daily and increase Amlodipine to 10 mg po daily.   ·	Monitor renal function  ·	S/P tooth extraction by dental surgery. Out pt F/U with dental after discharge  ·	Cont his other meds. 64 yo M PMHx DM II, HTN, HLD, glaucoma, renal mass s/p right nephrectomy, nephrolithiasis, CKD III, and RLE Cellulitis was brought in by EMS for evaluation of altered mental status. As per EMR, patient developed chills and shortness of breath (which he attributed to the shivering) for about one hour. After that he became confused and doesn't remember anything until he received o2 in the ER.     Sepsis on admission  Metabolic encephalopathy on admission  MSSA bactremia / GNR in the blood  R Hallux ulcer  Morbid obesity / Likely ISIDRA  DM-2 / HTN / DL  CKD-3  H/O Renal mass S/P Nephrectomy  H/O chronic DVT          PLAN:    ·	Scheduled for CHEIKH today  ·	Cont IV Cefazoline and IV Flagyl for now. Will discuss plan of care with ID after CHEIKH  ·	MRI foot reviewed. No OM  ·	ECHO reviewed. EF is 66%. No valvular endocarditis  ·	Check repeat blood cxs.   ·	Monitor FS. Cont Insulin.   ·	Cont Losartan 100 mg po daily and increase Amlodipine to 10 mg po daily.   ·	Monitor renal function  ·	S/P tooth extraction by dental surgery. Out pt F/U with dental after discharge  ·	Cont his other meds.     ADDENDUM:    Pt COVID test came back positive    1. Sepsis due to COVID-19 infection  2. Bactremia    Will cont current management.  ID F/U

## 2020-12-11 NOTE — PROGRESS NOTE ADULT - SUBJECTIVE AND OBJECTIVE BOX
JOSHUA HAM  65y Male    CHIEF COMPLAINT:    Patient is a 65y old  Male who presents with a chief complaint of SOB (11 Dec 2020 10:46)      INTERVAL HPI/OVERNIGHT EVENTS:    Patient seen and examined. Complains that he is still spiking low grade temp. No chills. No cough or sob. No urinary complaints. Scheduled for CHEIKH today.     ROS: All other systems are negative.    Vital Signs:    T(F): 99.3 (20 @ 05:56), Max: 99.3 (20 @ 05:56)  HR: 82 (20 @ 08:02) (75 - 82)  BP: 163/72 (20 @ 08:02) (141/60 - 163/72)  RR: 19 (20 @ 08:02) (18 - 19)  SpO2: 96% (12-10-20 @ 20:31) (96% - 96%)  I&O's Summary    10 Dec 2020 07:01  -  11 Dec 2020 07:00  --------------------------------------------------------  IN: 940 mL / OUT: 0 mL / NET: 940 mL      Daily Height in cm: 180.34 (11 Dec 2020 08:02)    Daily Weight in k.4 (11 Dec 2020 05:56)  CAPILLARY BLOOD GLUCOSE      POCT Blood Glucose.: 215 mg/dL (11 Dec 2020 07:55)  POCT Blood Glucose.: 156 mg/dL (10 Dec 2020 21:35)  POCT Blood Glucose.: 161 mg/dL (10 Dec 2020 16:30)      PHYSICAL EXAM:    GENERAL:  NAD  SKIN: No rashes or lesions  HENT: Atraumatic Normocephalic. PERRL. Moist membranes.  NECK: Supple, No JVD. No lymphadenopathy.  PULMONARY: CTA B/L. No wheezing. No rales  CVS: Normal S1, S2. Rate and Rhythm are regular. No murmurs.  ABDOMEN/GI: Soft, Nontender, Nondistended; BS present  EXTREMITIES: Peripheral pulses intact. No edema B/L LE.  NEUROLOGIC:  No motor or sensory deficit.  PSYCH: Alert & oriented x 3    Consultant(s) Notes Reviewed:  [x ] YES  [ ] NO  Care Discussed with Consultants/Other Providers [ x] YES  [ ] NO    EKG reviewed  Telemetry reviewed    LABS:                        11.9   4.22  )-----------( 189      ( 11 Dec 2020 05:19 )             37.6     12-10    134<L>  |  104  |  32<H>  ----------------------------<  175<H>   Creatinine Trend: 1.9<--, 2.0<--, 1.7<--, 1.9<--, 1.9<--, 2.1<--  4.7   |  17  |  1.9<H>    Ca    8.5      10 Dec 2020 04:30    TPro  6.7  /  Alb  3.3<L>  /  TBili  0.4  /  DBili  x   /  AST  97<H>  /  ALT  64<H>  /  AlkPhos  65  12-10        Trop 0.06, CKMB --, CK --, 20 @ 06:11      Culture - Blood (collected 09 Dec 2020 17:47)  Source: .Blood None  Preliminary Report (11 Dec 2020 01:02):    No growth to date.        RADIOLOGY & ADDITIONAL TESTS:      Imaging or report Personally Reviewed:  [ ] YES  [ ] NO    Medications:  Standing  amLODIPine   Tablet 5 milliGRAM(s) Oral daily  atorvastatin 80 milliGRAM(s) Oral at bedtime  ceFAZolin   IVPB 2000 milliGRAM(s) IV Intermittent every 8 hours  chlorhexidine 4% Liquid 1 Application(s) Topical <User Schedule>  dextrose 40% Gel 15 Gram(s) Oral once  dextrose 5%. 1000 milliLiter(s) IV Continuous <Continuous>  dextrose 5%. 1000 milliLiter(s) IV Continuous <Continuous>  dextrose 50% Injectable 25 Gram(s) IV Push once  dextrose 50% Injectable 12.5 Gram(s) IV Push once  dextrose 50% Injectable 25 Gram(s) IV Push once  dorzolamide 2% Ophthalmic Solution 1 Drop(s) Right EYE <User Schedule>  glucagon  Injectable 1 milliGRAM(s) IntraMuscular once  guaiFENesin ER 1200 milliGRAM(s) Oral every 12 hours  heparin   Injectable 5000 Unit(s) SubCutaneous every 8 hours  insulin glargine Injectable (LANTUS) 40 Unit(s) SubCutaneous at bedtime  insulin lispro (ADMELOG) corrective regimen sliding scale   SubCutaneous three times a day before meals  insulin lispro Injectable (ADMELOG) 14 Unit(s) SubCutaneous three times a day before meals  latanoprost 0.005% Ophthalmic Solution 1 Drop(s) Right EYE at bedtime  losartan 100 milliGRAM(s) Oral daily  metroNIDAZOLE    Tablet 500 milliGRAM(s) Oral every 8 hours  pantoprazole    Tablet 40 milliGRAM(s) Oral before breakfast  timolol 0.5% Solution 1 Drop(s) Right EYE every 24 hours    PRN Meds  acetaminophen   Tablet .. 650 milliGRAM(s) Oral every 6 hours PRN      Case discussed with resident    Care discussed with pt/family

## 2020-12-11 NOTE — DISCHARGE NOTE PROVIDER - CARE PROVIDER_API CALL
Chris Tong  PODIATRIC MEDICINE  [address] [place] [address]  Phone: [phone]  Fax: [phone]  Follow Up Time: 2 weeks    Alexander Pierce)  Internal Medicine  [address] [place] [address]  Phone: [phone]  Fax: [phone]  Follow Up Time: 2 weeks

## 2020-12-11 NOTE — DISCHARGE NOTE PROVIDER - CARE PROVIDERS DIRECT ADDRESSES
[email or web address]

## 2020-12-11 NOTE — DIETITIAN INITIAL EVALUATION ADULT. - OTHER INFO
P/w: SOB. Acute hypoxic respiratory failure resolved. Sepsis due to mssa bacteremia: rich recommended --> will be done today, dental extraction done.  Callus removed 3 weeks ago: podiatry following. DM2: insulin regimen. HTN controlled. CKD III: at baseline.

## 2020-12-12 LAB
ALBUMIN SERPL ELPH-MCNC: 3.4 G/DL — LOW (ref 3.5–5.2)
ALP SERPL-CCNC: 62 U/L — SIGNIFICANT CHANGE UP (ref 30–115)
ALT FLD-CCNC: 75 U/L — HIGH (ref 0–41)
ANION GAP SERPL CALC-SCNC: 14 MMOL/L — SIGNIFICANT CHANGE UP (ref 7–14)
AST SERPL-CCNC: 145 U/L — HIGH (ref 0–41)
BASOPHILS # BLD AUTO: 0.02 K/UL — SIGNIFICANT CHANGE UP (ref 0–0.2)
BASOPHILS NFR BLD AUTO: 0.6 % — SIGNIFICANT CHANGE UP (ref 0–1)
BILIRUB SERPL-MCNC: 0.3 MG/DL — SIGNIFICANT CHANGE UP (ref 0.2–1.2)
BLD GP AB SCN SERPL QL: SIGNIFICANT CHANGE UP
BUN SERPL-MCNC: 36 MG/DL — HIGH (ref 10–20)
CALCIUM SERPL-MCNC: 8.1 MG/DL — LOW (ref 8.5–10.1)
CHLORIDE SERPL-SCNC: 105 MMOL/L — SIGNIFICANT CHANGE UP (ref 98–110)
CO2 SERPL-SCNC: 18 MMOL/L — SIGNIFICANT CHANGE UP (ref 17–32)
CREAT SERPL-MCNC: 2.1 MG/DL — HIGH (ref 0.7–1.5)
D DIMER BLD IA.RAPID-MCNC: 292 NG/ML DDU — HIGH (ref 0–230)
EOSINOPHIL # BLD AUTO: 0.03 K/UL — SIGNIFICANT CHANGE UP (ref 0–0.7)
EOSINOPHIL NFR BLD AUTO: 0.8 % — SIGNIFICANT CHANGE UP (ref 0–8)
GLUCOSE BLDC GLUCOMTR-MCNC: 171 MG/DL — HIGH (ref 70–99)
GLUCOSE BLDC GLUCOMTR-MCNC: 194 MG/DL — HIGH (ref 70–99)
GLUCOSE BLDC GLUCOMTR-MCNC: 227 MG/DL — HIGH (ref 70–99)
GLUCOSE BLDC GLUCOMTR-MCNC: 238 MG/DL — HIGH (ref 70–99)
GLUCOSE SERPL-MCNC: 194 MG/DL — HIGH (ref 70–99)
HCT VFR BLD CALC: 37.7 % — LOW (ref 42–52)
HGB BLD-MCNC: 12.2 G/DL — LOW (ref 14–18)
IMM GRANULOCYTES NFR BLD AUTO: 1.4 % — HIGH (ref 0.1–0.3)
LYMPHOCYTES # BLD AUTO: 1.08 K/UL — LOW (ref 1.2–3.4)
LYMPHOCYTES # BLD AUTO: 30.4 % — SIGNIFICANT CHANGE UP (ref 20.5–51.1)
MAGNESIUM SERPL-MCNC: 2 MG/DL — SIGNIFICANT CHANGE UP (ref 1.8–2.4)
MCHC RBC-ENTMCNC: 29.2 PG — SIGNIFICANT CHANGE UP (ref 27–31)
MCHC RBC-ENTMCNC: 32.4 G/DL — SIGNIFICANT CHANGE UP (ref 32–37)
MCV RBC AUTO: 90.2 FL — SIGNIFICANT CHANGE UP (ref 80–94)
MONOCYTES # BLD AUTO: 0.42 K/UL — SIGNIFICANT CHANGE UP (ref 0.1–0.6)
MONOCYTES NFR BLD AUTO: 11.8 % — HIGH (ref 1.7–9.3)
NEUTROPHILS # BLD AUTO: 1.95 K/UL — SIGNIFICANT CHANGE UP (ref 1.4–6.5)
NEUTROPHILS NFR BLD AUTO: 55 % — SIGNIFICANT CHANGE UP (ref 42.2–75.2)
NRBC # BLD: 0 /100 WBCS — SIGNIFICANT CHANGE UP (ref 0–0)
PLATELET # BLD AUTO: 177 K/UL — SIGNIFICANT CHANGE UP (ref 130–400)
POTASSIUM SERPL-MCNC: 4.7 MMOL/L — SIGNIFICANT CHANGE UP (ref 3.5–5)
POTASSIUM SERPL-SCNC: 4.7 MMOL/L — SIGNIFICANT CHANGE UP (ref 3.5–5)
PROT SERPL-MCNC: 6.5 G/DL — SIGNIFICANT CHANGE UP (ref 6–8)
RBC # BLD: 4.18 M/UL — LOW (ref 4.7–6.1)
RBC # FLD: 14.3 % — SIGNIFICANT CHANGE UP (ref 11.5–14.5)
SODIUM SERPL-SCNC: 137 MMOL/L — SIGNIFICANT CHANGE UP (ref 135–146)
WBC # BLD: 3.55 K/UL — LOW (ref 4.8–10.8)
WBC # FLD AUTO: 3.55 K/UL — LOW (ref 4.8–10.8)

## 2020-12-12 PROCEDURE — 71045 X-RAY EXAM CHEST 1 VIEW: CPT | Mod: 26

## 2020-12-12 PROCEDURE — 99233 SBSQ HOSP IP/OBS HIGH 50: CPT

## 2020-12-12 RX ADMIN — Medication 100 MILLIGRAM(S): at 06:08

## 2020-12-12 RX ADMIN — Medication 1200 MILLIGRAM(S): at 06:00

## 2020-12-12 RX ADMIN — ATORVASTATIN CALCIUM 80 MILLIGRAM(S): 80 TABLET, FILM COATED ORAL at 21:43

## 2020-12-12 RX ADMIN — Medication 2: at 11:51

## 2020-12-12 RX ADMIN — Medication 650 MILLIGRAM(S): at 06:02

## 2020-12-12 RX ADMIN — PANTOPRAZOLE SODIUM 40 MILLIGRAM(S): 20 TABLET, DELAYED RELEASE ORAL at 06:02

## 2020-12-12 RX ADMIN — Medication 650 MILLIGRAM(S): at 00:45

## 2020-12-12 RX ADMIN — LOSARTAN POTASSIUM 100 MILLIGRAM(S): 100 TABLET, FILM COATED ORAL at 06:00

## 2020-12-12 RX ADMIN — LATANOPROST 1 DROP(S): 0.05 SOLUTION/ DROPS OPHTHALMIC; TOPICAL at 21:43

## 2020-12-12 RX ADMIN — Medication 1200 MILLIGRAM(S): at 17:59

## 2020-12-12 RX ADMIN — Medication 14 UNIT(S): at 11:51

## 2020-12-12 RX ADMIN — Medication 100 MILLIGRAM(S): at 15:34

## 2020-12-12 RX ADMIN — HEPARIN SODIUM 5000 UNIT(S): 5000 INJECTION INTRAVENOUS; SUBCUTANEOUS at 21:43

## 2020-12-12 RX ADMIN — Medication 1: at 18:01

## 2020-12-12 RX ADMIN — DORZOLAMIDE HYDROCHLORIDE 1 DROP(S): 20 SOLUTION/ DROPS OPHTHALMIC at 06:01

## 2020-12-12 RX ADMIN — DORZOLAMIDE HYDROCHLORIDE 1 DROP(S): 20 SOLUTION/ DROPS OPHTHALMIC at 18:00

## 2020-12-12 RX ADMIN — Medication 500 MILLIGRAM(S): at 21:43

## 2020-12-12 RX ADMIN — HEPARIN SODIUM 5000 UNIT(S): 5000 INJECTION INTRAVENOUS; SUBCUTANEOUS at 06:01

## 2020-12-12 RX ADMIN — Medication 100 MILLIGRAM(S): at 21:44

## 2020-12-12 RX ADMIN — Medication 500 MILLIGRAM(S): at 15:34

## 2020-12-12 RX ADMIN — AMLODIPINE BESYLATE 10 MILLIGRAM(S): 2.5 TABLET ORAL at 06:00

## 2020-12-12 RX ADMIN — HEPARIN SODIUM 5000 UNIT(S): 5000 INJECTION INTRAVENOUS; SUBCUTANEOUS at 15:34

## 2020-12-12 RX ADMIN — CHLORHEXIDINE GLUCONATE 1 APPLICATION(S): 213 SOLUTION TOPICAL at 06:00

## 2020-12-12 RX ADMIN — INSULIN GLARGINE 40 UNIT(S): 100 INJECTION, SOLUTION SUBCUTANEOUS at 21:29

## 2020-12-12 RX ADMIN — Medication 500 MILLIGRAM(S): at 06:00

## 2020-12-12 RX ADMIN — Medication 1 DROP(S): at 17:59

## 2020-12-12 RX ADMIN — Medication 14 UNIT(S): at 18:01

## 2020-12-12 NOTE — PROGRESS NOTE ADULT - ASSESSMENT
64 yo M PMHx DM II, HTN, HLD, glaucoma, renal mass s/p right nephrectomy, nephrolithiasis, CKD III, and RLE Cellulitis was brought in by EMS for evaluation of altered mental status. As per EMR, patient developed chills and shortness of breath (which he attributed to the shivering) for about one hour. After that he became confused and doesn't remember anything until he received o2 in the ER. Upon EMS arrival, patient was found to be hypoxic to low 80s. He has had 2 negative covid tests and was found to have staph aureus in blood.    #Sepsis due to mssa bacteremia    -dental extraction done  - cefepime switched back to cefazolin  - r/o osteomylitis--> mri negative   - tte was negative    # Covid 19 positive 12/10    - on room air  - had fever last night  - BMI 53;  - repeat inflammatory markers; CXR  - Follow up Id recs. monitor O2 for now         # Acute hypoxic respiratory failure resolved  - resolved  - perhaps related to sepsis  - v/q scan negative for PE (was on heparin gtt but was d/c)    # Callus removed 3 weeks ago    - Still oozing blood.   - podiatry consulted, recs: MRI foot, patient refusing MRI to identify extent of bone infection, will f/u tomorrow with patient decision and if patient agress will proceed with sx planning of amputation of R hallux  mri to r/o osteomylitis-> negative      # DM II    - c/w insulin  - FS controlled  - goal 110-180    # HTN    - controlled  - c/w amlodipine, losartan    # HLD    -c/w atorvastatin    # Glaucoma    - continue with timolol    # CKD III    - s/p R nephrectomy  - stable at baseline  - Renally adjust meds  - Avoid nephrotoxic meds  - Monitor BMP    Diet DASH  DVT px heparin SC  GI ppx pantoprazole  Full Code

## 2020-12-12 NOTE — PROGRESS NOTE ADULT - SUBJECTIVE AND OBJECTIVE BOX
24H events:    Patient is a 65y old Male who presents with a chief complaint of SOB (11 Dec 2020 18:11)    Primary diagnosis of Sepsis       Today is hospital day 8d. This morning patient was seen and examined at bedside, resting comfortably in bed.    patient is sating 95% on room air  he reports dry cough  No major respiratory symptoms  Had fever overnight  No abdominal pain or diarrhea  PAST MEDICAL & SURGICAL HISTORY  HLD (hyperlipidemia)    CKD (chronic kidney disease)    DVT (deep venous thrombosis)    Glaucoma    HTN (hypertension)    Diabetes mellitus    H/O right nephrectomy      SOCIAL HISTORY:  Social History:  Living Situation: Lives with daughters  Occupation: Retired  Tobacco Use: Denies  Alcohol Use: Denies  Drug Use: Denies (04 Dec 2020 08:21)      ALLERGIES:  No Known Allergies    MEDICATIONS:  STANDING MEDICATIONS  amLODIPine   Tablet 10 milliGRAM(s) Oral daily  atorvastatin 80 milliGRAM(s) Oral at bedtime  ceFAZolin   IVPB 2000 milliGRAM(s) IV Intermittent every 8 hours  chlorhexidine 4% Liquid 1 Application(s) Topical <User Schedule>  dextrose 40% Gel 15 Gram(s) Oral once  dextrose 5%. 1000 milliLiter(s) IV Continuous <Continuous>  dextrose 5%. 1000 milliLiter(s) IV Continuous <Continuous>  dextrose 50% Injectable 25 Gram(s) IV Push once  dextrose 50% Injectable 12.5 Gram(s) IV Push once  dextrose 50% Injectable 25 Gram(s) IV Push once  dorzolamide 2% Ophthalmic Solution 1 Drop(s) Right EYE <User Schedule>  glucagon  Injectable 1 milliGRAM(s) IntraMuscular once  guaiFENesin ER 1200 milliGRAM(s) Oral every 12 hours  heparin   Injectable 5000 Unit(s) SubCutaneous every 8 hours  insulin glargine Injectable (LANTUS) 40 Unit(s) SubCutaneous at bedtime  insulin lispro (ADMELOG) corrective regimen sliding scale   SubCutaneous three times a day before meals  insulin lispro Injectable (ADMELOG) 14 Unit(s) SubCutaneous three times a day before meals  latanoprost 0.005% Ophthalmic Solution 1 Drop(s) Right EYE at bedtime  losartan 100 milliGRAM(s) Oral daily  metroNIDAZOLE    Tablet 500 milliGRAM(s) Oral every 8 hours  pantoprazole    Tablet 40 milliGRAM(s) Oral before breakfast  timolol 0.5% Solution 1 Drop(s) Right EYE every 24 hours    PRN MEDICATIONS  acetaminophen   Tablet .. 650 milliGRAM(s) Oral every 6 hours PRN    VITALS:   T(F): 98.6  HR: 82  BP: 143/71  RR: 18  SpO2: 95%    PHYSICAL EXAM:  GEN: No acute distress, obese  LUNGS: Clear to auscultation bilaterally   HEART: S1/S2 present. RRR.   ABD: Soft, non-tender, non-distended. Bowel sounds present  EXT: NC/NC/NE/2+PP/KEARNEY/Skin Intact.   NEURO: AAOX3    LABS:                        11.9   4.22  )-----------( 189      ( 11 Dec 2020 05:19 )             37.6                     Culture - Blood (collected 10 Dec 2020 16:31)  Source: .Blood None  Preliminary Report (11 Dec 2020 23:02):    No growth to date.    Culture - Blood (collected 09 Dec 2020 17:47)  Source: .Blood None  Preliminary Report (11 Dec 2020 01:02):    No growth to date.          RADIOLOGY:

## 2020-12-12 NOTE — PROGRESS NOTE ADULT - ATTENDING COMMENTS
I saw and evaluated patient  by bedside, no dyspnea, c/o mild cough , tolerating diet well.    All labs, radiology studies, VS was reviewed  I have reviewed the resident's note and agree with documented findings and  plan of care. I saw and evaluated patient  by bedside, no dyspnea, c/o mild cough , remains off oxygen, saturating 97%, tolerating diet well.    All labs, radiology studies, VS was reviewed  I have reviewed the resident's note and agree with documented findings and  plan of care.  a/p:  Patient is a 64 y/o Male PMHx DM II, HTN, HLD, glaucoma, renal mass s/p right nephrectomy, nephrolithiasis, CKD III, and RLE Cellulitis was brought in by EMS for evaluation of altered mental status. As per EMR, patient developed chills and shortness of breath (which he attributed to the shivering) for about one hour. After that he became confused and doesn't remember anything until he received o2 in the ER. Upon EMS arrival, patient was found to be hypoxic to low 80s. He has had 2 negative covid tests and was found to have staph aureus in blood.    #Sepsis due to mssa bacteremia  - cefepime switched to cefazolin 2000 mg IV every 8 hours , repeated blood cxs 12/05 - negative - will complete total of 30 days course.   -added on Flagyl as per ID  - no osteomylitis--> mri negative   - tte was negative    # Covid 19 positive 12/10  -placed on airborne/contact/droplet isolation  - off oxygen , on room air-sat 97%  - monitor inflammatory markers; CXR-12/12- no significant infiltrates.      # Acute hypoxic respiratory failure resolved  - perhaps related to sepsis  - v/q scan negative for PE (was on heparin gtt but was d/c)    # Right hallux ulcer  - podiatry on board, f/up recommendations, daily dressing changes   - s/p mri of right foot on 12/09/2020 -> negative for acute osteomyelitis    # Periapical infection of # 2- was extracted by Dental Sx. on 12/10    # DM II- - c/w insulin  - FS controlled, - goal 110-180    # HTN - controlled  - c/w amlodipine, losartan    # HLD- -c/w atorvastatin    # Glaucoma-- continue with timolol    # CKD III   - s/p R nephrectomy, - stable at baseline,   - Avoid nephrotoxic meds, - Monitor BMP    # Morbid obesity- BMI - 53.8- outpatient weight loss tx, outpatient polysomnography study.    Daily DVT proph.- Heparin 5000 units subc . every 8 hours    #Progress Note Handoff: continue clinical observation for pulmonary symptoms, IV abx, picc line placement for long term IV abx. tx.-on monday  Family discussion: medical plan of tx. was d/w pt. by bedside Disposition: Home___once medically stable

## 2020-12-13 LAB
CRP SERPL-MCNC: 1.78 MG/DL — HIGH (ref 0–0.4)
FERRITIN SERPL-MCNC: 1525 NG/ML — HIGH (ref 30–400)
GLUCOSE BLDC GLUCOMTR-MCNC: 170 MG/DL — HIGH (ref 70–99)
GLUCOSE BLDC GLUCOMTR-MCNC: 179 MG/DL — HIGH (ref 70–99)
GLUCOSE BLDC GLUCOMTR-MCNC: 202 MG/DL — HIGH (ref 70–99)
GLUCOSE BLDC GLUCOMTR-MCNC: 210 MG/DL — HIGH (ref 70–99)
HCT VFR BLD CALC: 35.8 % — LOW (ref 42–52)
HGB BLD-MCNC: 11.3 G/DL — LOW (ref 14–18)
MCHC RBC-ENTMCNC: 28.5 PG — SIGNIFICANT CHANGE UP (ref 27–31)
MCHC RBC-ENTMCNC: 31.6 G/DL — LOW (ref 32–37)
MCV RBC AUTO: 90.2 FL — SIGNIFICANT CHANGE UP (ref 80–94)
NRBC # BLD: 0 /100 WBCS — SIGNIFICANT CHANGE UP (ref 0–0)
PLATELET # BLD AUTO: 166 K/UL — SIGNIFICANT CHANGE UP (ref 130–400)
PROCALCITONIN SERPL-MCNC: 0.61 NG/ML — HIGH (ref 0.02–0.1)
RBC # BLD: 3.97 M/UL — LOW (ref 4.7–6.1)
RBC # FLD: 14.2 % — SIGNIFICANT CHANGE UP (ref 11.5–14.5)
WBC # BLD: 3.64 K/UL — LOW (ref 4.8–10.8)
WBC # FLD AUTO: 3.64 K/UL — LOW (ref 4.8–10.8)

## 2020-12-13 PROCEDURE — 99233 SBSQ HOSP IP/OBS HIGH 50: CPT

## 2020-12-13 RX ADMIN — HEPARIN SODIUM 5000 UNIT(S): 5000 INJECTION INTRAVENOUS; SUBCUTANEOUS at 21:06

## 2020-12-13 RX ADMIN — Medication 500 MILLIGRAM(S): at 21:06

## 2020-12-13 RX ADMIN — Medication 1: at 08:35

## 2020-12-13 RX ADMIN — Medication 14 UNIT(S): at 11:46

## 2020-12-13 RX ADMIN — LATANOPROST 1 DROP(S): 0.05 SOLUTION/ DROPS OPHTHALMIC; TOPICAL at 21:08

## 2020-12-13 RX ADMIN — Medication 1: at 17:05

## 2020-12-13 RX ADMIN — DORZOLAMIDE HYDROCHLORIDE 1 DROP(S): 20 SOLUTION/ DROPS OPHTHALMIC at 17:06

## 2020-12-13 RX ADMIN — Medication 100 MILLIGRAM(S): at 13:37

## 2020-12-13 RX ADMIN — Medication 650 MILLIGRAM(S): at 00:51

## 2020-12-13 RX ADMIN — Medication 1200 MILLIGRAM(S): at 05:17

## 2020-12-13 RX ADMIN — Medication 2: at 11:46

## 2020-12-13 RX ADMIN — ATORVASTATIN CALCIUM 80 MILLIGRAM(S): 80 TABLET, FILM COATED ORAL at 21:06

## 2020-12-13 RX ADMIN — Medication 1200 MILLIGRAM(S): at 17:07

## 2020-12-13 RX ADMIN — Medication 1 DROP(S): at 17:06

## 2020-12-13 RX ADMIN — HEPARIN SODIUM 5000 UNIT(S): 5000 INJECTION INTRAVENOUS; SUBCUTANEOUS at 13:37

## 2020-12-13 RX ADMIN — Medication 650 MILLIGRAM(S): at 01:30

## 2020-12-13 RX ADMIN — LOSARTAN POTASSIUM 100 MILLIGRAM(S): 100 TABLET, FILM COATED ORAL at 05:17

## 2020-12-13 RX ADMIN — Medication 500 MILLIGRAM(S): at 13:37

## 2020-12-13 RX ADMIN — Medication 14 UNIT(S): at 08:35

## 2020-12-13 RX ADMIN — CHLORHEXIDINE GLUCONATE 1 APPLICATION(S): 213 SOLUTION TOPICAL at 05:18

## 2020-12-13 RX ADMIN — Medication 650 MILLIGRAM(S): at 17:06

## 2020-12-13 RX ADMIN — INSULIN GLARGINE 40 UNIT(S): 100 INJECTION, SOLUTION SUBCUTANEOUS at 21:51

## 2020-12-13 RX ADMIN — DORZOLAMIDE HYDROCHLORIDE 1 DROP(S): 20 SOLUTION/ DROPS OPHTHALMIC at 05:18

## 2020-12-13 RX ADMIN — HEPARIN SODIUM 5000 UNIT(S): 5000 INJECTION INTRAVENOUS; SUBCUTANEOUS at 05:18

## 2020-12-13 RX ADMIN — AMLODIPINE BESYLATE 10 MILLIGRAM(S): 2.5 TABLET ORAL at 05:18

## 2020-12-13 RX ADMIN — Medication 100 MILLIGRAM(S): at 05:18

## 2020-12-13 RX ADMIN — PANTOPRAZOLE SODIUM 40 MILLIGRAM(S): 20 TABLET, DELAYED RELEASE ORAL at 05:19

## 2020-12-13 RX ADMIN — Medication 500 MILLIGRAM(S): at 05:17

## 2020-12-13 RX ADMIN — Medication 100 MILLIGRAM(S): at 21:09

## 2020-12-13 NOTE — PROGRESS NOTE ADULT - SUBJECTIVE AND OBJECTIVE BOX
LÓPEZ HAMATORE  [facility] T2-3A 017 A ([facility] T[facility])            Patient was evaluated and examined  by bedside, c/o mild cough, no hypoxia, spiked fever at night          REVIEW OF SYSTEMS:  please see pertinent positives mentioned above, all other 12 ROS negative      T(C): , Max: 37.8 (12-13-20 @ 00:45)  HR: 74 (12-13-20 @ 09:00)  BP: 140/65 (12-13-20 @ 09:00)  RR: 18 (12-13-20 @ 09:00)  SpO2: 93% (12-13-20 @ 06:49)  CAPILLARY BLOOD GLUCOSE      POCT Blood Glucose.: 202 mg/dL (13 Dec 2020 11:27)  POCT Blood Glucose.: 179 mg/dL (13 Dec 2020 08:27)  POCT Blood Glucose.: 194 mg/dL (12 Dec 2020 21:06)  POCT Blood Glucose.: 171 mg/dL (12 Dec 2020 17:21)      PHYSICAL EXAM:  GENERAL: NAD, AAOX3, patient is sitting  comfortably in bed, obese  HEENT: AT, NC, PERRLA, SUPPLE, NO JVD, NO CB  LUNG: CTA B/L  CVS: normal S1, S2, RRR, NO M/G/R  ABDOMEN: soft, bowel sounds present, normoactive in all 4 quadrants, non-tender, non-distended  EXT: no E/C/C, positive PP b/l extremities  NEURO: no acute focal neurological deficits  SKIN: no rash, no ecchymosis          LAB  CBC  Date: 12-13-20 @ 08:28  Mean cell Ebmkliaojz81.5  Mean cell Hemoglobin Conc31.6  Mean cell Volum 90.2  Platelet count-Automate 166  RBC Count 3.97  Red Cell Distrib Width14.2  WBC Count3.64  % Albumin, Urine--  Hematocrit 35.8  Hemoglobin 11.3  CBC  Date: 12-12-20 @ 07:21  Mean cell Ilsxsztfzy21.2  Mean cell Hemoglobin Conc32.4  Mean cell Volum 90.2  Platelet count-Automate 177  RBC Count 4.18  Red Cell Distrib Width14.3  WBC Count3.55  % Albumin, Urine--  Hematocrit 37.7  Hemoglobin 12.2  CBC  Date: 12-11-20 @ 05:19  Mean cell Quudsbzngo37.7  Mean cell Hemoglobin Conc31.6  Mean cell Volum 90.8  Platelet count-Automate 189  RBC Count 4.14  Red Cell Distrib Width14.3  WBC Count4.22  % Albumin, Urine--  Hematocrit 37.6  Hemoglobin 11.9  CBC  Date: 12-10-20 @ 04:30  Mean cell Fmlsnkylbt13.3  Mean cell Hemoglobin Conc32.3  Mean cell Volum 90.5  Platelet count-Automate 155  RBC Count 4.10  Red Cell Distrib Width14.2  WBC Count4.11  % Albumin, Urine--  Hematocrit 37.1  Hemoglobin 12.0  CBC  Date: 12-09-20 @ 06:11  Mean cell Hqgviajrzx81.3  Mean cell Hemoglobin Conc32.1  Mean cell Volum 91.3  Platelet count-Automate 160  RBC Count 3.92  Red Cell Distrib Width14.4  WBC Count4.72  % Albumin, Urine--  Hematocrit 35.8  Hemoglobin 11.5  CBC  Date: 12-07-20 @ 05:20  Mean cell Uezuvwhxua04.7  Mean cell Hemoglobin Conc32.0  Mean cell Volum 89.7  Platelet count-Automate 152  RBC Count 3.90  Red Cell Distrib Width14.2  WBC Count6.20  % Albumin, Urine--  Hematocrit 35.0  Hemoglobin 11.2    [facility]  12-12-20 @ 07:21  Blood Gas Arterial-Calcium,Ionized--  Blood Urea Nitrogen, Serum 36 mg/dL<H> [10 - 20]  Carbon Dioxide, Serum18 mmol/L [17 - 32]  Chloride, Djzpd157 mmol/L [98 - 110]  Creatinie, Serum2.1 mg/dL<H> [0.7 - 1.5]  Glucose, Uonxm966 mg/dL<H> [70 - 99]  Potassium, Serum4.7 mmol/L [3.5 - 5.0]  Sodium, Serum 137 mmol/L [135 - 146]  [facility]  12-10-20 @ 04:30  Blood Gas Arterial-Calcium,Ionized--  Blood Urea Nitrogen, Serum 32 mg/dL<H> [10 - 20]  Carbon Dioxide, Serum17 mmol/L [17 - 32]  Chloride, Lwuin842 mmol/L [98 - 110]  Creatinie, Serum1.9 mg/dL<H> [0.7 - 1.5]  Glucose, Hqiku577 mg/dL<H> [70 - 99]  Potassium, Serum4.7 mmol/L [3.5 - 5.0]  Sodium, Serum 134 mmol/L<L> [135 - 146]  [facility]  12-09-20 @ 06:11  Blood Gas Arterial-Calcium,Ionized--  Blood Urea Nitrogen, Serum 27 mg/dL<H> [10 - 20]  Carbon Dioxide, Serum18 mmol/L [17 - 32]  Chloride, Qljug998 mmol/L [98 - 110]  Creatinie, Serum2.0 mg/dL<H> [0.7 - 1.5]  Glucose, Qkkjo209 mg/dL<H> [70 - 99]  Potassium, Serum4.7 mmol/L [3.5 - 5.0]  Sodium, Serum 135 mmol/L [135 - 146]              Microbiology:    Culture - Blood (collected 12-10-20 @ 16:31)  Source: .Blood None  Preliminary Report (12-11-20 @ 23:02):    No growth to date.    Culture - Blood (collected 12-09-20 @ 17:47)  Source: .Blood None  Preliminary Report (12-11-20 @ 01:02):    No growth to date.    Culture - Blood (collected 12-06-20 @ 05:37)  Source: .Blood None  Final Report (12-11-20 @ 18:01):    No Growth Final    Culture - Blood (collected 12-05-20 @ 12:50)  Source: .Blood None  Final Report (12-10-20 @ 19:01):    No Growth Final    Culture - Blood (collected 12-04-20 @ 02:00)  Source: .Blood Blood-Peripheral  Gram Stain (12-05-20 @ 22:46):    Growth in anaerobic bottle:    Gram Positive Cocci in Clusters    Growth in aerobic bottle: Gram Positive Cocci in Clusters  Final Report (12-07-20 @ 07:26):    Growth in aerobic and anaerobic bottles: Staphylococcus aureus    "Due to technical problems, Proteus sp. will Not be reported as part of    the BCID panel until further notice"    ***Blood Panel PCR results on this specimen are available    approximately 3 hours after the Gram stain result.***    Gram stain, PCR, and/or culture results may not always    correspond due to difference in methodologies.    ************************************************************    This PCR assay was performed using [employer].    The following targets are tested for: Enterococcus,    vancomycin resistant enterococci, Listeria monocytogenes,    coagulase negative staphylococci, S. aureus,    methicillin resistant S. aureus, Streptococcus agalactiae    (Group B), S. pneumoniae, S. pyogenes (Group A),    Acinetobacter baumannii, Enterobacter cloacae, E. coli,    Klebsiella oxytoca, K. pneumoniae, Proteus sp.,    Serratia marcescens, Haemophilus influenzae,    Neisseria meningitidis, Pseudomonas aeruginosa, Candida    albicans, C. glabrata, C krusei, C parapsilosis,    C. tropicalis and the KPC resistance gene.  Organism: Blood Culture PCR  Staphylococcus aureus (12-07-20 @ 07:26)  Organism: Staphylococcus aureus (12-07-20 @ 07:26)      -  Ampicillin/Sulbactam: S <=8/4      -  Cefazolin: S <=4      -  Clindamycin: R <=0.25 This isolate is presumed to be clindamycin resistant based on detection of inducible resistance. Clindamycin may still be effective in some patients.      -  Erythromycin: R >4      -  Gentamicin: S <=1 Should not be used as monotherapy      -  Oxacillin: S <=0.25      -  Penicillin: R >8      -  RIF- Rifampin: S <=1 Should not be used as monotherapy      -  Tetra/Doxy: S <=1      -  Trimethoprim/Sulfamethoxazole: S <=0.5/9.5      -  Vancomycin: S 1      Method Type: RIYA  Organism: Blood Culture PCR (12-07-20 @ 07:26)      -  Staphylococcus aureus: Detec Any isolate of Staphylococcus aureus from a blood culture is NOT considered a contaminant.      Method Type: PCR    Culture - Blood (collected 12-04-20 @ 02:00)  Source: .Blood Blood-Peripheral  Gram Stain (12-07-20 @ 22:01):    Growth in aerobic bottle: Gram Positive Cocci in Clusters    Growth in anaerobic bottle: Gram Negative Rods  Preliminary Report (12-08-20 @ 11:50):    Growth in aerobic bottle: Staphylococcus aureus See previous culture    [id number]    Growth in anaerobic bottle: Gram Negative Rods    "Due to technical problems, Proteus sp. will Not be reported as part of    the BCID panel until further notice"    ***Blood Panel PCR results on this specimen are available    approximately 3 hours after the Gram stain result.***    Gram stain, PCR, and/or culture results may not always    correspond due to difference in methodologies.    ************************************************************    This PCR assay was performed using [employer].    The following targets are tested for: Enterococcus,    vancomycin resistant enterococci, Listeria monocytogenes,    coagulase negative staphylococci, S. aureus,    methicillinresistant S. aureus, Streptococcus agalactiae    (Group B), S. pneumoniae, S. pyogenes (Group A),    Acinetobacter baumannii, Enterobacter cloacae, E. coli,    Klebsiella oxytoca, K. pneumoniae, Proteus sp.,    Serratia marcescens, Haemophilus influenzae,    Neisseria meningitidis, Pseudomonas aeruginosa, Candida    albicans, C. glabrata, C krusei, C parapsilosis,    C. tropicalis and the KPC resistance gene.  Organism: Blood Culture PCR (12-08-20 @ 00:09)  Organism: Blood Culture PCR (12-08-20 @ 00:09)      -  Acinetobacter baumanii: Nondet      -  Candida albicans: Nondet      -  Candida glabrata: Nondet      -  Candida krusei: Nondet      -  Candida parapsilosis: Nondet      -  Candida tropicalis: Nondet      -  Coagulase negative Staphylococcus: Nondet      -  Enterobacter cloacae complex: Nondet      -  Enterococcus species: Nondet      -  Escherichia coli: Nondet      -  Haemophilus influenzae: Nondet      -  Klebsiella oxytoca: Nondet      -  Klebsiella pneumoniae: Nondet      -  Listeria monocytogenes: Nondet      -  Methicillin resistant Staphylococcus aureus (MRSA): Nondet      -  Multidrug (KPC pos) resistant organism: Nondet      -  Neisseria meningitidis: Nondet      -  Pseudomonas aeruginosa: Nondet      -  Serratia marcescens: Nondet      -  Staphylococcus aureus: Nondet      -  Streptococcus agalactiae (Group B): Nondet      -  Streptococcus pneumoniae: Nondet      -  Streptococcus pyogenes (Group A): Nondet      -  Streptococcus sp. (Not Grp A, B or S pneumoniae): Nondet      -  Vancomycin resistant Enterococcus sp.: Nondet      Method Type: PCR        Medications:  acetaminophen   Tablet .. 650 milliGRAM(s) Oral every 6 hours PRN  amLODIPine   Tablet 10 milliGRAM(s) Oral daily  atorvastatin 80 milliGRAM(s) Oral at bedtime  ceFAZolin   IVPB 2000 milliGRAM(s) IV Intermittent every 8 hours  chlorhexidine 4% Liquid 1 Application(s) Topical <User Schedule>  dextrose 40% Gel 15 Gram(s) Oral once  dextrose 5%. 1000 milliLiter(s) IV Continuous <Continuous>  dextrose 5%. 1000 milliLiter(s) IV Continuous <Continuous>  dextrose 50% Injectable 25 Gram(s) IV Push once  dextrose 50% Injectable 12.5 Gram(s) IV Push once  dextrose 50% Injectable 25 Gram(s) IV Push once  dorzolamide 2% Ophthalmic Solution 1 Drop(s) Right EYE <User Schedule>  glucagon  Injectable 1 milliGRAM(s) IntraMuscular once  guaiFENesin ER 1200 milliGRAM(s) Oral every 12 hours  heparin   Injectable 5000 Unit(s) SubCutaneous every 8 hours  insulin glargine Injectable (LANTUS) 40 Unit(s) SubCutaneous at bedtime  insulin lispro (ADMELOG) corrective regimen sliding scale   SubCutaneous three times a day before meals  insulin lispro Injectable (ADMELOG) 14 Unit(s) SubCutaneous three times a day before meals  latanoprost 0.005% Ophthalmic Solution 1 Drop(s) Right EYE at bedtime  losartan 100 milliGRAM(s) Oral daily  metroNIDAZOLE    Tablet 500 milliGRAM(s) Oral every 8 hours  pantoprazole    Tablet 40 milliGRAM(s) Oral before breakfast  timolol 0.5% Solution 1 Drop(s) Right EYE every 24 hours        Assessment and Plan:  Patient is a 66 y/o Male PMHx DM II, HTN, HLD, glaucoma, renal mass s/p right nephrectomy, nephrolithiasis, CKD III, and RLE Cellulitis was brought in by EMS for evaluation of altered mental status. As per EMR, patient developed chills and shortness of breath (which he attributed to the shivering) for about one hour. After that he became confused and doesn't remember anything until he received o2 in the ER. Upon EMS arrival, patient was found to be hypoxic to low 80s. He has had 2 negative covid tests and was found to have staph aureus in blood.    #Sepsis due to mssa bacteremia  - cefepime switched to cefazolin 2000 mg IV every 8 hours , repeated blood cxs 12/05 - negative - will complete total of 30 days course.(unless we can have inpatient CHEIKH test to be completed, with confirmation of no vegetations)  -added on Flagyl as per ID  - no osteomylitis--> mri negative   - tte was negative    # Covid 19 positive 12/10  -placed on airborne/contact/droplet isolation  - off oxygen , on room air-sat 93%  - monitor inflammatory markers; CXR-12/12- no significant infiltrates.      # Acute hypoxic respiratory failure resolved  - perhaps related to sepsis  - v/q scan negative for PE (was on heparin gtt but was d/c)    # Right hallux ulcer  - podiatry on board, f/up recommendations, daily dressing changes   - s/p mri of right foot on 12/09/2020 -> negative for acute osteomyelitis    # Periapical infection of # 2- was extracted by Dental Sx. on 12/10    # DM II- - c/w insulin  - FS controlled, - goal 110-180    # HTN - controlled  - c/w amlodipine, losartan    # HLD- -c/w atorvastatin    # Glaucoma-- continue with timolol    # CKD III   - s/p R nephrectomy, - stable at baseline,   - Avoid nephrotoxic meds, - Monitor BMP    # Morbid obesity- BMI - 53.8- outpatient weight loss tx, outpatient polysomnography study.    Daily DVT proph.- Heparin 5000 units subc . every 8 hours    #Progress Note Handoff: continue clinical observation for pulmonary symptoms, IV abx, picc line placement for long term IV abx. tx.-on monday, f/up Cardiology if they will agree to do CHEIKH next week.  Family discussion: medical plan of tx. was d/w pt. by bedside Disposition: Home___once medically stable.

## 2020-12-14 LAB
ALBUMIN SERPL ELPH-MCNC: 3.4 G/DL — LOW (ref 3.5–5.2)
ALP SERPL-CCNC: 64 U/L — SIGNIFICANT CHANGE UP (ref 30–115)
ALT FLD-CCNC: 61 U/L — HIGH (ref 0–41)
ANION GAP SERPL CALC-SCNC: 12 MMOL/L — SIGNIFICANT CHANGE UP (ref 7–14)
AST SERPL-CCNC: 201 U/L — HIGH (ref 0–41)
BASOPHILS # BLD AUTO: 0.02 K/UL — SIGNIFICANT CHANGE UP (ref 0–0.2)
BASOPHILS NFR BLD AUTO: 0.6 % — SIGNIFICANT CHANGE UP (ref 0–1)
BILIRUB SERPL-MCNC: 0.4 MG/DL — SIGNIFICANT CHANGE UP (ref 0.2–1.2)
BUN SERPL-MCNC: 39 MG/DL — HIGH (ref 10–20)
CALCIUM SERPL-MCNC: 7.9 MG/DL — LOW (ref 8.5–10.1)
CHLORIDE SERPL-SCNC: 105 MMOL/L — SIGNIFICANT CHANGE UP (ref 98–110)
CO2 SERPL-SCNC: 17 MMOL/L — SIGNIFICANT CHANGE UP (ref 17–32)
CREAT SERPL-MCNC: 2.2 MG/DL — HIGH (ref 0.7–1.5)
EOSINOPHIL # BLD AUTO: 0 K/UL — SIGNIFICANT CHANGE UP (ref 0–0.7)
EOSINOPHIL NFR BLD AUTO: 0 % — SIGNIFICANT CHANGE UP (ref 0–8)
GLUCOSE BLDC GLUCOMTR-MCNC: 155 MG/DL — HIGH (ref 70–99)
GLUCOSE BLDC GLUCOMTR-MCNC: 180 MG/DL — HIGH (ref 70–99)
GLUCOSE BLDC GLUCOMTR-MCNC: 183 MG/DL — HIGH (ref 70–99)
GLUCOSE BLDC GLUCOMTR-MCNC: 247 MG/DL — HIGH (ref 70–99)
GLUCOSE SERPL-MCNC: 249 MG/DL — HIGH (ref 70–99)
HCT VFR BLD CALC: 35.5 % — LOW (ref 42–52)
HGB BLD-MCNC: 11.6 G/DL — LOW (ref 14–18)
IMM GRANULOCYTES NFR BLD AUTO: 1.1 % — HIGH (ref 0.1–0.3)
LYMPHOCYTES # BLD AUTO: 0.76 K/UL — LOW (ref 1.2–3.4)
LYMPHOCYTES # BLD AUTO: 20.9 % — SIGNIFICANT CHANGE UP (ref 20.5–51.1)
MAGNESIUM SERPL-MCNC: 2 MG/DL — SIGNIFICANT CHANGE UP (ref 1.8–2.4)
MCHC RBC-ENTMCNC: 28.9 PG — SIGNIFICANT CHANGE UP (ref 27–31)
MCHC RBC-ENTMCNC: 32.7 G/DL — SIGNIFICANT CHANGE UP (ref 32–37)
MCV RBC AUTO: 88.3 FL — SIGNIFICANT CHANGE UP (ref 80–94)
MONOCYTES # BLD AUTO: 0.4 K/UL — SIGNIFICANT CHANGE UP (ref 0.1–0.6)
MONOCYTES NFR BLD AUTO: 11 % — HIGH (ref 1.7–9.3)
NEUTROPHILS # BLD AUTO: 2.41 K/UL — SIGNIFICANT CHANGE UP (ref 1.4–6.5)
NEUTROPHILS NFR BLD AUTO: 66.4 % — SIGNIFICANT CHANGE UP (ref 42.2–75.2)
NRBC # BLD: 0 /100 WBCS — SIGNIFICANT CHANGE UP (ref 0–0)
PLATELET # BLD AUTO: 171 K/UL — SIGNIFICANT CHANGE UP (ref 130–400)
POTASSIUM SERPL-MCNC: 5.3 MMOL/L — HIGH (ref 3.5–5)
POTASSIUM SERPL-SCNC: 5.3 MMOL/L — HIGH (ref 3.5–5)
PROT SERPL-MCNC: 6.4 G/DL — SIGNIFICANT CHANGE UP (ref 6–8)
RBC # BLD: 4.02 M/UL — LOW (ref 4.7–6.1)
RBC # FLD: 14.1 % — SIGNIFICANT CHANGE UP (ref 11.5–14.5)
SODIUM SERPL-SCNC: 134 MMOL/L — LOW (ref 135–146)
WBC # BLD: 3.63 K/UL — LOW (ref 4.8–10.8)
WBC # FLD AUTO: 3.63 K/UL — LOW (ref 4.8–10.8)

## 2020-12-14 PROCEDURE — 99233 SBSQ HOSP IP/OBS HIGH 50: CPT

## 2020-12-14 RX ORDER — SODIUM ZIRCONIUM CYCLOSILICATE 10 G/10G
5 POWDER, FOR SUSPENSION ORAL ONCE
Refills: 0 | Status: COMPLETED | OUTPATIENT
Start: 2020-12-14 | End: 2020-12-14

## 2020-12-14 RX ORDER — METRONIDAZOLE 500 MG
1 TABLET ORAL
Qty: 63 | Refills: 0
Start: 2020-12-14 | End: 2021-01-03

## 2020-12-14 RX ORDER — AMLODIPINE BESYLATE 2.5 MG/1
1 TABLET ORAL
Qty: 30 | Refills: 1
Start: 2020-12-14 | End: 2021-02-11

## 2020-12-14 RX ORDER — INSULIN GLARGINE 100 [IU]/ML
42 INJECTION, SOLUTION SUBCUTANEOUS AT BEDTIME
Refills: 0 | Status: DISCONTINUED | OUTPATIENT
Start: 2020-12-14 | End: 2020-12-15

## 2020-12-14 RX ADMIN — HEPARIN SODIUM 5000 UNIT(S): 5000 INJECTION INTRAVENOUS; SUBCUTANEOUS at 14:54

## 2020-12-14 RX ADMIN — Medication 2: at 08:20

## 2020-12-14 RX ADMIN — LOSARTAN POTASSIUM 100 MILLIGRAM(S): 100 TABLET, FILM COATED ORAL at 06:00

## 2020-12-14 RX ADMIN — HEPARIN SODIUM 5000 UNIT(S): 5000 INJECTION INTRAVENOUS; SUBCUTANEOUS at 06:02

## 2020-12-14 RX ADMIN — SODIUM ZIRCONIUM CYCLOSILICATE 5 GRAM(S): 10 POWDER, FOR SUSPENSION ORAL at 12:24

## 2020-12-14 RX ADMIN — Medication 14 UNIT(S): at 12:24

## 2020-12-14 RX ADMIN — DORZOLAMIDE HYDROCHLORIDE 1 DROP(S): 20 SOLUTION/ DROPS OPHTHALMIC at 06:02

## 2020-12-14 RX ADMIN — CHLORHEXIDINE GLUCONATE 1 APPLICATION(S): 213 SOLUTION TOPICAL at 06:01

## 2020-12-14 RX ADMIN — Medication 500 MILLIGRAM(S): at 06:00

## 2020-12-14 RX ADMIN — Medication 1200 MILLIGRAM(S): at 06:00

## 2020-12-14 RX ADMIN — Medication 500 MILLIGRAM(S): at 14:54

## 2020-12-14 RX ADMIN — PANTOPRAZOLE SODIUM 40 MILLIGRAM(S): 20 TABLET, DELAYED RELEASE ORAL at 06:12

## 2020-12-14 RX ADMIN — Medication 1: at 17:21

## 2020-12-14 RX ADMIN — INSULIN GLARGINE 42 UNIT(S): 100 INJECTION, SOLUTION SUBCUTANEOUS at 21:48

## 2020-12-14 RX ADMIN — Medication 14 UNIT(S): at 17:21

## 2020-12-14 RX ADMIN — Medication 100 MILLIGRAM(S): at 06:02

## 2020-12-14 RX ADMIN — Medication 1200 MILLIGRAM(S): at 17:20

## 2020-12-14 RX ADMIN — Medication 14 UNIT(S): at 08:20

## 2020-12-14 RX ADMIN — DORZOLAMIDE HYDROCHLORIDE 1 DROP(S): 20 SOLUTION/ DROPS OPHTHALMIC at 17:20

## 2020-12-14 RX ADMIN — Medication 1 DROP(S): at 17:20

## 2020-12-14 RX ADMIN — HEPARIN SODIUM 5000 UNIT(S): 5000 INJECTION INTRAVENOUS; SUBCUTANEOUS at 21:48

## 2020-12-14 RX ADMIN — Medication 500 MILLIGRAM(S): at 21:48

## 2020-12-14 RX ADMIN — Medication 100 MILLIGRAM(S): at 21:48

## 2020-12-14 RX ADMIN — LATANOPROST 1 DROP(S): 0.05 SOLUTION/ DROPS OPHTHALMIC; TOPICAL at 21:48

## 2020-12-14 RX ADMIN — AMLODIPINE BESYLATE 10 MILLIGRAM(S): 2.5 TABLET ORAL at 06:00

## 2020-12-14 RX ADMIN — ATORVASTATIN CALCIUM 80 MILLIGRAM(S): 80 TABLET, FILM COATED ORAL at 21:48

## 2020-12-14 RX ADMIN — Medication 1: at 12:25

## 2020-12-14 NOTE — PROGRESS NOTE ADULT - SUBJECTIVE AND OBJECTIVE BOX
24H events:    Patient is a 65y old Male who presents with a chief complaint of SOB (13 Dec 2020 13:05)    Primary diagnosis of Sepsis       Today is hospital day 10d. This morning patient was seen and examined at bedside, resting comfortably in bed.    no major events or complaint overnight  on Room air  No major symptoms except for low grade fever and mild cough    PAST MEDICAL & SURGICAL HISTORY  HLD (hyperlipidemia)    CKD (chronic kidney disease)    DVT (deep venous thrombosis)    Glaucoma    HTN (hypertension)    Diabetes mellitus    H/O right nephrectomy      SOCIAL HISTORY:  Social History:  Living Situation: Lives with daughters  Occupation: Retired  Tobacco Use: Denies  Alcohol Use: Denies  Drug Use: Denies (04 Dec 2020 08:21)      ALLERGIES:  No Known Allergies    MEDICATIONS:  STANDING MEDICATIONS  amLODIPine   Tablet 10 milliGRAM(s) Oral daily  atorvastatin 80 milliGRAM(s) Oral at bedtime  ceFAZolin   IVPB 2000 milliGRAM(s) IV Intermittent every 8 hours  chlorhexidine 4% Liquid 1 Application(s) Topical <User Schedule>  dextrose 40% Gel 15 Gram(s) Oral once  dextrose 5%. 1000 milliLiter(s) IV Continuous <Continuous>  dextrose 5%. 1000 milliLiter(s) IV Continuous <Continuous>  dextrose 50% Injectable 25 Gram(s) IV Push once  dextrose 50% Injectable 12.5 Gram(s) IV Push once  dextrose 50% Injectable 25 Gram(s) IV Push once  dorzolamide 2% Ophthalmic Solution 1 Drop(s) Right EYE <User Schedule>  glucagon  Injectable 1 milliGRAM(s) IntraMuscular once  guaiFENesin ER 1200 milliGRAM(s) Oral every 12 hours  heparin   Injectable 5000 Unit(s) SubCutaneous every 8 hours  insulin glargine Injectable (LANTUS) 42 Unit(s) SubCutaneous at bedtime  insulin lispro (ADMELOG) corrective regimen sliding scale   SubCutaneous three times a day before meals  insulin lispro Injectable (ADMELOG) 14 Unit(s) SubCutaneous three times a day before meals  latanoprost 0.005% Ophthalmic Solution 1 Drop(s) Right EYE at bedtime  losartan 100 milliGRAM(s) Oral daily  metroNIDAZOLE    Tablet 500 milliGRAM(s) Oral every 8 hours  pantoprazole    Tablet 40 milliGRAM(s) Oral before breakfast  sodium zirconium cyclosilicate 5 Gram(s) Oral once  timolol 0.5% Solution 1 Drop(s) Right EYE every 24 hours    PRN MEDICATIONS  acetaminophen   Tablet .. 650 milliGRAM(s) Oral every 6 hours PRN    VITALS:   T(F): 99.8  HR: 92  BP: 167/78  RR: 18  SpO2: 93%    PHYSICAL EXAM:  GENERAL: NAD, obese  NERVOUS SYSTEM:  Alert & Oriented X3, non focal   CHEST/LUNG: Clear to auscultation bilaterally;   HEART: Regular rate and rhythm; No murmurs, rubs, or gallops  ABDOMEN: Soft, Nontender, distended; Bowel sounds present  EXTREMITIES:No clubbing, cyanosis, or edema    LABS:                        11.6   3.63  )-----------( 171      ( 14 Dec 2020 08:04 )             35.5     12-14    134<L>  |  105  |  39<H>  ----------------------------<  249<H>  5.3<H>   |  17  |  2.2<H>    Ca    7.9<L>      14 Dec 2020 08:04  Mg     2.0     12-14    TPro  6.4  /  Alb  3.4<L>  /  TBili  0.4  /  DBili  x   /  AST  201<H>  /  ALT  61<H>  /  AlkPhos  64  12-14                  RADIOLOGY:

## 2020-12-14 NOTE — PROGRESS NOTE ADULT - ATTENDING COMMENTS
I saw and evaluated patient  by bedside, spikes fevers, very mild cough, no hypoxia , tolerating diet well.    All labs, radiology studies, VS was reviewed  I have reviewed the resident's note and agree with documented findings and  plan of care.  a/p:  Patient is a 64 y/o Male PMHx DM II, HTN, HLD, glaucoma, renal mass s/p right nephrectomy, nephrolithiasis, CKD III, and RLE Cellulitis was brought in by EMS for evaluation of altered mental status. As per EMR, patient developed chills and shortness of breath (which he attributed to the shivering) for about one hour. After that he became confused and doesn't remember anything until he received o2 in the ER. Upon EMS arrival, patient was found to be hypoxic to low 80s. He has had 2 negative covid tests and was found to have staph aureus in blood.    #Sepsis due to mssa bacteremia  - cefepime switched to cefazolin 2000 mg IV every 8 hours , repeated blood cxs 12/05 - negative - will complete total of 30 days course.(unless patient will f/up with outpatient Cardiology to complete CHEIKH test with confirmation of no vegetations)  -added on Flagyl as per ID  - no osteomylitis--> mri negative   - tte was negative  -outpatient ID f/up. weekly CBC, BMP, LFT's monitoring    # Covid 19 positive 12/10  -placed on airborne/contact/droplet isolation  - off oxygen , on room air-sat 93%  - monitor inflammatory markers; CXR-12/12- no significant infiltrates.  -self isolation for 2 weeks, supportive tx. with antitussive/antipyretic tx.      # Acute hypoxic respiratory failure -- resolved  - perhaps related to sepsis  - v/q scan negative for PE (was on heparin gtt but was d/c)    # Right hallux ulcer  - podiatry on board, f/up recommendations, daily dressing changes   - s/p mri of right foot on 12/09/2020 -> negative for acute osteomyelitis  -Patient was instructed to f/up with Podiatry specialist post d/c home    # Periapical infection of # 2- was extracted by Dental Sx. on 12/10    # DM II- - c/w insulin  - FS controlled, - goal 110-180    # HTN - controlled  - c/w amlodipine, losartan    # HLD- -c/w atorvastatin    # Glaucoma-- continue with timolol    # CKD III   - s/p R nephrectomy, - stable at baseline,   - Avoid nephrotoxic meds, - Monitor BMP    # mild Hyperkalemia- tx. with Lokelma, low potassium diet    # Morbid obesity- BMI - 53.8- outpatient weight loss tx, outpatient polysomnography study.    Daily DVT proph.- Heparin 5000 units subc . every 8 hours    #Progress Note Handoff: Pending IR  for mid line placement, medical resident had difficulties placing a line. IV abx. arrangements  Family discussion: yes Disposition: Home_ once IV abx. is being arranged.

## 2020-12-14 NOTE — PROGRESS NOTE ADULT - ASSESSMENT
64 yo M PMHx DM II, HTN, HLD, glaucoma, renal mass s/p right nephrectomy, nephrolithiasis, CKD III, and RLE Cellulitis was brought in by EMS for evaluation of altered mental status. As per EMR, patient developed chills and shortness of breath (which he attributed to the shivering) for about one hour. After that he became confused and doesn't remember anything until he received o2 in the ER. Upon EMS arrival, patient was found to be hypoxic to low 80s. He has had 2 negative covid tests and was found to have staph aureus in blood.    #Sepsis due to mssa bacteremia    -dental extraction done  - cefepime switched back to cefazolin and flagyl till 5 January  - r/o osteomylitis--> mri negative   - tte was negative  - CHEIKH not done    # Covid 19 positive 12/10    - on room air  - had fever last night  - BMI 53;  - repeat inflammatory markers; CXR  - Follow up Id recs. monitor O2 for now         # Acute hypoxic respiratory failure resolved  - resolved  - perhaps related to sepsis  - v/q scan negative for PE (was on heparin gtt but was d/c)    # Callus removed 3 weeks ago    - Still oozing blood.   - podiatry consulted, recs: MRI foot, patient refusing MRI to identify extent of bone infection, will f/u tomorrow with patient decision and if patient agress will proceed with sx planning of amputation of R hallux  mri to r/o osteomylitis-> negative      # DM II    - c/w insulin  - FS controlled  - goal 110-180    # HTN    - controlled  - c/w amlodipine, losartan    # HLD    -c/w atorvastatin    # Glaucoma    - continue with timolol    # CKD III    - s/p R nephrectomy  - stable at baseline  - Renally adjust meds  - Avoid nephrotoxic meds  - Monitor BMP    Diet DASH  DVT px heparin SC  GI ppx pantoprazole  Full Code 66 yo M PMHx DM II, HTN, HLD, glaucoma, renal mass s/p right nephrectomy, nephrolithiasis, CKD III, and RLE Cellulitis was brought in by EMS for evaluation of altered mental status. As per EMR, patient developed chills and shortness of breath (which he attributed to the shivering) for about one hour. After that he became confused and doesn't remember anything until he received o2 in the ER. Upon EMS arrival, patient was found to be hypoxic to low 80s. He has had 2 negative covid tests and was found to have staph aureus in blood.    #Sepsis due to mssa bacteremia    -dental extraction done  - cefepime switched back to cefazolin and flagyl till 5 January  - r/o osteomylitis--> mri negative   - tte was negative  - CHEIKH not done  - patient will need cefazolin till January the Fifth to be given through pic line  - consult IR for pic line     # Covid 19 positive 12/10    - on room air  - BMI 53;  - monitor O2 for now         # Acute hypoxic respiratory failure resolved  - resolved  - perhaps related to sepsis  - v/q scan negative for PE (was on heparin gtt but was d/c)    # Callus removed 3 weeks ago    - Still oozing blood.   - podiatry consulted, recs: MRI foot, patient refusing MRI to identify extent of bone infection, will f/u tomorrow with patient decision and if patient agress will proceed with sx planning of amputation of R hallux  mri to r/o osteomylitis-> negative  - wound care      # DM II    - c/w insulin  - FS controlled  - goal 110-180    # HTN    - controlled  - c/w amlodipine, losartan    # HLD    -c/w atorvastatin    # Glaucoma    - continue with timolol    # CKD III    - s/p R nephrectomy  - stable at baseline  - Renally adjust meds  - Avoid nephrotoxic meds  - Monitor BMP    Diet DASH  DVT px heparin SC  GI ppx pantoprazole  Full Code 66 yo M PMHx DM II, HTN, HLD, glaucoma, renal mass s/p right nephrectomy, nephrolithiasis, CKD III, and RLE Cellulitis was brought in by EMS for evaluation of altered mental status. As per EMR, patient developed chills and shortness of breath (which he attributed to the shivering) for about one hour. After that he became confused and doesn't remember anything until he received o2 in the ER. Upon EMS arrival, patient was found to be hypoxic to low 80s. He has had 2 negative covid tests and was found to have staph aureus in blood.    #Sepsis due to mssa bacteremia    -dental extraction done  - cefepime switched back to cefazolin and flagyl till 5 January  - r/o osteomylitis--> mri negative   - tte was negative  - CHEIKH not done  - patient will need cefazolin till January the Fifth to be given through pic line; midline placement has failed  - consult IR for pic line     # Covid 19 positive 12/10    - on room air  - BMI 53;  - monitor O2 for now         # Acute hypoxic respiratory failure resolved  - resolved  - perhaps related to sepsis  - v/q scan negative for PE (was on heparin gtt but was d/c)    # Callus removed 3 weeks ago    - Still oozing blood.   - podiatry consulted, recs: MRI foot, patient refusing MRI to identify extent of bone infection, will f/u tomorrow with patient decision and if patient agress will proceed with sx planning of amputation of R hallux  mri to r/o osteomylitis-> negative  - wound care      # DM II    - c/w insulin  - FS controlled  - goal 110-180    # HTN    - controlled  - c/w amlodipine, losartan    # HLD    -c/w atorvastatin    # Glaucoma    - continue with timolol    # CKD III    - s/p R nephrectomy  - stable at baseline  - Renally adjust meds  - Avoid nephrotoxic meds  - Monitor BMP    Diet DASH  DVT px heparin SC  GI ppx pantoprazole  Full Code

## 2020-12-14 NOTE — CHART NOTE - NSCHARTNOTEFT_GEN_A_CORE
I made rounds today with the treatment team including the hospitalist, residents,  nurses and  and discussed the patient's current medical status and discharge  planning needs, and reviewed the chart.    T(C): 37.8 (12-14-20 @ 10:21), Max: 37.8 (12-13-20 @ 17:00)  HR: 87 (12-14-20 @ 10:21) (69 - 92)  BP: 132/60 (12-14-20 @ 10:21) (132/60 - 167/78)  RR: 18 (12-14-20 @ 10:21) (18 - 18)  SpO2: 94% (12-14-20 @ 10:27) (93% - 96%)          I reached out to the patient's health care proxy/ responsible family member-           [     ]  I reached                                     and discussed the patient's medical condition,                   family concerns, and discharge planning           [     ]  I left a message with family               [   x  ]  I personally participated in rounds with the medical team and my resident and discussed the case. My resident reached                   family member/ HCP      brotherMike                          under my direction and supervision  and we reviewed the conversation.          [     ]  My resident left a message with family under my direction and supervision           [     ]   My resident attended medical rounds and called the family                [   x   ]    The following was discussed:  The patient's medical status over the past 24 hrs was reviewed, as well as oxygen needs and medication changes and labs. PE ruled out. Pulse ox 93% on RA. Since unable to get echocardiogram, may have to go home on 3 more weeks of IV antibiotics. Will need Midline. Possible discharge tomorrow.         [   x   ]   The following concerns were raised: none          [     ] I spent 5-10 minutes on the above discussing medical issues with team members and family and/ or my resident    [   x  ] I spent 11-20 minutes on the above discussing medical issues with team members and family and/ or my resident    [     ] I spent 21-30 minutes on the above discussing medical issues with team members and family and/ or my resident

## 2020-12-15 VITALS
DIASTOLIC BLOOD PRESSURE: 77 MMHG | WEIGHT: 315 LBS | SYSTOLIC BLOOD PRESSURE: 166 MMHG | OXYGEN SATURATION: 93 % | TEMPERATURE: 101 F | RESPIRATION RATE: 18 BRPM | HEART RATE: 94 BPM

## 2020-12-15 LAB
ALBUMIN SERPL ELPH-MCNC: 3.2 G/DL — LOW (ref 3.5–5.2)
ALP SERPL-CCNC: 64 U/L — SIGNIFICANT CHANGE UP (ref 30–115)
ALT FLD-CCNC: 52 U/L — HIGH (ref 0–41)
ANION GAP SERPL CALC-SCNC: 14 MMOL/L — SIGNIFICANT CHANGE UP (ref 7–14)
AST SERPL-CCNC: 182 U/L — HIGH (ref 0–41)
BASOPHILS # BLD AUTO: 0.01 K/UL — SIGNIFICANT CHANGE UP (ref 0–0.2)
BASOPHILS NFR BLD AUTO: 0.2 % — SIGNIFICANT CHANGE UP (ref 0–1)
BILIRUB SERPL-MCNC: 0.3 MG/DL — SIGNIFICANT CHANGE UP (ref 0.2–1.2)
BUN SERPL-MCNC: 39 MG/DL — HIGH (ref 10–20)
CALCIUM SERPL-MCNC: 7.9 MG/DL — LOW (ref 8.5–10.1)
CHLORIDE SERPL-SCNC: 99 MMOL/L — SIGNIFICANT CHANGE UP (ref 98–110)
CO2 SERPL-SCNC: 16 MMOL/L — LOW (ref 17–32)
CREAT SERPL-MCNC: 2.2 MG/DL — HIGH (ref 0.7–1.5)
CULTURE RESULTS: SIGNIFICANT CHANGE UP
CULTURE RESULTS: SIGNIFICANT CHANGE UP
EOSINOPHIL # BLD AUTO: 0 K/UL — SIGNIFICANT CHANGE UP (ref 0–0.7)
EOSINOPHIL NFR BLD AUTO: 0 % — SIGNIFICANT CHANGE UP (ref 0–8)
GLUCOSE BLDC GLUCOMTR-MCNC: 118 MG/DL — HIGH (ref 70–99)
GLUCOSE BLDC GLUCOMTR-MCNC: 169 MG/DL — HIGH (ref 70–99)
GLUCOSE BLDC GLUCOMTR-MCNC: 194 MG/DL — HIGH (ref 70–99)
GLUCOSE BLDC GLUCOMTR-MCNC: 201 MG/DL — HIGH (ref 70–99)
GLUCOSE SERPL-MCNC: 193 MG/DL — HIGH (ref 70–99)
HCT VFR BLD CALC: 36.4 % — LOW (ref 42–52)
HGB BLD-MCNC: 11.8 G/DL — LOW (ref 14–18)
IMM GRANULOCYTES NFR BLD AUTO: 0.7 % — HIGH (ref 0.1–0.3)
LYMPHOCYTES # BLD AUTO: 1.02 K/UL — LOW (ref 1.2–3.4)
LYMPHOCYTES # BLD AUTO: 24.9 % — SIGNIFICANT CHANGE UP (ref 20.5–51.1)
MAGNESIUM SERPL-MCNC: 2 MG/DL — SIGNIFICANT CHANGE UP (ref 1.8–2.4)
MCHC RBC-ENTMCNC: 28.9 PG — SIGNIFICANT CHANGE UP (ref 27–31)
MCHC RBC-ENTMCNC: 32.4 G/DL — SIGNIFICANT CHANGE UP (ref 32–37)
MCV RBC AUTO: 89.2 FL — SIGNIFICANT CHANGE UP (ref 80–94)
MONOCYTES # BLD AUTO: 0.45 K/UL — SIGNIFICANT CHANGE UP (ref 0.1–0.6)
MONOCYTES NFR BLD AUTO: 11 % — HIGH (ref 1.7–9.3)
NEUTROPHILS # BLD AUTO: 2.58 K/UL — SIGNIFICANT CHANGE UP (ref 1.4–6.5)
NEUTROPHILS NFR BLD AUTO: 63.2 % — SIGNIFICANT CHANGE UP (ref 42.2–75.2)
NRBC # BLD: 0 /100 WBCS — SIGNIFICANT CHANGE UP (ref 0–0)
PLATELET # BLD AUTO: 165 K/UL — SIGNIFICANT CHANGE UP (ref 130–400)
POTASSIUM SERPL-MCNC: 4.9 MMOL/L — SIGNIFICANT CHANGE UP (ref 3.5–5)
POTASSIUM SERPL-SCNC: 4.9 MMOL/L — SIGNIFICANT CHANGE UP (ref 3.5–5)
PROT SERPL-MCNC: 6.3 G/DL — SIGNIFICANT CHANGE UP (ref 6–8)
RBC # BLD: 4.08 M/UL — LOW (ref 4.7–6.1)
RBC # FLD: 14.1 % — SIGNIFICANT CHANGE UP (ref 11.5–14.5)
SODIUM SERPL-SCNC: 129 MMOL/L — LOW (ref 135–146)
SPECIMEN SOURCE: SIGNIFICANT CHANGE UP
SPECIMEN SOURCE: SIGNIFICANT CHANGE UP
WBC # BLD: 4.09 K/UL — LOW (ref 4.8–10.8)
WBC # FLD AUTO: 4.09 K/UL — LOW (ref 4.8–10.8)

## 2020-12-15 PROCEDURE — 99239 HOSP IP/OBS DSCHRG MGMT >30: CPT

## 2020-12-15 RX ORDER — AMLODIPINE BESYLATE AND OLMESARTRAN MEDOXOMIL 10; 40 MG/1; MG/1
1 TABLET, FILM COATED ORAL
Qty: 0 | Refills: 0 | DISCHARGE

## 2020-12-15 RX ORDER — ASPIRIN/CALCIUM CARB/MAGNESIUM 324 MG
1 TABLET ORAL
Qty: 30 | Refills: 0
Start: 2020-12-15 | End: 2021-01-13

## 2020-12-15 RX ORDER — CEFAZOLIN SODIUM 1 G
2 VIAL (EA) INJECTION
Qty: 126 | Refills: 0
Start: 2020-12-15 | End: 2021-01-04

## 2020-12-15 RX ORDER — PANTOPRAZOLE SODIUM 20 MG/1
1 TABLET, DELAYED RELEASE ORAL
Qty: 30 | Refills: 0
Start: 2020-12-15 | End: 2021-01-13

## 2020-12-15 RX ADMIN — AMLODIPINE BESYLATE 10 MILLIGRAM(S): 2.5 TABLET ORAL at 05:37

## 2020-12-15 RX ADMIN — Medication 1 DROP(S): at 17:17

## 2020-12-15 RX ADMIN — Medication 500 MILLIGRAM(S): at 14:02

## 2020-12-15 RX ADMIN — Medication 14 UNIT(S): at 17:18

## 2020-12-15 RX ADMIN — LOSARTAN POTASSIUM 100 MILLIGRAM(S): 100 TABLET, FILM COATED ORAL at 05:37

## 2020-12-15 RX ADMIN — Medication 650 MILLIGRAM(S): at 20:57

## 2020-12-15 RX ADMIN — Medication 1: at 17:18

## 2020-12-15 RX ADMIN — Medication 500 MILLIGRAM(S): at 05:37

## 2020-12-15 RX ADMIN — Medication 500 MILLIGRAM(S): at 20:57

## 2020-12-15 RX ADMIN — Medication 2: at 11:37

## 2020-12-15 RX ADMIN — Medication 1: at 08:36

## 2020-12-15 RX ADMIN — HEPARIN SODIUM 5000 UNIT(S): 5000 INJECTION INTRAVENOUS; SUBCUTANEOUS at 14:02

## 2020-12-15 RX ADMIN — Medication 1200 MILLIGRAM(S): at 05:37

## 2020-12-15 RX ADMIN — HEPARIN SODIUM 5000 UNIT(S): 5000 INJECTION INTRAVENOUS; SUBCUTANEOUS at 05:36

## 2020-12-15 RX ADMIN — Medication 650 MILLIGRAM(S): at 21:27

## 2020-12-15 RX ADMIN — PANTOPRAZOLE SODIUM 40 MILLIGRAM(S): 20 TABLET, DELAYED RELEASE ORAL at 05:38

## 2020-12-15 RX ADMIN — DORZOLAMIDE HYDROCHLORIDE 1 DROP(S): 20 SOLUTION/ DROPS OPHTHALMIC at 05:36

## 2020-12-15 RX ADMIN — DORZOLAMIDE HYDROCHLORIDE 1 DROP(S): 20 SOLUTION/ DROPS OPHTHALMIC at 17:17

## 2020-12-15 RX ADMIN — INSULIN GLARGINE 42 UNIT(S): 100 INJECTION, SOLUTION SUBCUTANEOUS at 21:05

## 2020-12-15 RX ADMIN — Medication 100 MILLIGRAM(S): at 05:36

## 2020-12-15 RX ADMIN — LATANOPROST 1 DROP(S): 0.05 SOLUTION/ DROPS OPHTHALMIC; TOPICAL at 20:11

## 2020-12-15 RX ADMIN — CHLORHEXIDINE GLUCONATE 1 APPLICATION(S): 213 SOLUTION TOPICAL at 05:36

## 2020-12-15 RX ADMIN — Medication 14 UNIT(S): at 11:37

## 2020-12-15 RX ADMIN — HEPARIN SODIUM 5000 UNIT(S): 5000 INJECTION INTRAVENOUS; SUBCUTANEOUS at 20:11

## 2020-12-15 RX ADMIN — ATORVASTATIN CALCIUM 80 MILLIGRAM(S): 80 TABLET, FILM COATED ORAL at 20:11

## 2020-12-15 RX ADMIN — Medication 100 MILLIGRAM(S): at 20:10

## 2020-12-15 RX ADMIN — Medication 1200 MILLIGRAM(S): at 17:17

## 2020-12-15 RX ADMIN — Medication 100 MILLIGRAM(S): at 14:02

## 2020-12-15 RX ADMIN — Medication 14 UNIT(S): at 08:36

## 2020-12-15 NOTE — ED PROVIDER NOTE - AXIS
[address] [address]                                OPERATIVE REPORT    PATIENT NAME: Jeniffer Sorenson                     :        1939  MED REC NO:   [id number]                          ROOM:  ACCOUNT NO:   [de-identified]                           ADMIT DATE: 2020  PROVIDER:     Antonio Springer MD    DATE OF PROCEDURE:  2020    PREOPERATIVE DIAGNOSIS:  Chronic left hydronephrosis with left double-J  stent. POSTOPERATIVE DIAGNOSIS:  Chronic left hydronephrosis with left double-J  stent. PROCEDURE:  Cystoscopy and left stent exchange using a metallic stent. SURGEON:  Antonio Springer MD    ANESTHESIA:  General.    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 She developed a left ureteral stricture that has been  managed with a chronic stent since that time. She currently has a  metallic stent and has this exchanged every eight months. She is  brought now for cystoscopy and left stent exchange. DESCRIPTION OF THE PROCEDURE:  The patient was brought to the operating  room. After anesthesia was initiated, the patient was placed in the  dorsal lithotomy position. The perineum was sterilely prepped and  draped in the usual manner. The cystoscope was placed through the urethra into the bladder. The  bladder was examined. The stent was visualized and grasped with a  grasper and pulled out through the urethral meatus removing the metallic  stent. Next a guidewire was placed up the left ureteral orifice and curled in  the left renal pelvis under fluoroscopic guidance and then the [facility]  Resonance 6 x 22 metallic stent was opened on the field.   The access  sheath was placed over the wire and placed up into the left collecting system. The obturator and wire were removed leaving the sheath in good  position. Next the metallic stent was passed up through the sheath and curled in  the left renal pelvis under fluoroscopic guidance advancing the stent  using the obturator and then while pulling back the access sheath, the  obturator was advanced leaving the stent in good position. The distal  end of the stent was curled in the bladder and the sheath was removed  leaving the stent in excellent position. The bladder was examined. There was no abnormality seen. The stent was repositioned with a  grasper to be sure it was curled without pressure on the trigone. The  bladder was emptied. The patient tolerated the procedure well and was taken to the recovery  room in a stable condition. FINDINGS:  A 6 x 22 metallic Resonance stent Jackie Nadinebrittany) was replaced on the  patient's left side. FOLLOWUP:  The patient will be scheduled in eight months at [facility] for a cystoscopy and left stent exchange by Dr. Bobby Canas.     EBL - min        Yoshi Edwards MD    D: 12/14/2020 15:10:28       T: 12/14/2020 20:40:32     [email or web address]  Job#: [id number]     Doc#: [id number]    CC: Left Deviation

## 2020-12-15 NOTE — CHART NOTE - NSCHARTNOTEFT_GEN_A_CORE
I made rounds today with the treatment team including the hospitalist, residents,  nurses and  and discussed the patient's current medical status and discharge  planning needs, and reviewed the chart.    T(C): 36.1 (12-15-20 @ 09:48), Max: 37.6 (12-15-20 @ 01:06)  HR: 87 (12-15-20 @ 09:48) (75 - 89)  BP: 164/75 (12-15-20 @ 09:48) (131/64 - 164/75)  RR: 18 (12-15-20 @ 09:48) (18 - 19)  SpO2: 94% (12-15-20 @ 09:48) (91% - 96%)          I reached out to the patient's health care proxy/ responsible family member-           [     ]  I reached                                     and discussed the patient's medical condition,                   family concerns, and discharge planning           [     ]  I left a message with family               [  x   ]  I personally participated in rounds with the medical team and my resident and discussed the case. My resident reached                   family member/ HCP        brotherMike                        under my direction and supervision  and we reviewed the conversation.          [     ]  My resident left a message with family under my direction and supervision           [     ]   My resident attended medical rounds and called the family                [  x    ]    The following was discussed:  The patient's medical status over the past 24 hrs was reviewed, as well as oxygen needs and medication changes and labs. Patient is doing well on RA. Midline placed. Team working on arranging home IV antibiotics for discharge home.          [  x    ]   The following concerns were raised: none          [     ] I spent 5-10 minutes on the above discussing medical issues with team members and family and/ or my resident    [   x  ] I spent 11-20 minutes on the above discussing medical issues with team members and family and/ or my resident    [     ] I spent 21-30 minutes on the above discussing medical issues with team members and family and/ or my resident

## 2020-12-15 NOTE — PROGRESS NOTE ADULT - ASSESSMENT
64 yo M PMHx DM II, HTN, HLD, glaucoma, renal mass s/p right nephrectomy, nephrolithiasis, CKD III, and RLE Cellulitis was brought in by EMS for evaluation of altered mental status. As per EMR, patient developed chills and shortness of breath (which he attributed to the shivering) for about one hour. After that he became confused and doesn't remember anything until he received o2 in the ER. Upon EMS arrival, patient was found to be hypoxic to low 80s. He has had 2 negative covid tests and was found to have staph aureus in blood.    #Sepsis due to mssa bacteremia    -dental extraction done  - cefepime switched back to cefazolin and flagyl till 5 January  - r/o osteomylitis--> mri negative   - tte was negative  - CHEIKH not done  - patient will need cefazolin till January the Fifth to be given through midline       # Covid 19 positive 12/10    - on room air  - BMI 53;  - monitor O2 for now         # Acute hypoxic respiratory failure resolved  - resolved  - perhaps related to sepsis  - v/q scan negative for PE (was on heparin gtt but was d/c)    # Callus removed 3 weeks ago    - Still oozing blood.   - podiatry consulted, recs: MRI foot, patient refusing MRI to identify extent of bone infection, will f/u tomorrow with patient decision and if patient agress will proceed with sx planning of amputation of R hallux  mri to r/o osteomylitis-> negative  - wound care      # DM II    - c/w insulin  - FS controlled  - goal 110-180    # HTN    - controlled  - c/w amlodipine, losartan    # HLD    -c/w atorvastatin    # Glaucoma    - continue with timolol    # CKD III    - s/p R nephrectomy  - stable at baseline  - Renally adjust meds  - Avoid nephrotoxic meds  - Monitor BMP;    Diet DASH  DVT px heparin SC  GI ppx pantoprazole  Full Code

## 2020-12-15 NOTE — PROGRESS NOTE ADULT - ASSESSMENT
ASSESSMENT  65 yr old male with PMHx of DM, HTN, 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, s/p right nephrectomy for bleeding from surgical sites s/p renal mass resection, CKD, questionable hx of DVT in the past presented to the hospital for chills, SOB, weakness, and AMS     IMPRESSION  #COVID infection  - detected on 12/10  - D-Dimer Assay, Quantitative: 292  (12.12.20 @ 11:00)  - C-Reactive Protein, Serum: 1.78 mg/dL (12.12.20 @ 11:00)  - Ferritin, Serum: 1525 ng/mL (12.12.20 @ 11:00)  - Lactate Dehydrogenase, Serum: 373: Hemolyzed. Interpret with caution U/L (12.04.20 @ 02:00)  - Procalcitonin, Serum: 0.61 (12.12.20 @ 11:00)    #Sepsis on admission, secondary to MSSA bacteremia  - possible odontic source  - CT CAP 12/4: No CT evidence of acute intrathoracic or abdominopelvic pathology. Nodular enlarged right thyroid lobe. Recommend evaluation outpatient sonography. Additional findings in the body of the report  - MR Foot No Cont, Right (12.09.20): Impression: Soft tissue ulcer at the medial base of hallux distal phalanx with osteitis; no osteomyelitis or drainable collection  - COVID negative x 2 on 12/4  - Blood Cx 12/4 MSSA, GNR on stain   - Blood Cx 12/5-9 NG  - TTE negative for vegetation  - dental procedure 12/10    #Transaminitis   #Elevated D-DImer  #AMS - resolved    #CKD  #Hx of DVT  #Obesity BMI (kg/m2): 53.8  #Abx allergy: NKDA    Creatinine, Serum: 2.2 mg/dL (12.15.20 @ 06:13)  Weight (kg): 175 (04 Dec 2020 10:30)  CrCl 104    RECOMMENDATIONS  - agree with cefazolin 2g q 8 hours, will need 4 weeks from negative blood cx (12/5-1/1)  - O2 sats fluctuating between 92-96%; if consistently <95% can start dex 6 mg daily   - AST about 5x ULN, which is contraindication for RDV  - trend LFTs, could be related to COVID    Please call or message on [employer] Teams if with any questions.  Spectra [phone]

## 2020-12-15 NOTE — PROGRESS NOTE ADULT - SUBJECTIVE AND OBJECTIVE BOX
24H events:    Patient is a 65y old Male who presents with a chief complaint of SOB (14 Dec 2020 09:39)    Primary diagnosis of Sepsis       Today is hospital day 11d. This morning patient was seen and examined at bedside, resting comfortably in bed.      PAST MEDICAL & SURGICAL HISTORY  HLD (hyperlipidemia)    CKD (chronic kidney disease)    DVT (deep venous thrombosis)    Glaucoma    HTN (hypertension)    Diabetes mellitus    H/O right nephrectomy      SOCIAL HISTORY:  Social History:  Living Situation: Lives with daughters  Occupation: Retired  Tobacco Use: Denies  Alcohol Use: Denies  Drug Use: Denies (04 Dec 2020 08:21)      ALLERGIES:  No Known Allergies    MEDICATIONS:  STANDING MEDICATIONS  amLODIPine   Tablet 10 milliGRAM(s) Oral daily  atorvastatin 80 milliGRAM(s) Oral at bedtime  ceFAZolin   IVPB 2000 milliGRAM(s) IV Intermittent every 8 hours  chlorhexidine 4% Liquid 1 Application(s) Topical <User Schedule>  dextrose 40% Gel 15 Gram(s) Oral once  dextrose 5%. 1000 milliLiter(s) IV Continuous <Continuous>  dextrose 5%. 1000 milliLiter(s) IV Continuous <Continuous>  dextrose 50% Injectable 25 Gram(s) IV Push once  dextrose 50% Injectable 12.5 Gram(s) IV Push once  dextrose 50% Injectable 25 Gram(s) IV Push once  dorzolamide 2% Ophthalmic Solution 1 Drop(s) Right EYE <User Schedule>  glucagon  Injectable 1 milliGRAM(s) IntraMuscular once  guaiFENesin ER 1200 milliGRAM(s) Oral every 12 hours  heparin   Injectable 5000 Unit(s) SubCutaneous every 8 hours  insulin glargine Injectable (LANTUS) 42 Unit(s) SubCutaneous at bedtime  insulin lispro (ADMELOG) corrective regimen sliding scale   SubCutaneous three times a day before meals  insulin lispro Injectable (ADMELOG) 14 Unit(s) SubCutaneous three times a day before meals  latanoprost 0.005% Ophthalmic Solution 1 Drop(s) Right EYE at bedtime  losartan 100 milliGRAM(s) Oral daily  metroNIDAZOLE    Tablet 500 milliGRAM(s) Oral every 8 hours  pantoprazole    Tablet 40 milliGRAM(s) Oral before breakfast  timolol 0.5% Solution 1 Drop(s) Right EYE every 24 hours    PRN MEDICATIONS  acetaminophen   Tablet .. 650 milliGRAM(s) Oral every 6 hours PRN    VITALS:   T(F): 99.4  HR: 89  BP: 151/72  RR: 18  SpO2: 92%    PHYSICAL EXAM:  GENERAL: NAD, obese  NERVOUS SYSTEM:  Alert & Oriented X3, non focal   CHEST/LUNG: Clear to auscultation bilaterally; No rales, rhonchi, wheezing, or rubs  HEART: Regular rate and rhythm; No murmurs, rubs, or gallops  ABDOMEN: Soft, Nontender, distended; Bowel sounds present  EXTREMITIES: No clubbing, cyanosis, or edema heel ulcer    LABS:                        11.8   4.09  )-----------( 165      ( 15 Dec 2020 06:13 )             36.4     12-15    129<L>  |  99  |  39<H>  ----------------------------<  193<H>  4.9   |  16<L>  |  2.2<H>    Ca    7.9<L>      15 Dec 2020 06:13  Mg     2.0     12-15    TPro  6.3  /  Alb  3.2<L>  /  TBili  0.3  /  DBili  x   /  AST  182<H>  /  ALT  52<H>  /  AlkPhos  64  12-15                  RADIOLOGY:

## 2020-12-15 NOTE — PROGRESS NOTE ADULT - ATTENDING COMMENTS
I saw and evaluated patient  by bedside, no active complains , tolerating diet well.    All labs, radiology studies, VS was reviewed  I have reviewed the resident's note and agree with documented findings and  plan of care.  a/p:  Patient is a 66 y/o Male PMHx DM II, HTN, HLD, glaucoma, renal mass s/p right nephrectomy, nephrolithiasis, CKD III, and RLE Cellulitis was brought in by EMS for evaluation of altered mental status. As per EMR, patient developed chills and shortness of breath (which he attributed to the shivering) for about one hour. After that he became confused and doesn't remember anything until he received o2 in the ER. Upon EMS arrival, patient was found to be hypoxic to low 80s. He has had 2 negative covid tests and was found to have staph aureus in blood.    #Sepsis due to mssa bacteremia  - cefepime switched to cefazolin 2000 mg IV every 8 hours , repeated blood cxs 12/05 - negative - will complete total of 30 days course.(unless patient will f/up with outpatient Cardiology to complete CHEIKH test with confirmation of no vegetations)  -added on Flagyl as per ID  - no osteomylitis--> mri negative   - tte was negative  -outpatient ID f/up. weekly CBC, BMP, LFT's monitoring  -s/p midline placement to rue.    # Covid 19 positive 12/10  -placed on airborne/contact/droplet isolation  - off oxygen , on room air-sat 93%  - monitor inflammatory markers; CXR-12/12- no significant infiltrates.  -self isolation for 2 weeks, supportive tx. with antitussive/antipyretic tx.      # Acute hypoxic respiratory failure -- resolved  - perhaps related to sepsis  - v/q scan negative for PE (was on heparin gtt but was d/c)    # Right hallux ulcer  - podiatry on board, f/up recommendations, daily dressing changes   - s/p mri of right foot on 12/09/2020 -> negative for acute osteomyelitis  -Patient was instructed to f/up with Podiatry specialist post d/c home    # Periapical infection of # 2- was extracted by Dental Sx. on 12/10    # DM II- - c/w insulin  - FS controlled, - goal 110-180    # HTN - controlled  - c/w amlodipine, losartan    # HLD- -c/w atorvastatin    # Glaucoma-- continue with timolol    # CKD III   - s/p R nephrectomy, - stable at baseline,   - Avoid nephrotoxic meds, - Monitor BMP    # mild Hyperkalemia- tx. with Lokelma, low potassium diet    #mild hyponatremia with ckd- continue outpatient monitoring of sodium levels    # Morbid obesity- BMI - 53.8- outpatient weight loss tx, outpatient polysomnography study.    Daily DVT proph.- Heparin 5000 units subc . every 8 hours    #Progress Note Handoff: d/c home once  IV abx. arrangements has been completed  Family discussion: yes Disposition: Home

## 2020-12-15 NOTE — PROGRESS NOTE ADULT - SUBJECTIVE AND OBJECTIVE BOX
JOSHUA HAM  65y, Male  Allergy: No Known Allergies      LOS  11d    CHIEF COMPLAINT: SOB (15 Dec 2020 09:08)      INTERVAL EVENTS/HPI  - No acute events overnight  - T(F): , Max: 99.9 (12-15-20 @ 13:12)  - Denies any worsening symptoms  - Tolerating medication  - WBC Count: 4.09 (12-15-20 @ 06:13)  WBC Count: 3.63 (12-14-20 @ 08:04)     - Creatinine, Serum: 2.2 (12-15-20 @ 06:13)  Creatinine, Serum: 2.2 (12-14-20 @ 08:04)       ROS  General: Denies rigors, nightsweats  HEENT: Denies headache, rhinorrhea, sore throat, eye pain  CV: Denies CP, palpitations  PULM: Denies wheezing, hemoptysis  GI: Denies hematemesis, hematochezia, melena  : Denies discharge, hematuria  MSK: Denies arthralgias, myalgias  SKIN: Denies rash, lesions  NEURO: Denies paresthesias, weakness  PSYCH: Denies depression, anxiety    VITALS:  T(F): 99.9, Max: 99.9 (12-15-20 @ 13:12)  HR: 88  BP: 132/65  RR: 18Vital Signs Last 24 Hrs  T(C): 37.7 (15 Dec 2020 13:12), Max: 37.7 (15 Dec 2020 13:12)  T(F): 99.9 (15 Dec 2020 13:12), Max: 99.9 (15 Dec 2020 13:12)  HR: 88 (15 Dec 2020 13:12) (75 - 89)  BP: 132/65 (15 Dec 2020 13:12) (132/65 - 164/75)  BP(mean): --  RR: 18 (15 Dec 2020 13:12) (18 - 19)  SpO2: 95% (15 Dec 2020 13:12) (91% - 96%)    PHYSICAL EXAM:  Gen: NAD, resting in bed  HEENT: Normocephalic, atraumatic  Neck: supple, no lymphadenopathy  CV: Regular rate & regular rhythm  Lungs: decreased BS at bases, no fremitus  Abdomen: Soft, BS present  Ext: Warm, well perfused  Neuro: non focal, awake  Skin: no rash, no erythema  Lines: no phlebitis    FH: Non-contributory  Social Hx: Non-contributory    TESTS & MEASUREMENTS:                        11.8   4.09  )-----------( 165      ( 15 Dec 2020 06:13 )             36.4     12-15    129<L>  |  99  |  39<H>  ----------------------------<  193<H>  4.9   |  16<L>  |  2.2<H>    Ca    7.9<L>      15 Dec 2020 06:13  Mg     2.0     12-15    TPro  6.3  /  Alb  3.2<L>  /  TBili  0.3  /  DBili  x   /  AST  182<H>  /  ALT  52<H>  /  AlkPhos  64  12-15    eGFR if African American: 35 mL/min/1.73M2 (12-15-20 @ 06:13)  eGFR if Non African American: 30 mL/min/1.73M2 (12-15-20 @ 06:13)    LIVER FUNCTIONS - ( 15 Dec 2020 06:13 )  Alb: 3.2 g/dL / Pro: 6.3 g/dL / ALK PHOS: 64 U/L / ALT: 52 U/L / AST: 182 U/L / GGT: x               Culture - Blood (collected 12-10-20 @ 16:31)  Source: .Blood None  Preliminary Report (12-11-20 @ 23:02):    No growth to date.    Culture - Blood (collected 12-09-20 @ 17:47)  Source: .Blood None  Final Report (12-15-20 @ 01:01):    No Growth Final    Culture - Blood (collected 12-06-20 @ 05:37)  Source: .Blood None  Final Report (12-11-20 @ 18:01):    No Growth Final    Culture - Blood (collected 12-05-20 @ 12:50)  Source: .Blood None  Final Report (12-10-20 @ 19:01):    No Growth Final    Culture - Blood (collected 12-04-20 @ 02:00)  Source: .Blood Blood-Peripheral  Gram Stain (12-05-20 @ 22:46):    Growth in anaerobic bottle:    Gram Positive Cocci in Clusters    Growth in aerobic bottle: Gram Positive Cocci in Clusters  Final Report (12-07-20 @ 07:26):    Growth in aerobic and anaerobic bottles: Staphylococcus aureus    "Due to technical problems, Proteus sp. will Not be reported as part of    the BCID panel until further notice"    ***Blood Panel PCR results on this specimen are available    approximately 3 hours after the Gram stain result.***    Gram stain, PCR, and/or culture results may not always    correspond due to difference in methodologies.    ************************************************************    This PCR assay was performed using [employer].    The following targets are tested for: Enterococcus,    vancomycin resistant enterococci, Listeria monocytogenes,    coagulase negative staphylococci, S. aureus,    methicillin resistant S. aureus, Streptococcus agalactiae    (Group B), S. pneumoniae, S. pyogenes (Group A),    Acinetobacter baumannii, Enterobacter cloacae, E. coli,    Klebsiella oxytoca, K. pneumoniae, Proteus sp.,    Serratia marcescens, Haemophilus influenzae,    Neisseria meningitidis, Pseudomonas aeruginosa, Candida    albicans, C. glabrata, C krusei, C parapsilosis,    C. tropicalis and the KPC resistance gene.  Organism: Blood Culture PCR  Staphylococcus aureus (12-07-20 @ 07:26)  Organism: Staphylococcus aureus (12-07-20 @ 07:26)      -  Ampicillin/Sulbactam: S <=8/4      -  Cefazolin: S <=4      -  Clindamycin: R <=0.25 This isolate is presumed to be clindamycin resistant based on detection of inducible resistance. Clindamycin may still be effective in some patients.      -  Erythromycin: R >4      -  Gentamicin: S <=1 Should not be used as monotherapy      -  Oxacillin: S <=0.25      -  Penicillin: R >8      -  RIF- Rifampin: S <=1 Should not be used as monotherapy      -  Tetra/Doxy: S <=1      -  Trimethoprim/Sulfamethoxazole: S <=0.5/9.5      -  Vancomycin: S 1      Method Type: RIYA  Organism: Blood Culture PCR (12-07-20 @ 07:26)      -  Staphylococcus aureus: Detec Any isolate of Staphylococcus aureus from a blood culture is NOT considered a contaminant.      Method Type: PCR    Culture - Blood (collected 12-04-20 @ 02:00)  Source: .Blood Blood-Peripheral  Gram Stain (12-07-20 @ 22:01):    Growth in aerobic bottle: Gram Positive Cocci in Clusters    Growth in anaerobic bottle: Gram Negative Rods  Preliminary Report (12-08-20 @ 11:50):    Growth in aerobic bottle: Staphylococcus aureus See previous culture    [id number]    Growth in anaerobic bottle: Gram Negative Rods    "Due to technical problems, Proteus sp. will Not be reported as part of    the BCID panel until further notice"    ***Blood Panel PCR results on this specimen are available    approximately 3 hours after the Gram stain result.***    Gram stain, PCR, and/or culture results may not always    correspond due to difference in methodologies.    ************************************************************    This PCR assay was performed using [employer].    The following targets are tested for: Enterococcus,    vancomycin resistant enterococci, Listeria monocytogenes,    coagulase negative staphylococci, S. aureus,    methicillinresistant S. aureus, Streptococcus agalactiae    (Group B), S. pneumoniae, S. pyogenes (Group A),    Acinetobacter baumannii, Enterobacter cloacae, E. coli,    Klebsiella oxytoca, K. pneumoniae, Proteus sp.,    Serratia marcescens, Haemophilus influenzae,    Neisseria meningitidis, Pseudomonas aeruginosa, Candida    albicans, C. glabrata, C krusei, C parapsilosis,    C. tropicalis and the KPC resistance gene.  Organism: Blood Culture PCR (12-08-20 @ 00:09)  Organism: Blood Culture PCR (12-08-20 @ 00:09)      -  Acinetobacter baumanii: Nondet      -  Candida albicans: Nondet      -  Candida glabrata: Nondet      -  Candida krusei: Nondet      -  Candida parapsilosis: Nondet      -  Candida tropicalis: Nondet      -  Coagulase negative Staphylococcus: Nondet      -  Enterobacter cloacae complex: Nondet      -  Enterococcus species: Nondet      -  Escherichia coli: Nondet      -  Haemophilus influenzae: Nondet      -  Klebsiella oxytoca: Nondet      -  Klebsiella pneumoniae: Nondet      -  Listeria monocytogenes: Nondet      -  Methicillin resistant Staphylococcus aureus (MRSA): Nondet      -  Multidrug (KPC pos) resistant organism: Nondet      -  Neisseria meningitidis: Nondet      -  Pseudomonas aeruginosa: Nondet      -  Serratia marcescens: Nondet      -  Staphylococcus aureus: Nondet      -  Streptococcus agalactiae (Group B): Nondet      -  Streptococcus pneumoniae: Nondet      -  Streptococcus pyogenes (Group A): Nondet      -  Streptococcus sp. (Not Grp A, B or S pneumoniae): Nondet      -  Vancomycin resistant Enterococcus sp.: Nondet      Method Type: PCR            INFECTIOUS DISEASES TESTING  Procalcitonin, Serum: 0.61 (12-12-20 @ 11:00)  COVID-19 PCR: Detected (12-10-20 @ 23:19)  Rapid RVP Result: NotDetec (12-04-20 @ 11:20)  Rapid RVP Result: NotDetec (12-04-20 @ 02:00)  Procalcitonin, Serum: 0.18 (12-04-20 @ 02:00)      INFLAMMATORY MARKERS  C-Reactive Protein, Serum: 1.78 mg/dL (12-12-20 @ 11:00)  C-Reactive Protein, Serum: 0.61 mg/dL (12-04-20 @ 02:00)      RADIOLOGY & ADDITIONAL TESTS:  I have personally reviewed the last available Chest xray  CXR      CT      CARDIOLOGY TESTING  12 Lead ECG:   Ventricular Rate 80 BPM    Atrial Rate 80 BPM    P-R Interval 256 ms    QRS Duration 182 ms    Q-T Interval 426 ms    QTC Calculation(Bazett) 491 ms    P Axis 33 degrees    R Axis -37 degrees    T Axis 14 degrees    Diagnosis Line Sinus rhythm [id number] degree A-V block  Indeterminate axis  Right bundle branch block  Abnormal ECG    Confirmed by VIBHA GUILLERMO MD ([id number]) on 12/6/2020 12:58:49 PM (12-06-20 @ 01:53)  12 Lead ECG:   Ventricular Rate 110 BPM    Atrial Rate 110 BPM    P-R Interval 216 ms    QRS Duration 174 ms    Q-T Interval 344 ms    QTC Calculation(Bazett) 465 ms    P Axis 11 degrees    R Axis -50 degrees    T Axis 2 degrees    Diagnosis Line Sinus tachycardia with 1st degree A-V block  Right bundle branch block  Left anterior fascicular block  *** Bifascicular block ***  Inferior infarct , age undetermined  Abnormal ECG    Confirmed by JANNETH MENON MD ([id number]) on 12/4/2020 7:45:29 AM (12-04-20 @ 04:36)      MEDICATIONS  amLODIPine   Tablet 10 Oral daily  atorvastatin 80 Oral at bedtime  ceFAZolin   IVPB 2000 IV Intermittent every 8 hours  chlorhexidine 4% Liquid 1 Topical <User Schedule>  dextrose 40% Gel 15 Oral once  dextrose 5%. 1000 IV Continuous <Continuous>  dextrose 5%. 1000 IV Continuous <Continuous>  dextrose 50% Injectable 25 IV Push once  dextrose 50% Injectable 12.5 IV Push once  dextrose 50% Injectable 25 IV Push once  dorzolamide 2% Ophthalmic Solution 1 Right EYE <User Schedule>  glucagon  Injectable 1 IntraMuscular once  guaiFENesin ER 1200 Oral every 12 hours  heparin   Injectable 5000 SubCutaneous every 8 hours  insulin glargine Injectable (LANTUS) 42 SubCutaneous at bedtime  insulin lispro (ADMELOG) corrective regimen sliding scale  SubCutaneous three times a day before meals  insulin lispro Injectable (ADMELOG) 14 SubCutaneous three times a day before meals  latanoprost 0.005% Ophthalmic Solution 1 Right EYE at bedtime  losartan 100 Oral daily  metroNIDAZOLE    Tablet 500 Oral every 8 hours  pantoprazole    Tablet 40 Oral before breakfast  timolol 0.5% Solution 1 Right EYE every 24 hours      WEIGHT  Weight (kg): 175 (12-11-20 @ 08:02)  Creatinine, Serum: 2.2 mg/dL (12-15-20 @ 06:13)      ANTIBIOTICS:  ceFAZolin   IVPB 2000 milliGRAM(s) IV Intermittent every 8 hours  metroNIDAZOLE    Tablet 500 milliGRAM(s) Oral every 8 hours      All available historical records have been reviewed

## 2020-12-17 ENCOUNTER — INPATIENT (INPATIENT)
Facility: HOSPITAL | Age: 65
LOS: 5 days | Discharge: HOME | End: 2020-12-23
Attending: HOSPITALIST | Admitting: HOSPITALIST
Payer: MEDICARE

## 2020-12-17 VITALS
HEART RATE: 98 BPM | SYSTOLIC BLOOD PRESSURE: 165 MMHG | RESPIRATION RATE: 18 BRPM | TEMPERATURE: 98 F | DIASTOLIC BLOOD PRESSURE: 78 MMHG | OXYGEN SATURATION: 99 %

## 2020-12-17 DIAGNOSIS — Z90.5 ACQUIRED ABSENCE OF KIDNEY: Chronic | ICD-10-CM

## 2020-12-17 PROBLEM — E78.5 HYPERLIPIDEMIA, UNSPECIFIED: Chronic | Status: ACTIVE | Noted: 2020-12-04

## 2020-12-17 LAB
ALBUMIN SERPL ELPH-MCNC: 3.4 G/DL — LOW (ref 3.5–5.2)
ALP SERPL-CCNC: 60 U/L — SIGNIFICANT CHANGE UP (ref 30–115)
ALT FLD-CCNC: 22 U/L — SIGNIFICANT CHANGE UP (ref 0–41)
ANION GAP SERPL CALC-SCNC: 14 MMOL/L — SIGNIFICANT CHANGE UP (ref 7–14)
AST SERPL-CCNC: 96 U/L — HIGH (ref 0–41)
BASE EXCESS BLDV CALC-SCNC: -6.1 MMOL/L — LOW (ref -2–2)
BASOPHILS # BLD AUTO: 0.01 K/UL — SIGNIFICANT CHANGE UP (ref 0–0.2)
BASOPHILS NFR BLD AUTO: 0.1 % — SIGNIFICANT CHANGE UP (ref 0–1)
BILIRUB SERPL-MCNC: 0.4 MG/DL — SIGNIFICANT CHANGE UP (ref 0.2–1.2)
BUN SERPL-MCNC: 38 MG/DL — HIGH (ref 10–20)
CA-I SERPL-SCNC: 1.11 MMOL/L — LOW (ref 1.12–1.3)
CALCIUM SERPL-MCNC: 7.8 MG/DL — LOW (ref 8.5–10.1)
CHLORIDE SERPL-SCNC: 99 MMOL/L — SIGNIFICANT CHANGE UP (ref 98–110)
CO2 SERPL-SCNC: 18 MMOL/L — SIGNIFICANT CHANGE UP (ref 17–32)
CREAT SERPL-MCNC: 2.4 MG/DL — HIGH (ref 0.7–1.5)
D DIMER BLD IA.RAPID-MCNC: 605 NG/ML DDU — HIGH (ref 0–230)
EOSINOPHIL # BLD AUTO: 0.01 K/UL — SIGNIFICANT CHANGE UP (ref 0–0.7)
EOSINOPHIL NFR BLD AUTO: 0.1 % — SIGNIFICANT CHANGE UP (ref 0–8)
GAS PNL BLDV: 129 MMOL/L — LOW (ref 136–145)
GAS PNL BLDV: SIGNIFICANT CHANGE UP
GLUCOSE SERPL-MCNC: 282 MG/DL — HIGH (ref 70–99)
HCO3 BLDV-SCNC: 20 MMOL/L — LOW (ref 22–29)
HCT VFR BLD CALC: 38.1 % — LOW (ref 42–52)
HCT VFR BLDA CALC: 39.1 % — SIGNIFICANT CHANGE UP (ref 34–44)
HGB BLD CALC-MCNC: 12.8 G/DL — LOW (ref 14–18)
HGB BLD-MCNC: 12.4 G/DL — LOW (ref 14–18)
IMM GRANULOCYTES NFR BLD AUTO: 1.2 % — HIGH (ref 0.1–0.3)
LACTATE BLDV-MCNC: 2.1 MMOL/L — HIGH (ref 0.5–1.6)
LYMPHOCYTES # BLD AUTO: 0.53 K/UL — LOW (ref 1.2–3.4)
LYMPHOCYTES # BLD AUTO: 7.9 % — LOW (ref 20.5–51.1)
MCHC RBC-ENTMCNC: 28.7 PG — SIGNIFICANT CHANGE UP (ref 27–31)
MCHC RBC-ENTMCNC: 32.5 G/DL — SIGNIFICANT CHANGE UP (ref 32–37)
MCV RBC AUTO: 88.2 FL — SIGNIFICANT CHANGE UP (ref 80–94)
MONOCYTES # BLD AUTO: 0.5 K/UL — SIGNIFICANT CHANGE UP (ref 0.1–0.6)
MONOCYTES NFR BLD AUTO: 7.4 % — SIGNIFICANT CHANGE UP (ref 1.7–9.3)
NEUTROPHILS # BLD AUTO: 5.6 K/UL — SIGNIFICANT CHANGE UP (ref 1.4–6.5)
NEUTROPHILS NFR BLD AUTO: 83.3 % — HIGH (ref 42.2–75.2)
NRBC # BLD: 0 /100 WBCS — SIGNIFICANT CHANGE UP (ref 0–0)
PCO2 BLDV: 39 MMHG — LOW (ref 41–51)
PH BLDV: 7.31 — SIGNIFICANT CHANGE UP (ref 7.26–7.43)
PLATELET # BLD AUTO: 211 K/UL — SIGNIFICANT CHANGE UP (ref 130–400)
PO2 BLDV: 34 MMHG — SIGNIFICANT CHANGE UP (ref 20–40)
POTASSIUM BLDV-SCNC: 5.2 MMOL/L — SIGNIFICANT CHANGE UP (ref 3.3–5.6)
POTASSIUM SERPL-MCNC: 5.7 MMOL/L — HIGH (ref 3.5–5)
POTASSIUM SERPL-SCNC: 5.7 MMOL/L — HIGH (ref 3.5–5)
PROT SERPL-MCNC: 6.4 G/DL — SIGNIFICANT CHANGE UP (ref 6–8)
RBC # BLD: 4.32 M/UL — LOW (ref 4.7–6.1)
RBC # FLD: 14 % — SIGNIFICANT CHANGE UP (ref 11.5–14.5)
SAO2 % BLDV: 62 % — SIGNIFICANT CHANGE UP
SODIUM SERPL-SCNC: 131 MMOL/L — LOW (ref 135–146)
TROPONIN T SERPL-MCNC: 0.08 NG/ML — CRITICAL HIGH
TROPONIN T SERPL-MCNC: 0.09 NG/ML — CRITICAL HIGH
WBC # BLD: 6.73 K/UL — SIGNIFICANT CHANGE UP (ref 4.8–10.8)
WBC # FLD AUTO: 6.73 K/UL — SIGNIFICANT CHANGE UP (ref 4.8–10.8)

## 2020-12-17 PROCEDURE — 93010 ELECTROCARDIOGRAM REPORT: CPT | Mod: 77

## 2020-12-17 PROCEDURE — 71045 X-RAY EXAM CHEST 1 VIEW: CPT | Mod: 26

## 2020-12-17 PROCEDURE — 99285 EMERGENCY DEPT VISIT HI MDM: CPT

## 2020-12-17 PROCEDURE — 93010 ELECTROCARDIOGRAM REPORT: CPT

## 2020-12-17 RX ORDER — CHLORHEXIDINE GLUCONATE 213 G/1000ML
1 SOLUTION TOPICAL
Refills: 0 | Status: DISCONTINUED | OUTPATIENT
Start: 2020-12-17 | End: 2020-12-23

## 2020-12-17 RX ORDER — METRONIDAZOLE 500 MG
500 TABLET ORAL EVERY 8 HOURS
Refills: 0 | Status: DISCONTINUED | OUTPATIENT
Start: 2020-12-17 | End: 2020-12-18

## 2020-12-17 RX ORDER — INFLUENZA VIRUS VACCINE 15; 15; 15; 15 UG/.5ML; UG/.5ML; UG/.5ML; UG/.5ML
0.5 SUSPENSION INTRAMUSCULAR ONCE
Refills: 0 | Status: COMPLETED | OUTPATIENT
Start: 2020-12-17 | End: 2020-12-17

## 2020-12-17 RX ORDER — TIMOLOL 0.5 %
1 DROPS OPHTHALMIC (EYE)
Refills: 0 | Status: DISCONTINUED | OUTPATIENT
Start: 2020-12-17 | End: 2020-12-23

## 2020-12-17 RX ORDER — PANTOPRAZOLE SODIUM 20 MG/1
40 TABLET, DELAYED RELEASE ORAL
Refills: 0 | Status: DISCONTINUED | OUTPATIENT
Start: 2020-12-17 | End: 2020-12-23

## 2020-12-17 RX ORDER — ASPIRIN/CALCIUM CARB/MAGNESIUM 324 MG
81 TABLET ORAL DAILY
Refills: 0 | Status: DISCONTINUED | OUTPATIENT
Start: 2020-12-17 | End: 2020-12-23

## 2020-12-17 RX ORDER — DEXAMETHASONE 0.5 MG/5ML
6 ELIXIR ORAL DAILY
Refills: 0 | Status: DISCONTINUED | OUTPATIENT
Start: 2020-12-17 | End: 2020-12-23

## 2020-12-17 RX ORDER — HEPARIN SODIUM 5000 [USP'U]/ML
5000 INJECTION INTRAVENOUS; SUBCUTANEOUS EVERY 8 HOURS
Refills: 0 | Status: DISCONTINUED | OUTPATIENT
Start: 2020-12-17 | End: 2020-12-18

## 2020-12-17 RX ORDER — ACETAMINOPHEN 500 MG
650 TABLET ORAL ONCE
Refills: 0 | Status: COMPLETED | OUTPATIENT
Start: 2020-12-17 | End: 2020-12-17

## 2020-12-17 RX ORDER — ATORVASTATIN CALCIUM 80 MG/1
20 TABLET, FILM COATED ORAL AT BEDTIME
Refills: 0 | Status: DISCONTINUED | OUTPATIENT
Start: 2020-12-17 | End: 2020-12-23

## 2020-12-17 RX ORDER — CEFAZOLIN SODIUM 1 G
2000 VIAL (EA) INJECTION EVERY 8 HOURS
Refills: 0 | Status: DISCONTINUED | OUTPATIENT
Start: 2020-12-17 | End: 2020-12-18

## 2020-12-17 RX ORDER — DORZOLAMIDE HYDROCHLORIDE 20 MG/ML
1 SOLUTION/ DROPS OPHTHALMIC
Refills: 0 | Status: DISCONTINUED | OUTPATIENT
Start: 2020-12-17 | End: 2020-12-23

## 2020-12-17 RX ORDER — AMLODIPINE BESYLATE 2.5 MG/1
10 TABLET ORAL DAILY
Refills: 0 | Status: DISCONTINUED | OUTPATIENT
Start: 2020-12-17 | End: 2020-12-23

## 2020-12-17 RX ORDER — SODIUM ZIRCONIUM CYCLOSILICATE 10 G/10G
5 POWDER, FOR SUSPENSION ORAL THREE TIMES A DAY
Refills: 0 | Status: DISCONTINUED | OUTPATIENT
Start: 2020-12-17 | End: 2020-12-23

## 2020-12-17 RX ORDER — DORZOLAMIDE HYDROCHLORIDE TIMOLOL MALEATE 20; 5 MG/ML; MG/ML
1 SOLUTION/ DROPS OPHTHALMIC
Refills: 0 | Status: DISCONTINUED | OUTPATIENT
Start: 2020-12-17 | End: 2020-12-17

## 2020-12-17 RX ORDER — ACETAMINOPHEN 500 MG
650 TABLET ORAL EVERY 6 HOURS
Refills: 0 | Status: DISCONTINUED | OUTPATIENT
Start: 2020-12-17 | End: 2020-12-23

## 2020-12-17 RX ADMIN — HEPARIN SODIUM 5000 UNIT(S): 5000 INJECTION INTRAVENOUS; SUBCUTANEOUS at 21:16

## 2020-12-17 RX ADMIN — SODIUM ZIRCONIUM CYCLOSILICATE 5 GRAM(S): 10 POWDER, FOR SUSPENSION ORAL at 21:15

## 2020-12-17 RX ADMIN — ATORVASTATIN CALCIUM 20 MILLIGRAM(S): 80 TABLET, FILM COATED ORAL at 21:15

## 2020-12-17 RX ADMIN — Medication 500 MILLIGRAM(S): at 21:15

## 2020-12-17 RX ADMIN — DORZOLAMIDE HYDROCHLORIDE 1 DROP(S): 20 SOLUTION/ DROPS OPHTHALMIC at 20:56

## 2020-12-17 RX ADMIN — Medication 650 MILLIGRAM(S): at 16:22

## 2020-12-17 RX ADMIN — Medication 100 MILLIGRAM(S): at 21:15

## 2020-12-17 RX ADMIN — Medication 650 MILLIGRAM(S): at 19:04

## 2020-12-17 RX ADMIN — Medication 1 DROP(S): at 20:56

## 2020-12-17 NOTE — ED PROVIDER NOTE - PROGRESS NOTE DETAILS
CXR consistent with COIVD-19 picture. patient taken off NRB and placed on 4L nasal canula.  patient O2 sat 93% on NC with no increased work of breathing.

## 2020-12-17 NOTE — ED PROVIDER NOTE - OBJECTIVE STATEMENT
65 y male with PMH of morbid obesity, CKD s/p right nephrectomy, HLD, HTN, T2DM was recently dc from [facility] on 12/15 for sepsis related to foot ulcer and was diagnosed with covid pneumonia on 12/10.  patient reports worsening SOB, cough, and fever since dc and this afternoon called EMS and was noted to have 85% sat on RA.  Was placed on NRB 15L and sats improved to 99%  in ambulance.  denies dizziness, weakness, dysarthria, dysphagia, leg swelling, CP, Abd pain, loss of taste or smell., diarrhea, constipation.

## 2020-12-17 NOTE — ED PROVIDER NOTE - NS ED ROS FT
Constitutional:  (+) fevers (+) chills.  Eyes:  No visual changes, eye pain, or discharge.  ENT:  No hearing changes. No sore throat.  Neck:  No neck pain or stiffness.  Cardiac:  No CP or edema.  Resp:  (+) cough (+) SOB. No hemoptysis.   GI:  No nausea, vomiting, diarrhea, or abdominal pain.  :  No dysuria, frequency, or hematuria.  MSK:  No myalgias or joint pain/swelling.  Neuro:  No headache, dizziness, or weakness.  Skin:  No skin rash.

## 2020-12-17 NOTE — H&P ADULT - NSHPPHYSICALEXAM_GEN_ALL_CORE
Gen: NAD, resting in bed  HEENT: Normocephalic, atraumatic  Neck: supple, no lymphadenopathy  CV: Regular rate & regular rhythm  Lungs: decreased BS at bases, no fremitus  Abdomen: Soft, BS present  Ext: Warm, well perfused  Neuro: non focal, awake  Skin: no rash, no erythema  Lines: no phlebitis

## 2020-12-17 NOTE — H&P ADULT - ATTENDING COMMENTS
HPI:  65 years old gentleman with a PMHx of DM, HTN, HLD, glaucoma, CKD III, nehroithiasis, renal mass s/p nephrectomy, obesity, recently discharged from [facility] for MSSA bactermia, COVID positive on 12/10 c/o persistent cough and SOB which is worsening since yesterday. Patient reports that his cough has been there since his previous admission but his difficulty breathing is new. He also reports onset of fevers.   He was discharged on cefazolin for MSSA bacteremia suspected secondary to a R. Hallux infection and was scheduled for an out patient CHEIKH. TTE on last admission showed no vegetations and a normal EF. During our interview patient was reporting slight worsening of breathing, he was on 3L NC and satting at 89%, titrated up to 5L NC with improvement to 93% and improvement in symptoms.     REVIEW OF SYSTEMS:  CONSTITUTIONAL:  No weakness, + fevers, chills, no night sweats, weight loss  EYES/ENT: No visual changes. No vertigo or dysphagia  NECK: No neck pain or stiffness  RESPIRATORY: + cough, no wheezing, hemoptysis. + shortness of breath  CARDIOVASCULAR: No chest pain or palpitations. No lower extremity edema  GASTROINTESTINAL: No abdominal pain. No nausea, vomiting, diarrhea, or hematemesis  GENITOURINARY: No dysuria or hematuria   NEUROLOGICAL: No focal numbness or weakness  SKIN: No rashes or itching  HEMATOLOGIC: No easy bruising or prolonged bleeding.      PHYSICAL EXAM:  GENERAL: NAD, morbidly obese, Non-toxic, stated age   HEAD:  Atraumatic, Normocephalic  EYES: EOMI, Sclera White   NECK: Supple, No JVD  CHEST/LUNG: minimal crackles noted but exam limited to body habitus; No wheezing, rhonchi  HEART: Regular rate and rhythm; s1, s2, No murmurs, rubs, or gallops  ABDOMEN: Soft, Nontender, Nondistended; Bowel sounds present, No rebound or guarding noted   EXTREMITIES:  No lower extremity edema or calf tenderness to palpation.  No clubbing or cyanosis  PSYCH: AAOx3, pleasant, cooperative, not anxious  NEUROLOGY: non-focal  SKIN: No rashes or lesions      ASSESSMENT AND PLAN:  Acute Hypoxic Respiratory Failure secondary to severe COVID-19 Pneumonia: SIRS not present on admission. ID consulted, no Remdesivir due to his kidney function, Dexamethasone 6mg q8. Follow up inflammatory markers (Ferritin, LDH, CRP, ESR, D-Dimer). Prolactin may be elevated in the face of previous MSSA Bacteremia. Doubt superimposed bacterial pneumonia at this time. Supplemental O2 as needed. If O2 sat continues to down trend then may need high flow oxygen.     MSSA Bacteremia: Continue with cefazolin 2g q8. Will need out patient CHEIKH. Follow up blood cultures.     Elevated Trop: trops elevated at baseline. Likely further elevated in the setting of BATOOL and infection. Doubt ACS. Trend trops.     BATOOL On CKD III: Cr has been slowly trending up since Dec. May be possible ATN/AIN from cefazolin. Cont for now, trend I/O, check, UA for casts, urine Pr:Cr and urine sodium. If kidney function worsens will need nephro consult and possible change in Abx therapy.      Hyperkalemia: given lokelma, follow up AM labs    Right Hallux ulcer: wound care by podiatry, complete course of flagyl.     Morbid obesity: Patient should follow with a dietician as an outpatient, bariatric surgery should be discussed with PMD, and under go eval for ISIDRA.  DVT ppx: Heparin 7500u q8  GI ppx: Not indicated  GOC: Full code.    My note supersedes the residents in the event of a discrepancy. HPI:  65 years old gentleman with a PMHx of DM, HTN, HLD, glaucoma, CKD III, nehroithiasis, renal mass s/p nephrectomy, obesity, recently discharged from [facility] for MSSA bactermia, COVID positive on 12/10 c/o persistent cough and SOB which is worsening since yesterday. Patient reports that his cough has been there since his previous admission but his difficulty breathing is new. He also reports onset of fevers.   He was discharged on cefazolin for MSSA bacteremia suspected secondary to a R. Hallux infection and was scheduled for an out patient CHEIKH. TTE on last admission showed no vegetations and a normal EF. During our interview patient was reporting slight worsening of breathing, he was on 3L NC and satting at 89%, titrated up to 5L NC with improvement to 93% and improvement in symptoms.     REVIEW OF SYSTEMS:  CONSTITUTIONAL:  No weakness, + fevers, chills, no night sweats, weight loss  EYES/ENT: No visual changes. No vertigo or dysphagia  NECK: No neck pain or stiffness  RESPIRATORY: + cough, no wheezing, hemoptysis. + shortness of breath  CARDIOVASCULAR: No chest pain or palpitations. No lower extremity edema  GASTROINTESTINAL: No abdominal pain. No nausea, vomiting, diarrhea, or hematemesis  GENITOURINARY: No dysuria or hematuria   NEUROLOGICAL: No focal numbness or weakness  SKIN: No rashes or itching  HEMATOLOGIC: No easy bruising or prolonged bleeding.      PHYSICAL EXAM:  GENERAL: NAD, morbidly obese, Non-toxic, stated age   HEAD:  Atraumatic, Normocephalic  EYES: EOMI, Sclera White   NECK: Supple, No JVD  CHEST/LUNG: minimal crackles noted but exam limited to body habitus; No wheezing, rhonchi  HEART: Regular rate and rhythm; s1, s2, No murmurs, rubs, or gallops  ABDOMEN: Soft, Nontender, Nondistended; Bowel sounds present, No rebound or guarding noted   EXTREMITIES:  No lower extremity edema or calf tenderness to palpation.  No clubbing or cyanosis  PSYCH: AAOx3, pleasant, cooperative, not anxious  NEUROLOGY: non-focal  SKIN: No rashes or lesions      ASSESSMENT AND PLAN:  Acute Hypoxic Respiratory Failure secondary to severe COVID-19 Pneumonia: SIRS not present on admission. ID consulted, no Remdesivir due to his kidney function, Dexamethasone 6mg q8. Follow up inflammatory markers (Ferritin, LDH, CRP, ESR, D-Dimer). Prolactin may be elevated in the face of previous MSSA Bacteremia. Doubt superimposed bacterial pneumonia at this time. Supplemental O2 as needed. If O2 sat continues to down trend then may need high flow oxygen.     MSSA Bacteremia: Continue with cefazolin 2g q8. Will need out patient CHEIKH. Follow up blood cultures.     Elevated Trop: trops elevated at baseline. Likely further elevated in the setting of BATOOL and infection. Doubt ACS. Trend trops.     BATOOL On CKD III: Cr has been slowly trending up since Dec. May be possible ATN/AIN from cefazolin. Cont for now, trend I/O, check, UA for casts, urine Pr:Cr and urine sodium. If kidney function worsens will need nephro consult and possible change in Abx therapy.      Hyperkalemia: given lokelma, follow up AM labs    Right Hallux ulcer: wound care by podiatry, complete course of flagyl.   Diabetes: Basal bolus insulin, keep fingerstick glucose <180. At risk for hyperglycemia from dexamethasone  Morbid obesity: Patient should follow with a dietician as an outpatient, bariatric surgery should be discussed with PMD, and under go eval for ISIDRA.  DVT ppx: Heparin 7500u q8  GI ppx: Not indicated  GOC: Full code.    My note supersedes the residents in the event of a discrepancy.

## 2020-12-17 NOTE — H&P ADULT - NSHPLABSRESULTS_GEN_ALL_CORE
(12-17 @ 15:54)                      12.4  6.73 )-----------( 211                 38.1    Neutrophils = 5.60 (83.3%)  Lymphocytes = 0.53 (7.9%)  Eosinophils = 0.01 (0.1%)  Basophils = 0.01 (0.1%)  Monocytes = 0.50 (7.4%)  Bands = --%    12-17    131<L>  |  99  |  38<H>  ----------------------------<  282<H>  5.7<H>   |  18  |  2.4<H>    Ca    7.8<L>      17 Dec 2020 15:54    TPro  6.4  /  Alb  3.4<L>  /  TBili  0.4  /  DBili  x   /  AST  96<H>  /  ALT  22  /  AlkPhos  60  12-17      CARDIAC MARKERS ( 17 Dec 2020 15:54 )  Trop 0.09 ng/mL<HH> / CK x     / CKMB x           RVP:    Venous Blood Gas:  12-17 @ 15:41  7.31/39/34/20/62  VBG Lactate: 2.1      < from: TTE Echo Complete w/o Contrast w/ Doppler (12.08.20 @ 12:06) >    Summary:   1. LV Ejection Fraction by Gutierrez's Method with a biplane EF of 66 %.   2. Normal left ventricular size and wall thicknesses, with normal systolic and diastolic function.   3. Normal left atrial size.   4. Normal right atrial size.   5. No evidence of mitral valve regurgitation.   6. LA volume Index is 19.9 ml/m² ml/m2.    < end of copied text >

## 2020-12-17 NOTE — ED ADULT NURSE NOTE - OBJECTIVE STATEMENT
Pt Covid + and BIBA for Shortness of Breath. Pt on Non-rebreather 10L @ 100% SpO2. Pt Covid + and BIBA for Shortness of Breath. Pt on Non-rebreather 10L @ 100% SpO2. R AC midline IV present upon arrival and is patent.

## 2020-12-17 NOTE — ED ADULT NURSE NOTE - PMH
CKD (chronic kidney disease)    Diabetes mellitus    DVT (deep venous thrombosis)    Glaucoma    HLD (hyperlipidemia)    HTN (hypertension)

## 2020-12-17 NOTE — ED PROVIDER NOTE - PHYSICAL EXAMINATION
PHYSICAL EXAM: I have reviewed current vital signs.  GENERAL: NAD, well-nourished; well-developed.  HEAD:  Normocephalic, atraumatic.  EYES: EOMI, PERRL, conjunctiva and sclera clear.  ENT: MMM, no erythema/exudates.  NECK: Supple, no JVD.  CHEST/LUNG: decreased breath sounds on right compared to left.  coughing throughout exam; no wheezes, rales, or rhonchi.  HEART: Regular rate and rhythm, normal S1 and S2; no murmurs, rubs, or gallops.  ABDOMEN: Soft, nontender, nondistended.  EXTREMITIES:  2+ peripheral pulses; no clubbing, cyanosis, or edema.  PSYCH: Cooperative, appropriate, normal mood and affect.  NEUROLOGY: A&O x 3. Motor 5/5. Sensory intact. No focal neurological deficits. CN II - XII intact. (-) dysmetria, facial droop, pronator drift.  SKIN:  well healing ulcer on the right 1st toe no erythema, no discharge.  rest of skin warm and dry.

## 2020-12-17 NOTE — ED PROVIDER NOTE - ATTENDING CONTRIBUTION TO CARE
64 yo male PMH DM, HTN, 64 yo male PMH DM, HTN, HLD, glaucoma, CKD, kidney stones, s/p nephrectomy, obesity, COVID positive  c/o persistent cough and SOB which is worsening since yesterday.  Reportedly low oxygen saturation as per EMS ( 89% RA).  Patient was just d/c from the hospital a few days ago, is taking abx for toe cellulitis.  No fever, CP, hemoptysis, no vomiting or abdominal pain, no change in leg swelling.  Morbidly obese male, NAD, PERRL, mmm, + tachypnea without acute respiratory distress, inspiratory crackles b/l bases, RRR, well-perfused extremities, abdomen soft, NT, no calf ttp, right calf>left, but as patient this is unchanged, A&Ox3, no gross neuro deficits.  Saturating well on NR mask, will check labs, CXR, admit.   Symptoms most likely due to COVID-19 .

## 2020-12-17 NOTE — ED PROVIDER NOTE - PRIOR HOSPITAL/ED VISITS SUMMARY FREE TEXT FOR MDM OBTAINED AND REVIEWED OLD RECORDS QUESTION
most recent admission for sepsis and toe infection.  patient tested positive for COVID during the admission.

## 2020-12-17 NOTE — ED PROVIDER NOTE - CLINICAL SUMMARY MEDICAL DECISION MAKING FREE TEXT BOX
64 yo male PMH DM, HTN, HLD, glaucoma, CKD, kidney stones, s/p nephrectomy, obesity, COVID positive  c/o persistent cough and SOB which is worsening since yesterday.  Reportedly low oxygen saturation as per EMS ( 89% RA).  Patient was just d/c from the hospital a few days ago, is taking abx for toe cellulitis.  No fever, CP, hemoptysis, no vomiting or abdominal pain, no change in leg swelling.  Morbidly obese male, NAD, PERRL, mmm, + tachypnea without acute respiratory distress, inspiratory crackles b/l bases, RRR, well-perfused extremities, abdomen soft, NT, no calf ttp, right calf>left, but as patient this is unchanged, A&Ox3, no gross neuro deficits.  Saturating well on NR mask, will check labs, CXR, admit.   Symptoms most likely due to COVID-19 .

## 2020-12-17 NOTE — H&P ADULT - ASSESSMENT
# Acute hypoxic respiratory failure / SOB / moderate - severe COVID - 19 PNA  - CXR shows b/l pulmonary opacities  - D-Dimer and LDH WNL for now  - monitor O2, titrate O2 as needed to goal of 92 - 96%  - decadron 6mg IV upto 10 days  - remdesivir 200mg IV x 1 day followed by 100mg IV x 4 days  - empiric abx if sepsis  - anticoagulation  - tylenol for fever  - toci? plasma if less than 7 days from symptoms onset.   - trend D-Dimer, CRP, LDH, ferritin     # Sepsis due to MSSA bacteremia  - CT CAP 12/4: No CT evidence of acute intrathoracic or abdominopelvic pathology. Nodular enlarged right thyroid lobe. Recommend evaluation outpatient sonography. Additional findings in the body of the report  - MR Foot No Cont, Right (12.09.20): Impression: Soft tissue ulcer at the medial base of hallux distal phalanx with osteitis; no osteomyelitis or drainable collection  - Blood Cx 12/4 MSSA, GNR on stain   - Blood Cx 12/5-9 NG  - TTE LVEF 66%, no vegetation  - dental procedure 12/10  - c/w cefazolin 2000 mg IV every 8 hours ,   - CHEIKH as out patient  - f/u ID    # Right hallux ulcer  - s/p mri of right foot on 12/09/2020 -> negative for acute osteomyelitis  - f/u podiatry    # Periapical infection of # 2- was extracted by Dental Sx. on 12/10    # DM II- - c/w insulin  - FS controlled, - goal 110-180    # HTN - controlled  - c/w amlodipine, losartan    # Transaminitis  # HLD- -c/w atorvastatin  # Glaucoma-- continue with timolol  # CKD III   - s/p R nephrectomy, - stable at baseline,   - Avoid nephrotoxic meds, - Monitor BMP  # mild Hyperkalemia- tx. with Lokelma, low potassium diet  # mild hyponatremia with ckd- continue outpatient monitoring of sodium levels  # Morbid obesity- BMI - 53.8- outpatient weight loss tx, outpatient polysomnography study.    # Diet: DASH/TLC   # DVT Prophylaxis: Heparin 5000 sc q8  # GI Prophylaxis: Protonix  # Activity: IAT  # IV Fluids:  # Dispo: Acute  # Code Status: Fullcode  # CHG wash  65 years old male with PMHx of DM, HTN, HLD, glaucoma, CKD III, nehroithiasis, renal mass s/p nephrectomy, obesity, recently discharged from [facility] for MSSA bactermia, COVID positive on 12/10 c/o persistent cough and SOB which is worsening since yesterday    # Acute hypoxic respiratory failure / SOB / moderate - severe COVID - 19 PNA  - CXR shows b/l pulmonary opacities  - D-Dimer elevated at 605  - monitor O2, titrate O2 as needed to goal of 92 - 96%  - decadron 6mg IV upto 10 days  - not a candidate for RDV due to decreased GFR  - anticoagulation: heparin 5000 sc q8  - tylenol for fever  - consult ID  - trend D-Dimer, CRP, LDH, ferritin     # Elavated trop, SOB  - get EKG, f/u troponin    # Sepsis due to MSSA bacteremia  - CT CAP 12/4: No CT evidence of acute intrathoracic or abdominopelvic pathology. Nodular enlarged right thyroid lobe. Recommend evaluation outpatient sonography. Additional findings in the body of the report  - MR Foot No Cont, Right (12.09.20): Impression: Soft tissue ulcer at the medial base of hallux distal phalanx with osteitis; no osteomyelitis or drainable collection  - Blood Cx 12/4 MSSA, GNR on stain   - Blood Cx 12/5-9 NG  - TTE LVEF 66%, no vegetation  - dental procedure 12/10  - c/w cefazolin 2000 mg IV every 8 hours   - CHEIKH as out patient  - f/u ID    # Right hallux ulcer  - s/p mri of right foot on 12/09/2020 -> negative for acute osteomyelitis  - c/w flagyl 500mg PO q8  - f/u podiatry    # Periapical infection of # 2- was extracted by Dental Sx. on 12/10    # DM II- trend FS, start insulin basal blus if FS > 180. lantus 38, lispro 13  # HTN - controlled  - c/w amlodipine, losartan  # HLD- -c/w atorvastatin  # Glaucoma-- continue with timolol    # CKD III / hyperkalema / hyponatremia  - s/p R nephrectomy, - stable at baseline,   - Avoid nephrotoxic meds, - Monitor BMP  - lokelma  - trend BMP    # Morbid obesity- BMI - 53.8- outpatient weight loss tx, outpatient polysomnography study.    # Diet: DASH/TLC, low K  # DVT Prophylaxis: Heparin 5000 sc q8  # GI Prophylaxis: Protonix  # Activity: IAT  # Dispo: Acute  # Code Status: Fullcode

## 2020-12-18 LAB
ALBUMIN SERPL ELPH-MCNC: 3.1 G/DL — LOW (ref 3.5–5.2)
ALBUMIN SERPL ELPH-MCNC: 3.1 G/DL — LOW (ref 3.5–5.2)
ALP SERPL-CCNC: 59 U/L — SIGNIFICANT CHANGE UP (ref 30–115)
ALP SERPL-CCNC: 61 U/L — SIGNIFICANT CHANGE UP (ref 30–115)
ALT FLD-CCNC: 15 U/L — SIGNIFICANT CHANGE UP (ref 0–41)
ALT FLD-CCNC: 15 U/L — SIGNIFICANT CHANGE UP (ref 0–41)
ANION GAP SERPL CALC-SCNC: 15 MMOL/L — HIGH (ref 7–14)
ANION GAP SERPL CALC-SCNC: 16 MMOL/L — HIGH (ref 7–14)
ANION GAP SERPL CALC-SCNC: 18 MMOL/L — HIGH (ref 7–14)
APTT BLD: 31.2 SEC — SIGNIFICANT CHANGE UP (ref 27–39.2)
AST SERPL-CCNC: 70 U/L — HIGH (ref 0–41)
AST SERPL-CCNC: 74 U/L — HIGH (ref 0–41)
B-OH-BUTYR SERPL-SCNC: 1.5 MMOL/L — HIGH
BASOPHILS # BLD AUTO: 0.02 K/UL — SIGNIFICANT CHANGE UP (ref 0–0.2)
BASOPHILS NFR BLD AUTO: 0.3 % — SIGNIFICANT CHANGE UP (ref 0–1)
BILIRUB SERPL-MCNC: 0.4 MG/DL — SIGNIFICANT CHANGE UP (ref 0.2–1.2)
BILIRUB SERPL-MCNC: 0.4 MG/DL — SIGNIFICANT CHANGE UP (ref 0.2–1.2)
BUN SERPL-MCNC: 38 MG/DL — HIGH (ref 10–20)
BUN SERPL-MCNC: 41 MG/DL — HIGH (ref 10–20)
BUN SERPL-MCNC: 46 MG/DL — HIGH (ref 10–20)
CALCIUM SERPL-MCNC: 7.9 MG/DL — LOW (ref 8.5–10.1)
CALCIUM SERPL-MCNC: 8 MG/DL — LOW (ref 8.5–10.1)
CALCIUM SERPL-MCNC: 8.2 MG/DL — LOW (ref 8.5–10.1)
CHLORIDE SERPL-SCNC: 97 MMOL/L — LOW (ref 98–110)
CHLORIDE SERPL-SCNC: 98 MMOL/L — SIGNIFICANT CHANGE UP (ref 98–110)
CHLORIDE SERPL-SCNC: 98 MMOL/L — SIGNIFICANT CHANGE UP (ref 98–110)
CO2 SERPL-SCNC: 14 MMOL/L — LOW (ref 17–32)
CO2 SERPL-SCNC: 15 MMOL/L — LOW (ref 17–32)
CO2 SERPL-SCNC: 17 MMOL/L — SIGNIFICANT CHANGE UP (ref 17–32)
CREAT SERPL-MCNC: 2.2 MG/DL — HIGH (ref 0.7–1.5)
CREAT SERPL-MCNC: 2.2 MG/DL — HIGH (ref 0.7–1.5)
CREAT SERPL-MCNC: 2.5 MG/DL — HIGH (ref 0.7–1.5)
CRP SERPL-MCNC: 7.73 MG/DL — HIGH (ref 0–0.4)
CRP SERPL-MCNC: 9.8 MG/DL — HIGH (ref 0–0.4)
EOSINOPHIL # BLD AUTO: 0 K/UL — SIGNIFICANT CHANGE UP (ref 0–0.7)
EOSINOPHIL NFR BLD AUTO: 0 % — SIGNIFICANT CHANGE UP (ref 0–8)
FERRITIN SERPL-MCNC: 1886 NG/ML — HIGH (ref 30–400)
FERRITIN SERPL-MCNC: 1923 NG/ML — HIGH (ref 30–400)
FOLATE SERPL-MCNC: 7.1 NG/ML — SIGNIFICANT CHANGE UP
GLUCOSE BLDC GLUCOMTR-MCNC: 280 MG/DL — HIGH (ref 70–99)
GLUCOSE BLDC GLUCOMTR-MCNC: 399 MG/DL — HIGH (ref 70–99)
GLUCOSE BLDC GLUCOMTR-MCNC: 415 MG/DL — HIGH (ref 70–99)
GLUCOSE BLDC GLUCOMTR-MCNC: 441 MG/DL — HIGH (ref 70–99)
GLUCOSE BLDC GLUCOMTR-MCNC: 447 MG/DL — HIGH (ref 70–99)
GLUCOSE BLDC GLUCOMTR-MCNC: 447 MG/DL — HIGH (ref 70–99)
GLUCOSE BLDC GLUCOMTR-MCNC: 448 MG/DL — HIGH (ref 70–99)
GLUCOSE BLDC GLUCOMTR-MCNC: 467 MG/DL — CRITICAL HIGH (ref 70–99)
GLUCOSE SERPL-MCNC: 356 MG/DL — HIGH (ref 70–99)
GLUCOSE SERPL-MCNC: 463 MG/DL — CRITICAL HIGH (ref 70–99)
GLUCOSE SERPL-MCNC: 487 MG/DL — CRITICAL HIGH (ref 70–99)
HCT VFR BLD CALC: 37.4 % — LOW (ref 42–52)
HGB BLD-MCNC: 12.1 G/DL — LOW (ref 14–18)
IMM GRANULOCYTES NFR BLD AUTO: 0.8 % — HIGH (ref 0.1–0.3)
INR BLD: 1.2 RATIO — SIGNIFICANT CHANGE UP (ref 0.65–1.3)
IRON SATN MFR SERPL: 13 % — LOW (ref 15–50)
IRON SATN MFR SERPL: 21 UG/DL — LOW (ref 35–150)
LACTATE SERPL-SCNC: 1.2 MMOL/L — SIGNIFICANT CHANGE UP (ref 0.7–2)
LDH SERPL L TO P-CCNC: 463 U/L — HIGH (ref 50–242)
LYMPHOCYTES # BLD AUTO: 0.37 K/UL — LOW (ref 1.2–3.4)
LYMPHOCYTES # BLD AUTO: 5.6 % — LOW (ref 20.5–51.1)
MAGNESIUM SERPL-MCNC: 2.1 MG/DL — SIGNIFICANT CHANGE UP (ref 1.8–2.4)
MCHC RBC-ENTMCNC: 28.5 PG — SIGNIFICANT CHANGE UP (ref 27–31)
MCHC RBC-ENTMCNC: 32.4 G/DL — SIGNIFICANT CHANGE UP (ref 32–37)
MCV RBC AUTO: 88 FL — SIGNIFICANT CHANGE UP (ref 80–94)
MONOCYTES # BLD AUTO: 0.33 K/UL — SIGNIFICANT CHANGE UP (ref 0.1–0.6)
MONOCYTES NFR BLD AUTO: 5 % — SIGNIFICANT CHANGE UP (ref 1.7–9.3)
NEUTROPHILS # BLD AUTO: 5.88 K/UL — SIGNIFICANT CHANGE UP (ref 1.4–6.5)
NEUTROPHILS NFR BLD AUTO: 88.3 % — HIGH (ref 42.2–75.2)
NRBC # BLD: 0 /100 WBCS — SIGNIFICANT CHANGE UP (ref 0–0)
PLATELET # BLD AUTO: 260 K/UL — SIGNIFICANT CHANGE UP (ref 130–400)
POTASSIUM SERPL-MCNC: 5.4 MMOL/L — HIGH (ref 3.5–5)
POTASSIUM SERPL-MCNC: 5.7 MMOL/L — HIGH (ref 3.5–5)
POTASSIUM SERPL-MCNC: 6 MMOL/L — CRITICAL HIGH (ref 3.5–5)
POTASSIUM SERPL-SCNC: 5.4 MMOL/L — HIGH (ref 3.5–5)
POTASSIUM SERPL-SCNC: 5.7 MMOL/L — HIGH (ref 3.5–5)
POTASSIUM SERPL-SCNC: 6 MMOL/L — CRITICAL HIGH (ref 3.5–5)
PROCALCITONIN SERPL-MCNC: 0.87 NG/ML — HIGH (ref 0.02–0.1)
PROCALCITONIN SERPL-MCNC: 1.16 NG/ML — HIGH (ref 0.02–0.1)
PROT SERPL-MCNC: 6.5 G/DL — SIGNIFICANT CHANGE UP (ref 6–8)
PROT SERPL-MCNC: 6.9 G/DL — SIGNIFICANT CHANGE UP (ref 6–8)
PROTHROM AB SERPL-ACNC: 13.8 SEC — HIGH (ref 9.95–12.87)
RBC # BLD: 4.25 M/UL — LOW (ref 4.7–6.1)
RBC # FLD: 14.1 % — SIGNIFICANT CHANGE UP (ref 11.5–14.5)
SODIUM SERPL-SCNC: 128 MMOL/L — LOW (ref 135–146)
SODIUM SERPL-SCNC: 130 MMOL/L — LOW (ref 135–146)
SODIUM SERPL-SCNC: 130 MMOL/L — LOW (ref 135–146)
TIBC SERPL-MCNC: 160 UG/DL — LOW (ref 220–430)
TROPONIN T SERPL-MCNC: 0.07 NG/ML — CRITICAL HIGH
TSH SERPL-MCNC: 0.5 UIU/ML — SIGNIFICANT CHANGE UP (ref 0.27–4.2)
UIBC SERPL-MCNC: 139 UG/DL — SIGNIFICANT CHANGE UP (ref 110–370)
VIT B12 SERPL-MCNC: 736 PG/ML — SIGNIFICANT CHANGE UP (ref 232–1245)
WBC # BLD: 6.65 K/UL — SIGNIFICANT CHANGE UP (ref 4.8–10.8)
WBC # FLD AUTO: 6.65 K/UL — SIGNIFICANT CHANGE UP (ref 4.8–10.8)

## 2020-12-18 PROCEDURE — 99223 1ST HOSP IP/OBS HIGH 75: CPT

## 2020-12-18 PROCEDURE — 99221 1ST HOSP IP/OBS SF/LOW 40: CPT

## 2020-12-18 RX ORDER — FUROSEMIDE 40 MG
40 TABLET ORAL ONCE
Refills: 0 | Status: COMPLETED | OUTPATIENT
Start: 2020-12-18 | End: 2020-12-18

## 2020-12-18 RX ORDER — SODIUM ZIRCONIUM CYCLOSILICATE 10 G/10G
10 POWDER, FOR SUSPENSION ORAL ONCE
Refills: 0 | Status: COMPLETED | OUTPATIENT
Start: 2020-12-18 | End: 2020-12-18

## 2020-12-18 RX ORDER — INSULIN LISPRO 100/ML
10 VIAL (ML) SUBCUTANEOUS
Refills: 0 | Status: DISCONTINUED | OUTPATIENT
Start: 2020-12-18 | End: 2020-12-19

## 2020-12-18 RX ORDER — SODIUM BICARBONATE 1 MEQ/ML
0.6 SYRINGE (ML) INTRAVENOUS
Qty: 150 | Refills: 0 | Status: COMPLETED | OUTPATIENT
Start: 2020-12-18 | End: 2020-12-18

## 2020-12-18 RX ORDER — SODIUM BICARBONATE 1 MEQ/ML
650 SYRINGE (ML) INTRAVENOUS THREE TIMES A DAY
Refills: 0 | Status: DISCONTINUED | OUTPATIENT
Start: 2020-12-18 | End: 2020-12-23

## 2020-12-18 RX ORDER — INSULIN HUMAN 100 [IU]/ML
10 INJECTION, SOLUTION SUBCUTANEOUS ONCE
Refills: 0 | Status: COMPLETED | OUTPATIENT
Start: 2020-12-18 | End: 2020-12-18

## 2020-12-18 RX ORDER — SODIUM CHLORIDE 9 MG/ML
500 INJECTION INTRAMUSCULAR; INTRAVENOUS; SUBCUTANEOUS ONCE
Refills: 0 | Status: COMPLETED | OUTPATIENT
Start: 2020-12-18 | End: 2020-12-18

## 2020-12-18 RX ORDER — INSULIN HUMAN 100 [IU]/ML
10 INJECTION, SOLUTION SUBCUTANEOUS ONCE
Refills: 0 | Status: DISCONTINUED | OUTPATIENT
Start: 2020-12-18 | End: 2020-12-18

## 2020-12-18 RX ORDER — CEFAZOLIN SODIUM 1 G
2000 VIAL (EA) INJECTION EVERY 24 HOURS
Refills: 0 | Status: DISCONTINUED | OUTPATIENT
Start: 2020-12-18 | End: 2020-12-21

## 2020-12-18 RX ORDER — HEPARIN SODIUM 5000 [USP'U]/ML
7500 INJECTION INTRAVENOUS; SUBCUTANEOUS EVERY 8 HOURS
Refills: 0 | Status: DISCONTINUED | OUTPATIENT
Start: 2020-12-18 | End: 2020-12-23

## 2020-12-18 RX ORDER — INSULIN GLARGINE 100 [IU]/ML
30 INJECTION, SOLUTION SUBCUTANEOUS AT BEDTIME
Refills: 0 | Status: DISCONTINUED | OUTPATIENT
Start: 2020-12-18 | End: 2020-12-19

## 2020-12-18 RX ORDER — GUAIFENESIN/DEXTROMETHORPHAN 600MG-30MG
10 TABLET, EXTENDED RELEASE 12 HR ORAL EVERY 6 HOURS
Refills: 0 | Status: DISCONTINUED | OUTPATIENT
Start: 2020-12-18 | End: 2020-12-23

## 2020-12-18 RX ADMIN — HEPARIN SODIUM 7500 UNIT(S): 5000 INJECTION INTRAVENOUS; SUBCUTANEOUS at 05:45

## 2020-12-18 RX ADMIN — HEPARIN SODIUM 7500 UNIT(S): 5000 INJECTION INTRAVENOUS; SUBCUTANEOUS at 12:23

## 2020-12-18 RX ADMIN — Medication 500 MILLIGRAM(S): at 12:24

## 2020-12-18 RX ADMIN — INSULIN HUMAN 10 UNIT(S): 100 INJECTION, SOLUTION SUBCUTANEOUS at 16:31

## 2020-12-18 RX ADMIN — INSULIN HUMAN 10 UNIT(S): 100 INJECTION, SOLUTION SUBCUTANEOUS at 22:01

## 2020-12-18 RX ADMIN — Medication 1 DROP(S): at 10:18

## 2020-12-18 RX ADMIN — PANTOPRAZOLE SODIUM 40 MILLIGRAM(S): 20 TABLET, DELAYED RELEASE ORAL at 05:45

## 2020-12-18 RX ADMIN — Medication 650 MILLIGRAM(S): at 15:50

## 2020-12-18 RX ADMIN — SODIUM ZIRCONIUM CYCLOSILICATE 5 GRAM(S): 10 POWDER, FOR SUSPENSION ORAL at 14:14

## 2020-12-18 RX ADMIN — Medication 40 MILLIGRAM(S): at 14:14

## 2020-12-18 RX ADMIN — Medication 81 MILLIGRAM(S): at 14:00

## 2020-12-18 RX ADMIN — ATORVASTATIN CALCIUM 20 MILLIGRAM(S): 80 TABLET, FILM COATED ORAL at 22:52

## 2020-12-18 RX ADMIN — HEPARIN SODIUM 7500 UNIT(S): 5000 INJECTION INTRAVENOUS; SUBCUTANEOUS at 22:53

## 2020-12-18 RX ADMIN — DORZOLAMIDE HYDROCHLORIDE 1 DROP(S): 20 SOLUTION/ DROPS OPHTHALMIC at 10:17

## 2020-12-18 RX ADMIN — SODIUM ZIRCONIUM CYCLOSILICATE 5 GRAM(S): 10 POWDER, FOR SUSPENSION ORAL at 05:46

## 2020-12-18 RX ADMIN — SODIUM CHLORIDE 1000 MILLILITER(S): 9 INJECTION INTRAMUSCULAR; INTRAVENOUS; SUBCUTANEOUS at 22:54

## 2020-12-18 RX ADMIN — INSULIN GLARGINE 30 UNIT(S): 100 INJECTION, SOLUTION SUBCUTANEOUS at 23:15

## 2020-12-18 RX ADMIN — SODIUM ZIRCONIUM CYCLOSILICATE 10 GRAM(S): 10 POWDER, FOR SUSPENSION ORAL at 14:14

## 2020-12-18 RX ADMIN — Medication 10 UNIT(S): at 12:22

## 2020-12-18 RX ADMIN — Medication 10 UNIT(S): at 16:24

## 2020-12-18 RX ADMIN — SODIUM ZIRCONIUM CYCLOSILICATE 5 GRAM(S): 10 POWDER, FOR SUSPENSION ORAL at 22:54

## 2020-12-18 RX ADMIN — Medication 6 MILLIGRAM(S): at 05:44

## 2020-12-18 RX ADMIN — CHLORHEXIDINE GLUCONATE 1 APPLICATION(S): 213 SOLUTION TOPICAL at 05:44

## 2020-12-18 RX ADMIN — Medication 100 MILLIGRAM(S): at 05:44

## 2020-12-18 RX ADMIN — INSULIN HUMAN 10 UNIT(S): 100 INJECTION, SOLUTION SUBCUTANEOUS at 18:31

## 2020-12-18 RX ADMIN — Medication 500 MEQ/KG/HR: at 17:44

## 2020-12-18 RX ADMIN — AMLODIPINE BESYLATE 10 MILLIGRAM(S): 2.5 TABLET ORAL at 05:44

## 2020-12-18 RX ADMIN — Medication 100 MILLIGRAM(S): at 14:15

## 2020-12-18 RX ADMIN — Medication 500 MILLIGRAM(S): at 05:45

## 2020-12-18 RX ADMIN — Medication 650 MILLIGRAM(S): at 22:52

## 2020-12-18 NOTE — PROGRESS NOTE ADULT - ASSESSMENT
65 years old male with PMHx of DM, HTN, HLD, glaucoma, CKD III, nephrolithiasis renal mass s/p nephrectomy, obesity, recently discharged from [facility] for MSSA bacteremia COVID positive on 12/10 c/o persistent cough and SOB which is worsening since yesterday.    # Acute hypoxic respiratory failure / SOB / moderate - severe COVID - 19 PNA  - CXR shows b/l pulmonary opacities  - D-Dimer 605, CRP 7.73, Ferritin 1923, procal .87  - cont decadron for 10 days - day 1 12/17  - not a candidate for RDV due to decreased GFR  - monitor O2, titrate O2 as needed to goal of 92 - 96% - currently 93% on 5L  - tylenol for fever  - f/u ID recs  - trend D-Dimer, CRP, LDH, ferritin Q48    # Elevated trop/ SOB  - Trop .09>.08  - f/u EKG    # Sepsis due to MSSA bacteremia  - CT CAP 12/4: No CT evidence of acute intrathoracic or abdominopelvic pathology. Nodular enlarged right thyroid lobe. Recommend evaluation outpatient sonography. Additional findings in the body of the report  - MR Foot No Cont, Right (12.09.20): Impression: Soft tissue ulcer at the medial base of hallux distal phalanx with osteitis; no osteomyelitis or drainable collection  - Blood Cx 12/4 MSSA, GNR on stain   - Blood Cx 12/5-9 NG  - TTE LVEF 66%, no vegetation  - dental procedure 12/10  - c/w cefazolin 2000 mg IV every 8 hours for  - CHEIKH as out patient  - f/u ID    # Right hallux ulcer  - s/p mri of right foot on 12/09/2020 -> negative for acute osteomyelitis  - c/w flagyl 500mg PO q8  - f/u podiatry    # Periapical infection of # 2- was extracted by Dental Sx. on 12/10    # DM II - trend FS, start insulin basal blus if FS > 180. lantus 38, lispro 13  # HTN - controlled - c/w amlodipine, losartan  # HLD - c/w atorvastatin  # Glaucoma - continue with timolol    # CKD III / hyperkalema / hyponatremia  - s/p R nephrectomy, - stable at baseline,   - Avoid nephrotoxic meds, - Monitor BMP  - lokelma  - trend BMP    # Morbid obesity- BMI - 53.8- outpatient weight loss tx, outpatient polysomnography study.    # Diet: DASH/TLC, low K  # DVT Prophylaxis: Heparin 5000 sc q8  # GI Prophylaxis: Protonix  # Activity: IAT  # Dispo: Acute  # Code Status: Fullcode

## 2020-12-18 NOTE — PROGRESS NOTE ADULT - SUBJECTIVE AND OBJECTIVE BOX
SUBJECTIVE:    Patient is a 65y old Male who presents with a chief complaint of SOB (17 Dec 2020 17:43)    Currently admitted to medicine with the primary diagnosis of COVID-19       24 hour update:  Today is hospital day 1d. This morning he is resting comfortably in bed and reports no new issues or overnight events.      PAST MEDICAL & SURGICAL HISTORY  HLD (hyperlipidemia)    CKD (chronic kidney disease)    DVT (deep venous thrombosis)    Glaucoma    HTN (hypertension)    Diabetes mellitus    H/O right nephrectomy      SOCIAL HISTORY:    ALLERGIES:  No Known Allergies    MEDICATIONS:  STANDING MEDICATIONS  amLODIPine   Tablet 10 milliGRAM(s) Oral daily  aspirin enteric coated 81 milliGRAM(s) Oral daily  atorvastatin 20 milliGRAM(s) Oral at bedtime  ceFAZolin   IVPB 2000 milliGRAM(s) IV Intermittent every 8 hours  chlorhexidine 4% Liquid 1 Application(s) Topical <User Schedule>  dexAMETHasone  Injectable 6 milliGRAM(s) IV Push daily  dorzolamide 2% Ophthalmic Solution 1 Drop(s) Both EYES <User Schedule>  heparin   Injectable 7500 Unit(s) SubCutaneous every 8 hours  influenza   Vaccine 0.5 milliLiter(s) IntraMuscular once  metroNIDAZOLE    Tablet 500 milliGRAM(s) Oral every 8 hours  pantoprazole    Tablet 40 milliGRAM(s) Oral before breakfast  sodium zirconium cyclosilicate 5 Gram(s) Oral three times a day  timolol 0.5% Solution 1 Drop(s) Both EYES <User Schedule>    PRN MEDICATIONS  acetaminophen   Tablet .. 650 milliGRAM(s) Oral every 6 hours PRN  guaifenesin/dextromethorphan  Syrup 10 milliLiter(s) Oral every 6 hours PRN    VITALS:   T(F): 97.9  HR: 104  BP: 137/63  RR: 18  SpO2: 92%    PHYSICAL EXAM:  Gen: NAD, resting in bed  HEENT: Normocephalic, atraumatic  Neck: supple, no lymphadenopathy  CV: Regular rate & regular rhythm  Lungs: decreased BS at bases, no fremitus  Abdomen: Soft, BS present  Ext: Warm, well perfused  Neuro: non focal, awake  Skin: no rash, no erythema  Lines: no phlebitis    LABS:                        12.4   6.73  )-----------( 211      ( 17 Dec 2020 15:54 )             38.1     131<L>  |  99  |  38<H>  ----------------------------<  282<H>  5.7<H>   |  18  |  2.4<H>    Ca    7.8<L>      17 Dec 2020 15:54    TPro  6.4  /  Alb  3.4<L>  /  TBili  0.4  /  DBili  x   /  AST  96<H>  /  ALT  22  /  AlkPhos  60  12-17          Troponin T, Serum: 0.08 ng/mL <HH> (12-17-20 @ 20:48)  Troponin T, Serum: 0.09 ng/mL <HH> (12-17-20 @ 15:54)      CARDIAC MARKERS ( 17 Dec 2020 20:48 )  x     / 0.08 ng/mL / x     / x     / x      CARDIAC MARKERS ( 17 Dec 2020 15:54 )  x     / 0.09 ng/mL / x     / x     / x          Troponin T, Serum: 0.08 ng/mL (12-17-20 @ 20:48)  Troponin T, Serum: 0.09 ng/mL (12-17-20 @ 15:54)        IMAGING:       SUBJECTIVE:    Patient is a 65y old Male who presents with a chief complaint of SOB (17 Dec 2020 17:43)    Currently admitted to medicine with the primary diagnosis of COVID-19       24 hour update:  Today is hospital day 1d. This morning he is resting comfortably in bed and reports no new issues or overnight events.      PAST MEDICAL & SURGICAL HISTORY  HLD (hyperlipidemia)    CKD (chronic kidney disease)    DVT (deep venous thrombosis)    Glaucoma    HTN (hypertension)    Diabetes mellitus    H/O right nephrectomy      SOCIAL HISTORY:    ALLERGIES:  No Known Allergies    MEDICATIONS:  STANDING MEDICATIONS  amLODIPine   Tablet 10 milliGRAM(s) Oral daily  aspirin enteric coated 81 milliGRAM(s) Oral daily  atorvastatin 20 milliGRAM(s) Oral at bedtime  ceFAZolin   IVPB 2000 milliGRAM(s) IV Intermittent every 8 hours  chlorhexidine 4% Liquid 1 Application(s) Topical <User Schedule>  dexAMETHasone  Injectable 6 milliGRAM(s) IV Push daily  dorzolamide 2% Ophthalmic Solution 1 Drop(s) Both EYES <User Schedule>  heparin   Injectable 7500 Unit(s) SubCutaneous every 8 hours  influenza   Vaccine 0.5 milliLiter(s) IntraMuscular once  metroNIDAZOLE    Tablet 500 milliGRAM(s) Oral every 8 hours  pantoprazole    Tablet 40 milliGRAM(s) Oral before breakfast  sodium zirconium cyclosilicate 5 Gram(s) Oral three times a day  timolol 0.5% Solution 1 Drop(s) Both EYES <User Schedule>    PRN MEDICATIONS  acetaminophen   Tablet .. 650 milliGRAM(s) Oral every 6 hours PRN  guaifenesin/dextromethorphan  Syrup 10 milliLiter(s) Oral every 6 hours PRN    VITALS:   T(F): 97.9  HR: 104  BP: 137/63  RR: 18  SpO2: 92%    PHYSICAL EXAM:  Gen: NAD, resting in bed  HEENT: Normocephalic, atraumatic  Neck: supple, no lymphadenopathy  CV: Regular rate & regular rhythm  Lungs: decreased BS at bases, no fremitus  Abdomen: Soft, BS present  Ext: Warm, well perfused  Neuro: non focal, awake  Skin: no rash, no erythema  Lines: no phlebitis    LABS:                        12.4   6.73  )-----------( 211      ( 17 Dec 2020 15:54 )             38.1     131<L>  |  99  |  38<H>  ----------------------------<  282<H>  5.7<H>   |  18  |  2.4<H>    Ca    7.8<L>      17 Dec 2020 15:54    TPro  6.4  /  Alb  3.4<L>  /  TBili  0.4  /  DBili  x   /  AST  96<H>  /  ALT  22  /  AlkPhos  60  12-17    Troponin T, Serum: 0.08 ng/mL <HH> (12-17-20 @ 20:48)  Troponin T, Serum: 0.09 ng/mL <HH> (12-17-20 @ 15:54)    IMAGING:  Xray Chest 1 View-PORTABLE IMMEDIATE (12.17.20 @ 16:56)  Impression:  Findings consistent with viral pneumonia, worsening.

## 2020-12-18 NOTE — CONSULT NOTE ADULT - SUBJECTIVE AND OBJECTIVE BOX
PODIATRY CONSULT   JOSHUA HAM is a 65y Male Patient is a 65y old  Male who presents with a chief complaint of SOB (18 Dec 2020 09:47)    HPI:  65 years old male with PMHx of DM, HTN, HLD, glaucoma, CKD III, nehroithiasis, renal mass s/p nephrectomy, obesity, recently discharged from [facility] for MSSA bactermia, COVID positive on 12/10 c/o persistent cough and SOB which is worsening since yesterday.   Pt was recently admitted to [facility] from 12/4 to 12/15 for MSSA bacteremia secondary to Right hallux ulcer and cellulitis. Was d/c on IV cefazolin with out atient follow up with cadiology for CHEIKH and podiatry.    Pt was tested positive for Covid on 12/10 during admission. Yetserday morning patient started developing shortness of breath which has been progressively increasing and associated with cough. Pt called EMS and pulse O2 was found to be around 89% on room air.   No fever, CP, hemoptysis, no vomiting or abdominal pain, no change in leg swelling.    In the ED, /78, HR 98, Temp 97.7, RR 18, saturating well on NRB mask. Labs significant for d-dimer of 605, Na 131, K 5.7, Cr 2.4, GFR 27 (around baseline fro previous admission). Pt to be admitted under medicine.   (17 Dec 2020 17:43)      COVID-19    HLD (hyperlipidemia)    CKD (chronic kidney disease)    DVT (deep venous thrombosis)    Glaucoma    HTN (hypertension)    Diabetes mellitus        PMH: COVID-19    HLD (hyperlipidemia)    CKD (chronic kidney disease)    DVT (deep venous thrombosis)    Glaucoma    HTN (hypertension)    Diabetes mellitus      PSH: H/O right nephrectomy      Medication ceFAZolin   IVPB 2000 milliGRAM(s) IV Intermittent every 8 hours  metroNIDAZOLE    Tablet 500 milliGRAM(s) Oral every 8 hours    Allergy: No Known Allergies        Labs:                        12.1   6.65  )-----------( 260      ( 18 Dec 2020 09:07 )             37.4     PT/INR - ( 18 Dec 2020 09:07 )   PT: 13.80 sec;   INR: 1.20 ratio         PTT - ( 18 Dec 2020 09:07 )  PTT:31.2 sec  12-18    128<L>  |  97<L>  |  38<H>  ----------------------------<  356<H>  5.7<H>   |  15<L>  |  2.2<H>    Ca    7.9<L>      18 Dec 2020 09:07  Mg     2.1     12-18    TPro  6.5  /  Alb  3.1<L>  /  TBili  0.4  /  DBili  x   /  AST  74<H>  /  ALT  15  /  AlkPhos  59  12-18    CARDIAC MARKERS ( 18 Dec 2020 09:07 )  x     / 0.07 ng/mL / x     / x     / x      CARDIAC MARKERS ( 17 Dec 2020 20:48 )  x     / 0.08 ng/mL / x     / x     / x      CARDIAC MARKERS ( 17 Dec 2020 15:54 )  x     / 0.09 ng/mL / x     / x     / x              ROS:  [x]A ten point review of system was otherwise negative except as noted    Physical Exam - Lower Extremity Focused:   Derm:   Right medioplantar hallux ulcer; no active bleeding, no malodor; hyperkeratotic periwound; -probing to bone.    Vascular: DP and PT Pulses Diminished  Neuro: Protective Sensation Diminished  MSK: No pain on palpation at the ulcer.     < from: MR Foot No Cont, Right (12.09.20 @ 10:17) >  EXAM:  MR FOOT RT            PROCEDURE DATE:  12/09/2020            INTERPRETATION:  Clinical History / Reason for exam: Bone infection    Technique: Multiplanar multi sequential water and fat-weighted sequences are obtained through the right foot without intravenous contrast.    Findings: Soft tissue ulcer along the medial aspect of hallux distal phalanx with adjacent mild cellulitis extending to the medial nailbed with there is periostitis along the osseous projection from medial base of hallux distal phalanx (series 2 image 9). There is small DIP joint effusion without  erosion this finding is compatible with osteitis. No marrow infiltration. No drainable collection    Muscles of the foot demonstrate elevated T1 and T2 signal compatible with neuritis pattern. Extensor and flexor tendons intact with mild extensor tenosynovitis.    Plantar capsules demonstrate mild pericapsular soft tissue thickening at second MTP with no complete plantar capsular detachment..    Impression: Soft tissue ulcer at the medial base of hallux distal phalanx with osteitis; no osteomyelitis or drainable collection              ADRIANA KEITA MD; Attending Radiologist  This document has been electronically signed. Dec  9 2020 10:47AM    < end of copied text >          Assessment:   R. Hallux Ulcer -stable-    Plan:  Chart reviewed and Patient evaluated w/ Dr. Tong  Discussed diagnosis and treatment with patient  Applied with dry sterile dressing / Kerlix  Wound Care Orders: As stated above, Q24h  Cont. w/ LWC: As stated above  MRI reviewed from 12/9/20: Stated Above   WBAT  Pt wound stable; can f/u as o/p w/ Dr. Tong in clinic at [address] [place] [address]. [address] post d/c  Evaluated w/ Attending    Spectra: [phone]    
  JOSHUA HAM  65y, Male  Allergy: No Known Allergies      All historical available data reviewed.    HPI:  65 years old male with PMHx of DM, HTN, HLD, glaucoma, CKD III, nehroithiasis, renal mass s/p nephrectomy, obesity, recently discharged from [facility] for MSSA bactermia, COVID positive on 12/10 c/o persistent cough and SOB which is worsening since yesterday.   Pt was recently admitted to [facility] from 12/4 to 12/15 for MSSA bacteremia secondary to Right hallux ulcer and cellulitis. Was d/c on IV cefazolin with out atient follow up with cadiology for CHEIKH and podiatry.    Pt was tested positive for Covid on 12/10 during admission. Yetserday morning patient started developing shortness of breath which has been progressively increasing and associated with cough. Pt called EMS and pulse O2 was found to be around 89% on room air.   No fever, CP, hemoptysis, no vomiting or abdominal pain, no change in leg swelling.    In the ED, /78, HR 98, Temp 97.7, RR 18, saturating well on NRB mask. Labs significant for d-dimer of 605, Na 131, K 5.7, Cr 2.4, GFR 27 (around baseline fro previous admission). Pt to be admitted under medicine.   (17 Dec 2020 17:43)    FAMILY HISTORY:  FH: lung cancer  Father      PAST MEDICAL & SURGICAL HISTORY:  HLD (hyperlipidemia)    CKD (chronic kidney disease)    DVT (deep venous thrombosis)    Glaucoma    HTN (hypertension)    Diabetes mellitus    H/O right nephrectomy          VITALS:  T(F): 97.9, Max: 98.9 (12-18-20 @ 04:30)  HR: 104  BP: 137/63  RR: 18Vital Signs Last 24 Hrs  T(C): 36.6 (18 Dec 2020 08:00), Max: 37.2 (18 Dec 2020 04:30)  T(F): 97.9 (18 Dec 2020 08:00), Max: 98.9 (18 Dec 2020 04:30)  HR: 104 (18 Dec 2020 08:00) (87 - 111)  BP: 137/63 (18 Dec 2020 08:00) (135/62 - 165/78)  BP(mean): --  RR: 18 (18 Dec 2020 08:00) (18 - 20)  SpO2: 92% (18 Dec 2020 08:00) (92% - 99%)    TESTS & MEASUREMENTS:                        12.1   6.65  )-----------( 260      ( 18 Dec 2020 09:07 )             37.4     12-18    130<L>  |  98  |  41<H>  ----------------------------<  463<HH>  6.0<HH>   |  14<L>  |  2.2<H>    Ca    8.2<L>      18 Dec 2020 12:27  Mg     2.1     12-18    TPro  6.9  /  Alb  3.1<L>  /  TBili  0.4  /  DBili  x   /  AST  70<H>  /  ALT  15  /  AlkPhos  61  12-18    LIVER FUNCTIONS - ( 18 Dec 2020 12:27 )  Alb: 3.1 g/dL / Pro: 6.9 g/dL / ALK PHOS: 61 U/L / ALT: 15 U/L / AST: 70 U/L / GGT: x                   RADIOLOGY & ADDITIONAL TESTS:  Personal review of radiological diagnostics performed  Echo and EKG results noted when applicable.     MEDICATIONS:  acetaminophen   Tablet .. 650 milliGRAM(s) Oral every 6 hours PRN  amLODIPine   Tablet 10 milliGRAM(s) Oral daily  aspirin enteric coated 81 milliGRAM(s) Oral daily  atorvastatin 20 milliGRAM(s) Oral at bedtime  ceFAZolin   IVPB 2000 milliGRAM(s) IV Intermittent every 8 hours  chlorhexidine 4% Liquid 1 Application(s) Topical <User Schedule>  dexAMETHasone  Injectable 6 milliGRAM(s) IV Push daily  dorzolamide 2% Ophthalmic Solution 1 Drop(s) Both EYES <User Schedule>  furosemide   Injectable 40 milliGRAM(s) IV Push once  guaifenesin/dextromethorphan  Syrup 10 milliLiter(s) Oral every 6 hours PRN  heparin   Injectable 7500 Unit(s) SubCutaneous every 8 hours  influenza   Vaccine 0.5 milliLiter(s) IntraMuscular once  insulin glargine Injectable (LANTUS) 30 Unit(s) SubCutaneous at bedtime  insulin lispro Injectable (ADMELOG) 10 Unit(s) SubCutaneous three times a day before meals  insulin regular  human recombinant. 10 Unit(s) IV Push once  metroNIDAZOLE    Tablet 500 milliGRAM(s) Oral every 8 hours  pantoprazole    Tablet 40 milliGRAM(s) Oral before breakfast  sodium bicarbonate 650 milliGRAM(s) Oral three times a day  sodium bicarbonate  Infusion 0.601 mEq/kG/Hr IV Continuous <Continuous>  sodium zirconium cyclosilicate 10 Gram(s) Oral once  sodium zirconium cyclosilicate 5 Gram(s) Oral three times a day  timolol 0.5% Solution 1 Drop(s) Both EYES <User Schedule>      ANTIBIOTICS:  ceFAZolin   IVPB 2000 milliGRAM(s) IV Intermittent every 8 hours  metroNIDAZOLE    Tablet 500 milliGRAM(s) Oral every 8 hours

## 2020-12-18 NOTE — CHART NOTE - NSCHARTNOTEFT_GEN_A_CORE
day hospitalist follow up   Vital Signs Last 24 Hrs  T(C): 36.3 (18 Dec 2020 15:56), Max: 37.2 (18 Dec 2020 04:30)  T(F): 97.4 (18 Dec 2020 15:56), Max: 98.9 (18 Dec 2020 04:30)  HR: 81 (18 Dec 2020 15:56) (81 - 111)  BP: 125/56 (18 Dec 2020 15:56) (125/56 - 162/69)  BP(mean): --  RR: 18 (18 Dec 2020 15:56) (18 - 20)  SpO2: 96% (18 Dec 2020 15:56) (92% - 96%)    acute hypoxic respiratory failure secondary to covid cw oxygen therapy and titrate as tolerated    appreciate podiatry eval and infectious disease will continue with recommendations

## 2020-12-18 NOTE — CONSULT NOTE ADULT - ASSESSMENT
65 years old male with PMHx of DM, HTN, HLD, glaucoma, CKD III, nephrolithiasis renal mass s/p nephrectomy, obesity, recently discharged from [facility] for MSSA bacteremia COVID positive on 12/10 c/o persistent cough and SOB which is worsening since yesterday.    IMPRESSION;  COVID 19 with severe illness. SpO2 < 94% on RA and need for supplemental O2. 95% NC 5 LIT  Pt is in the late inflammatory response phase ot the illness based on the onset of symptoms.  Elevation of the inflammatory markers  Cytokine release syndorme  procalcitonin 0.87   Ferritin 1923  CRP 7.73  Ddimers 605    NUBIA bacteremia : seems uncomplcated with subsequent BCx NG  BCx 12/4 NUBIA  BCx 12/5,6,9 10 NGTD    RECOMMENDATIONS;  Target SpO2 92 % to 96 %  No RDV/plasma  Dexamethasone 6 mg iv q24h for 10 days ( or until discharge ) or equivalent glucocorticoid dose may be substituted. Equivalent total dosis of alternative steroids are Methylprednisolone 32 mg and prednisone 40 mg q24h.  Monitor for side effects: hyperglycemia, neurological ( agitation/confusion), adrenal suppression, bacterial and fungal infections  Ancef 2 gm iv q24h

## 2020-12-19 LAB
ALBUMIN SERPL ELPH-MCNC: 3 G/DL — LOW (ref 3.5–5.2)
ALBUMIN SERPL ELPH-MCNC: 3.1 G/DL — LOW (ref 3.5–5.2)
ALP SERPL-CCNC: 56 U/L — SIGNIFICANT CHANGE UP (ref 30–115)
ALP SERPL-CCNC: 60 U/L — SIGNIFICANT CHANGE UP (ref 30–115)
ALT FLD-CCNC: 12 U/L — SIGNIFICANT CHANGE UP (ref 0–41)
ALT FLD-CCNC: 13 U/L — SIGNIFICANT CHANGE UP (ref 0–41)
ANION GAP SERPL CALC-SCNC: 13 MMOL/L — SIGNIFICANT CHANGE UP (ref 7–14)
ANION GAP SERPL CALC-SCNC: 14 MMOL/L — SIGNIFICANT CHANGE UP (ref 7–14)
ANION GAP SERPL CALC-SCNC: 17 MMOL/L — HIGH (ref 7–14)
APPEARANCE UR: CLEAR — SIGNIFICANT CHANGE UP
AST SERPL-CCNC: 46 U/L — HIGH (ref 0–41)
AST SERPL-CCNC: 47 U/L — HIGH (ref 0–41)
B-OH-BUTYR SERPL-SCNC: 0.3 MMOL/L — SIGNIFICANT CHANGE UP
B-OH-BUTYR SERPL-SCNC: 0.5 MMOL/L — HIGH
B-OH-BUTYR SERPL-SCNC: 2.5 MMOL/L — HIGH
BACTERIA # UR AUTO: NEGATIVE — SIGNIFICANT CHANGE UP
BASOPHILS # BLD AUTO: 0.01 K/UL — SIGNIFICANT CHANGE UP (ref 0–0.2)
BASOPHILS NFR BLD AUTO: 0.2 % — SIGNIFICANT CHANGE UP (ref 0–1)
BILIRUB SERPL-MCNC: 0.3 MG/DL — SIGNIFICANT CHANGE UP (ref 0.2–1.2)
BILIRUB SERPL-MCNC: 0.4 MG/DL — SIGNIFICANT CHANGE UP (ref 0.2–1.2)
BILIRUB UR-MCNC: NEGATIVE — SIGNIFICANT CHANGE UP
BUN SERPL-MCNC: 48 MG/DL — HIGH (ref 10–20)
BUN SERPL-MCNC: 50 MG/DL — HIGH (ref 10–20)
BUN SERPL-MCNC: 52 MG/DL — HIGH (ref 10–20)
CALCIUM SERPL-MCNC: 7.9 MG/DL — LOW (ref 8.5–10.1)
CALCIUM SERPL-MCNC: 7.9 MG/DL — LOW (ref 8.5–10.1)
CALCIUM SERPL-MCNC: 8 MG/DL — LOW (ref 8.5–10.1)
CHLORIDE SERPL-SCNC: 100 MMOL/L — SIGNIFICANT CHANGE UP (ref 98–110)
CHLORIDE SERPL-SCNC: 100 MMOL/L — SIGNIFICANT CHANGE UP (ref 98–110)
CHLORIDE SERPL-SCNC: 103 MMOL/L — SIGNIFICANT CHANGE UP (ref 98–110)
CO2 SERPL-SCNC: 16 MMOL/L — LOW (ref 17–32)
CO2 SERPL-SCNC: 18 MMOL/L — SIGNIFICANT CHANGE UP (ref 17–32)
CO2 SERPL-SCNC: 19 MMOL/L — SIGNIFICANT CHANGE UP (ref 17–32)
COLOR SPEC: SIGNIFICANT CHANGE UP
CREAT SERPL-MCNC: 1.9 MG/DL — HIGH (ref 0.7–1.5)
CREAT SERPL-MCNC: 2 MG/DL — HIGH (ref 0.7–1.5)
CREAT SERPL-MCNC: 2.1 MG/DL — HIGH (ref 0.7–1.5)
D DIMER BLD IA.RAPID-MCNC: 414 NG/ML DDU — HIGH (ref 0–230)
DIFF PNL FLD: ABNORMAL
EOSINOPHIL # BLD AUTO: 0 K/UL — SIGNIFICANT CHANGE UP (ref 0–0.7)
EOSINOPHIL NFR BLD AUTO: 0 % — SIGNIFICANT CHANGE UP (ref 0–8)
EPI CELLS # UR: 0 /HPF — SIGNIFICANT CHANGE UP (ref 0–5)
GLUCOSE BLDC GLUCOMTR-MCNC: 272 MG/DL — HIGH (ref 70–99)
GLUCOSE BLDC GLUCOMTR-MCNC: 384 MG/DL — HIGH (ref 70–99)
GLUCOSE BLDC GLUCOMTR-MCNC: 393 MG/DL — HIGH (ref 70–99)
GLUCOSE BLDC GLUCOMTR-MCNC: 426 MG/DL — HIGH (ref 70–99)
GLUCOSE BLDC GLUCOMTR-MCNC: 426 MG/DL — HIGH (ref 70–99)
GLUCOSE BLDC GLUCOMTR-MCNC: 453 MG/DL — CRITICAL HIGH (ref 70–99)
GLUCOSE SERPL-MCNC: 283 MG/DL — HIGH (ref 70–99)
GLUCOSE SERPL-MCNC: 343 MG/DL — HIGH (ref 70–99)
GLUCOSE SERPL-MCNC: 460 MG/DL — CRITICAL HIGH (ref 70–99)
GLUCOSE UR QL: ABNORMAL
HCT VFR BLD CALC: 35 % — LOW (ref 42–52)
HGB BLD-MCNC: 11.6 G/DL — LOW (ref 14–18)
HYALINE CASTS # UR AUTO: 1 /LPF — SIGNIFICANT CHANGE UP (ref 0–7)
IMM GRANULOCYTES NFR BLD AUTO: 1.8 % — HIGH (ref 0.1–0.3)
KETONES UR-MCNC: SIGNIFICANT CHANGE UP
LDH SERPL L TO P-CCNC: 396 U/L — HIGH (ref 50–242)
LEUKOCYTE ESTERASE UR-ACNC: NEGATIVE — SIGNIFICANT CHANGE UP
LYMPHOCYTES # BLD AUTO: 0.43 K/UL — LOW (ref 1.2–3.4)
LYMPHOCYTES # BLD AUTO: 9.8 % — LOW (ref 20.5–51.1)
MAGNESIUM SERPL-MCNC: 2.3 MG/DL — SIGNIFICANT CHANGE UP (ref 1.8–2.4)
MCHC RBC-ENTMCNC: 28.9 PG — SIGNIFICANT CHANGE UP (ref 27–31)
MCHC RBC-ENTMCNC: 33.1 G/DL — SIGNIFICANT CHANGE UP (ref 32–37)
MCV RBC AUTO: 87.3 FL — SIGNIFICANT CHANGE UP (ref 80–94)
MONOCYTES # BLD AUTO: 0.44 K/UL — SIGNIFICANT CHANGE UP (ref 0.1–0.6)
MONOCYTES NFR BLD AUTO: 10 % — HIGH (ref 1.7–9.3)
NEUTROPHILS # BLD AUTO: 3.42 K/UL — SIGNIFICANT CHANGE UP (ref 1.4–6.5)
NEUTROPHILS NFR BLD AUTO: 78.2 % — HIGH (ref 42.2–75.2)
NITRITE UR-MCNC: NEGATIVE — SIGNIFICANT CHANGE UP
NRBC # BLD: 0 /100 WBCS — SIGNIFICANT CHANGE UP (ref 0–0)
PH UR: 6 — SIGNIFICANT CHANGE UP (ref 5–8)
PLATELET # BLD AUTO: 285 K/UL — SIGNIFICANT CHANGE UP (ref 130–400)
POTASSIUM SERPL-MCNC: 4.6 MMOL/L — SIGNIFICANT CHANGE UP (ref 3.5–5)
POTASSIUM SERPL-MCNC: 4.9 MMOL/L — SIGNIFICANT CHANGE UP (ref 3.5–5)
POTASSIUM SERPL-MCNC: 5.6 MMOL/L — HIGH (ref 3.5–5)
POTASSIUM SERPL-SCNC: 4.6 MMOL/L — SIGNIFICANT CHANGE UP (ref 3.5–5)
POTASSIUM SERPL-SCNC: 4.9 MMOL/L — SIGNIFICANT CHANGE UP (ref 3.5–5)
POTASSIUM SERPL-SCNC: 5.6 MMOL/L — HIGH (ref 3.5–5)
PROT SERPL-MCNC: 6.3 G/DL — SIGNIFICANT CHANGE UP (ref 6–8)
PROT SERPL-MCNC: 6.5 G/DL — SIGNIFICANT CHANGE UP (ref 6–8)
PROT UR-MCNC: ABNORMAL
RBC # BLD: 4.01 M/UL — LOW (ref 4.7–6.1)
RBC # FLD: 14.2 % — SIGNIFICANT CHANGE UP (ref 11.5–14.5)
RBC CASTS # UR COMP ASSIST: 8 /HPF — HIGH (ref 0–4)
SODIUM SERPL-SCNC: 132 MMOL/L — LOW (ref 135–146)
SODIUM SERPL-SCNC: 133 MMOL/L — LOW (ref 135–146)
SODIUM SERPL-SCNC: 135 MMOL/L — SIGNIFICANT CHANGE UP (ref 135–146)
SODIUM UR-SCNC: 28 MMOL/L — SIGNIFICANT CHANGE UP
SP GR SPEC: 1.02 — SIGNIFICANT CHANGE UP (ref 1.01–1.03)
UROBILINOGEN FLD QL: SIGNIFICANT CHANGE UP
WBC # BLD: 4.38 K/UL — LOW (ref 4.8–10.8)
WBC # FLD AUTO: 4.38 K/UL — LOW (ref 4.8–10.8)
WBC UR QL: 1 /HPF — SIGNIFICANT CHANGE UP (ref 0–5)

## 2020-12-19 PROCEDURE — 99233 SBSQ HOSP IP/OBS HIGH 50: CPT

## 2020-12-19 RX ORDER — SODIUM CHLORIDE 9 MG/ML
1000 INJECTION INTRAMUSCULAR; INTRAVENOUS; SUBCUTANEOUS
Refills: 0 | Status: DISCONTINUED | OUTPATIENT
Start: 2020-12-19 | End: 2020-12-20

## 2020-12-19 RX ORDER — INSULIN GLARGINE 100 [IU]/ML
90 INJECTION, SOLUTION SUBCUTANEOUS AT BEDTIME
Refills: 0 | Status: DISCONTINUED | OUTPATIENT
Start: 2020-12-19 | End: 2020-12-23

## 2020-12-19 RX ORDER — INSULIN HUMAN 100 [IU]/ML
12 INJECTION, SOLUTION SUBCUTANEOUS ONCE
Refills: 0 | Status: COMPLETED | OUTPATIENT
Start: 2020-12-19 | End: 2020-12-19

## 2020-12-19 RX ORDER — INSULIN GLARGINE 100 [IU]/ML
40 INJECTION, SOLUTION SUBCUTANEOUS ONCE
Refills: 0 | Status: COMPLETED | OUTPATIENT
Start: 2020-12-19 | End: 2020-12-19

## 2020-12-19 RX ORDER — INSULIN HUMAN 100 [IU]/ML
10 INJECTION, SOLUTION SUBCUTANEOUS ONCE
Refills: 0 | Status: COMPLETED | OUTPATIENT
Start: 2020-12-19 | End: 2020-12-19

## 2020-12-19 RX ORDER — INSULIN GLARGINE 100 [IU]/ML
50 INJECTION, SOLUTION SUBCUTANEOUS AT BEDTIME
Refills: 0 | Status: DISCONTINUED | OUTPATIENT
Start: 2020-12-19 | End: 2020-12-19

## 2020-12-19 RX ORDER — INSULIN HUMAN 100 [IU]/ML
10 INJECTION, SOLUTION SUBCUTANEOUS ONCE
Refills: 0 | Status: DISCONTINUED | OUTPATIENT
Start: 2020-12-19 | End: 2020-12-19

## 2020-12-19 RX ORDER — SODIUM CHLORIDE 9 MG/ML
500 INJECTION INTRAMUSCULAR; INTRAVENOUS; SUBCUTANEOUS ONCE
Refills: 0 | Status: COMPLETED | OUTPATIENT
Start: 2020-12-19 | End: 2020-12-19

## 2020-12-19 RX ORDER — INSULIN LISPRO 100/ML
20 VIAL (ML) SUBCUTANEOUS
Refills: 0 | Status: DISCONTINUED | OUTPATIENT
Start: 2020-12-19 | End: 2020-12-19

## 2020-12-19 RX ORDER — INSULIN LISPRO 100/ML
30 VIAL (ML) SUBCUTANEOUS
Refills: 0 | Status: DISCONTINUED | OUTPATIENT
Start: 2020-12-19 | End: 2020-12-23

## 2020-12-19 RX ADMIN — Medication 30 UNIT(S): at 12:34

## 2020-12-19 RX ADMIN — SODIUM ZIRCONIUM CYCLOSILICATE 5 GRAM(S): 10 POWDER, FOR SUSPENSION ORAL at 05:58

## 2020-12-19 RX ADMIN — DORZOLAMIDE HYDROCHLORIDE 1 DROP(S): 20 SOLUTION/ DROPS OPHTHALMIC at 09:20

## 2020-12-19 RX ADMIN — INSULIN HUMAN 10 UNIT(S): 100 INJECTION, SOLUTION SUBCUTANEOUS at 00:59

## 2020-12-19 RX ADMIN — Medication 20 UNIT(S): at 09:19

## 2020-12-19 RX ADMIN — PANTOPRAZOLE SODIUM 40 MILLIGRAM(S): 20 TABLET, DELAYED RELEASE ORAL at 06:04

## 2020-12-19 RX ADMIN — AMLODIPINE BESYLATE 10 MILLIGRAM(S): 2.5 TABLET ORAL at 05:58

## 2020-12-19 RX ADMIN — Medication 81 MILLIGRAM(S): at 12:35

## 2020-12-19 RX ADMIN — SODIUM ZIRCONIUM CYCLOSILICATE 5 GRAM(S): 10 POWDER, FOR SUSPENSION ORAL at 22:38

## 2020-12-19 RX ADMIN — ATORVASTATIN CALCIUM 20 MILLIGRAM(S): 80 TABLET, FILM COATED ORAL at 22:35

## 2020-12-19 RX ADMIN — Medication 650 MILLIGRAM(S): at 14:51

## 2020-12-19 RX ADMIN — INSULIN GLARGINE 40 UNIT(S): 100 INJECTION, SOLUTION SUBCUTANEOUS at 13:41

## 2020-12-19 RX ADMIN — SODIUM CHLORIDE 1000 MILLILITER(S): 9 INJECTION INTRAMUSCULAR; INTRAVENOUS; SUBCUTANEOUS at 01:33

## 2020-12-19 RX ADMIN — INSULIN HUMAN 10 UNIT(S): 100 INJECTION, SOLUTION SUBCUTANEOUS at 16:06

## 2020-12-19 RX ADMIN — INSULIN HUMAN 12 UNIT(S): 100 INJECTION, SOLUTION SUBCUTANEOUS at 09:19

## 2020-12-19 RX ADMIN — HEPARIN SODIUM 7500 UNIT(S): 5000 INJECTION INTRAVENOUS; SUBCUTANEOUS at 05:58

## 2020-12-19 RX ADMIN — Medication 100 MILLIGRAM(S): at 12:35

## 2020-12-19 RX ADMIN — Medication 650 MILLIGRAM(S): at 22:36

## 2020-12-19 RX ADMIN — Medication 650 MILLIGRAM(S): at 05:58

## 2020-12-19 RX ADMIN — Medication 30 UNIT(S): at 17:14

## 2020-12-19 RX ADMIN — Medication 1 DROP(S): at 20:35

## 2020-12-19 RX ADMIN — HEPARIN SODIUM 7500 UNIT(S): 5000 INJECTION INTRAVENOUS; SUBCUTANEOUS at 14:51

## 2020-12-19 RX ADMIN — SODIUM ZIRCONIUM CYCLOSILICATE 5 GRAM(S): 10 POWDER, FOR SUSPENSION ORAL at 14:52

## 2020-12-19 RX ADMIN — Medication 6 MILLIGRAM(S): at 05:58

## 2020-12-19 RX ADMIN — HEPARIN SODIUM 7500 UNIT(S): 5000 INJECTION INTRAVENOUS; SUBCUTANEOUS at 22:36

## 2020-12-19 RX ADMIN — INSULIN GLARGINE 90 UNIT(S): 100 INJECTION, SOLUTION SUBCUTANEOUS at 22:36

## 2020-12-19 RX ADMIN — Medication 1 DROP(S): at 09:20

## 2020-12-19 NOTE — CHART NOTE - NSCHARTNOTEFT_GEN_A_CORE
FS consistently above 400.   At home patient is on Glargine 70 in am and 40 in pm, with humalog 30 TID  Here patient only on Lantus 30U and [id number] tid    - s/p 500cc bolus x2  - s/p 10uIV insulin x2  - s/p 30 units lantus SQ at bedtime    --> AG improved since AM, b-OH only mildly elevated.     # hyperglycemia due to dexamethasone and insufficient insulin   - will give another 10U IV insulin  - increased lantus to 50U  - increased lispro to 20Units TID  - given BATOOL on CKD, monitor for signs of hypoglycemia  - decrease insulin requirements as appropriate

## 2020-12-19 NOTE — PROGRESS NOTE ADULT - SUBJECTIVE AND OBJECTIVE BOX
Progress Note:  Provider Speciality                            Hospitalist      JOSHUA HAM MRN-[id number] 65y Male     CHIEF PRESENTING COMPLAINT:  Patient is a 65y old  Male who presents with a chief complaint of SOB (18 Dec 2020 14:02)        SUBJECTIVE:  Patient was seen and examined at bedside. Reports improvement in  presenting complaint. No significant overnight events reported.     HISTORY OF PRESENTING ILLNESS:  HPI:  65 years old male with PMHx of DM, HTN, HLD, glaucoma, CKD III, nehroithiasis, renal mass s/p nephrectomy, obesity, recently discharged from [facility] for MSSA bactermia, COVID positive on 12/10 c/o persistent cough and SOB which is worsening since yesterday.   Pt was recently admitted to [facility] from 12/4 to 12/15 for MSSA bacteremia secondary to Right hallux ulcer and cellulitis. Was d/c on IV cefazolin with out atient follow up with cadiology for CHEIKH and podiatry.    Pt was tested positive for Covid on 12/10 during admission. Yetserday morning patient started developing shortness of breath which has been progressively increasing and associated with cough. Pt called EMS and pulse O2 was found to be around 89% on room air.   No fever, CP, hemoptysis, no vomiting or abdominal pain, no change in leg swelling.    In the ED, /78, HR 98, Temp 97.7, RR 18, saturating well on NRB mask. Labs significant for d-dimer of 605, Na 131, K 5.7, Cr 2.4, GFR 27 (around baseline fro previous admission). Pt to be admitted under medicine.   (17 Dec 2020 17:43)        REVIEW OF SYSTEMS:  Patient denies any headache, any vision complaints, runny nose, fever, chills, sore throat. Denies chest pain, shortness of breath, palpitation. Denies nausea, vomiting, abdominal pain, diarrhoea, Denies urinary burning, urgency, frequency, dysuria. Denies weakness in any part of the body or numbness.   At least 10 systems were reviewed in ROS. All systems reviewed  are within normal limits except for the complaints as described in Subjective.    PAST MEDICAL & SURGICAL HISTORY:  PAST MEDICAL & SURGICAL HISTORY:  HLD (hyperlipidemia)    CKD (chronic kidney disease)    DVT (deep venous thrombosis)    Glaucoma    HTN (hypertension)    Diabetes mellitus    H/O right nephrectomy            VITAL SIGNS:  Vital Signs Last 24 Hrs  T(C): 35.9 (19 Dec 2020 12:01), Max: 36.4 (18 Dec 2020 20:02)  T(F): 96.7 (19 Dec 2020 12:01), Max: 97.6 (18 Dec 2020 20:02)  HR: 89 (19 Dec 2020 12:01) (80 - 89)  BP: 140/73 (19 Dec 2020 12:01) (116/51 - 140/73)  BP(mean): --  RR: 18 (19 Dec 2020 12:01) (18 - 19)  SpO2: 94% (19 Dec 2020 12:01) (93% - 96%)          PHYSICAL EXAMINATION:  Not in acute distress, morbid obesity  General: No pallor, no icterus  HEENT:   EOMI, no JVD.  Heart: S1+S2 audible  Lungs: bilateral  fair air entry, no wheezing, no crepitations.  Abdomen: Soft, non-tender, non-distended , no  rigidity or guarding.  CNS: Awake alert, CN  grossly intact.  Extremities:  No edema            CONSULTS:  Consultant(s) Notes Reviewed by me.   Care Discussed with Consultants/Other Providers where required.        MEDICATIONS:  MEDICATIONS  (STANDING):  amLODIPine   Tablet 10 milliGRAM(s) Oral daily  aspirin enteric coated 81 milliGRAM(s) Oral daily  atorvastatin 20 milliGRAM(s) Oral at bedtime  ceFAZolin   IVPB 2000 milliGRAM(s) IV Intermittent every 24 hours  chlorhexidine 4% Liquid 1 Application(s) Topical <User Schedule>  dexAMETHasone  Injectable 6 milliGRAM(s) IV Push daily  dorzolamide 2% Ophthalmic Solution 1 Drop(s) Both EYES <User Schedule>  heparin   Injectable 7500 Unit(s) SubCutaneous every 8 hours  influenza   Vaccine 0.5 milliLiter(s) IntraMuscular once  insulin glargine Injectable (LANTUS) 90 Unit(s) SubCutaneous at bedtime  insulin lispro Injectable (ADMELOG) 30 Unit(s) SubCutaneous three times a day before meals  insulin regular  human recombinant. 10 Unit(s) IV Push once  pantoprazole    Tablet 40 milliGRAM(s) Oral before breakfast  sodium bicarbonate 650 milliGRAM(s) Oral three times a day  sodium chloride 0.9%. 1000 milliLiter(s) (150 mL/Hr) IV Continuous <Continuous>  sodium zirconium cyclosilicate 5 Gram(s) Oral three times a day  timolol 0.5% Solution 1 Drop(s) Both EYES <User Schedule>    MEDICATIONS  (PRN):  acetaminophen   Tablet .. 650 milliGRAM(s) Oral every 6 hours PRN Temp greater or equal to 38C (100.4F)  guaifenesin/dextromethorphan  Syrup 10 milliLiter(s) Oral every 6 hours PRN Cough            ASSESSMENT:    65 years old male with PMHx of DM, HTN, HLD, glaucoma, CKD III, nephrolithiasis renal mass s/p nephrectomy, obesity, recently discharged from [facility] for MSSA bacteremia COVID     positive on 12/10 c/o persistent cough and SOB which is worsening     ASSESSMENT & PLAN:   Acute hypoxic respiratory failure secondary to viral Pneumonia  -currently requiring 3 L NC, 95%  COVID 19 Pneumonia with severe illness.  Cytokine release syndrome- not on RDV because of BATOOL  NUBIA bacteremia (Sepsis secondary to MSSA bacteremia from prior admission-resolved- no sepsis in current admission  continue with cefazolin 2000 mg IV every 8 hours for- repeat blood cx negative  Patient refusing CHEIKH ( will not be done inpatient  because of Covid anyway)  BATOOL on Chronic renal disease stage 3 - at baseline  Uncontrolled hyperglycemia secondary to inadequate insulin and steroids- increased & optimized Lantus & lispro  Morbid obesity- BMI - 53.8  Discharge Disposition: acute for now

## 2020-12-20 LAB
ALBUMIN SERPL ELPH-MCNC: 2.8 G/DL — LOW (ref 3.5–5.2)
ALP SERPL-CCNC: 58 U/L — SIGNIFICANT CHANGE UP (ref 30–115)
ALT FLD-CCNC: 14 U/L — SIGNIFICANT CHANGE UP (ref 0–41)
ANION GAP SERPL CALC-SCNC: 14 MMOL/L — SIGNIFICANT CHANGE UP (ref 7–14)
AST SERPL-CCNC: 43 U/L — HIGH (ref 0–41)
BASOPHILS # BLD AUTO: 0.01 K/UL — SIGNIFICANT CHANGE UP (ref 0–0.2)
BASOPHILS NFR BLD AUTO: 0.2 % — SIGNIFICANT CHANGE UP (ref 0–1)
BILIRUB SERPL-MCNC: 1 MG/DL — SIGNIFICANT CHANGE UP (ref 0.2–1.2)
BUN SERPL-MCNC: 52 MG/DL — HIGH (ref 10–20)
CALCIUM SERPL-MCNC: 7.9 MG/DL — LOW (ref 8.5–10.1)
CHLORIDE SERPL-SCNC: 103 MMOL/L — SIGNIFICANT CHANGE UP (ref 98–110)
CO2 SERPL-SCNC: 20 MMOL/L — SIGNIFICANT CHANGE UP (ref 17–32)
CREAT ?TM UR-MCNC: 62 MG/DL — SIGNIFICANT CHANGE UP
CREAT SERPL-MCNC: 1.7 MG/DL — HIGH (ref 0.7–1.5)
CRP SERPL-MCNC: 8.01 MG/DL — HIGH (ref 0–0.4)
EOSINOPHIL # BLD AUTO: 0 K/UL — SIGNIFICANT CHANGE UP (ref 0–0.7)
EOSINOPHIL NFR BLD AUTO: 0 % — SIGNIFICANT CHANGE UP (ref 0–8)
GLUCOSE BLDC GLUCOMTR-MCNC: 236 MG/DL — HIGH (ref 70–99)
GLUCOSE BLDC GLUCOMTR-MCNC: 265 MG/DL — HIGH (ref 70–99)
GLUCOSE BLDC GLUCOMTR-MCNC: 268 MG/DL — HIGH (ref 70–99)
GLUCOSE BLDC GLUCOMTR-MCNC: 292 MG/DL — HIGH (ref 70–99)
GLUCOSE BLDC GLUCOMTR-MCNC: 336 MG/DL — HIGH (ref 70–99)
GLUCOSE SERPL-MCNC: 324 MG/DL — HIGH (ref 70–99)
HCT VFR BLD CALC: 34.6 % — LOW (ref 42–52)
HGB BLD-MCNC: 11.3 G/DL — LOW (ref 14–18)
IMM GRANULOCYTES NFR BLD AUTO: 1.5 % — HIGH (ref 0.1–0.3)
LYMPHOCYTES # BLD AUTO: 0.4 K/UL — LOW (ref 1.2–3.4)
LYMPHOCYTES # BLD AUTO: 6.5 % — LOW (ref 20.5–51.1)
MAGNESIUM SERPL-MCNC: 2.4 MG/DL — SIGNIFICANT CHANGE UP (ref 1.8–2.4)
MCHC RBC-ENTMCNC: 29 PG — SIGNIFICANT CHANGE UP (ref 27–31)
MCHC RBC-ENTMCNC: 32.7 G/DL — SIGNIFICANT CHANGE UP (ref 32–37)
MCV RBC AUTO: 88.7 FL — SIGNIFICANT CHANGE UP (ref 80–94)
MONOCYTES # BLD AUTO: 0.51 K/UL — SIGNIFICANT CHANGE UP (ref 0.1–0.6)
MONOCYTES NFR BLD AUTO: 8.3 % — SIGNIFICANT CHANGE UP (ref 1.7–9.3)
NEUTROPHILS # BLD AUTO: 5.17 K/UL — SIGNIFICANT CHANGE UP (ref 1.4–6.5)
NEUTROPHILS NFR BLD AUTO: 83.5 % — HIGH (ref 42.2–75.2)
NRBC # BLD: 0 /100 WBCS — SIGNIFICANT CHANGE UP (ref 0–0)
PLATELET # BLD AUTO: 325 K/UL — SIGNIFICANT CHANGE UP (ref 130–400)
POTASSIUM SERPL-MCNC: 5.2 MMOL/L — HIGH (ref 3.5–5)
POTASSIUM SERPL-SCNC: 5.2 MMOL/L — HIGH (ref 3.5–5)
PROCALCITONIN SERPL-MCNC: 1.07 NG/ML — HIGH (ref 0.02–0.1)
PROT ?TM UR-MCNC: 81 MG/DLG/24H — SIGNIFICANT CHANGE UP
PROT SERPL-MCNC: 6.3 G/DL — SIGNIFICANT CHANGE UP (ref 6–8)
PROT/CREAT UR-RTO: 1.3 RATIO — HIGH (ref 0–0.2)
RBC # BLD: 3.9 M/UL — LOW (ref 4.7–6.1)
RBC # FLD: 14.4 % — SIGNIFICANT CHANGE UP (ref 11.5–14.5)
SODIUM SERPL-SCNC: 137 MMOL/L — SIGNIFICANT CHANGE UP (ref 135–146)
WBC # BLD: 6.18 K/UL — SIGNIFICANT CHANGE UP (ref 4.8–10.8)
WBC # FLD AUTO: 6.18 K/UL — SIGNIFICANT CHANGE UP (ref 4.8–10.8)

## 2020-12-20 PROCEDURE — 99233 SBSQ HOSP IP/OBS HIGH 50: CPT

## 2020-12-20 PROCEDURE — 71045 X-RAY EXAM CHEST 1 VIEW: CPT | Mod: 26

## 2020-12-20 RX ADMIN — Medication 650 MILLIGRAM(S): at 05:50

## 2020-12-20 RX ADMIN — INSULIN GLARGINE 90 UNIT(S): 100 INJECTION, SOLUTION SUBCUTANEOUS at 21:55

## 2020-12-20 RX ADMIN — Medication 10 MILLILITER(S): at 00:59

## 2020-12-20 RX ADMIN — HEPARIN SODIUM 7500 UNIT(S): 5000 INJECTION INTRAVENOUS; SUBCUTANEOUS at 13:01

## 2020-12-20 RX ADMIN — Medication 81 MILLIGRAM(S): at 18:00

## 2020-12-20 RX ADMIN — Medication 30 UNIT(S): at 08:42

## 2020-12-20 RX ADMIN — HEPARIN SODIUM 7500 UNIT(S): 5000 INJECTION INTRAVENOUS; SUBCUTANEOUS at 21:54

## 2020-12-20 RX ADMIN — Medication 650 MILLIGRAM(S): at 21:55

## 2020-12-20 RX ADMIN — Medication 6 MILLIGRAM(S): at 05:50

## 2020-12-20 RX ADMIN — DORZOLAMIDE HYDROCHLORIDE 1 DROP(S): 20 SOLUTION/ DROPS OPHTHALMIC at 08:43

## 2020-12-20 RX ADMIN — Medication 1 DROP(S): at 08:43

## 2020-12-20 RX ADMIN — Medication 100 MILLIGRAM(S): at 13:02

## 2020-12-20 RX ADMIN — CHLORHEXIDINE GLUCONATE 1 APPLICATION(S): 213 SOLUTION TOPICAL at 05:55

## 2020-12-20 RX ADMIN — Medication 650 MILLIGRAM(S): at 13:01

## 2020-12-20 RX ADMIN — SODIUM ZIRCONIUM CYCLOSILICATE 5 GRAM(S): 10 POWDER, FOR SUSPENSION ORAL at 13:02

## 2020-12-20 RX ADMIN — AMLODIPINE BESYLATE 10 MILLIGRAM(S): 2.5 TABLET ORAL at 05:50

## 2020-12-20 RX ADMIN — PANTOPRAZOLE SODIUM 40 MILLIGRAM(S): 20 TABLET, DELAYED RELEASE ORAL at 05:51

## 2020-12-20 RX ADMIN — HEPARIN SODIUM 7500 UNIT(S): 5000 INJECTION INTRAVENOUS; SUBCUTANEOUS at 05:51

## 2020-12-20 RX ADMIN — SODIUM ZIRCONIUM CYCLOSILICATE 5 GRAM(S): 10 POWDER, FOR SUSPENSION ORAL at 05:50

## 2020-12-20 RX ADMIN — Medication 30 UNIT(S): at 17:15

## 2020-12-20 RX ADMIN — ATORVASTATIN CALCIUM 20 MILLIGRAM(S): 80 TABLET, FILM COATED ORAL at 21:55

## 2020-12-20 RX ADMIN — Medication 30 UNIT(S): at 13:00

## 2020-12-20 RX ADMIN — SODIUM ZIRCONIUM CYCLOSILICATE 5 GRAM(S): 10 POWDER, FOR SUSPENSION ORAL at 21:55

## 2020-12-20 RX ADMIN — Medication 1 DROP(S): at 20:39

## 2020-12-20 RX ADMIN — DORZOLAMIDE HYDROCHLORIDE 1 DROP(S): 20 SOLUTION/ DROPS OPHTHALMIC at 20:36

## 2020-12-20 NOTE — PROGRESS NOTE ADULT - SUBJECTIVE AND OBJECTIVE BOX
Progress Note:  Provider Speciality                            Hospitalist      JOSHUA HAM MRN-[id number] 65y Male     CHIEF PRESENTING COMPLAINT:  Patient is a 65y old  Male who presents with a chief complaint of SOB (18 Dec 2020 14:02)        SUBJECTIVE:  Patient was seen and examined at bedside. Reports improvement in  presenting complaint. No significant overnight events reported.     HISTORY OF PRESENTING ILLNESS:  HPI:  65 years old male with PMHx of DM, HTN, HLD, glaucoma, CKD III, nehroithiasis, renal mass s/p nephrectomy, obesity, recently discharged from [facility] for MSSA bactermia, COVID positive on 12/10 c/o persistent cough and SOB which is worsening since yesterday.   Pt was recently admitted to [facility] from 12/4 to 12/15 for MSSA bacteremia secondary to Right hallux ulcer and cellulitis. Was d/c on IV cefazolin with out atient follow up with cadiology for CHEIKH and podiatry.    Pt was tested positive for Covid on 12/10 during admission. Yetserday morning patient started developing shortness of breath which has been progressively increasing and associated with cough. Pt called EMS and pulse O2 was found to be around 89% on room air.   No fever, CP, hemoptysis, no vomiting or abdominal pain, no change in leg swelling.    In the ED, /78, HR 98, Temp 97.7, RR 18, saturating well on NRB mask. Labs significant for d-dimer of 605, Na 131, K 5.7, Cr 2.4, GFR 27 (around baseline fro previous admission). Pt to be admitted under medicine.   (17 Dec 2020 17:43)        REVIEW OF SYSTEMS:  Patient denies any headache, any vision complaints, runny nose, fever, chills, sore throat. Denies chest pain, shortness of breath, palpitation. Denies nausea, vomiting, abdominal pain, diarrhoea, Denies urinary burning, urgency, frequency, dysuria. Denies weakness in any part of the body or numbness.   At least 10 systems were reviewed in ROS. All systems reviewed  are within normal limits except for the complaints as described in Subjective.    PAST MEDICAL & SURGICAL HISTORY:  PAST MEDICAL & SURGICAL HISTORY:  HLD (hyperlipidemia)    CKD (chronic kidney disease)    DVT (deep venous thrombosis)    Glaucoma    HTN (hypertension)    Diabetes mellitus    H/O right nephrectomy            VITAL SIGNS:  Vital Signs Last 24 Hrs  T(C): 35.7 (20 Dec 2020 08:00), Max: 37 (19 Dec 2020 16:00)  T(F): 96.3 (20 Dec 2020 08:00), Max: 98.6 (19 Dec 2020 16:00)  HR: 90 (20 Dec 2020 08:00) (82 - 90)  BP: 130/75 (20 Dec 2020 08:00) (130/75 - 147/72)  BP(mean): --  RR: 18 (20 Dec 2020 08:00) (18 - 18)  SpO2: 96% (20 Dec 2020 08:00) (95% - 99%)        PHYSICAL EXAMINATION:  Not in acute distress, morbid obesity  General: No pallor, no icterus  HEENT:   EOMI, no JVD.  Heart: S1+S2 audible  Lungs: bilateral  fair air entry, no wheezing, no crepitations.  Abdomen: Soft, non-tender, non-distended , no  rigidity or guarding.  CNS: Awake alert, CN  grossly intact.  Extremities:  No edema            CONSULTS:  Consultant(s) Notes Reviewed by me.   Care Discussed with Consultants/Other Providers where required.        MEDICATIONS:  MEDICATIONS  (STANDING):  amLODIPine   Tablet 10 milliGRAM(s) Oral daily  aspirin enteric coated 81 milliGRAM(s) Oral daily  atorvastatin 20 milliGRAM(s) Oral at bedtime  ceFAZolin   IVPB 2000 milliGRAM(s) IV Intermittent every 24 hours  chlorhexidine 4% Liquid 1 Application(s) Topical <User Schedule>  dexAMETHasone  Injectable 6 milliGRAM(s) IV Push daily  dorzolamide 2% Ophthalmic Solution 1 Drop(s) Both EYES <User Schedule>  heparin   Injectable 7500 Unit(s) SubCutaneous every 8 hours  influenza   Vaccine 0.5 milliLiter(s) IntraMuscular once  insulin glargine Injectable (LANTUS) 90 Unit(s) SubCutaneous at bedtime  insulin lispro Injectable (ADMELOG) 30 Unit(s) SubCutaneous three times a day before meals  insulin regular  human recombinant. 10 Unit(s) IV Push once  pantoprazole    Tablet 40 milliGRAM(s) Oral before breakfast  sodium bicarbonate 650 milliGRAM(s) Oral three times a day  sodium chloride 0.9%. 1000 milliLiter(s) (150 mL/Hr) IV Continuous <Continuous>  sodium zirconium cyclosilicate 5 Gram(s) Oral three times a day  timolol 0.5% Solution 1 Drop(s) Both EYES <User Schedule>    MEDICATIONS  (PRN):  acetaminophen   Tablet .. 650 milliGRAM(s) Oral every 6 hours PRN Temp greater or equal to 38C (100.4F)  guaifenesin/dextromethorphan  Syrup 10 milliLiter(s) Oral every 6 hours PRN Cough            ASSESSMENT:    65 years old male with PMHx of DM, HTN, HLD, glaucoma, CKD III, nephrolithiasis renal mass s/p nephrectomy, obesity, recently discharged from [facility] for MSSA bacteremia COVID     positive on 12/10 c/o persistent cough and SOB which is worsening     ASSESSMENT & PLAN:   Acute hypoxic respiratory failure secondary to viral Pneumonia  -currently requiring 5 L NC, 95%. Will titrate down to 2   COVID 19 Pneumonia with severe illness.  Cytokine release syndrome- not on RDV because of BATOOL  NUBIA bacteremia (Sepsis secondary to MSSA bacteremia from prior admission-resolved- no sepsis in current admission  continue with cefazolin 2000 mg IV every 8 hours for- repeat blood cx negative  Patient refusing CHEIKH ( will not be done inpatient  because of Covid anyway)  BATOOL on Chronic renal disease stage 3 - at baseline  Uncontrolled hyperglycemia secondary to inadequate insulin and steroids- increased & optimized Lantus & lispro  Morbid obesity- BMI - 53.8  Activity- out of bed as tolerated & PT  Discharge Disposition: acute for now

## 2020-12-21 DIAGNOSIS — N18.30 CHRONIC KIDNEY DISEASE, STAGE 3 UNSPECIFIED: ICD-10-CM

## 2020-12-21 DIAGNOSIS — E11.621 TYPE 2 DIABETES MELLITUS WITH FOOT ULCER: ICD-10-CM

## 2020-12-21 DIAGNOSIS — I12.9 HYPERTENSIVE CHRONIC KIDNEY DISEASE WITH STAGE 1 THROUGH STAGE 4 CHRONIC KIDNEY DISEASE, OR UNSPECIFIED CHRONIC KIDNEY DISEASE: ICD-10-CM

## 2020-12-21 DIAGNOSIS — E87.2 ACIDOSIS: ICD-10-CM

## 2020-12-21 DIAGNOSIS — L03.115 CELLULITIS OF RIGHT LOWER LIMB: ICD-10-CM

## 2020-12-21 DIAGNOSIS — N20.0 CALCULUS OF KIDNEY: ICD-10-CM
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
## 2020-12-21 DIAGNOSIS — L76.21 POSTPROCEDURAL HEMORRHAGE OF SKIN AND SUBCUTANEOUS TISSUE FOLLOWING A DERMATOLOGIC PROCEDURE: ICD-10-CM

## 2020-12-21 DIAGNOSIS — I24.8 OTHER FORMS OF ACUTE ISCHEMIC HEART DISEASE: ICD-10-CM

## 2020-12-21 DIAGNOSIS — A41.89 OTHER SPECIFIED SEPSIS: ICD-10-CM

## 2020-12-21 DIAGNOSIS — H40.9 UNSPECIFIED GLAUCOMA: ICD-10-CM

## 2020-12-21 DIAGNOSIS — Z90.5 ACQUIRED ABSENCE OF KIDNEY: ICD-10-CM

## 2020-12-21 DIAGNOSIS — I44.0 ATRIOVENTRICULAR BLOCK, FIRST DEGREE: ICD-10-CM

## 2020-12-21 DIAGNOSIS — I25.2 OLD MYOCARDIAL INFARCTION: ICD-10-CM

## 2020-12-21 DIAGNOSIS — U07.1 COVID-19: ICD-10-CM

## 2020-12-21 DIAGNOSIS — E66.01 MORBID (SEVERE) OBESITY DUE TO EXCESS CALORIES: ICD-10-CM

## 2020-12-21 DIAGNOSIS — E78.5 HYPERLIPIDEMIA, UNSPECIFIED: ICD-10-CM

## 2020-12-21 DIAGNOSIS — G47.33 OBSTRUCTIVE SLEEP APNEA (ADULT) (PEDIATRIC): ICD-10-CM

## 2020-12-21 DIAGNOSIS — E11.22 TYPE 2 DIABETES MELLITUS WITH DIABETIC CHRONIC KIDNEY DISEASE: ICD-10-CM

## 2020-12-21 DIAGNOSIS — Z79.4 LONG TERM (CURRENT) USE OF INSULIN: ICD-10-CM

## 2020-12-21 DIAGNOSIS — E87.1 HYPO-OSMOLALITY AND HYPONATREMIA: ICD-10-CM

## 2020-12-21 DIAGNOSIS — G93.41 METABOLIC ENCEPHALOPATHY: ICD-10-CM

## 2020-12-21 DIAGNOSIS — R41.82 ALTERED MENTAL STATUS, UNSPECIFIED: ICD-10-CM

## 2020-12-21 DIAGNOSIS — Z86.718 PERSONAL HISTORY OF OTHER VENOUS THROMBOSIS AND EMBOLISM: ICD-10-CM

## 2020-12-21 DIAGNOSIS — E83.42 HYPOMAGNESEMIA: ICD-10-CM

## 2020-12-21 DIAGNOSIS — A41.01 SEPSIS DUE TO METHICILLIN SUSCEPTIBLE STAPHYLOCOCCUS AUREUS: ICD-10-CM

## 2020-12-21 DIAGNOSIS — L97.519 NON-PRESSURE CHRONIC ULCER OF OTHER PART OF RIGHT FOOT WITH UNSPECIFIED SEVERITY: ICD-10-CM

## 2020-12-21 DIAGNOSIS — Z80.1 FAMILY HISTORY OF MALIGNANT NEOPLASM OF TRACHEA, BRONCHUS AND LUNG: ICD-10-CM
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
## 2020-12-21 DIAGNOSIS — J96.01 ACUTE RESPIRATORY FAILURE WITH HYPOXIA: ICD-10-CM

## 2020-12-21 LAB
ALBUMIN SERPL ELPH-MCNC: 2.9 G/DL — LOW (ref 3.5–5.2)
ALP SERPL-CCNC: 60 U/L — SIGNIFICANT CHANGE UP (ref 30–115)
ALT FLD-CCNC: 17 U/L — SIGNIFICANT CHANGE UP (ref 0–41)
ANION GAP SERPL CALC-SCNC: 13 MMOL/L — SIGNIFICANT CHANGE UP (ref 7–14)
AST SERPL-CCNC: 44 U/L — HIGH (ref 0–41)
BASOPHILS # BLD AUTO: 0.01 K/UL — SIGNIFICANT CHANGE UP (ref 0–0.2)
BASOPHILS NFR BLD AUTO: 0.2 % — SIGNIFICANT CHANGE UP (ref 0–1)
BILIRUB SERPL-MCNC: 0.5 MG/DL — SIGNIFICANT CHANGE UP (ref 0.2–1.2)
BUN SERPL-MCNC: 46 MG/DL — HIGH (ref 10–20)
CALCIUM SERPL-MCNC: 8 MG/DL — LOW (ref 8.5–10.1)
CHLORIDE SERPL-SCNC: 105 MMOL/L — SIGNIFICANT CHANGE UP (ref 98–110)
CO2 SERPL-SCNC: 19 MMOL/L — SIGNIFICANT CHANGE UP (ref 17–32)
CREAT SERPL-MCNC: 1.3 MG/DL — SIGNIFICANT CHANGE UP (ref 0.7–1.5)
EOSINOPHIL # BLD AUTO: 0 K/UL — SIGNIFICANT CHANGE UP (ref 0–0.7)
EOSINOPHIL NFR BLD AUTO: 0 % — SIGNIFICANT CHANGE UP (ref 0–8)
GLUCOSE BLDC GLUCOMTR-MCNC: 178 MG/DL — HIGH (ref 70–99)
GLUCOSE BLDC GLUCOMTR-MCNC: 199 MG/DL — HIGH (ref 70–99)
GLUCOSE BLDC GLUCOMTR-MCNC: 227 MG/DL — HIGH (ref 70–99)
GLUCOSE BLDC GLUCOMTR-MCNC: 254 MG/DL — HIGH (ref 70–99)
GLUCOSE SERPL-MCNC: 236 MG/DL — HIGH (ref 70–99)
HCT VFR BLD CALC: 35.6 % — LOW (ref 42–52)
HGB BLD-MCNC: 11.6 G/DL — LOW (ref 14–18)
IMM GRANULOCYTES NFR BLD AUTO: 1.7 % — HIGH (ref 0.1–0.3)
LYMPHOCYTES # BLD AUTO: 0.51 K/UL — LOW (ref 1.2–3.4)
LYMPHOCYTES # BLD AUTO: 8.8 % — LOW (ref 20.5–51.1)
MAGNESIUM SERPL-MCNC: 2.4 MG/DL — SIGNIFICANT CHANGE UP (ref 1.8–2.4)
MCHC RBC-ENTMCNC: 28.7 PG — SIGNIFICANT CHANGE UP (ref 27–31)
MCHC RBC-ENTMCNC: 32.6 G/DL — SIGNIFICANT CHANGE UP (ref 32–37)
MCV RBC AUTO: 88.1 FL — SIGNIFICANT CHANGE UP (ref 80–94)
MONOCYTES # BLD AUTO: 0.73 K/UL — HIGH (ref 0.1–0.6)
MONOCYTES NFR BLD AUTO: 12.5 % — HIGH (ref 1.7–9.3)
NEUTROPHILS # BLD AUTO: 4.47 K/UL — SIGNIFICANT CHANGE UP (ref 1.4–6.5)
NEUTROPHILS NFR BLD AUTO: 76.8 % — HIGH (ref 42.2–75.2)
NRBC # BLD: 0 /100 WBCS — SIGNIFICANT CHANGE UP (ref 0–0)
PLATELET # BLD AUTO: 353 K/UL — SIGNIFICANT CHANGE UP (ref 130–400)
POTASSIUM SERPL-MCNC: 5 MMOL/L — SIGNIFICANT CHANGE UP (ref 3.5–5)
POTASSIUM SERPL-SCNC: 5 MMOL/L — SIGNIFICANT CHANGE UP (ref 3.5–5)
PROT SERPL-MCNC: 6.3 G/DL — SIGNIFICANT CHANGE UP (ref 6–8)
RBC # BLD: 4.04 M/UL — LOW (ref 4.7–6.1)
RBC # FLD: 14.2 % — SIGNIFICANT CHANGE UP (ref 11.5–14.5)
SARS-COV-2 IGG SERPL QL IA: POSITIVE
SARS-COV-2 IGM SERPL IA-ACNC: 14.9 INDEX — HIGH
SODIUM SERPL-SCNC: 137 MMOL/L — SIGNIFICANT CHANGE UP (ref 135–146)
WBC # BLD: 5.82 K/UL — SIGNIFICANT CHANGE UP (ref 4.8–10.8)
WBC # FLD AUTO: 5.82 K/UL — SIGNIFICANT CHANGE UP (ref 4.8–10.8)

## 2020-12-21 PROCEDURE — 99233 SBSQ HOSP IP/OBS HIGH 50: CPT

## 2020-12-21 RX ORDER — CEFAZOLIN SODIUM 1 G
2000 VIAL (EA) INJECTION EVERY 8 HOURS
Refills: 0 | Status: DISCONTINUED | OUTPATIENT
Start: 2020-12-21 | End: 2020-12-21

## 2020-12-21 RX ORDER — CEFAZOLIN SODIUM 1 G
2000 VIAL (EA) INJECTION EVERY 8 HOURS
Refills: 0 | Status: DISCONTINUED | OUTPATIENT
Start: 2020-12-21 | End: 2020-12-22

## 2020-12-21 RX ADMIN — Medication 81 MILLIGRAM(S): at 11:25

## 2020-12-21 RX ADMIN — Medication 650 MILLIGRAM(S): at 21:46

## 2020-12-21 RX ADMIN — DORZOLAMIDE HYDROCHLORIDE 1 DROP(S): 20 SOLUTION/ DROPS OPHTHALMIC at 07:58

## 2020-12-21 RX ADMIN — SODIUM ZIRCONIUM CYCLOSILICATE 5 GRAM(S): 10 POWDER, FOR SUSPENSION ORAL at 21:43

## 2020-12-21 RX ADMIN — Medication 30 UNIT(S): at 07:57

## 2020-12-21 RX ADMIN — Medication 100 MILLIGRAM(S): at 11:27

## 2020-12-21 RX ADMIN — Medication 1 DROP(S): at 21:49

## 2020-12-21 RX ADMIN — Medication 100 MILLIGRAM(S): at 21:48

## 2020-12-21 RX ADMIN — ATORVASTATIN CALCIUM 20 MILLIGRAM(S): 80 TABLET, FILM COATED ORAL at 21:44

## 2020-12-21 RX ADMIN — SODIUM ZIRCONIUM CYCLOSILICATE 5 GRAM(S): 10 POWDER, FOR SUSPENSION ORAL at 14:19

## 2020-12-21 RX ADMIN — DORZOLAMIDE HYDROCHLORIDE 1 DROP(S): 20 SOLUTION/ DROPS OPHTHALMIC at 21:51

## 2020-12-21 RX ADMIN — AMLODIPINE BESYLATE 10 MILLIGRAM(S): 2.5 TABLET ORAL at 06:10

## 2020-12-21 RX ADMIN — Medication 650 MILLIGRAM(S): at 14:18

## 2020-12-21 RX ADMIN — HEPARIN SODIUM 7500 UNIT(S): 5000 INJECTION INTRAVENOUS; SUBCUTANEOUS at 06:11

## 2020-12-21 RX ADMIN — PANTOPRAZOLE SODIUM 40 MILLIGRAM(S): 20 TABLET, DELAYED RELEASE ORAL at 06:10

## 2020-12-21 RX ADMIN — Medication 30 UNIT(S): at 17:14

## 2020-12-21 RX ADMIN — HEPARIN SODIUM 7500 UNIT(S): 5000 INJECTION INTRAVENOUS; SUBCUTANEOUS at 14:18

## 2020-12-21 RX ADMIN — INSULIN GLARGINE 90 UNIT(S): 100 INJECTION, SOLUTION SUBCUTANEOUS at 21:47

## 2020-12-21 RX ADMIN — Medication 1 DROP(S): at 07:58

## 2020-12-21 RX ADMIN — Medication 650 MILLIGRAM(S): at 06:10

## 2020-12-21 RX ADMIN — HEPARIN SODIUM 7500 UNIT(S): 5000 INJECTION INTRAVENOUS; SUBCUTANEOUS at 21:47

## 2020-12-21 RX ADMIN — SODIUM ZIRCONIUM CYCLOSILICATE 5 GRAM(S): 10 POWDER, FOR SUSPENSION ORAL at 06:10

## 2020-12-21 RX ADMIN — Medication 30 UNIT(S): at 11:25

## 2020-12-21 RX ADMIN — Medication 6 MILLIGRAM(S): at 06:11

## 2020-12-21 RX ADMIN — CHLORHEXIDINE GLUCONATE 1 APPLICATION(S): 213 SOLUTION TOPICAL at 06:13

## 2020-12-21 NOTE — PROGRESS NOTE ADULT - SUBJECTIVE AND OBJECTIVE BOX
Progress Note:  Provider Speciality                            Hospitalist      JOSHUA HAM MRN-[id number] 65y Male     CHIEF PRESENTING COMPLAINT:  Patient is a 65y old  Male who presents with a chief complaint of SOB (18 Dec 2020 14:02)        SUBJECTIVE:  Patient was seen and examined at bedside. Reports improvement in  presenting complaint. No significant overnight events reported.     HISTORY OF PRESENTING ILLNESS:  HPI:  65 years old male with PMHx of DM, HTN, HLD, glaucoma, CKD III, nehroithiasis, renal mass s/p nephrectomy, obesity, recently discharged from [facility] for MSSA bactermia, COVID positive on 12/10 c/o persistent cough and SOB which is worsening since yesterday.   Pt was recently admitted to [facility] from 12/4 to 12/15 for MSSA bacteremia secondary to Right hallux ulcer and cellulitis. Was d/c on IV cefazolin with out atient follow up with cadiology for CHEIKH and podiatry.    Pt was tested positive for Covid on 12/10 during admission. Yetserday morning patient started developing shortness of breath which has been progressively increasing and associated with cough. Pt called EMS and pulse O2 was found to be around 89% on room air.   No fever, CP, hemoptysis, no vomiting or abdominal pain, no change in leg swelling.    In the ED, /78, HR 98, Temp 97.7, RR 18, saturating well on NRB mask. Labs significant for d-dimer of 605, Na 131, K 5.7, Cr 2.4, GFR 27 (around baseline fro previous admission). Pt to be admitted under medicine.   (17 Dec 2020 17:43)        REVIEW OF SYSTEMS:  Patient denies any headache, any vision complaints, runny nose, fever, chills, sore throat. Denies chest pain, shortness of breath, palpitation. Denies nausea, vomiting, abdominal pain, diarrhoea, Denies urinary burning, urgency, frequency, dysuria. Denies weakness in any part of the body or numbness.   At least 10 systems were reviewed in ROS. All systems reviewed  are within normal limits except for the complaints as described in Subjective.    PAST MEDICAL & SURGICAL HISTORY:  PAST MEDICAL & SURGICAL HISTORY:  HLD (hyperlipidemia)    CKD (chronic kidney disease)    DVT (deep venous thrombosis)    Glaucoma    HTN (hypertension)    Diabetes mellitus    H/O right nephrectomy            VITAL SIGNS:  Vital Signs Last 24 Hrs  T(C): 35.8 (21 Dec 2020 08:03), Max: 36.8 (20 Dec 2020 16:00)  T(F): 96.4 (21 Dec 2020 08:03), Max: 98.3 (20 Dec 2020 16:00)  HR: 87 (21 Dec 2020 08:03) (69 - 87)  BP: 158/67 (21 Dec 2020 08:03) (139/63 - 158/67)  BP(mean): --  RR: 18 (21 Dec 2020 08:03) (18 - 19)  SpO2: 95% (21 Dec 2020 08:03) (95% - 97%)      PHYSICAL EXAMINATION:  Not in acute distress, morbid obesity  General: No pallor, no icterus  HEENT:   EOMI, no JVD.  Heart: S1+S2 audible  Lungs: bilateral  fair air entry, no wheezing, no crepitations.  Abdomen: Soft, non-tender, non-distended , no  rigidity or guarding.  CNS: Awake alert, CN  grossly intact.  Extremities:  No edema            CONSULTS:  Consultant(s) Notes Reviewed by me.   Care Discussed with Consultants/Other Providers where required.        MEDICATIONS:  MEDICATIONS  (STANDING):  amLODIPine   Tablet 10 milliGRAM(s) Oral daily  aspirin enteric coated 81 milliGRAM(s) Oral daily  atorvastatin 20 milliGRAM(s) Oral at bedtime  ceFAZolin   IVPB 2000 milliGRAM(s) IV Intermittent every 24 hours  chlorhexidine 4% Liquid 1 Application(s) Topical <User Schedule>  dexAMETHasone  Injectable 6 milliGRAM(s) IV Push daily  dorzolamide 2% Ophthalmic Solution 1 Drop(s) Both EYES <User Schedule>  heparin   Injectable 7500 Unit(s) SubCutaneous every 8 hours  influenza   Vaccine 0.5 milliLiter(s) IntraMuscular once  insulin glargine Injectable (LANTUS) 90 Unit(s) SubCutaneous at bedtime  insulin lispro Injectable (ADMELOG) 30 Unit(s) SubCutaneous three times a day before meals  insulin regular  human recombinant. 10 Unit(s) IV Push once  pantoprazole    Tablet 40 milliGRAM(s) Oral before breakfast  sodium bicarbonate 650 milliGRAM(s) Oral three times a day  sodium chloride 0.9%. 1000 milliLiter(s) (150 mL/Hr) IV Continuous <Continuous>  sodium zirconium cyclosilicate 5 Gram(s) Oral three times a day  timolol 0.5% Solution 1 Drop(s) Both EYES <User Schedule>    MEDICATIONS  (PRN):  acetaminophen   Tablet .. 650 milliGRAM(s) Oral every 6 hours PRN Temp greater or equal to 38C (100.4F)  guaifenesin/dextromethorphan  Syrup 10 milliLiter(s) Oral every 6 hours PRN Cough            ASSESSMENT:    65 years old male with PMHx of DM, HTN, HLD, glaucoma, CKD III, nephrolithiasis renal mass s/p nephrectomy, obesity, recently discharged from [facility] for MSSA bacteremia COVID     positive on 12/10 c/o persistent cough and SOB which is worsening     ASSESSMENT & PLAN:   Acute hypoxic respiratory failure secondary to viral Pneumonia  -currently requiring 5 L NC, 95%. Will titrate down to 2   COVID 19 Pneumonia with severe illness.  Cytokine release syndrome- not on RDV because of BATOOL  NUBIA bacteremia (Sepsis secondary to MSSA bacteremia from prior admission-resolved- no sepsis in current admission  continue with cefazolin 2000 mg IV every 8 hours for- repeat blood cx negative  Patient refusing CHEIKH ( will not be done inpatient  because of Covid anyway)  BATOOL on Chronic renal disease stage 3 - at baseline  Uncontrolled hyperglycemia secondary to inadequate insulin and steroids- increased & optimized Lantus & lispro  Morbid obesity- BMI - 53.8  Activity- out of bed as tolerated & PT  Discharge Disposition: acute for now

## 2020-12-21 NOTE — PROGRESS NOTE ADULT - ASSESSMENT
65 years old male with PMHx of DM, HTN, HLD, glaucoma, CKD III, nephrolithiasis renal mass s/p nephrectomy, obesity, recently discharged from [facility] for MSSA bacteremia COVID positive on 12/10 c/o persistent cough and SOB which is worsening since yesterday.    # Acute hypoxic respiratory failure / SOB / moderate - severe COVID - 19 PNA  - CXR shows b/l pulmonary opacities 12/17. 12/20 unchanged  - D-Dimer 605 > 414 12/19, CRP 7.73 > 8/01 12/19, Ferritin 1923 > 1886 12/18, procal 0.87 > 1/07 12/19  - cont decadron for 10 days - day 5, 12/17-  - not a candidate for RDV due to decreased GFR, f/u ID now that GFR improved to 56, out of window?   - monitor O2, titrate O2 as needed to goal of 92 - 96% - currently 93% on 5L  - f/u ID recs    # Sepsis due to MSSA bacteremia  - CT CAP 12/4: No CT evidence of acute intrathoracic or abdominopelvic pathology. Nodular enlarged right thyroid lobe. Recommend evaluation outpatient sonography. Additional findings in the body of the report  - MR Foot No Cont, Right (12.09.20): Impression: Soft tissue ulcer at the medial base of hallux distal phalanx with osteitis; no osteomyelitis or drainable collection  - Blood Cx 12/4 MSSA, GNR on stain   - Blood Cx 12/5-12/9 no growth  - TTE LVEF 66%, no vegetation  - dental procedure 12/10  - c/w cefazolin 2000 mg IV every 8 hours (day 5)  - CHEIKH obtain as out patient  - f/u ID    # Right hallux ulcer  - s/p mri of right foot on 12/09/2020 -> negative for acute osteomyelitis  - s/p flagyl  - podiatry: wound care and f/u outpatient    # Elevated trop/ SOB  - Trop .09>.08  - f/u EKG    # CKD III / hyperkalema / hyponatremia  - s/p R nephrectomy, - stable at baseline,   - Avoid nephrotoxic meds, - Monitor BMP  - lokelma, Na+ 5.0 12/21   -Cr 1.3 12/21    # Periapical infection of #2- was extracted by Dental Sx. on 12/10  -f/u dental recs    # DM II  -trend FS, start insulin basal blus if FS > 180  -c/w Lantus 90U qHS, lispro 30U qAC    # HTN - controlled   - c/w amlodipine, losartan    # HLD  - c/w atorvastatin    # Glaucoma   - continue with timolol    # Morbid obesity- BMI - 53.8- outpatient weight loss tx, outpatient polysomnography study.    DVT ppx: Heparin SubQ 7500U TID  GI ppx: pantoprazole 40mg qD  Labs: CMP+Mg, CBC  Diet: DASH/TLC  PT/Rehab: Evaluation pending  Activity: Ambulate as tolerated  Dispo: Home pending clinical improvement  FULL CODE 65 years old male with PMHx of DM, HTN, HLD, glaucoma, CKD III, nephrolithiasis renal mass s/p nephrectomy, obesity, recently discharged from [facility] for MSSA bacteremia COVID positive on 12/10 c/o persistent cough and SOB which is worsening since yesterday.    # Acute hypoxic respiratory failure / SOB / moderate - severe COVID - 19 PNA  - CXR shows b/l pulmonary opacities 12/17. 12/20 unchanged  - D-Dimer 605 > 414 12/19, CRP 7.73 > 8/01 12/19, Ferritin 1923 > 1886 12/18, procal 0.87 > 1/07 12/19  - cont decadron for 10 days - day 5, 12/17-  - not a candidate for RDV due to decreased GFR, f/u ID now that GFR improved to 56, out of window?   - monitor O2, titrate O2 as needed to goal of 92 - 96% - currently 93% on 5L  - f/u ID recs    # Sepsis due to MSSA bacteremia  - CT CAP 12/4: No CT evidence of acute intrathoracic or abdominopelvic pathology. Nodular enlarged right thyroid lobe. Recommend evaluation outpatient sonography. Additional findings in the body of the report  - MR Foot No Cont, Right (12.09.20): Impression: Soft tissue ulcer at the medial base of hallux distal phalanx with osteitis; no osteomyelitis or drainable collection  - Blood Cx 12/4 MSSA, GNR on stain   - Blood Cx 12/5-12/9 no growth  - had dental procedure 12/10  - c/w cefazolin 2000 mg IV every 8 hours (will need 4 weeks from 12/5 to 1/1 per ID)  - TTE LVEF 66%, no vegetation  - CHEIKH need to obtain as out patient  - f/u ID recs    # Right hallux ulcer  - s/p mri of right foot on 12/09/2020 -> negative for acute osteomyelitis  - s/p flagyl  - podiatry: wound care and f/u outpatient    # Elevated trop/ SOB  - Trop .09>.08  - f/u EKG    # CKD III / hyperkalema / hyponatremia  - s/p R nephrectomy, - stable at baseline,   - Avoid nephrotoxic meds, - Monitor BMP  - lokelma, Na+ 5.0 12/21   -Cr 1.3 12/21    # Periapical infection of #2- was extracted by Dental Sx. on 12/10  -f/u dental recs    # DM II  -trend FS, start insulin basal blus if FS > 180  -c/w Lantus 90U qHS, lispro 30U qAC    # HTN - controlled   - c/w amlodipine, losartan    # HLD  - c/w atorvastatin    # Glaucoma   - continue with timolol    # Morbid obesity- BMI - 53.8- outpatient weight loss tx, outpatient polysomnography study.    DVT ppx: Heparin SubQ 7500U TID  GI ppx: pantoprazole 40mg qD  Labs: CMP+Mg, CBC  Diet: DASH/TLC  PT/Rehab: Evaluation pending  Activity: Ambulate as tolerated  Dispo: Home pending clinical improvement  FULL CODE 65 years old male with PMHx of DM, HTN, HLD, glaucoma, CKD III, nephrolithiasis renal mass s/p nephrectomy, obesity, recently discharged from [facility] for MSSA bacteremia COVID positive on 12/10 c/o persistent cough and SOB which is worsening since yesterday.    # Acute hypoxic respiratory failure / SOB / moderate - severe COVID - 19 PNA  - CXR shows b/l pulmonary opacities 12/17. 12/20 unchanged  - D-Dimer 605 > 414 12/19, CRP 7.73 > 8/01 12/19, Ferritin 1923 > 1886 12/18, procal 0.87 > 1/07 12/19  - cont decadron for 10 days - day 5, 12/17-  - not a candidate for RDV due to decreased GFR, f/u ID now that GFR improved to 56, out of window?   - monitor O2, titrate O2 as needed to goal of 92 - 96% - currently 93% on 5L this AM > was able to wean to 2L at 98% today 12/21  - f/u ID recs    # Sepsis due to MSSA bacteremia  - CT CAP 12/4: No CT evidence of acute intrathoracic or abdominopelvic pathology. Nodular enlarged right thyroid lobe. Recommend evaluation outpatient sonography. Additional findings in the body of the report  - MR Foot No Cont, Right (12.09.20): Impression: Soft tissue ulcer at the medial base of hallux distal phalanx with osteitis; no osteomyelitis or drainable collection  - Blood Cx 12/4 MSSA, GNR on stain   - Blood Cx 12/5-12/9 no growth  - had dental procedure 12/10  - c/w cefazolin 2000 mg IV every 8 hours (will need 4 weeks from 12/5 to 1/1 per ID, R midline in place)  - TTE LVEF 66%, no vegetation  - CHEIKH need to obtain as out patient  - f/u ID recs    # Right hallux ulcer  - s/p mri of right foot on 12/09/2020 -> negative for acute osteomyelitis  - s/p flagyl  - podiatry: wound care and f/u outpatient    # Elevated trop/ SOB  - Trop .09>.08  - f/u EKG    # CKD III / hyperkalema / hyponatremia  - s/p R nephrectomy, - stable at baseline,   - Avoid nephrotoxic meds, - Monitor BMP  - lokelma, Na+ 5.0 12/21   -Cr 1.3 12/21    # Periapical infection of #2- was extracted by Dental Sx. on 12/10  -f/u dental recs    # DM II  -trend FS, start insulin basal blus if FS > 180  -c/w Lantus 90U qHS, lispro 30U qAC    # HTN - controlled   - c/w amlodipine, losartan    # HLD  - c/w atorvastatin    # Glaucoma   - continue with timolol    # Morbid obesity- BMI - 53.8- outpatient weight loss tx, outpatient polysomnography study.    DVT ppx: Heparin SubQ 7500U TID  GI ppx: pantoprazole 40mg qD  Labs: CMP+Mg, CBC  Diet: DASH/TLC  PT/Rehab: Evaluation pending  Activity: Ambulate as tolerated  Dispo: Home pending clinical improvement  FULL CODE

## 2020-12-21 NOTE — PROGRESS NOTE ADULT - SUBJECTIVE AND OBJECTIVE BOX
SUBJECTIVE:    Patient is a 65y old Male who presents with a chief complaint of SOB (20 Dec 2020 13:23)    Currently admitted to medicine with the primary diagnosis of COVID-19     Today is hospital day 4d. This morning he is resting comfortably in bed and reports no new issues or overnight events. No fevers, chills. Denies CP or SOB. Wants to get OOBTC and walk to bathroom on own with oxygen.       PAST MEDICAL & SURGICAL HISTORY  HLD (hyperlipidemia)    CKD (chronic kidney disease)    DVT (deep venous thrombosis)    Glaucoma    HTN (hypertension)    Diabetes mellitus    H/O right nephrectomy      SOCIAL HISTORY:  Negative for smoking/alcohol/drug use.     ALLERGIES:  No Known Allergies    MEDICATIONS:  STANDING MEDICATIONS  amLODIPine   Tablet 10 milliGRAM(s) Oral daily  aspirin enteric coated 81 milliGRAM(s) Oral daily  atorvastatin 20 milliGRAM(s) Oral at bedtime  ceFAZolin   IVPB 2000 milliGRAM(s) IV Intermittent every 24 hours  chlorhexidine 4% Liquid 1 Application(s) Topical <User Schedule>  dexAMETHasone  Injectable 6 milliGRAM(s) IV Push daily  dorzolamide 2% Ophthalmic Solution 1 Drop(s) Both EYES <User Schedule>  heparin   Injectable 7500 Unit(s) SubCutaneous every 8 hours  influenza   Vaccine 0.5 milliLiter(s) IntraMuscular once  insulin glargine Injectable (LANTUS) 90 Unit(s) SubCutaneous at bedtime  insulin lispro Injectable (ADMELOG) 30 Unit(s) SubCutaneous three times a day before meals  pantoprazole    Tablet 40 milliGRAM(s) Oral before breakfast  sodium bicarbonate 650 milliGRAM(s) Oral three times a day  sodium zirconium cyclosilicate 5 Gram(s) Oral three times a day  timolol 0.5% Solution 1 Drop(s) Both EYES <User Schedule>    PRN MEDICATIONS  acetaminophen   Tablet .. 650 milliGRAM(s) Oral every 6 hours PRN  guaifenesin/dextromethorphan  Syrup 10 milliLiter(s) Oral every 6 hours PRN    VITALS:   T(F): 96.4  HR: 87  BP: 158/67  RR: 18  SpO2: 95%    LABS:                        11.6   5.82  )-----------( 353      ( 21 Dec 2020 06:56 )             35.6     12-21    137  |  105  |  46<H>  ----------------------------<  236<H>  5.0   |  19  |  1.3    Ca    8.0<L>      21 Dec 2020 06:56  Mg     2.4     12-    TPro  6.3  /  Alb  2.9<L>  /  TBili  0.5  /  DBili  x   /  AST  44<H>  /  ALT  17  /  AlkPhos  60  12-      Urinalysis Basic - ( 19 Dec 2020 20:33 )    Color: Light Yellow / Appearance: Clear / S.016 / pH: x  Gluc: x / Ketone: Trace  / Bili: Negative / Urobili: <2 mg/dL   Blood: x / Protein: 100 mg/dL / Nitrite: Negative   Leuk Esterase: Negative / RBC: 8 /HPF / WBC 1 /HPF   Sq Epi: x / Non Sq Epi: 0 /HPF / Bacteria: Negative        RADIOLOGY:  < from: Xray Chest 1 View-PORTABLE IMMEDIATE (Xray Chest 1 View-PORTABLE IMMEDIATE .) (20 @ 13:27) >  Impression:  Low lung volumes without lobar consolidation, pleural effusion or pneumothorax.  < end of copied text >      PHYSICAL EXAM:  GEN: NAD, lying bed comfortably, obese  HEENT: EOMI, PERRLA, conjunctiva and sclera clear. MMM.  LUNGS: Clear to auscultation bilaterally. No rales, rhonchi, or wheezing. On 5L NC  HEART: S1/S2 present. RRR.   ABD: Soft, non-tender, non-distended. Bowel sounds present.  EXT: 2+ peripheral pulses. No clubbing, cyanosis, or edema. R foot dressing c/d/i  NEURO: AAOX3. Speech clear. No focal neurological deficits. Sensation grossly intact.   Skin: No rashes or lesions.

## 2020-12-21 NOTE — PHYSICAL THERAPY INITIAL EVALUATION ADULT - PERTINENT HX OF CURRENT PROBLEM, REHAB EVAL
65 years old male with PMHx of DM, HTN, HLD, glaucoma, CKD III, nehroithiasis, renal mass s/p nephrectomy, obesity, recently discharged from [facility] for MSSA bactermia, COVID positive on 12/10 c/o persistent cough and SOB which is worsening since yesterday.

## 2020-12-21 NOTE — PROGRESS NOTE ADULT - ASSESSMENT
65 years old male with PMHx of DM, HTN, HLD, glaucoma, CKD III, nephrolithiasis renal mass s/p nephrectomy, obesity, recently discharged from [facility] for MSSA bacteremia COVID positive on 12/10 c/o persistent cough and SOB which is worsening since yesterday.    IMPRESSION;  COVID 19 with severe illness. SpO2 < 94% on RA and need for supplemental O2. 95% NC 4 LIT  Pt is in the late inflammatory response phase ot the illness based on the onset of symptoms.  Elevation of the inflammatory markers  Cytokine release syndorme  procalcitonin 0.87 >1.07  Ferritin 1923>1886  CRP 7.73  Ddimers 605    NUBIA bacteremia : seems uncomplcated with subsequent BCx NG  BCx 12/4 NUBIA  BCx 12/5,6,9 10 NGTD    RECOMMENDATIONS;  NO NEED TO REPEAT INFLAMMATORY MARKERS.  Target SpO2 92 % to 96 %  No RDV/plasma  Dexamethasone 6 mg iv q24h for 10 days ( or until discharge ) or equivalent glucocorticoid dose may be substituted. Equivalent total dosis of alternative steroids are Methylprednisolone 32 mg and prednisone 40 mg q24h.  Monitor for side effects: hyperglycemia, neurological ( agitation/confusion), adrenal suppression, bacterial and fungal infections  Ancef 2 gm iv q8h

## 2020-12-21 NOTE — PHYSICAL THERAPY INITIAL EVALUATION ADULT - GENERAL OBSERVATIONS, REHAB EVAL
Pt. encountered alert and NAD, supine in bed. (+)IV lock, (+)O2 via NC 2L/min, agreeable to PT Eval. Pt. left as found, supine in bed. (+)alarms (+)call bell in reach, NAD.

## 2020-12-21 NOTE — PROGRESS NOTE ADULT - SUBJECTIVE AND OBJECTIVE BOX
JOSHUA HAM  65y, Male    All available historical data reviewed    OVERNIGHT EVENTS:  no fevers  feels well and has no complaints  anxious  95% NC 4 LIT    ROS:  General: Denies rigors, nightsweats  HEENT: Denies headache, rhinorrhea, sore throat, eye pain  CV: Denies CP, palpitations  PULM: Denies wheezing, hemoptysis  GI: Denies hematemesis, hematochezia, melena  : Denies discharge, hematuria  MSK: Denies arthralgias, myalgias  SKIN: Denies rash, lesions  NEURO: Denies paresthesias, weakness  PSYCH: Denies depression, anxiety    VITALS:  T(F): 96.4, Max: 98.3 (20 @ 16:00)  HR: 87  BP: 158/67  RR: 18Vital Signs Last 24 Hrs  T(C): 35.8 (21 Dec 2020 08:03), Max: 36.8 (20 Dec 2020 16:00)  T(F): 96.4 (21 Dec 2020 08:03), Max: 98.3 (20 Dec 2020 16:00)  HR: 87 (21 Dec 2020 08:03) (69 - 95)  BP: 158/67 (21 Dec 2020 08:03) (131/71 - 158/67)  BP(mean): --  RR: 18 (21 Dec 2020 08:03) (18 - 19)  SpO2: 95% (21 Dec 2020 08:03) (95% - 97%)    TESTS & MEASUREMENTS:                        11.6   5.82  )-----------( 353      ( 21 Dec 2020 06:56 )             35.6         137  |  105  |  46<H>  ----------------------------<  236<H>  5.0   |  19  |  1.3    Ca    8.0<L>      21 Dec 2020 06:56  Mg     2.4     12-    TPro  6.3  /  Alb  2.9<L>  /  TBili  0.5  /  DBili  x   /  AST  44<H>  /  ALT  17  /  AlkPhos  60  12-    LIVER FUNCTIONS - ( 21 Dec 2020 06:56 )  Alb: 2.9 g/dL / Pro: 6.3 g/dL / ALK PHOS: 60 U/L / ALT: 17 U/L / AST: 44 U/L / GGT: x             Urinalysis Basic - ( 19 Dec 2020 20:33 )    Color: Light Yellow / Appearance: Clear / S.016 / pH: x  Gluc: x / Ketone: Trace  / Bili: Negative / Urobili: <2 mg/dL   Blood: x / Protein: 100 mg/dL / Nitrite: Negative   Leuk Esterase: Negative / RBC: 8 /HPF / WBC 1 /HPF   Sq Epi: x / Non Sq Epi: 0 /HPF / Bacteria: Negative          RADIOLOGY & ADDITIONAL TESTS:  Personal review of radiological diagnostics performed  Echo and EKG results noted when applicable.     MEDICATIONS:  acetaminophen   Tablet .. 650 milliGRAM(s) Oral every 6 hours PRN  amLODIPine   Tablet 10 milliGRAM(s) Oral daily  aspirin enteric coated 81 milliGRAM(s) Oral daily  atorvastatin 20 milliGRAM(s) Oral at bedtime  ceFAZolin   IVPB 2000 milliGRAM(s) IV Intermittent every 24 hours  chlorhexidine 4% Liquid 1 Application(s) Topical <User Schedule>  dexAMETHasone  Injectable 6 milliGRAM(s) IV Push daily  dorzolamide 2% Ophthalmic Solution 1 Drop(s) Both EYES <User Schedule>  guaifenesin/dextromethorphan  Syrup 10 milliLiter(s) Oral every 6 hours PRN  heparin   Injectable 7500 Unit(s) SubCutaneous every 8 hours  influenza   Vaccine 0.5 milliLiter(s) IntraMuscular once  insulin glargine Injectable (LANTUS) 90 Unit(s) SubCutaneous at bedtime  insulin lispro Injectable (ADMELOG) 30 Unit(s) SubCutaneous three times a day before meals  pantoprazole    Tablet 40 milliGRAM(s) Oral before breakfast  sodium bicarbonate 650 milliGRAM(s) Oral three times a day  sodium zirconium cyclosilicate 5 Gram(s) Oral three times a day  timolol 0.5% Solution 1 Drop(s) Both EYES <User Schedule>      ANTIBIOTICS:  ceFAZolin   IVPB 2000 milliGRAM(s) IV Intermittent every 24 hours

## 2020-12-22 LAB
ALBUMIN SERPL ELPH-MCNC: 3 G/DL — LOW (ref 3.5–5.2)
ALP SERPL-CCNC: 59 U/L — SIGNIFICANT CHANGE UP (ref 30–115)
ALT FLD-CCNC: 21 U/L — SIGNIFICANT CHANGE UP (ref 0–41)
ANION GAP SERPL CALC-SCNC: 12 MMOL/L — SIGNIFICANT CHANGE UP (ref 7–14)
AST SERPL-CCNC: 42 U/L — HIGH (ref 0–41)
BASOPHILS # BLD AUTO: 0.01 K/UL — SIGNIFICANT CHANGE UP (ref 0–0.2)
BASOPHILS NFR BLD AUTO: 0.2 % — SIGNIFICANT CHANGE UP (ref 0–1)
BILIRUB SERPL-MCNC: 0.4 MG/DL — SIGNIFICANT CHANGE UP (ref 0.2–1.2)
BUN SERPL-MCNC: 45 MG/DL — HIGH (ref 10–20)
CALCIUM SERPL-MCNC: 7.8 MG/DL — LOW (ref 8.5–10.1)
CHLORIDE SERPL-SCNC: 102 MMOL/L — SIGNIFICANT CHANGE UP (ref 98–110)
CO2 SERPL-SCNC: 21 MMOL/L — SIGNIFICANT CHANGE UP (ref 17–32)
CREAT SERPL-MCNC: 1.4 MG/DL — SIGNIFICANT CHANGE UP (ref 0.7–1.5)
D DIMER BLD IA.RAPID-MCNC: 502 NG/ML DDU — HIGH (ref 0–230)
EOSINOPHIL # BLD AUTO: 0 K/UL — SIGNIFICANT CHANGE UP (ref 0–0.7)
EOSINOPHIL NFR BLD AUTO: 0 % — SIGNIFICANT CHANGE UP (ref 0–8)
GLUCOSE BLDC GLUCOMTR-MCNC: 253 MG/DL — HIGH (ref 70–99)
GLUCOSE BLDC GLUCOMTR-MCNC: 275 MG/DL — HIGH (ref 70–99)
GLUCOSE BLDC GLUCOMTR-MCNC: 93 MG/DL — SIGNIFICANT CHANGE UP (ref 70–99)
GLUCOSE BLDC GLUCOMTR-MCNC: 98 MG/DL — SIGNIFICANT CHANGE UP (ref 70–99)
GLUCOSE SERPL-MCNC: 300 MG/DL — HIGH (ref 70–99)
HCT VFR BLD CALC: 37.5 % — LOW (ref 42–52)
HGB BLD-MCNC: 12.1 G/DL — LOW (ref 14–18)
IMM GRANULOCYTES NFR BLD AUTO: 2.7 % — HIGH (ref 0.1–0.3)
LYMPHOCYTES # BLD AUTO: 0.49 K/UL — LOW (ref 1.2–3.4)
LYMPHOCYTES # BLD AUTO: 10.1 % — LOW (ref 20.5–51.1)
MAGNESIUM SERPL-MCNC: 2.3 MG/DL — SIGNIFICANT CHANGE UP (ref 1.8–2.4)
MCHC RBC-ENTMCNC: 28.3 PG — SIGNIFICANT CHANGE UP (ref 27–31)
MCHC RBC-ENTMCNC: 32.3 G/DL — SIGNIFICANT CHANGE UP (ref 32–37)
MCV RBC AUTO: 87.8 FL — SIGNIFICANT CHANGE UP (ref 80–94)
MONOCYTES # BLD AUTO: 0.62 K/UL — HIGH (ref 0.1–0.6)
MONOCYTES NFR BLD AUTO: 12.8 % — HIGH (ref 1.7–9.3)
NEUTROPHILS # BLD AUTO: 3.6 K/UL — SIGNIFICANT CHANGE UP (ref 1.4–6.5)
NEUTROPHILS NFR BLD AUTO: 74.2 % — SIGNIFICANT CHANGE UP (ref 42.2–75.2)
NRBC # BLD: 0 /100 WBCS — SIGNIFICANT CHANGE UP (ref 0–0)
PLATELET # BLD AUTO: 321 K/UL — SIGNIFICANT CHANGE UP (ref 130–400)
POTASSIUM SERPL-MCNC: 5 MMOL/L — SIGNIFICANT CHANGE UP (ref 3.5–5)
POTASSIUM SERPL-SCNC: 5 MMOL/L — SIGNIFICANT CHANGE UP (ref 3.5–5)
PROT SERPL-MCNC: 6.1 G/DL — SIGNIFICANT CHANGE UP (ref 6–8)
RBC # BLD: 4.27 M/UL — LOW (ref 4.7–6.1)
RBC # FLD: 14 % — SIGNIFICANT CHANGE UP (ref 11.5–14.5)
SODIUM SERPL-SCNC: 135 MMOL/L — SIGNIFICANT CHANGE UP (ref 135–146)
WBC # BLD: 4.85 K/UL — SIGNIFICANT CHANGE UP (ref 4.8–10.8)
WBC # FLD AUTO: 4.85 K/UL — SIGNIFICANT CHANGE UP (ref 4.8–10.8)

## 2020-12-22 PROCEDURE — 99233 SBSQ HOSP IP/OBS HIGH 50: CPT

## 2020-12-22 RX ORDER — APIXABAN 2.5 MG/1
1 TABLET, FILM COATED ORAL
Qty: 60 | Refills: 0
Start: 2020-12-22 | End: 2021-01-20

## 2020-12-22 RX ADMIN — DORZOLAMIDE HYDROCHLORIDE 1 DROP(S): 20 SOLUTION/ DROPS OPHTHALMIC at 10:17

## 2020-12-22 RX ADMIN — Medication 30 UNIT(S): at 08:53

## 2020-12-22 RX ADMIN — Medication 650 MILLIGRAM(S): at 21:27

## 2020-12-22 RX ADMIN — Medication 650 MILLIGRAM(S): at 14:09

## 2020-12-22 RX ADMIN — HEPARIN SODIUM 7500 UNIT(S): 5000 INJECTION INTRAVENOUS; SUBCUTANEOUS at 05:42

## 2020-12-22 RX ADMIN — HEPARIN SODIUM 7500 UNIT(S): 5000 INJECTION INTRAVENOUS; SUBCUTANEOUS at 21:27

## 2020-12-22 RX ADMIN — SODIUM ZIRCONIUM CYCLOSILICATE 5 GRAM(S): 10 POWDER, FOR SUSPENSION ORAL at 18:41

## 2020-12-22 RX ADMIN — DORZOLAMIDE HYDROCHLORIDE 1 DROP(S): 20 SOLUTION/ DROPS OPHTHALMIC at 21:22

## 2020-12-22 RX ADMIN — Medication 30 UNIT(S): at 18:44

## 2020-12-22 RX ADMIN — PANTOPRAZOLE SODIUM 40 MILLIGRAM(S): 20 TABLET, DELAYED RELEASE ORAL at 06:58

## 2020-12-22 RX ADMIN — Medication 30 UNIT(S): at 12:22

## 2020-12-22 RX ADMIN — Medication 100 MILLIGRAM(S): at 05:44

## 2020-12-22 RX ADMIN — Medication 81 MILLIGRAM(S): at 12:23

## 2020-12-22 RX ADMIN — INSULIN GLARGINE 90 UNIT(S): 100 INJECTION, SOLUTION SUBCUTANEOUS at 22:09

## 2020-12-22 RX ADMIN — SODIUM ZIRCONIUM CYCLOSILICATE 5 GRAM(S): 10 POWDER, FOR SUSPENSION ORAL at 21:27

## 2020-12-22 RX ADMIN — HEPARIN SODIUM 7500 UNIT(S): 5000 INJECTION INTRAVENOUS; SUBCUTANEOUS at 14:07

## 2020-12-22 RX ADMIN — SODIUM ZIRCONIUM CYCLOSILICATE 5 GRAM(S): 10 POWDER, FOR SUSPENSION ORAL at 05:42

## 2020-12-22 RX ADMIN — AMLODIPINE BESYLATE 10 MILLIGRAM(S): 2.5 TABLET ORAL at 05:42

## 2020-12-22 RX ADMIN — Medication 650 MILLIGRAM(S): at 05:42

## 2020-12-22 RX ADMIN — Medication 6 MILLIGRAM(S): at 05:47

## 2020-12-22 RX ADMIN — ATORVASTATIN CALCIUM 20 MILLIGRAM(S): 80 TABLET, FILM COATED ORAL at 21:27

## 2020-12-22 RX ADMIN — Medication 1 DROP(S): at 10:17

## 2020-12-22 RX ADMIN — Medication 1 DROP(S): at 21:23

## 2020-12-22 NOTE — PROGRESS NOTE ADULT - SUBJECTIVE AND OBJECTIVE BOX
SUBJECTIVE:    Patient is a 65y old Male who presents with a chief complaint of SOB (21 Dec 2020 11:41)    Currently admitted to medicine with the primary diagnosis of COVID-19    Today is hospital day 5d. This morning he is resting comfortably in bed and reports no new issues or overnight events. No fevers, chills. Denies CP or SOB. Able to get out of bed, sit in chair, ambulate to bathroom more yesterday. Decreased from 5L to 2L NC yesterday.     PAST MEDICAL & SURGICAL HISTORY  HLD (hyperlipidemia)    CKD (chronic kidney disease)    DVT (deep venous thrombosis)    Glaucoma    HTN (hypertension)    Diabetes mellitus    H/O right nephrectomy      SOCIAL HISTORY:  Negative for smoking/alcohol/drug use.     ALLERGIES:  No Known Allergies    MEDICATIONS:  STANDING MEDICATIONS  amLODIPine   Tablet 10 milliGRAM(s) Oral daily  aspirin enteric coated 81 milliGRAM(s) Oral daily  atorvastatin 20 milliGRAM(s) Oral at bedtime  ceFAZolin   IVPB 2000 milliGRAM(s) IV Intermittent every 8 hours  chlorhexidine 4% Liquid 1 Application(s) Topical <User Schedule>  dexAMETHasone  Injectable 6 milliGRAM(s) IV Push daily  dorzolamide 2% Ophthalmic Solution 1 Drop(s) Both EYES <User Schedule>  heparin   Injectable 7500 Unit(s) SubCutaneous every 8 hours  influenza   Vaccine 0.5 milliLiter(s) IntraMuscular once  insulin glargine Injectable (LANTUS) 90 Unit(s) SubCutaneous at bedtime  insulin lispro Injectable (ADMELOG) 30 Unit(s) SubCutaneous three times a day before meals  pantoprazole    Tablet 40 milliGRAM(s) Oral before breakfast  sodium bicarbonate 650 milliGRAM(s) Oral three times a day  sodium zirconium cyclosilicate 5 Gram(s) Oral three times a day  timolol 0.5% Solution 1 Drop(s) Both EYES <User Schedule>    PRN MEDICATIONS  acetaminophen   Tablet .. 650 milliGRAM(s) Oral every 6 hours PRN  guaifenesin/dextromethorphan  Syrup 10 milliLiter(s) Oral every 6 hours PRN    VITALS:   T(F): 97.4  HR: 73  BP: 136/65  RR: 18  SpO2: 98%    LABS:                        11.6   5.82  )-----------( 353      ( 21 Dec 2020 06:56 )             35.6     12-21    137  |  105  |  46<H>  ----------------------------<  236<H>  5.0   |  19  |  1.3    Ca    8.0<L>      21 Dec 2020 06:56  Mg     2.4     12-21    TPro  6.3  /  Alb  2.9<L>  /  TBili  0.5  /  DBili  x   /  AST  44<H>  /  ALT  17  /  AlkPhos  60  12-21      RADIOLOGY:  < from: Xray Chest 1 View-PORTABLE IMMEDIATE (Xray Chest 1 View-PORTABLE IMMEDIATE .) (12.20.20 @ 13:27) >  Impression:  Low lung volumes without lobar consolidation, pleural effusion or pneumothorax.  < end of copied text >      PHYSICAL EXAM:  GEN: NAD, lying bed comfortably, obese  HEENT: EOMI, PERRLA, conjunctiva and sclera clear. MMM.  LUNGS: Clear to auscultation bilaterally. No rales, rhonchi, or wheezing. On 2L NC  HEART: S1/S2 present. RRR.   ABD: Soft, non-tender, non-distended. Bowel sounds present.  EXT: 2+ peripheral pulses. No clubbing, cyanosis, or edema. R foot dressing c/d/i  NEURO: AAOX3. Speech clear. No focal neurological deficits. Sensation grossly intact.   Skin: No rashes or lesions.

## 2020-12-22 NOTE — PROGRESS NOTE ADULT - SUBJECTIVE AND OBJECTIVE BOX
Progress Note:  Provider Speciality                            Hospitalist      JOSHUA HAM MRN-[id number] 65y Male     CHIEF PRESENTING COMPLAINT:  Patient is a 65y old  Male who presents with a chief complaint of SOB (18 Dec 2020 14:02)        SUBJECTIVE:  Patient was seen and examined at bedside. Reports improvement in  presenting complaint. No significant overnight events reported.     HISTORY OF PRESENTING ILLNESS:  HPI:  65 years old male with PMHx of DM, HTN, HLD, glaucoma, CKD III, nehroithiasis, renal mass s/p nephrectomy, obesity, recently discharged from [facility] for MSSA bactermia, COVID positive on 12/10 c/o persistent cough and SOB which is worsening since yesterday.   Pt was recently admitted to [facility] from 12/4 to 12/15 for MSSA bacteremia secondary to Right hallux ulcer and cellulitis. Was d/c on IV cefazolin with out atient follow up with cadiology for CHEIKH and podiatry.    Pt was tested positive for Covid on 12/10 during admission. Yetserday morning patient started developing shortness of breath which has been progressively increasing and associated with cough. Pt called EMS and pulse O2 was found to be around 89% on room air.   No fever, CP, hemoptysis, no vomiting or abdominal pain, no change in leg swelling.    In the ED, /78, HR 98, Temp 97.7, RR 18, saturating well on NRB mask. Labs significant for d-dimer of 605, Na 131, K 5.7, Cr 2.4, GFR 27 (around baseline fro previous admission). Pt to be admitted under medicine.   (17 Dec 2020 17:43)        REVIEW OF SYSTEMS:  Patient denies any headache, any vision complaints, runny nose, fever, chills, sore throat. Denies chest pain, shortness of breath, palpitation. Denies nausea, vomiting, abdominal pain, diarrhoea, Denies urinary burning, urgency, frequency, dysuria. Denies weakness in any part of the body or numbness.   At least 10 systems were reviewed in ROS. All systems reviewed  are within normal limits except for the complaints as described in Subjective.    PAST MEDICAL & SURGICAL HISTORY:  PAST MEDICAL & SURGICAL HISTORY:  HLD (hyperlipidemia)    CKD (chronic kidney disease)    DVT (deep venous thrombosis)    Glaucoma    HTN (hypertension)    Diabetes mellitus    H/O right nephrectomy            VITAL SIGNS:  Vital Signs Last 24 Hrs  T(C): 36.6 (22 Dec 2020 12:30), Max: 37 (21 Dec 2020 19:20)  T(F): 97.8 (22 Dec 2020 12:30), Max: 98.6 (21 Dec 2020 19:20)  HR: 84 (22 Dec 2020 12:30) (73 - 84)  BP: 135/64 (22 Dec 2020 12:30) (129/59 - 142/68)  BP(mean): --  RR: 20 (22 Dec 2020 12:30) (18 - 20)  SpO2: 97% (22 Dec 2020 12:30) (95% - 98%)    PHYSICAL EXAMINATION:  Not in acute distress, morbid obesity  General: No pallor, no icterus  HEENT:   EOMI, no JVD.  Heart: S1+S2 audible  Lungs: bilateral  fair air entry, no wheezing, no crepitations.  Abdomen: Soft, non-tender, non-distended , no  rigidity or guarding.  CNS: Awake alert, CN  grossly intact.  Extremities:  No edema            CONSULTS:  Consultant(s) Notes Reviewed by me.   Care Discussed with Consultants/Other Providers where required.        MEDICATIONS:  MEDICATIONS  (STANDING):  amLODIPine   Tablet 10 milliGRAM(s) Oral daily  aspirin enteric coated 81 milliGRAM(s) Oral daily  atorvastatin 20 milliGRAM(s) Oral at bedtime  ceFAZolin   IVPB 2000 milliGRAM(s) IV Intermittent every 24 hours  chlorhexidine 4% Liquid 1 Application(s) Topical <User Schedule>  dexAMETHasone  Injectable 6 milliGRAM(s) IV Push daily  dorzolamide 2% Ophthalmic Solution 1 Drop(s) Both EYES <User Schedule>  heparin   Injectable 7500 Unit(s) SubCutaneous every 8 hours  influenza   Vaccine 0.5 milliLiter(s) IntraMuscular once  insulin glargine Injectable (LANTUS) 90 Unit(s) SubCutaneous at bedtime  insulin lispro Injectable (ADMELOG) 30 Unit(s) SubCutaneous three times a day before meals  insulin regular  human recombinant. 10 Unit(s) IV Push once  pantoprazole    Tablet 40 milliGRAM(s) Oral before breakfast  sodium bicarbonate 650 milliGRAM(s) Oral three times a day  sodium chloride 0.9%. 1000 milliLiter(s) (150 mL/Hr) IV Continuous <Continuous>  sodium zirconium cyclosilicate 5 Gram(s) Oral three times a day  timolol 0.5% Solution 1 Drop(s) Both EYES <User Schedule>    MEDICATIONS  (PRN):  acetaminophen   Tablet .. 650 milliGRAM(s) Oral every 6 hours PRN Temp greater or equal to 38C (100.4F)  guaifenesin/dextromethorphan  Syrup 10 milliLiter(s) Oral every 6 hours PRN Cough            ASSESSMENT:    65 years old male with PMHx of DM, HTN, HLD, glaucoma, CKD III, nephrolithiasis renal mass s/p nephrectomy, obesity, recently discharged from [facility] for MSSA bacteremia COVID positive on 12/10 compliant of  persistent cough and SOB which is worsening     ASSESSMENT & PLAN:   Acute hypoxic respiratory failure secondary to viral Pneumonia  -doing well on RA. Will assess for need for oxygen with ambulation today   COVID 19 Pneumonia with severe illness.  Cytokine release syndrome- not on RDV because of BATOOL  NUBIA bacteremia (Sepsis secondary to MSSA bacteremia from prior admission-resolved- no sepsis in current admission  IV abx completed repeat blood cx negative  Patient refusing CHEIKH ( will not be done inpatient  because of Covid anyway)  BATOOL on Chronic renal disease stage 3 - at baseline  Uncontrolled hyperglycemia secondary to inadequate insulin and steroids- increased & optimized Lantus & lispro  Morbid obesity- BMI - 53.8  Activity- out of bed as tolerated & PT  Discharge Disposition: Stable for discharge to home with home PT vs transfer to [facility]. Pending assessment of need for home O2           Progress Note:  Provider Speciality                            Hospitalist      JOSHUA HAM MRN-[id number] 65y Male     CHIEF PRESENTING COMPLAINT:  Patient is a 65y old  Male who presents with a chief complaint of SOB (18 Dec 2020 14:02)        SUBJECTIVE:  Patient was seen and examined at bedside. Reports improvement in  presenting complaint. No significant overnight events reported.     HISTORY OF PRESENTING ILLNESS:  HPI:  65 years old male with PMHx of DM, HTN, HLD, glaucoma, CKD III, nehroithiasis, renal mass s/p nephrectomy, obesity, recently discharged from [facility] for MSSA bactermia, COVID positive on 12/10 c/o persistent cough and SOB which is worsening since yesterday.   Pt was recently admitted to [facility] from 12/4 to 12/15 for MSSA bacteremia secondary to Right hallux ulcer and cellulitis. Was d/c on IV cefazolin with out atient follow up with cadiology for CHEIKH and podiatry.    Pt was tested positive for Covid on 12/10 during admission. Yetserday morning patient started developing shortness of breath which has been progressively increasing and associated with cough. Pt called EMS and pulse O2 was found to be around 89% on room air.   No fever, CP, hemoptysis, no vomiting or abdominal pain, no change in leg swelling.    In the ED, /78, HR 98, Temp 97.7, RR 18, saturating well on NRB mask. Labs significant for d-dimer of 605, Na 131, K 5.7, Cr 2.4, GFR 27 (around baseline fro previous admission). Pt to be admitted under medicine.   (17 Dec 2020 17:43)        REVIEW OF SYSTEMS:  Patient denies any headache, any vision complaints, runny nose, fever, chills, sore throat. Denies chest pain, shortness of breath, palpitation. Denies nausea, vomiting, abdominal pain, diarrhoea, Denies urinary burning, urgency, frequency, dysuria. Denies weakness in any part of the body or numbness.   At least 10 systems were reviewed in ROS. All systems reviewed  are within normal limits except for the complaints as described in Subjective.    PAST MEDICAL & SURGICAL HISTORY:  PAST MEDICAL & SURGICAL HISTORY:  HLD (hyperlipidemia)    CKD (chronic kidney disease)    DVT (deep venous thrombosis)    Glaucoma    HTN (hypertension)    Diabetes mellitus    H/O right nephrectomy            VITAL SIGNS:  Vital Signs Last 24 Hrs  T(C): 36.6 (22 Dec 2020 12:30), Max: 37 (21 Dec 2020 19:20)  T(F): 97.8 (22 Dec 2020 12:30), Max: 98.6 (21 Dec 2020 19:20)  HR: 84 (22 Dec 2020 12:30) (73 - 84)  BP: 135/64 (22 Dec 2020 12:30) (129/59 - 142/68)  BP(mean): --  RR: 20 (22 Dec 2020 12:30) (18 - 20)  SpO2: 97% (22 Dec 2020 12:30) (95% - 98%)    PHYSICAL EXAMINATION:  Not in acute distress, morbid obesity  General: No pallor, no icterus  HEENT:   EOMI, no JVD.  Heart: S1+S2 audible  Lungs: bilateral  fair air entry, no wheezing, no crepitations.  Abdomen: Soft, non-tender, non-distended , no  rigidity or guarding.  CNS: Awake alert, CN  grossly intact.  Extremities:  No edema            CONSULTS:  Consultant(s) Notes Reviewed by me.   Care Discussed with Consultants/Other Providers where required.        MEDICATIONS:  MEDICATIONS  (STANDING):  amLODIPine   Tablet 10 milliGRAM(s) Oral daily  aspirin enteric coated 81 milliGRAM(s) Oral daily  atorvastatin 20 milliGRAM(s) Oral at bedtime  ceFAZolin   IVPB 2000 milliGRAM(s) IV Intermittent every 24 hours  chlorhexidine 4% Liquid 1 Application(s) Topical <User Schedule>  dexAMETHasone  Injectable 6 milliGRAM(s) IV Push daily  dorzolamide 2% Ophthalmic Solution 1 Drop(s) Both EYES <User Schedule>  heparin   Injectable 7500 Unit(s) SubCutaneous every 8 hours  influenza   Vaccine 0.5 milliLiter(s) IntraMuscular once  insulin glargine Injectable (LANTUS) 90 Unit(s) SubCutaneous at bedtime  insulin lispro Injectable (ADMELOG) 30 Unit(s) SubCutaneous three times a day before meals  insulin regular  human recombinant. 10 Unit(s) IV Push once  pantoprazole    Tablet 40 milliGRAM(s) Oral before breakfast  sodium bicarbonate 650 milliGRAM(s) Oral three times a day  sodium chloride 0.9%. 1000 milliLiter(s) (150 mL/Hr) IV Continuous <Continuous>  sodium zirconium cyclosilicate 5 Gram(s) Oral three times a day  timolol 0.5% Solution 1 Drop(s) Both EYES <User Schedule>    MEDICATIONS  (PRN):  acetaminophen   Tablet .. 650 milliGRAM(s) Oral every 6 hours PRN Temp greater or equal to 38C (100.4F)  guaifenesin/dextromethorphan  Syrup 10 milliLiter(s) Oral every 6 hours PRN Cough            ASSESSMENT:    65 years old male with PMHx of DM, HTN, HLD, glaucoma, CKD III, nephrolithiasis renal mass s/p nephrectomy, obesity, recently discharged from [facility] for MSSA bacteremia COVID positive on 12/10 compliant of  persistent cough and SOB which is worsening     ASSESSMENT & PLAN:   Acute hypoxic respiratory failure secondary to viral Pneumonia  -doing well on RA. Will assess for need for oxygen with ambulation today   COVID 19 Pneumonia with severe illness.  Cytokine release syndrome- not on RDV because of BATOOL  NUBIA bacteremia (Sepsis secondary to MSSA bacteremia from prior admission-resolved- no sepsis in current admission  IV abx completed repeat blood cx negative  Patient refusing CHEIKH   BATOOL on Chronic renal disease stage 3 - at baseline  Uncontrolled hyperglycemia secondary to inadequate insulin and steroids- increased & optimized Lantus & lispro  Morbid obesity- BMI - 53.8  Activity- out of bed as tolerated & PT  Discharge Disposition: Stable for discharge to home with home PT awaiting DME. Pending assessment of need for home O2

## 2020-12-22 NOTE — PROGRESS NOTE ADULT - ASSESSMENT
· Assessment	  65 years old male with PMHx of DM, HTN, HLD, glaucoma, CKD III, nephrolithiasis renal mass s/p nephrectomy, obesity, recently discharged from [facility] for MSSA bacteremia COVID positive on 12/10 c/o persistent cough and SOB which is worsening since yesterday.    IMPRESSION;  COVID 19 with severe illness. SpO2 < 94% on RA and need for supplemental O2. 95% NC 4 LIT  Pt is in the late inflammatory response phase ot the illness based on the onset of symptoms.  Elevation of the inflammatory markers  Cytokine release syndorme  procalcitonin 0.87 >1.07  Ferritin 1923>1886  CRP 7.73  Ddimers 605    NUBIA bacteremia : seems uncomplcated with subsequent BCx NG  BCx 12/4 NUBIA  BCx 12/5,6,9 10 NGTD    RECOMMENDATIONS;  NO NEED TO REPEAT INFLAMMATORY MARKERS.  Target SpO2 92 % to 96 %  No RDV/plasma  Dexamethasone 6 mg iv q24h for 10 days ( or until discharge ) or equivalent glucocorticoid dose may be substituted. Equivalent total dosis of alternative steroids are Methylprednisolone 32 mg and prednisone 40 mg q24h.  Monitor for side effects: hyperglycemia, neurological ( agitation/confusion), adrenal suppression, bacterial and fungal infections  Ancef 2 gm iv q8h for 14 days starting 12/5  Could finish course of steroids with po prednisone 40 mg q24 for a total of 10 days  Could transfer to [facility] for further management.  recall prn please

## 2020-12-22 NOTE — PROGRESS NOTE ADULT - ASSESSMENT
65 years old male with PMHx of DM, HTN, HLD, glaucoma, CKD III, nephrolithiasis renal mass s/p nephrectomy, obesity, recently discharged from [facility] for MSSA bacteremia COVID positive on 12/10 c/o persistent cough and SOB which is worsening since yesterday.    # Acute hypoxic respiratory failure / SOB / moderate - severe COVID - 19 PNA  - CXR shows b/l pulmonary opacities 12/17. 12/20 unchanged  - D-Dimer 605 > 414 12/19 >   - CRP 7.73 > 8/01 12/19 >  - Ferritin 1923 > 1886 12/18 >  - procal 0.87 > 1/07 12/19 >  - cont decadron for 10 days - day 6, 12/17-  - not a candidate for RDV due to decreased GFR, and timeline >10 days out of window  - monitor O2, titrate O2 as needed to goal of 92 - 96% - currently 2L at 98%, continue to wean   - f/u ID recs    # Sepsis due to MSSA bacteremia  - CT CAP 12/4: No CT evidence of acute intrathoracic or abdominopelvic pathology. Nodular enlarged right thyroid lobe. Recommend evaluation outpatient sonography. Additional findings in the body of the report  - MR Foot No Cont, Right (12.09.20): Impression: Soft tissue ulcer at the medial base of hallux distal phalanx with osteitis; no osteomyelitis or drainable collection  - Blood Cx 12/4 MSSA, GNR on stain   - Blood Cx 12/5-12/9 no growth  - had dental procedure 12/10  - c/w cefazolin 2000 mg IV every 8 hours (will need 4 weeks from 12/5 to 1/1 per ID, R midline in place)  - TTE LVEF 66%, no vegetation  - CHEIKH need to obtain as out patient  - f/u ID recs    # Right hallux ulcer  - s/p mri of right foot on 12/09/2020 -> negative for acute osteomyelitis  - s/p flagyl  - podiatry: wound care and f/u outpatient    # Elevated trop/ SOB  - Trop .09>.08  - f/u EKG    # CKD III / hyperkalema / hyponatremia  - s/p R nephrectomy, - stable at baseline,   - Avoid nephrotoxic meds, - Monitor BMP  - lokelma, K+ 5.0 12/21 > __ 12/22  -Cr 1.3 12/21 > __ 12/22    # Periapical infection of #2- was extracted by Dental Sx. on 12/10  -f/u dental recs    # DM II  -trend FS, start insulin basal blus if FS > 180  -c/w Lantus 90U qHS, lispro 30U qAC    # HTN - controlled   - c/w amlodipine, losartan    # HLD  - c/w atorvastatin    # Glaucoma   - continue with timolol    # Morbid obesity- BMI - 53.8- outpatient weight loss tx, outpatient polysomnography study.    DVT ppx: Heparin SubQ 7500U TID  GI ppx: pantoprazole 40mg qD  Labs: CMP+Mg, CBC  Diet: DASH/TLC  PT/Rehab: Rec for home with home PT services  Activity: Ambulate as tolerated  Dispo: Home in 1-2 days pending clinical improvement  FULL CODE     65 years old male with PMHx of DM, HTN, HLD, glaucoma, CKD III, nephrolithiasis renal mass s/p nephrectomy, obesity, recently discharged from [facility] for MSSA bacteremia COVID positive on 12/10 c/o persistent cough and SOB which is worsening since yesterday.    # Acute hypoxic respiratory failure / SOB / moderate - severe COVID - 19 PNA  - CXR shows b/l pulmonary opacities 12/17. 12/20 unchanged  - D-Dimer 605 > 414 12/19 >   - CRP 7.73 > 8/01 12/19 >  - Ferritin 1923 > 1886 12/18 >  - procal 0.87 > 1/07 12/19 >  - cont decadron for 10 days - day 6, 12/17-  - not a candidate for RDV due to decreased GFR, and timeline >10 days out of window  - monitor O2, titrate O2 as needed to goal of 92 - 96% - currently 2L at 98%, continue to wean off O2 today   - f/u ID recs    # Sepsis due to MSSA bacteremia  - CT CAP 12/4: No CT evidence of acute intrathoracic or abdominopelvic pathology. Nodular enlarged right thyroid lobe. Recommend evaluation outpatient sonography. Additional findings in the body of the report  - MR Foot No Cont, Right (12.09.20): Impression: Soft tissue ulcer at the medial base of hallux distal phalanx with osteitis; no osteomyelitis or drainable collection  - Blood Cx 12/4 MSSA, GNR on stain   - Blood Cx 12/5-12/9 no growth  - had dental procedure 12/10  - c/w cefazolin 2000 mg IV every 8 hours (will need 4 weeks from 12/5 to 1/1 per ID, R midline in place)  - TTE LVEF 66%, no vegetation  - CHEIKH need to obtain as out patient  - f/u ID recs    # Right hallux ulcer  - s/p mri of right foot on 12/09/2020 -> negative for acute osteomyelitis  - s/p flagyl  - podiatry: wound care and f/u outpatient    # Elevated trop/ SOB  - Trop .09>.08  - f/u EKG    # CKD III / hyperkalema / hyponatremia  - s/p R nephrectomy, - stable at baseline,   - Avoid nephrotoxic meds, - Monitor BMP  - lokelma, K+ 5.0 12/21 > __ 12/22  -Cr 1.3 12/21 > __ 12/22    # Periapical infection of #2- was extracted by Dental Sx. on 12/10  -f/u dental recs    # DM II  -trend FS, start insulin basal blus if FS > 180  -c/w Lantus 90U qHS, lispro 30U qAC    # HTN - controlled   - c/w amlodipine, losartan    # HLD  - c/w atorvastatin    # Glaucoma   - continue with timolol    # Morbid obesity- BMI - 53.8- outpatient weight loss tx, outpatient polysomnography study.    DVT ppx: Heparin SubQ 7500U TID  GI ppx: pantoprazole 40mg qD  Labs: CMP+Mg, CBC  Diet: DASH/TLC  PT/Rehab: Rec for home with home PT services  Activity: Ambulate as tolerated  Dispo: Home in 1-2 days pending clinical improvement  FULL CODE     65 years old male with PMHx of DM, HTN, HLD, glaucoma, CKD III, nephrolithiasis renal mass s/p nephrectomy, obesity, recently discharged from [facility] for MSSA bacteremia COVID positive on 12/10 c/o persistent cough and SOB which is worsening since yesterday.    # Acute hypoxic respiratory failure / SOB / moderate - severe COVID - 19 PNA  - CXR shows b/l pulmonary opacities 12/17. 12/20 unchanged  - D-Dimer 605 > 414 12/19 > 502 12/22   - CRP 7.73 > 8/01 12/19 >   - Ferritin 1923 > 1886 12/18 >  - procal 0.87 > 1/07 12/19 >  - cont decadron for 10 days - day 6, 12/17-  - not a candidate for RDV due to decreased GFR, and timeline >10 days out of window  - monitor O2, titrate O2 as needed to goal of 92 - 96% - currently 2L at 98%, continue to wean off O2 today   - f/u ID recs    # Sepsis due to MSSA bacteremia  - CT CAP 12/4: No CT evidence of acute intrathoracic or abdominopelvic pathology. Nodular enlarged right thyroid lobe. Recommend evaluation outpatient sonography. Additional findings in the body of the report  - MR Foot No Cont, Right (12.09.20): Impression: Soft tissue ulcer at the medial base of hallux distal phalanx with osteitis; no osteomyelitis or drainable collection  - Blood Cx 12/4 MSSA, GNR on stain   - Blood Cx 12/5-12/9 no growth  - had dental procedure 12/10  - c/w cefazolin 2000 mg IV every 8 hours (will need 4 weeks from 12/5 to 1/1 per ID, R midline in place - was placed on 12/14)  - TTE LVEF 66%, no vegetation  - CHEIKH need to obtain as out patient  - f/u ID recs    # Right hallux ulcer  - s/p mri of right foot on 12/09/2020 -> negative for acute osteomyelitis  - s/p flagyl  - podiatry: wound care and f/u outpatient    # Elevated trop/ SOB  - Trop .09>.08  - f/u EKG    # CKD III / hyperkalema / hyponatremia  - s/p R nephrectomy, - stable at baseline,   - Avoid nephrotoxic meds, - Monitor BMP  - lokelma, K+ 5.0 12/21 > __ 12/22  -Cr 1.3 12/21 > __ 12/22    # Periapical infection of #2- was extracted by Dental Sx. on 12/10  -f/u dental recs    # DM II  -trend FS, start insulin basal blus if FS > 180  -c/w Lantus 90U qHS, lispro 30U qAC    # HTN - controlled   - c/w amlodipine, losartan    # HLD  - c/w atorvastatin    # Glaucoma   - continue with timolol    # Morbid obesity- BMI - 53.8- outpatient weight loss tx, outpatient polysomnography study.    DVT ppx: Heparin SubQ 7500U TID  GI ppx: pantoprazole 40mg qD  Labs: CMP+Mg, CBC  Diet: DASH/TLC  PT/Rehab: Rec for home with home PT services  Activity: Ambulate as tolerated  Dispo: Home in 1-2 days pending clinical improvement  FULL CODE     65 years old male with PMHx of DM, HTN, HLD, glaucoma, CKD III, nephrolithiasis renal mass s/p nephrectomy, obesity, recently discharged from [facility] for MSSA bacteremia COVID positive on 12/10 c/o persistent cough and SOB which is worsening since yesterday.    # Acute hypoxic respiratory failure / SOB / moderate - severe COVID - 19 PNA  - CXR shows b/l pulmonary opacities 12/17. 12/20 unchanged  - D-Dimer 605 > 414 12/19 > 502 12/22   - CRP 7.73 > 8/01 12/19 >   - Ferritin 1923 > 1886 12/18 >  - procal 0.87 > 1/07 12/19 >  - cont decadron for 10 days - day 6, 12/17-  - not a candidate for RDV due to decreased GFR, and timeline >10 days out of window  - monitor O2, titrate O2 as needed to goal of 92 - 96% - currently 2L at 98%, continue to wean off O2 today   - f/u ID recs    # Sepsis due to MSSA bacteremia  - CT CAP 12/4: No CT evidence of acute intrathoracic or abdominopelvic pathology. Nodular enlarged right thyroid lobe. Recommend evaluation outpatient sonography. Additional findings in the body of the report  - MR Foot No Cont, Right (12.09.20): Impression: Soft tissue ulcer at the medial base of hallux distal phalanx with osteitis; no osteomyelitis or drainable collection  - Blood Cx 12/4 MSSA, GNR on stain   - Blood Cx 12/5-12/9 no growth  - had dental procedure 12/10  - discussed with ID, can d/c cefazolin 2g IV q8h as completed 2 week coursec/w cefazolin 2000 mg IV every 8 hours  - TTE LVEF 66%, no vegetation  - CHEIKH need to obtain as out patient  - f/u ID recs    # Right hallux ulcer  - s/p mri of right foot on 12/09/2020 -> negative for acute osteomyelitis  - s/p flagyl  - podiatry: wound care and f/u outpatient    # Elevated trop/ SOB  - Trop .09>.08  - f/u EKG    # CKD III / hyperkalema / hyponatremia  - s/p R nephrectomy, - stable at baseline,   - Avoid nephrotoxic meds, - Monitor BMP  - lokelma, K+ 5.0 12/21 > __ 12/22  -Cr 1.3 12/21 > __ 12/22    # Periapical infection of #2- was extracted by Dental Sx. on 12/10  -f/u dental recs    # DM II  -trend FS, start insulin basal blus if FS > 180  -c/w Lantus 90U qHS, lispro 30U qAC    # HTN - controlled   - c/w amlodipine, losartan    # HLD  - c/w atorvastatin    # Glaucoma   - continue with timolol    # Morbid obesity- BMI - 53.8- outpatient weight loss tx, outpatient polysomnography study.    DVT ppx: Heparin SubQ 7500U TID  GI ppx: pantoprazole 40mg qD  Labs: CMP+Mg, CBC  Diet: DASH/TLC  PT/Rehab: Rec for home with home PT services  Activity: Ambulate as tolerated  Dispo: Home in 1-2 days pending clinical improvement  FULL CODE     65 years old male with PMHx of DM, HTN, HLD, glaucoma, CKD III, nephrolithiasis renal mass s/p nephrectomy, obesity, recently discharged from [facility] for MSSA bacteremia COVID positive on 12/10 c/o persistent cough and SOB which is worsening since yesterday.    # Acute hypoxic respiratory failure / SOB / moderate - severe COVID - 19 PNA  - CXR shows b/l pulmonary opacities 12/17. 12/20 unchanged  - D-Dimer 605 > 414 12/19 > 502 12/22   - CRP 7.73 > 8/01 12/19 >   - Ferritin 1923 > 1886 12/18 >  - procal 0.87 > 1/07 12/19 >  - cont decadron for 10 days - day 6, 12/17-  - not a candidate for RDV due to decreased GFR, and timeline >10 days out of window  - monitor O2, titrate O2 as needed to goal of 92 - 96% - currently 2L at 98%, continue to wean off O2 today   - f/u ID recs  -d/c on 30 days of dvt ppx : 2.5mg eliquis    # Sepsis due to MSSA bacteremia  - CT CAP 12/4: No CT evidence of acute intrathoracic or abdominopelvic pathology. Nodular enlarged right thyroid lobe. Recommend evaluation outpatient sonography. Additional findings in the body of the report  - MR Foot No Cont, Right (12.09.20): Impression: Soft tissue ulcer at the medial base of hallux distal phalanx with osteitis; no osteomyelitis or drainable collection  - Blood Cx 12/4 MSSA, GNR on stain   - Blood Cx 12/5-12/9 no growth  - had dental procedure 12/10  - discussed with ID, can d/c cefazolin 2g IV q8h as completed 2 week coursec/w cefazolin 2000 mg IV every 8 hours  - TTE LVEF 66%, no vegetation  - CHEIKH need to obtain as out patient  - f/u ID recs    # Right hallux ulcer  - s/p mri of right foot on 12/09/2020 -> negative for acute osteomyelitis  - s/p flagyl  - podiatry: wound care and f/u outpatient    # Elevated trop/ SOB  - Trop .09>.08  - f/u EKG    # CKD III / hyperkalema / hyponatremia  - s/p R nephrectomy, - stable at baseline,   - Avoid nephrotoxic meds, - Monitor BMP  - lokelma, K+ 5.0 12/21 > f/u BMP  -Cr 1.3 12/21 > f/u BMP    # Periapical infection of #2- was extracted by Dental Sx. on 12/10  -f/u dental recs    # DM II  -trend FS, start insulin basal blus if FS > 180  -c/w Lantus 90U qHS, lispro 30U qAC    # HTN - controlled   - c/w amlodipine, losartan    # HLD  - c/w atorvastatin    # Glaucoma   - continue with timolol    # Morbid obesity- BMI - 53.8- outpatient weight loss tx, outpatient polysomnography study.    DVT ppx: Heparin SubQ 7500U TID  GI ppx: pantoprazole 40mg qD  Labs: CMP+Mg, CBC  Diet: DASH/TLC  PT/Rehab: Rec for home with home PT services  Activity: Ambulate as tolerated  Dispo: Home in 1-2 days pending clinical improvement  FULL CODE     65 years old male with PMHx of DM, HTN, HLD, glaucoma, CKD III, nephrolithiasis renal mass s/p nephrectomy, obesity, recently discharged from [facility] for MSSA bacteremia COVID positive on 12/10 c/o persistent cough and SOB which is worsening since yesterday.    # Acute hypoxic respiratory failure / SOB / moderate - severe COVID - 19 PNA  - CXR shows b/l pulmonary opacities 12/17. 12/20 unchanged  - D-Dimer 605 > 414 12/19 > 502 12/22   - CRP 7.73 > 8/01 12/19 >   - Ferritin 1923 > 1886 12/18 >  - procal 0.87 > 1/07 12/19 >  - cont decadron for 10 days - day 6, 12/17-  - not a candidate for RDV due to decreased GFR, and timeline >10 days out of window  - monitor O2, titrate O2 as needed to goal of 92 - 96% - currently 2L at 98%, continue to wean off O2 today   - f/u ID recs  -d/c on 30 days of dvt ppx : 2.5mg eliquis    # Sepsis due to MSSA bacteremia  - CT CAP 12/4: No CT evidence of acute intrathoracic or abdominopelvic pathology. Nodular enlarged right thyroid lobe. Recommend evaluation outpatient sonography. Additional findings in the body of the report  - MR Foot No Cont, Right (12.09.20): Impression: Soft tissue ulcer at the medial base of hallux distal phalanx with osteitis; no osteomyelitis or drainable collection  - Blood Cx 12/4 MSSA, GNR on stain   - Blood Cx 12/5-12/9 no growth  - had dental procedure 12/10  - discussed with ID, can d/c cefazolin 2g IV q8h as completed 2 week coursec/w cefazolin 2000 mg IV every 8 hours  - TTE LVEF 66%, no vegetation  - CHEIKH need to obtain as out patient  - f/u ID recs    # Right hallux ulcer  - s/p mri of right foot on 12/09/2020 -> negative for acute osteomyelitis  - s/p flagyl  - podiatry: wound care and f/u outpatient    # Elevated trop/ SOB  - Trop .09>.08  - f/u EKG    # CKD III / hyperkalema / hyponatremia  - s/p R nephrectomy, - stable at baseline,   - Avoid nephrotoxic meds, - Monitor BMP  - lokelma, K+ 5.0 12/21 > 5.0 12/22  -Cr 1.3 12/21 > 1.4 12/22    # Periapical infection of #2- was extracted by Dental Sx. on 12/10  -f/u dental recs    # DM II  -trend FS, start insulin basal blus if FS > 180  -c/w Lantus 90U qHS, lispro 30U qAC    # HTN - controlled   - c/w amlodipine, losartan    # HLD  - c/w atorvastatin    # Glaucoma   - continue with timolol    # Morbid obesity- BMI - 53.8- outpatient weight loss tx, outpatient polysomnography study.    DVT ppx: Heparin SubQ 7500U TID  GI ppx: pantoprazole 40mg qD  Labs: CMP+Mg, CBC  Diet: DASH/TLC  PT/Rehab: Rec for home with home PT services  Activity: Ambulate as tolerated  Dispo: Home in 1-2 days pending clinical improvement  FULL CODE

## 2020-12-22 NOTE — PROGRESS NOTE ADULT - SUBJECTIVE AND OBJECTIVE BOX
JOSHUA HAM  65y, Male    All available historical data reviewed    OVERNIGHT EVENTS:  no fevers  feels well and has no complaints  96% NC 2 LIT    ROS:  General: Denies rigors, nightsweats  HEENT: Denies headache, rhinorrhea, sore throat, eye pain  CV: Denies CP, palpitations  PULM: Denies wheezing, hemoptysis  GI: Denies hematemesis, hematochezia, melena  : Denies discharge, hematuria  MSK: Denies arthralgias, myalgias  SKIN: Denies rash, lesions  NEURO: Denies paresthesias, weakness  PSYCH: Denies depression, anxiety    VITALS:  T(F): 96.2, Max: 98.6 (12-21-20 @ 19:20)  HR: 82  BP: 142/68  RR: 20Vital Signs Last 24 Hrs  T(C): 35.7 (22 Dec 2020 08:31), Max: 37 (21 Dec 2020 19:20)  T(F): 96.2 (22 Dec 2020 08:31), Max: 98.6 (21 Dec 2020 19:20)  HR: 82 (22 Dec 2020 08:31) (73 - 88)  BP: 142/68 (22 Dec 2020 08:31) (129/59 - 150/65)  BP(mean): --  RR: 20 (22 Dec 2020 08:31) (18 - 20)  SpO2: 96% (22 Dec 2020 08:31) (95% - 98%)    TESTS & MEASUREMENTS:                        12.1   4.85  )-----------( 321      ( 22 Dec 2020 04:30 )             37.5     12-21    137  |  105  |  46<H>  ----------------------------<  236<H>  5.0   |  19  |  1.3    Ca    8.0<L>      21 Dec 2020 06:56  Mg     2.4     12-21    TPro  6.3  /  Alb  2.9<L>  /  TBili  0.5  /  DBili  x   /  AST  44<H>  /  ALT  17  /  AlkPhos  60  12-21    LIVER FUNCTIONS - ( 21 Dec 2020 06:56 )  Alb: 2.9 g/dL / Pro: 6.3 g/dL / ALK PHOS: 60 U/L / ALT: 17 U/L / AST: 44 U/L / GGT: x                   RADIOLOGY & ADDITIONAL TESTS:  Personal review of radiological diagnostics performed  Echo and EKG results noted when applicable.     MEDICATIONS:  acetaminophen   Tablet .. 650 milliGRAM(s) Oral every 6 hours PRN  amLODIPine   Tablet 10 milliGRAM(s) Oral daily  aspirin enteric coated 81 milliGRAM(s) Oral daily  atorvastatin 20 milliGRAM(s) Oral at bedtime  ceFAZolin   IVPB 2000 milliGRAM(s) IV Intermittent every 8 hours  chlorhexidine 4% Liquid 1 Application(s) Topical <User Schedule>  dexAMETHasone  Injectable 6 milliGRAM(s) IV Push daily  dorzolamide 2% Ophthalmic Solution 1 Drop(s) Both EYES <User Schedule>  guaifenesin/dextromethorphan  Syrup 10 milliLiter(s) Oral every 6 hours PRN  heparin   Injectable 7500 Unit(s) SubCutaneous every 8 hours  influenza   Vaccine 0.5 milliLiter(s) IntraMuscular once  insulin glargine Injectable (LANTUS) 90 Unit(s) SubCutaneous at bedtime  insulin lispro Injectable (ADMELOG) 30 Unit(s) SubCutaneous three times a day before meals  pantoprazole    Tablet 40 milliGRAM(s) Oral before breakfast  sodium bicarbonate 650 milliGRAM(s) Oral three times a day  sodium zirconium cyclosilicate 5 Gram(s) Oral three times a day  timolol 0.5% Solution 1 Drop(s) Both EYES <User Schedule>      ANTIBIOTICS:  ceFAZolin   IVPB 2000 milliGRAM(s) IV Intermittent every 8 hours

## 2020-12-22 NOTE — CHART NOTE - NSCHARTNOTEFT_GEN_A_CORE
Communications Team    Called family to relay pt's clinical status and plan of the day. no  Communications Team    Called family to relay pt's clinical status and plan of the day.

## 2020-12-23 ENCOUNTER — TRANSCRIPTION ENCOUNTER (OUTPATIENT)
Age: 65
End: 2020-12-23

## 2020-12-23 VITALS
OXYGEN SATURATION: 96 % | TEMPERATURE: 96 F | HEART RATE: 84 BPM | SYSTOLIC BLOOD PRESSURE: 116 MMHG | RESPIRATION RATE: 18 BRPM | DIASTOLIC BLOOD PRESSURE: 61 MMHG

## 2020-12-23 LAB
ALBUMIN SERPL ELPH-MCNC: 2.9 G/DL — LOW (ref 3.5–5.2)
ALP SERPL-CCNC: 55 U/L — SIGNIFICANT CHANGE UP (ref 30–115)
ALT FLD-CCNC: 28 U/L — SIGNIFICANT CHANGE UP (ref 0–41)
ANION GAP SERPL CALC-SCNC: 9 MMOL/L — SIGNIFICANT CHANGE UP (ref 7–14)
AST SERPL-CCNC: 53 U/L — HIGH (ref 0–41)
BASOPHILS # BLD AUTO: 0.04 K/UL — SIGNIFICANT CHANGE UP (ref 0–0.2)
BASOPHILS NFR BLD AUTO: 0.6 % — SIGNIFICANT CHANGE UP (ref 0–1)
BILIRUB SERPL-MCNC: 0.8 MG/DL — SIGNIFICANT CHANGE UP (ref 0.2–1.2)
BUN SERPL-MCNC: 39 MG/DL — HIGH (ref 10–20)
CALCIUM SERPL-MCNC: 8.4 MG/DL — LOW (ref 8.5–10.1)
CHLORIDE SERPL-SCNC: 107 MMOL/L — SIGNIFICANT CHANGE UP (ref 98–110)
CO2 SERPL-SCNC: 24 MMOL/L — SIGNIFICANT CHANGE UP (ref 17–32)
CREAT SERPL-MCNC: 1.3 MG/DL — SIGNIFICANT CHANGE UP (ref 0.7–1.5)
CRP SERPL-MCNC: 1.01 MG/DL — HIGH (ref 0–0.4)
CULTURE RESULTS: SIGNIFICANT CHANGE UP
EOSINOPHIL # BLD AUTO: 0.07 K/UL — SIGNIFICANT CHANGE UP (ref 0–0.7)
EOSINOPHIL NFR BLD AUTO: 1 % — SIGNIFICANT CHANGE UP (ref 0–8)
FERRITIN SERPL-MCNC: 1242 NG/ML — HIGH (ref 30–400)
GLUCOSE BLDC GLUCOMTR-MCNC: 101 MG/DL — HIGH (ref 70–99)
GLUCOSE BLDC GLUCOMTR-MCNC: 130 MG/DL — HIGH (ref 70–99)
GLUCOSE BLDC GLUCOMTR-MCNC: 85 MG/DL — SIGNIFICANT CHANGE UP (ref 70–99)
GLUCOSE SERPL-MCNC: 89 MG/DL — SIGNIFICANT CHANGE UP (ref 70–99)
HCT VFR BLD CALC: 37.7 % — LOW (ref 42–52)
HGB BLD-MCNC: 12.2 G/DL — LOW (ref 14–18)
IMM GRANULOCYTES NFR BLD AUTO: 6.3 % — HIGH (ref 0.1–0.3)
LYMPHOCYTES # BLD AUTO: 1.1 K/UL — LOW (ref 1.2–3.4)
LYMPHOCYTES # BLD AUTO: 16 % — LOW (ref 20.5–51.1)
MAGNESIUM SERPL-MCNC: 2.2 MG/DL — SIGNIFICANT CHANGE UP (ref 1.8–2.4)
MCHC RBC-ENTMCNC: 28.8 PG — SIGNIFICANT CHANGE UP (ref 27–31)
MCHC RBC-ENTMCNC: 32.4 G/DL — SIGNIFICANT CHANGE UP (ref 32–37)
MCV RBC AUTO: 89.1 FL — SIGNIFICANT CHANGE UP (ref 80–94)
MONOCYTES # BLD AUTO: 1.03 K/UL — HIGH (ref 0.1–0.6)
MONOCYTES NFR BLD AUTO: 15 % — HIGH (ref 1.7–9.3)
NEUTROPHILS # BLD AUTO: 4.19 K/UL — SIGNIFICANT CHANGE UP (ref 1.4–6.5)
NEUTROPHILS NFR BLD AUTO: 61.1 % — SIGNIFICANT CHANGE UP (ref 42.2–75.2)
NRBC # BLD: 0 /100 WBCS — SIGNIFICANT CHANGE UP (ref 0–0)
ORGANISM # SPEC MICROSCOPIC CNT: SIGNIFICANT CHANGE UP
ORGANISM # SPEC MICROSCOPIC CNT: SIGNIFICANT CHANGE UP
PLATELET # BLD AUTO: 343 K/UL — SIGNIFICANT CHANGE UP (ref 130–400)
POTASSIUM SERPL-MCNC: 4.6 MMOL/L — SIGNIFICANT CHANGE UP (ref 3.5–5)
POTASSIUM SERPL-SCNC: 4.6 MMOL/L — SIGNIFICANT CHANGE UP (ref 3.5–5)
PROCALCITONIN SERPL-MCNC: 0.19 NG/ML — HIGH (ref 0.02–0.1)
PROT SERPL-MCNC: 6.3 G/DL — SIGNIFICANT CHANGE UP (ref 6–8)
RBC # BLD: 4.23 M/UL — LOW (ref 4.7–6.1)
RBC # FLD: 13.9 % — SIGNIFICANT CHANGE UP (ref 11.5–14.5)
SODIUM SERPL-SCNC: 140 MMOL/L — SIGNIFICANT CHANGE UP (ref 135–146)
SPECIMEN SOURCE: SIGNIFICANT CHANGE UP
WBC # BLD: 6.86 K/UL — SIGNIFICANT CHANGE UP (ref 4.8–10.8)
WBC # FLD AUTO: 6.86 K/UL — SIGNIFICANT CHANGE UP (ref 4.8–10.8)

## 2020-12-23 PROCEDURE — 71045 X-RAY EXAM CHEST 1 VIEW: CPT | Mod: 26

## 2020-12-23 PROCEDURE — 99239 HOSP IP/OBS DSCHRG MGMT >30: CPT

## 2020-12-23 RX ADMIN — Medication 30 UNIT(S): at 12:34

## 2020-12-23 RX ADMIN — SODIUM ZIRCONIUM CYCLOSILICATE 5 GRAM(S): 10 POWDER, FOR SUSPENSION ORAL at 14:49

## 2020-12-23 RX ADMIN — Medication 650 MILLIGRAM(S): at 05:14

## 2020-12-23 RX ADMIN — HEPARIN SODIUM 7500 UNIT(S): 5000 INJECTION INTRAVENOUS; SUBCUTANEOUS at 14:49

## 2020-12-23 RX ADMIN — DORZOLAMIDE HYDROCHLORIDE 1 DROP(S): 20 SOLUTION/ DROPS OPHTHALMIC at 09:01

## 2020-12-23 RX ADMIN — Medication 650 MILLIGRAM(S): at 14:49

## 2020-12-23 RX ADMIN — AMLODIPINE BESYLATE 10 MILLIGRAM(S): 2.5 TABLET ORAL at 05:14

## 2020-12-23 RX ADMIN — Medication 1 DROP(S): at 09:01

## 2020-12-23 RX ADMIN — PANTOPRAZOLE SODIUM 40 MILLIGRAM(S): 20 TABLET, DELAYED RELEASE ORAL at 05:14

## 2020-12-23 RX ADMIN — Medication 30 UNIT(S): at 09:01

## 2020-12-23 RX ADMIN — Medication 81 MILLIGRAM(S): at 12:34

## 2020-12-23 RX ADMIN — SODIUM ZIRCONIUM CYCLOSILICATE 5 GRAM(S): 10 POWDER, FOR SUSPENSION ORAL at 05:14

## 2020-12-23 RX ADMIN — HEPARIN SODIUM 7500 UNIT(S): 5000 INJECTION INTRAVENOUS; SUBCUTANEOUS at 05:14

## 2020-12-23 NOTE — PROGRESS NOTE ADULT - ATTENDING COMMENTS
Patient seen and examined independently. Agree with resident note   Acute hypoxic respiratory failure secondary to viral Pneumonia  -doing well on RA.  with ambulation oxygen saturation remained stable.  COVID 19 Pneumonia -.- not on RDV because of BATOOL- completed decadron.  NUBIA bacteremia (Sepsis secondary to MSSA bacteremia from prior admission-resolved- no sepsis in current admission  IV abx completed repeat blood cx negative  Patient refusing CHEIKH   BATOOL on Chronic renal disease stage 3 - at baseline  Uncontrolled hyperglycemia secondary to inadequate insulin and steroids- increased & optimized Lantus & lispro  Morbid obesity- BMI - 53.8  Activity- out of bed as tolerated & PT  Discharge Disposition: Stable for discharge to home with home PT awaiting DME.  spent more than 30mins.

## 2020-12-23 NOTE — PROGRESS NOTE ADULT - ASSESSMENT
65 years old male with PMHx of DM, HTN, HLD, glaucoma, CKD III, nephrolithiasis renal mass s/p nephrectomy, obesity, recently discharged from [facility] for MSSA bacteremia COVID positive on 12/10 c/o persistent cough and SOB which is worsening since yesterday.    # Acute hypoxic respiratory failure / SOB / moderate - severe COVID - 19 PNA  - CXR shows b/l pulmonary opacities 12/17. 12/20 unchanged, 12/23 pathcy bilateral opacities.   - D-Dimer 605 > 414 12/19 > 502 12/22   - CRP 7.73 > 8/01 12/19 > 1.01 12/22   - Ferritin 1923 > 1886 12/18 > 1242 12/22  - procal 0.87 > 1/07 12/19 > 0.19 12/22  - cont decadron for 10 days - day 7, 12/17-  - not a candidate for RDV due to decreased GFR, and timeline >10 days out of window  - monitor O2, titrate O2 as needed to goal of 92 - 96% - currently weaned off O2  - f/u ID recs  -d/c on 30 days of dvt ppx : 2.5mg eliquis    # Sepsis due to MSSA bacteremia  - CT CAP 12/4: No CT evidence of acute intrathoracic or abdominopelvic pathology. Nodular enlarged right thyroid lobe. Recommend evaluation outpatient sonography. Additional findings in the body of the report  - MR Foot No Cont, Right (12.09.20): Impression: Soft tissue ulcer at the medial base of hallux distal phalanx with osteitis; no osteomyelitis or drainable collection  - Blood Cx 12/4 MSSA, GNR on stain   - Blood Cx 12/5-12/9 no growth  - had dental procedure 12/10  - discussed with ID, can d/c cefazolin 2g IV q8h as completed 2 week coursec/w cefazolin 2000 mg IV every 8 hours  - TTE LVEF 66%, no vegetation  - CHEIKH need to obtain as out patient  - f/u ID recs    # Right hallux ulcer  - s/p mri of right foot on 12/09/2020 -> negative for acute osteomyelitis  - s/p flagyl  - podiatry: wound care and f/u outpatient    # Elevated trop/ SOB  - Trop .09>.08  - f/u EKG    # CKD III / hyperkalema / hyponatremia  - s/p R nephrectomy, - stable at baseline,   - Avoid nephrotoxic meds, - Monitor BMP  - lokelma, K+ 5.0 12/21 > 5.0 12/22 > 4.6 12/23  -Cr 1.3 12/21 > 1.4 12/22 > 1.3 12/23    # Periapical infection of #2- was extracted by Dental Sx. on 12/10  -f/u dental recs    # DM II  -trend FS, start insulin basal blus if FS > 180  -c/w Lantus 90U qHS, lispro 30U qAC    # HTN - controlled   - c/w amlodipine, losartan    # HLD  - c/w atorvastatin    # Glaucoma   - continue with timolol    # Morbid obesity- BMI - 53.8- outpatient weight loss tx, outpatient polysomnography study.    DVT ppx: Heparin SubQ 7500U TID  GI ppx: pantoprazole 40mg qD  Labs: CMP+Mg, CBC  Diet: DASH/TLC  PT/Rehab: Rec for home with home PT services  Activity: Ambulate as tolerated - 97% on RA, 92% RA on ambulation  Dispo: Home in 1-2 days pending clinical improvement, bariatric walker, lift ordered for 12/23  FULL CODE

## 2020-12-23 NOTE — PROGRESS NOTE ADULT - SUBJECTIVE AND OBJECTIVE BOX
SUBJECTIVE:    Patient is a 65y old Male who presents with a chief complaint of SOB (22 Dec 2020 15:25)    Currently admitted to medicine with the primary diagnosis of COVID-19       Today is hospital day 6d. This morning he is resting comfortably in bed and reports no new issues or overnight events. No fevers, chills. Denies CP or SOB. Able to get out of bed, sit in chair, ambulate to bathroom more yesterday. Tolerating room air since yesterday.     PAST MEDICAL & SURGICAL HISTORY  HLD (hyperlipidemia)    CKD (chronic kidney disease)    DVT (deep venous thrombosis)    Glaucoma    HTN (hypertension)    Diabetes mellitus    H/O right nephrectomy      SOCIAL HISTORY:  Negative for smoking/alcohol/drug use.     ALLERGIES:  No Known Allergies    MEDICATIONS:  STANDING MEDICATIONS  amLODIPine   Tablet 10 milliGRAM(s) Oral daily  aspirin enteric coated 81 milliGRAM(s) Oral daily  atorvastatin 20 milliGRAM(s) Oral at bedtime  chlorhexidine 4% Liquid 1 Application(s) Topical <User Schedule>  dexAMETHasone  Injectable 6 milliGRAM(s) IV Push daily  dorzolamide 2% Ophthalmic Solution 1 Drop(s) Both EYES <User Schedule>  heparin   Injectable 7500 Unit(s) SubCutaneous every 8 hours  influenza   Vaccine 0.5 milliLiter(s) IntraMuscular once  insulin glargine Injectable (LANTUS) 90 Unit(s) SubCutaneous at bedtime  insulin lispro Injectable (ADMELOG) 30 Unit(s) SubCutaneous three times a day before meals  pantoprazole    Tablet 40 milliGRAM(s) Oral before breakfast  sodium bicarbonate 650 milliGRAM(s) Oral three times a day  sodium zirconium cyclosilicate 5 Gram(s) Oral three times a day  timolol 0.5% Solution 1 Drop(s) Both EYES <User Schedule>    PRN MEDICATIONS  acetaminophen   Tablet .. 650 milliGRAM(s) Oral every 6 hours PRN  guaifenesin/dextromethorphan  Syrup 10 milliLiter(s) Oral every 6 hours PRN    VITALS:   T(F): 97.8  HR: 72  BP: 132/67  RR: 18  SpO2: 98%    LABS:                        12.2   6.86  )-----------( 343      ( 23 Dec 2020 06:48 )             37.7     12-23    140  |  107  |  39<H>  ----------------------------<  89  4.6   |  24  |  1.3    Ca    8.4<L>      23 Dec 2020 06:48  Mg     2.2     12-23    TPro  6.3  /  Alb  2.9<L>  /  TBili  0.8  /  DBili  x   /  AST  53<H>  /  ALT  28  /  AlkPhos  55  12-23    RADIOLOGY:  < from: Xray Chest 1 View- PORTABLE-Routine (Xray Chest 1 View- PORTABLE-Routine in AM.) (12.23.20 @ 06:01) >  Impression:  Low lung volume. Patchy bilateral opacities.  < end of copied text >    PHYSICAL EXAM:  GEN: NAD, lying bed comfortably, obese  HEENT: EOMI, PERRLA, conjunctiva and sclera clear. MMM.  LUNGS: Clear to auscultation bilaterally. No rales, rhonchi, or wheezing. On RA  HEART: S1/S2 present. RRR.   ABD: Soft, non-tender, non-distended. Bowel sounds present.  EXT: 2+ peripheral pulses. No clubbing, cyanosis, or edema. R foot dressing c/d/i  NEURO: AAOX3. Speech clear. No focal neurological deficits. Sensation grossly intact.   Skin: No rashes or lesions.

## 2020-12-23 NOTE — DISCHARGE NOTE PROVIDER - PROVIDER TOKENS
PROVIDER:[TOKEN:[[id number]],FOLLOWUP:[1 week]],PROVIDER:[TOKEN:[[id number]],FOLLOWUP:[1 week]]

## 2020-12-23 NOTE — PROGRESS NOTE ADULT - PROVIDER SPECIALTY LIST ADULT
Hospitalist
Hospitalist
Internal Medicine
Internal Medicine
Hospitalist
Hospitalist
Infectious Disease
Internal Medicine
Internal Medicine
Infectious Disease

## 2020-12-23 NOTE — DISCHARGE NOTE PROVIDER - HOSPITAL COURSE
HPI:  65 years old male with PMHx of DM, HTN, HLD, glaucoma, CKD III, nehroithiasis, renal mass s/p nephrectomy, obesity, recently discharged from [facility] for MSSA bactermia, COVID positive on 12/10 c/o persistent cough and SOB which is worsening since yesterday.   Pt was recently admitted to [facility] from 12/4 to 12/15 for MSSA bacteremia secondary to Right hallux ulcer and cellulitis. Was d/c on IV cefazolin with out atient follow up with cadiology for CHEIKH and podiatry.    Pt was tested positive for Covid on 12/10 during admission. Yetserday morning patient started developing shortness of breath which has been progressively increasing and associated with cough. Pt called EMS and pulse O2 was found to be around 89% on room air.   No fever, CP, hemoptysis, no vomiting or abdominal pain, no change in leg swelling.    In the ED, /78, HR 98, Temp 97.7, RR 18, saturating well on NRB mask. Labs significant for d-dimer of 605, Na 131, K 5.7, Cr 2.4, GFR 27 (around baseline from previous admission). Pt to be admitted under medicine.      Hospital Course:  # Acute hypoxic respiratory failure / SOB / moderate - severe COVID - 19 PNA  CXR showed b/l pulmonary opacities 12/17. 12/20 unchanged, 12/23 patchy bilateral opacities. D-Dimer 605 > 414 12/19 > 502 12/22. CRP 7.73 > 8/01 12/19 > 1.01 12/22. Ferritin 1923 > 1886 12/18 > 1242 12/22  procal 0.87 > 1/07 12/19 > 0.19 12/22. Cont 'd decadron for 7 days. Was not a candidate for RDV due to decreased GFR, and timeline >10 days out of window. Weaned off O2. Will discharge on 30 days of dvt ppx : 2.5mg eliquis    # Sepsis due to MSSA bacteremia  -MR Foot No Cont, Right (12.09.20): Impression: Soft tissue ulcer at the medial base of hallux distal phalanx with osteitis; no osteomyelitis or drainable collection  -Blood Cx 12/4 MSSA, GNR on stain. Blood Cx 12/5-12/9 no growth. Received full 2 week dose of cefazolin 2000mg IV q8h     # Right hallux ulcer  - s/p mri of right foot on 12/09/2020 -> negative for acute osteomyelitis  - podiatry: wound care and f/u outpatient  -home wound care (dry sterile dressings and kerlix)  -follow up outpatient podiatrist     Dispo: Stable for discharge home with home PT services    Follow-up:   -follow up with primary care physician in 1-2 weeks  -follow up with podiatrist in 1-2 weeks  -home with home PT services  -continue anticoagulation for 30 days

## 2020-12-23 NOTE — DISCHARGE NOTE PROVIDER - NSDCMRMEDTOKEN_GEN_ALL_CORE_FT
amLODIPine 10 mg oral tablet: 1 tab(s) orally once a day  aspirin 81 mg oral delayed release tablet: 1 tab(s) orally once a day   atorvastatin 20 mg oral tablet: 1 tab(s) orally once a day  Cosopt 2.23%-0.68% ophthalmic solution: 1 drop(s) to each affected eye 2 times a day  Eliquis 2.5 mg oral tablet: 1 tab(s) orally 2 times a day   HumaLOG 100 units/mL subcutaneous solution: 30 unit(s) subcutaneous 3 times a day (before meals)  insulin glargine 100 units/mL subcutaneous solution: 70 units in morning and 40 units at night  pantoprazole 40 mg oral delayed release tablet: 1 tab(s) orally once a day   predniSONE 20 mg oral tablet: 2 tab(s) orally once a day

## 2020-12-23 NOTE — DISCHARGE NOTE NURSING/CASE MANAGEMENT/SOCIAL WORK - PATIENT PORTAL LINK FT
You can access the FollowMyHealth Patient Portal offered by [facility] by registering at the following website: [email or web address]. By joining [facility]’s FollowMyHealth portal, you will also be able to view your health information using other applications (apps) compatible with our system.

## 2020-12-23 NOTE — DISCHARGE NOTE PROVIDER - NSDCCPCAREPLAN_GEN_ALL_CORE_FT
PRINCIPAL DISCHARGE DIAGNOSIS  Diagnosis: COVID-19  Assessment and Plan of Treatment: # Acute hypoxic respiratory failure / SOB / moderate - severe COVID - 19 PNA  CXR showed b/l pulmonary opacities 12/17. 12/20 unchanged, 12/23 patchy bilateral opacities. D-Dimer 605 > 414 12/19 > 502 12/22. CRP 7.73 > 8/01 12/19 > 1.01 12/22. Ferritin 1923 > 1886 12/18 > 1242 12/22  procal 0.87 > 1/07 12/19 > 0.19 12/22. Cont 'd decadron for 7 days. Was not a candidate for RDV due to decreased GFR, and timeline >10 days out of window. Weaned off O2. Will discharge on 30 days of dvt ppx : 2.5mg eliquis        SECONDARY DISCHARGE DIAGNOSES  Diagnosis: Diabetic ulcer of right foot  Assessment and Plan of Treatment: # Right hallux ulcer  - s/p mri of right foot on 12/09/2020 -> negative for acute osteomyelitis  - podiatry: wound care and f/u outpatient  -home wound care (dry sterile dressings and kerlix)  -follow up outpatient podiatrist

## 2020-12-23 NOTE — DISCHARGE NOTE PROVIDER - CARE PROVIDER_API CALL
Abdon Dyson  Pediatrics  [facility] [place] [address]  Phone: [phone]  Fax: [phone]  Follow Up Time: 1 week    Chris Tong  PODIATRIC MEDICINE  [address] [place] [address], [facility] [address] [place] [address]  Phone: [phone]  Fax: [phone]  Follow Up Time: 1 week

## 2020-12-23 NOTE — CHART NOTE - NSCHARTNOTESELECT_GEN_ALL_CORE
Event Note
Communication Team/Event Note
Communications Note/Event Note
Communications Team/Event Note
Communications Team/Event Note
Event Note

## 2020-12-28 DIAGNOSIS — J96.01 ACUTE RESPIRATORY FAILURE WITH HYPOXIA: ICD-10-CM

## 2020-12-28 DIAGNOSIS — E11.22 TYPE 2 DIABETES MELLITUS WITH DIABETIC CHRONIC KIDNEY DISEASE: ICD-10-CM

## 2020-12-28 DIAGNOSIS — I12.9 HYPERTENSIVE CHRONIC KIDNEY DISEASE WITH STAGE 1 THROUGH STAGE 4 CHRONIC KIDNEY DISEASE, OR UNSPECIFIED CHRONIC KIDNEY DISEASE: ICD-10-CM

## 2020-12-28 DIAGNOSIS — E11.621 TYPE 2 DIABETES MELLITUS WITH FOOT ULCER: ICD-10-CM

## 2020-12-28 DIAGNOSIS — Z79.4 LONG TERM (CURRENT) USE OF INSULIN: ICD-10-CM

## 2020-12-28 DIAGNOSIS — E87.1 HYPO-OSMOLALITY AND HYPONATREMIA: ICD-10-CM

## 2020-12-28 DIAGNOSIS — E66.01 MORBID (SEVERE) OBESITY DUE TO EXCESS CALORIES: ICD-10-CM

## 2020-12-28 DIAGNOSIS — E87.5 HYPERKALEMIA: ICD-10-CM

## 2020-12-28 DIAGNOSIS — U07.1 COVID-19: ICD-10-CM

## 2020-12-28 DIAGNOSIS — B95.61 METHICILLIN SUSCEPTIBLE STAPHYLOCOCCUS AUREUS INFECTION AS THE CAUSE OF DISEASES CLASSIFIED ELSEWHERE: ICD-10-CM

## 2020-12-28 DIAGNOSIS — H40.9 UNSPECIFIED GLAUCOMA: ICD-10-CM

## 2020-12-28 DIAGNOSIS — Z90.5 ACQUIRED ABSENCE OF KIDNEY: ICD-10-CM

## 2020-12-28 DIAGNOSIS — J12.89 OTHER VIRAL PNEUMONIA: ICD-10-CM

## 2020-12-28 DIAGNOSIS — Z79.82 LONG TERM (CURRENT) USE OF ASPIRIN: ICD-10-CM

## 2020-12-28 DIAGNOSIS — E78.5 HYPERLIPIDEMIA, UNSPECIFIED: ICD-10-CM

## 2020-12-28 DIAGNOSIS — L97.519 NON-PRESSURE CHRONIC ULCER OF OTHER PART OF RIGHT FOOT WITH UNSPECIFIED SEVERITY: ICD-10-CM

## 2020-12-28 DIAGNOSIS — N18.30 CHRONIC KIDNEY DISEASE, STAGE 3 UNSPECIFIED: ICD-10-CM

## 2020-12-28 DIAGNOSIS — R78.81 BACTEREMIA: ICD-10-CM

## 2020-12-28 DIAGNOSIS — N17.9 ACUTE KIDNEY FAILURE, UNSPECIFIED: ICD-10-CM

## 2021-02-05 NOTE — PHYSICAL THERAPY INITIAL EVALUATION ADULT - WEIGHT-BEARING RESTRICTIONS: SIT/STAND, REHAB EVAL
Patient called to inform that bilateral venous insufficiency test couldn't be scheduled to be done until 2/18/21. He had asked if wound clinic appt on 2/18/21 could be done later in the day after the test or the following day.     Electronically signed by Anjum Montes De Oca RN on 2/5/2021 at 3:41 PM full weight-bearing

## 2021-05-17 ENCOUNTER — APPOINTMENT (OUTPATIENT)
Dept: VASCULAR SURGERY | Facility: CLINIC | Age: 66
End: 2021-05-17

## 2021-05-19 NOTE — DISCHARGE NOTE PROVIDER - CARE PROVIDERS DIRECT ADDRESSES
,DirectAddress_Unknown,[email or web address] Please see discussion noted above in GOC conversation.

## 2021-07-05 ENCOUNTER — INPATIENT (INPATIENT)
Facility: HOSPITAL | Age: 66
LOS: 4 days | Discharge: HOME | End: 2021-07-10
Attending: INTERNAL MEDICINE | Admitting: INTERNAL MEDICINE
Payer: MEDICARE

## 2021-07-05 VITALS
HEIGHT: 71 IN | WEIGHT: 309.97 LBS | HEART RATE: 92 BPM | RESPIRATION RATE: 18 BRPM | TEMPERATURE: 100 F | SYSTOLIC BLOOD PRESSURE: 151 MMHG | DIASTOLIC BLOOD PRESSURE: 69 MMHG

## 2021-07-05 DIAGNOSIS — N20.2 CALCULUS OF KIDNEY WITH CALCULUS OF URETER: ICD-10-CM

## 2021-07-05 DIAGNOSIS — H40.9 UNSPECIFIED GLAUCOMA: ICD-10-CM

## 2021-07-05 DIAGNOSIS — Z90.5 ACQUIRED ABSENCE OF KIDNEY: ICD-10-CM

## 2021-07-05 DIAGNOSIS — Z86.16 PERSONAL HISTORY OF COVID-19: ICD-10-CM

## 2021-07-05 DIAGNOSIS — K80.20 CALCULUS OF GALLBLADDER WITHOUT CHOLECYSTITIS WITHOUT OBSTRUCTION: ICD-10-CM

## 2021-07-05 DIAGNOSIS — E66.01 MORBID (SEVERE) OBESITY DUE TO EXCESS CALORIES: ICD-10-CM

## 2021-07-05 DIAGNOSIS — R31.9 HEMATURIA, UNSPECIFIED: ICD-10-CM

## 2021-07-05 DIAGNOSIS — Z86.718 PERSONAL HISTORY OF OTHER VENOUS THROMBOSIS AND EMBOLISM: ICD-10-CM

## 2021-07-05 DIAGNOSIS — Z79.01 LONG TERM (CURRENT) USE OF ANTICOAGULANTS: ICD-10-CM

## 2021-07-05 DIAGNOSIS — A41.9 SEPSIS, UNSPECIFIED ORGANISM: ICD-10-CM

## 2021-07-05 DIAGNOSIS — Z90.5 ACQUIRED ABSENCE OF KIDNEY: Chronic | ICD-10-CM

## 2021-07-05 DIAGNOSIS — I12.9 HYPERTENSIVE CHRONIC KIDNEY DISEASE WITH STAGE 1 THROUGH STAGE 4 CHRONIC KIDNEY DISEASE, OR UNSPECIFIED CHRONIC KIDNEY DISEASE: ICD-10-CM

## 2021-07-05 DIAGNOSIS — Z79.4 LONG TERM (CURRENT) USE OF INSULIN: ICD-10-CM

## 2021-07-05 DIAGNOSIS — R50.9 FEVER, UNSPECIFIED: ICD-10-CM

## 2021-07-05 DIAGNOSIS — Z79.82 LONG TERM (CURRENT) USE OF ASPIRIN: ICD-10-CM

## 2021-07-05 DIAGNOSIS — R74.01 ELEVATION OF LEVELS OF LIVER TRANSAMINASE LEVELS: ICD-10-CM

## 2021-07-05 DIAGNOSIS — E11.22 TYPE 2 DIABETES MELLITUS WITH DIABETIC CHRONIC KIDNEY DISEASE: ICD-10-CM

## 2021-07-05 DIAGNOSIS — N17.9 ACUTE KIDNEY FAILURE, UNSPECIFIED: ICD-10-CM

## 2021-07-05 DIAGNOSIS — E78.5 HYPERLIPIDEMIA, UNSPECIFIED: ICD-10-CM

## 2021-07-05 DIAGNOSIS — N39.0 URINARY TRACT INFECTION, SITE NOT SPECIFIED: ICD-10-CM

## 2021-07-05 DIAGNOSIS — N18.30 CHRONIC KIDNEY DISEASE, STAGE 3 UNSPECIFIED: ICD-10-CM

## 2021-07-05 LAB
ALBUMIN SERPL ELPH-MCNC: 4 G/DL — SIGNIFICANT CHANGE UP (ref 3.5–5.2)
ALP SERPL-CCNC: 173 U/L — HIGH (ref 30–115)
ALT FLD-CCNC: 245 U/L — HIGH (ref 0–41)
ANION GAP SERPL CALC-SCNC: 16 MMOL/L — HIGH (ref 7–14)
APPEARANCE UR: CLEAR — SIGNIFICANT CHANGE UP
AST SERPL-CCNC: 186 U/L — HIGH (ref 0–41)
BACTERIA # UR AUTO: NEGATIVE — SIGNIFICANT CHANGE UP
BASE EXCESS BLDV CALC-SCNC: -2.2 MMOL/L — LOW (ref -2–2)
BASOPHILS # BLD AUTO: 0.04 K/UL — SIGNIFICANT CHANGE UP (ref 0–0.2)
BASOPHILS NFR BLD AUTO: 0.5 % — SIGNIFICANT CHANGE UP (ref 0–1)
BILIRUB SERPL-MCNC: 3.3 MG/DL — HIGH (ref 0.2–1.2)
BILIRUB UR-MCNC: NEGATIVE — SIGNIFICANT CHANGE UP
BUN SERPL-MCNC: 49 MG/DL — HIGH (ref 10–20)
CA-I SERPL-SCNC: 1.19 MMOL/L — SIGNIFICANT CHANGE UP (ref 1.12–1.3)
CALCIUM SERPL-MCNC: 9 MG/DL — SIGNIFICANT CHANGE UP (ref 8.5–10.1)
CHLORIDE SERPL-SCNC: 98 MMOL/L — SIGNIFICANT CHANGE UP (ref 98–110)
CO2 SERPL-SCNC: 19 MMOL/L — SIGNIFICANT CHANGE UP (ref 17–32)
COLOR SPEC: YELLOW — SIGNIFICANT CHANGE UP
CREAT SERPL-MCNC: 2.3 MG/DL — HIGH (ref 0.7–1.5)
DIFF PNL FLD: NEGATIVE — SIGNIFICANT CHANGE UP
EOSINOPHIL # BLD AUTO: 0.09 K/UL — SIGNIFICANT CHANGE UP (ref 0–0.7)
EOSINOPHIL NFR BLD AUTO: 1.1 % — SIGNIFICANT CHANGE UP (ref 0–8)
EPI CELLS # UR: 1 /HPF — SIGNIFICANT CHANGE UP (ref 0–5)
GAS PNL BLDV: 134 MMOL/L — LOW (ref 136–145)
GAS PNL BLDV: SIGNIFICANT CHANGE UP
GLUCOSE SERPL-MCNC: 164 MG/DL — HIGH (ref 70–99)
GLUCOSE UR QL: NEGATIVE — SIGNIFICANT CHANGE UP
HCO3 BLDV-SCNC: 24 MMOL/L — SIGNIFICANT CHANGE UP (ref 22–29)
HCT VFR BLD CALC: 42.1 % — SIGNIFICANT CHANGE UP (ref 42–52)
HCT VFR BLDA CALC: 42.5 % — SIGNIFICANT CHANGE UP (ref 34–44)
HGB BLD CALC-MCNC: 13.9 G/DL — LOW (ref 14–18)
HGB BLD-MCNC: 14 G/DL — SIGNIFICANT CHANGE UP (ref 14–18)
HYALINE CASTS # UR AUTO: 2 /LPF — SIGNIFICANT CHANGE UP (ref 0–7)
IMM GRANULOCYTES NFR BLD AUTO: 0.5 % — HIGH (ref 0.1–0.3)
KETONES UR-MCNC: SIGNIFICANT CHANGE UP
LACTATE BLDV-MCNC: 1.4 MMOL/L — SIGNIFICANT CHANGE UP (ref 0.5–1.6)
LEUKOCYTE ESTERASE UR-ACNC: NEGATIVE — SIGNIFICANT CHANGE UP
LYMPHOCYTES # BLD AUTO: 0.58 K/UL — LOW (ref 1.2–3.4)
LYMPHOCYTES # BLD AUTO: 6.9 % — LOW (ref 20.5–51.1)
MCHC RBC-ENTMCNC: 29 PG — SIGNIFICANT CHANGE UP (ref 27–31)
MCHC RBC-ENTMCNC: 33.3 G/DL — SIGNIFICANT CHANGE UP (ref 32–37)
MCV RBC AUTO: 87.3 FL — SIGNIFICANT CHANGE UP (ref 80–94)
MONOCYTES # BLD AUTO: 0.81 K/UL — HIGH (ref 0.1–0.6)
MONOCYTES NFR BLD AUTO: 9.6 % — HIGH (ref 1.7–9.3)
NEUTROPHILS # BLD AUTO: 6.9 K/UL — HIGH (ref 1.4–6.5)
NEUTROPHILS NFR BLD AUTO: 81.4 % — HIGH (ref 42.2–75.2)
NITRITE UR-MCNC: NEGATIVE — SIGNIFICANT CHANGE UP
NRBC # BLD: 0 /100 WBCS — SIGNIFICANT CHANGE UP (ref 0–0)
PCO2 BLDV: 44 MMHG — SIGNIFICANT CHANGE UP (ref 41–51)
PH BLDV: 7.34 — SIGNIFICANT CHANGE UP (ref 7.26–7.43)
PH UR: 6 — SIGNIFICANT CHANGE UP (ref 5–8)
PLATELET # BLD AUTO: 162 K/UL — SIGNIFICANT CHANGE UP (ref 130–400)
PO2 BLDV: 25 MMHG — SIGNIFICANT CHANGE UP (ref 20–40)
POTASSIUM BLDV-SCNC: 3.9 MMOL/L — SIGNIFICANT CHANGE UP (ref 3.3–5.6)
POTASSIUM SERPL-MCNC: 4.7 MMOL/L — SIGNIFICANT CHANGE UP (ref 3.5–5)
POTASSIUM SERPL-SCNC: 4.7 MMOL/L — SIGNIFICANT CHANGE UP (ref 3.5–5)
PROT SERPL-MCNC: 7.3 G/DL — SIGNIFICANT CHANGE UP (ref 6–8)
PROT UR-MCNC: ABNORMAL
RBC # BLD: 4.82 M/UL — SIGNIFICANT CHANGE UP (ref 4.7–6.1)
RBC # FLD: 14.5 % — SIGNIFICANT CHANGE UP (ref 11.5–14.5)
RBC CASTS # UR COMP ASSIST: 3 /HPF — SIGNIFICANT CHANGE UP (ref 0–4)
SAO2 % BLDV: 45 % — SIGNIFICANT CHANGE UP
SARS-COV-2 RNA SPEC QL NAA+PROBE: SIGNIFICANT CHANGE UP
SODIUM SERPL-SCNC: 133 MMOL/L — LOW (ref 135–146)
SP GR SPEC: 1.01 — SIGNIFICANT CHANGE UP (ref 1.01–1.03)
TROPONIN T SERPL-MCNC: 0.05 NG/ML — CRITICAL HIGH
UROBILINOGEN FLD QL: ABNORMAL
WBC # BLD: 8.46 K/UL — SIGNIFICANT CHANGE UP (ref 4.8–10.8)
WBC # FLD AUTO: 8.46 K/UL — SIGNIFICANT CHANGE UP (ref 4.8–10.8)
WBC UR QL: 2 /HPF — SIGNIFICANT CHANGE UP (ref 0–5)

## 2021-07-05 PROCEDURE — 71045 X-RAY EXAM CHEST 1 VIEW: CPT | Mod: 26

## 2021-07-05 PROCEDURE — G1004: CPT

## 2021-07-05 PROCEDURE — 99285 EMERGENCY DEPT VISIT HI MDM: CPT

## 2021-07-05 PROCEDURE — 74176 CT ABD & PELVIS W/O CONTRAST: CPT | Mod: 26,ME

## 2021-07-05 PROCEDURE — 93010 ELECTROCARDIOGRAM REPORT: CPT

## 2021-07-05 RX ORDER — ACETAMINOPHEN 500 MG
975 TABLET ORAL ONCE
Refills: 0 | Status: DISCONTINUED | OUTPATIENT
Start: 2021-07-05 | End: 2021-07-05

## 2021-07-05 RX ORDER — CEFEPIME 1 G/1
2000 INJECTION, POWDER, FOR SOLUTION INTRAMUSCULAR; INTRAVENOUS ONCE
Refills: 0 | Status: COMPLETED | OUTPATIENT
Start: 2021-07-05 | End: 2021-07-05

## 2021-07-05 RX ORDER — SODIUM CHLORIDE 9 MG/ML
1000 INJECTION, SOLUTION INTRAVENOUS ONCE
Refills: 0 | Status: COMPLETED | OUTPATIENT
Start: 2021-07-05 | End: 2021-07-05

## 2021-07-05 RX ADMIN — SODIUM CHLORIDE 1000 MILLILITER(S): 9 INJECTION, SOLUTION INTRAVENOUS at 16:30

## 2021-07-05 RX ADMIN — CEFEPIME 100 MILLIGRAM(S): 1 INJECTION, POWDER, FOR SOLUTION INTRAMUSCULAR; INTRAVENOUS at 22:24

## 2021-07-05 NOTE — ED ADULT NURSE NOTE - OBJECTIVE STATEMENT
64 y/o male c/o of blood in urine and fever x 1 day . patient denies any pain or discomfort at this time

## 2021-07-05 NOTE — ED PROVIDER NOTE - OBJECTIVE STATEMENT
66 y/o male with hx CKD, DM, HTN, s/p R Nephrectomy presents to the ED c/o "I had chills, fever 102.7 yesterday with multiple episodes of bloody urine. I have a hx of kidney stones. I was seen at  today and sent to the ED." no abdominal pain/ sob/ cp/ cough/ back pain

## 2021-07-05 NOTE — ED PROVIDER NOTE - CARE PLAN
Assessment and plan of treatment:	Plan: EKG, CXR, labs, urine, imaging, reassess.   Principal Discharge DX:	Febrile illness  Assessment and plan of treatment:	Plan: EKG, CXR, labs, urine, imaging, reassess.  Secondary Diagnosis:	Cholelithiases  Secondary Diagnosis:	Transaminitis

## 2021-07-05 NOTE — ED PROVIDER NOTE - ATTENDING CONTRIBUTION TO CARE
64 y/o m w/ pmhx of DM, HTN, HLD, glaucoma, CKD III, nephrolithiasis, renal mass s/p R nephrectomy, obesity, MSSA bacteremia, COVID positive on 12/10, L kidney with staghorn calculus and multiple calculi presents with hematuria since yesterday nigth associated with fever tmax of 102.7, has been taking tyl last was yesterday evening, and chills, due to blood in urine came to ed. does not have urologist but follows with nephrologist Dr. Hurst. went to urgent care yesterday and was told to come to ed, but wanted to wait until to day to see how he was feeling. denies n/v, cp, sob, pleuritic chest pain, palpitations, diaphoresis, cough, abd pain, diarrhea, constipation, melena/brbpr, dysuria, burning upon urination,, back/ flank pain, penile pain/discharge, testicular pain/swelling/erythema, rectal pain or tenesmus, sick contacts, recent travel or rash.      on exam: WDWN appearing  sitting on stretcher in nad, no rash, mmm, regular rate, radial pulses 2/4 b/l, no jvd, ctabl w/ breath sounds present b/l, no wheezing or crackles,no accessory muscle use, no tachypnea, no stridor, bs present throughout all 4 quadrants, abd soft, nd, nt, no rebound tenderness or guarding, no cvat. FROM of ext, no edema, no calf pain/swelling/erythema, AAOx3. No focal deficits.

## 2021-07-05 NOTE — ED PROVIDER NOTE - PROGRESS NOTE DETAILS
ED Attending QUIN Connors  pt troponin noted, typically elevated today 0.05, previous 0.0-70.09, pt with no chest pain. ED Attending QUIN Connors  elevated lfts noted, pt aware, compared to december, ruq sono added in addition to ct s/p without contrast (bun/cre 49/2.3, previous 39/1.3), pt troponin noted, typically elevated today 0.05, previous 0.0-70.09, pt with no chest pain. ED Attending QUIN Connors  Pt endorsed to Dr. Fields,   please follow up imaging, reassess.

## 2021-07-05 NOTE — ED PROVIDER NOTE - CLINICAL SUMMARY MEDICAL DECISION MAKING FREE TEXT BOX
Authored by Dr. Kathrine Fields: s/o to me by Dr. Connors - 65M pmh dm, htn, ckd, s/p R nephrectomy, h/o L staghorn calculus, p/w fever & hematuria, iv abx given, labs sig for transmaminitis, hyperbili, ckd, ua neg, s/o to me pending imaging which showed known renal calculi & +gallstones, ddx biliary v uro inf, abx, admitted for further mgmt

## 2021-07-05 NOTE — ED ADULT TRIAGE NOTE - CHIEF COMPLAINT QUOTE
Patient sent in from PMD concerned for kidney stone. Patient c/o hematuria and fevers x 3 days and denies flank pain

## 2021-07-06 LAB
ALBUMIN SERPL ELPH-MCNC: 3.6 G/DL — SIGNIFICANT CHANGE UP (ref 3.5–5.2)
ALP SERPL-CCNC: 166 U/L — HIGH (ref 30–115)
ALT FLD-CCNC: 163 U/L — HIGH (ref 0–41)
ANION GAP SERPL CALC-SCNC: 12 MMOL/L — SIGNIFICANT CHANGE UP (ref 7–14)
APTT BLD: 29.8 SEC — SIGNIFICANT CHANGE UP (ref 27–39.2)
AST SERPL-CCNC: 102 U/L — HIGH (ref 0–41)
BILIRUB SERPL-MCNC: 1.7 MG/DL — HIGH (ref 0.2–1.2)
BUN SERPL-MCNC: 43 MG/DL — HIGH (ref 10–20)
CALCIUM SERPL-MCNC: 8.7 MG/DL — SIGNIFICANT CHANGE UP (ref 8.5–10.1)
CHLORIDE SERPL-SCNC: 101 MMOL/L — SIGNIFICANT CHANGE UP (ref 98–110)
CO2 SERPL-SCNC: 22 MMOL/L — SIGNIFICANT CHANGE UP (ref 17–32)
CREAT SERPL-MCNC: 1.8 MG/DL — HIGH (ref 0.7–1.5)
GLUCOSE BLDC GLUCOMTR-MCNC: 125 MG/DL — HIGH (ref 70–99)
GLUCOSE BLDC GLUCOMTR-MCNC: 151 MG/DL — HIGH (ref 70–99)
GLUCOSE BLDC GLUCOMTR-MCNC: 164 MG/DL — HIGH (ref 70–99)
GLUCOSE BLDC GLUCOMTR-MCNC: 168 MG/DL — HIGH (ref 70–99)
GLUCOSE BLDC GLUCOMTR-MCNC: 83 MG/DL — SIGNIFICANT CHANGE UP (ref 70–99)
GLUCOSE SERPL-MCNC: 173 MG/DL — HIGH (ref 70–99)
HCT VFR BLD CALC: 37.8 % — LOW (ref 42–52)
HGB BLD-MCNC: 12.5 G/DL — LOW (ref 14–18)
INR BLD: 1.13 RATIO — SIGNIFICANT CHANGE UP (ref 0.65–1.3)
MAGNESIUM SERPL-MCNC: 1.9 MG/DL — SIGNIFICANT CHANGE UP (ref 1.8–2.4)
MCHC RBC-ENTMCNC: 28.8 PG — SIGNIFICANT CHANGE UP (ref 27–31)
MCHC RBC-ENTMCNC: 33.1 G/DL — SIGNIFICANT CHANGE UP (ref 32–37)
MCV RBC AUTO: 87.1 FL — SIGNIFICANT CHANGE UP (ref 80–94)
NRBC # BLD: 0 /100 WBCS — SIGNIFICANT CHANGE UP (ref 0–0)
PLATELET # BLD AUTO: 160 K/UL — SIGNIFICANT CHANGE UP (ref 130–400)
POTASSIUM SERPL-MCNC: 4.2 MMOL/L — SIGNIFICANT CHANGE UP (ref 3.5–5)
POTASSIUM SERPL-SCNC: 4.2 MMOL/L — SIGNIFICANT CHANGE UP (ref 3.5–5)
PROT SERPL-MCNC: 6.5 G/DL — SIGNIFICANT CHANGE UP (ref 6–8)
PROTHROM AB SERPL-ACNC: 13 SEC — HIGH (ref 9.95–12.87)
RBC # BLD: 4.34 M/UL — LOW (ref 4.7–6.1)
RBC # FLD: 14.3 % — SIGNIFICANT CHANGE UP (ref 11.5–14.5)
SODIUM SERPL-SCNC: 135 MMOL/L — SIGNIFICANT CHANGE UP (ref 135–146)
TROPONIN T SERPL-MCNC: 0.03 NG/ML — CRITICAL HIGH
WBC # BLD: 4.88 K/UL — SIGNIFICANT CHANGE UP (ref 4.8–10.8)
WBC # FLD AUTO: 4.88 K/UL — SIGNIFICANT CHANGE UP (ref 4.8–10.8)

## 2021-07-06 PROCEDURE — 99223 1ST HOSP IP/OBS HIGH 75: CPT

## 2021-07-06 PROCEDURE — 99222 1ST HOSP IP/OBS MODERATE 55: CPT

## 2021-07-06 PROCEDURE — 76705 ECHO EXAM OF ABDOMEN: CPT | Mod: 26

## 2021-07-06 PROCEDURE — 76775 US EXAM ABDO BACK WALL LIM: CPT | Mod: 26

## 2021-07-06 RX ORDER — ENOXAPARIN SODIUM 100 MG/ML
40 INJECTION SUBCUTANEOUS DAILY
Refills: 0 | Status: DISCONTINUED | OUTPATIENT
Start: 2021-07-06 | End: 2021-07-08

## 2021-07-06 RX ORDER — DEXTROSE 50 % IN WATER 50 %
12.5 SYRINGE (ML) INTRAVENOUS ONCE
Refills: 0 | Status: DISCONTINUED | OUTPATIENT
Start: 2021-07-06 | End: 2021-07-10

## 2021-07-06 RX ORDER — DEXTROSE 50 % IN WATER 50 %
15 SYRINGE (ML) INTRAVENOUS ONCE
Refills: 0 | Status: DISCONTINUED | OUTPATIENT
Start: 2021-07-06 | End: 2021-07-10

## 2021-07-06 RX ORDER — LATANOPROST 0.05 MG/ML
1 SOLUTION/ DROPS OPHTHALMIC; TOPICAL AT BEDTIME
Refills: 0 | Status: DISCONTINUED | OUTPATIENT
Start: 2021-07-06 | End: 2021-07-10

## 2021-07-06 RX ORDER — DORZOLAMIDE HYDROCHLORIDE TIMOLOL MALEATE 20; 5 MG/ML; MG/ML
1 SOLUTION/ DROPS OPHTHALMIC ONCE
Refills: 0 | Status: DISCONTINUED | OUTPATIENT
Start: 2021-07-06 | End: 2021-07-08

## 2021-07-06 RX ORDER — SODIUM CHLORIDE 9 MG/ML
1000 INJECTION, SOLUTION INTRAVENOUS
Refills: 0 | Status: DISCONTINUED | OUTPATIENT
Start: 2021-07-06 | End: 2021-07-10

## 2021-07-06 RX ORDER — INSULIN LISPRO 100/ML
30 VIAL (ML) SUBCUTANEOUS
Refills: 0 | Status: DISCONTINUED | OUTPATIENT
Start: 2021-07-06 | End: 2021-07-10

## 2021-07-06 RX ORDER — ATORVASTATIN CALCIUM 80 MG/1
20 TABLET, FILM COATED ORAL AT BEDTIME
Refills: 0 | Status: DISCONTINUED | OUTPATIENT
Start: 2021-07-06 | End: 2021-07-10

## 2021-07-06 RX ORDER — INSULIN LISPRO 100/ML
VIAL (ML) SUBCUTANEOUS AT BEDTIME
Refills: 0 | Status: DISCONTINUED | OUTPATIENT
Start: 2021-07-06 | End: 2021-07-07

## 2021-07-06 RX ORDER — INSULIN GLARGINE 100 [IU]/ML
70 INJECTION, SOLUTION SUBCUTANEOUS AT BEDTIME
Refills: 0 | Status: DISCONTINUED | OUTPATIENT
Start: 2021-07-06 | End: 2021-07-10

## 2021-07-06 RX ORDER — PANTOPRAZOLE SODIUM 20 MG/1
40 TABLET, DELAYED RELEASE ORAL
Refills: 0 | Status: DISCONTINUED | OUTPATIENT
Start: 2021-07-06 | End: 2021-07-10

## 2021-07-06 RX ORDER — INSULIN GLARGINE 100 [IU]/ML
40 INJECTION, SOLUTION SUBCUTANEOUS EVERY MORNING
Refills: 0 | Status: DISCONTINUED | OUTPATIENT
Start: 2021-07-06 | End: 2021-07-10

## 2021-07-06 RX ORDER — SODIUM CHLORIDE 9 MG/ML
1000 INJECTION INTRAMUSCULAR; INTRAVENOUS; SUBCUTANEOUS
Refills: 0 | Status: DISCONTINUED | OUTPATIENT
Start: 2021-07-06 | End: 2021-07-10

## 2021-07-06 RX ORDER — DEXTROSE 50 % IN WATER 50 %
25 SYRINGE (ML) INTRAVENOUS ONCE
Refills: 0 | Status: DISCONTINUED | OUTPATIENT
Start: 2021-07-06 | End: 2021-07-10

## 2021-07-06 RX ORDER — GLUCAGON INJECTION, SOLUTION 0.5 MG/.1ML
1 INJECTION, SOLUTION SUBCUTANEOUS ONCE
Refills: 0 | Status: DISCONTINUED | OUTPATIENT
Start: 2021-07-06 | End: 2021-07-10

## 2021-07-06 RX ORDER — CEFTRIAXONE 500 MG/1
1000 INJECTION, POWDER, FOR SOLUTION INTRAMUSCULAR; INTRAVENOUS EVERY 24 HOURS
Refills: 0 | Status: COMPLETED | OUTPATIENT
Start: 2021-07-06 | End: 2021-07-10

## 2021-07-06 RX ORDER — AMLODIPINE BESYLATE 2.5 MG/1
5 TABLET ORAL DAILY
Refills: 0 | Status: DISCONTINUED | OUTPATIENT
Start: 2021-07-06 | End: 2021-07-10

## 2021-07-06 RX ADMIN — INSULIN GLARGINE 40 UNIT(S): 100 INJECTION, SOLUTION SUBCUTANEOUS at 10:11

## 2021-07-06 RX ADMIN — ATORVASTATIN CALCIUM 20 MILLIGRAM(S): 80 TABLET, FILM COATED ORAL at 21:53

## 2021-07-06 RX ADMIN — PANTOPRAZOLE SODIUM 40 MILLIGRAM(S): 20 TABLET, DELAYED RELEASE ORAL at 10:13

## 2021-07-06 RX ADMIN — LATANOPROST 1 DROP(S): 0.05 SOLUTION/ DROPS OPHTHALMIC; TOPICAL at 21:53

## 2021-07-06 RX ADMIN — SODIUM CHLORIDE 75 MILLILITER(S): 9 INJECTION INTRAMUSCULAR; INTRAVENOUS; SUBCUTANEOUS at 10:18

## 2021-07-06 RX ADMIN — Medication 30 UNIT(S): at 08:19

## 2021-07-06 RX ADMIN — Medication 30 UNIT(S): at 11:48

## 2021-07-06 RX ADMIN — SODIUM CHLORIDE 75 MILLILITER(S): 9 INJECTION INTRAMUSCULAR; INTRAVENOUS; SUBCUTANEOUS at 21:53

## 2021-07-06 RX ADMIN — CEFTRIAXONE 100 MILLIGRAM(S): 500 INJECTION, POWDER, FOR SOLUTION INTRAMUSCULAR; INTRAVENOUS at 05:42

## 2021-07-06 NOTE — CONSULT NOTE ADULT - ASSESSMENT
65 year old male w/ s/p R nephrectomy in 2017 (patient does not remember who performed the R nephrectomy) and L staghorn calculus and multiple calculi presents with hematuria since yesterday and fever - hematuria and fever resolved. Left staghorn nonobstructing - no left sided hydronephrosis seen on CT. Cr 1.8 (baseline 1.3). Patient wishes to follow with Dr. Falk for management of his left staghorn calculus.    Plan:  No acute  intervention indicated at this time  Hematuria resolved, vitals stable  Cont to trend Cr, Cr 1.8  Cont IV abx  No left sided hydronephrosis seen on RBUS and CTA/P  Will discuss with attending regarding further recommendations   65 year old male w/ s/p R nephrectomy in 2017 (patient does not remember who performed the R nephrectomy) and L staghorn calculus and multiple calculi presents with hematuria since yesterday and fever - hematuria and fever resolved. Left staghorn nonobstructing - no left sided hydronephrosis seen on CT. Cr 1.8 (baseline 1.3). Patient wishes to follow with Dr. Falk for management of his left staghorn calculus.    Plan:  ·	No acute  intervention indicated at this time  ·	Hematuria resolved, vitals stable  ·	Cont to trend Cr, Cr 1.8  ·	Cont IV abx  ·	No left sided hydronephrosis seen on RBUS and CTA/P  ·	Will discuss with attending regarding further recommendations      Patient seen and examined at bedside with Dr. Liang. Note to be reviewed and amended where necessary tomorrow. Plan is as follows:  ·	No acute  intervention indicated at this time  ·	Hematuria resolved, vitals stable  ·	No left sided hydronephrosis seen on RBUS and CTA/P  ·	Patient wishes to continue care with patient's own private urologist for further care.  ·	Please provide patient with hard copy of patient's CT A/P prior to discharge home 65 year old male w/ s/p R nephrectomy in 2017 (patient does not remember who performed the R nephrectomy) and L staghorn calculus and multiple calculi presents with hematuria since yesterday and fever - hematuria and fever resolved. Left staghorn nonobstructing - no left sided hydronephrosis seen on CT. Cr 1.8 (baseline 1.3). Patient wishes to follow with Dr. Falk for management of his left staghorn calculus.    Plan:  ·	No acute  intervention indicated at this time  ·	Hematuria resolved, vitals stable  ·	Cont to trend Cr, Cr 1.8  ·	Cont IV abx  ·	No left sided hydronephrosis seen on RBUS and CTA/P  ·	Will discuss with attending regarding further recommendations      Patient seen and examined at bedside with Dr. Liang. Note to be reviewed and amended where necessary tomorrow. Plan is as follows:  ·	No acute  intervention indicated at this time  ·	Hematuria resolved, vitals stable  ·	No left sided hydronephrosis seen on RBUS and CTA/P  ·	Patient wishes to continue care with patient's own private urologist for further care.    he told me his doctor had seen a stone on sono but wanted a CT for more definition. He reportedly told the pt no PCNL as he has a solitary kidney but i reviewed this size stone is a large stone burden to deal with ureteroscopically    Regardless he wants to go back to Dr. Falk and I wish him luck  please recall of acute care needed      ·	Please provide patient with hard copy of patient's CT A/P prior to discharge home

## 2021-07-06 NOTE — CONSULT NOTE ADULT - SUBJECTIVE AND OBJECTIVE BOX
Urology Consult Note: 65 year old male w/ pmhx of DM, HTN, HLD, glaucoma, CKD III, nephrolithiasis, renal mass s/p R nephrectomy in 2017 (patient does not remember who performed the R nephrectomy), obesity, MSSA bacteremia, COVID positive on 12/10, L kidney with staghorn calculus and multiple calculi presents with hematuria since yesterday and fever tmax of 102.7 at home. Patient states that he went to urgent care yesterday and was told to come to the ED, but wanted to wait until to day to see how he was feeling and when the patient began to feel febrile, he believed that it may be due to possibly moo covid for the second time so patient took a dose of tylenol and eliquis and reported to the ED for further evaluation.  called to evaluate patient for LEFT staghorn calculus in the setting of RIGHT nephrectomy. Patient states that he follows Dr. Falk in [facility] for management of his LEFT staghorn calculus. While in the ED, VS wnl, pt afebrile. UA negative, Labs wnl. CT A/P No evidence of acute intra-abdominal pathology. Status post right nephrectomy. Left staghorn calculus, difficult to measure, measuring approximately 3.3 x 1.3 x 1.3 cm, and appearing slightly decreased in size since prior CT. No left hydronephrosis. Left lower pole cyst. Hematuria had resolved. Patient at this time denies fevers, chills, n/v, cp, sob, chest pain, abd/flank pain, dysuria, penile pain/discharge or hematuria.      PAST MEDICAL & SURGICAL HISTORY:  Diabetes mellitus  HTN (hypertension)  Glaucoma  DVT (deep venous thrombosis)  CKD (chronic kidney disease)  HLD (hyperlipidemia)  H/O right nephrectomy    [ x ] A 10 Point Review of Systems was negative except where noted    MEDICATIONS  (STANDING):  amLODIPine   Tablet 5 milliGRAM(s) Oral daily  atorvastatin 20 milliGRAM(s) Oral at bedtime  cefTRIAXone   IVPB 1000 milliGRAM(s) IV Intermittent every 24 hours  dextrose 40% Gel 15 Gram(s) Oral once  dextrose 5%. 1000 milliLiter(s) (50 mL/Hr) IV Continuous <Continuous>  dextrose 5%. 1000 milliLiter(s) (100 mL/Hr) IV Continuous <Continuous>  dextrose 50% Injectable 25 Gram(s) IV Push once  dextrose 50% Injectable 12.5 Gram(s) IV Push once  dextrose 50% Injectable 25 Gram(s) IV Push once  dorzolamide 2%/timolol 0.5% Ophthalmic Solution 1 Drop(s) Both EYES once  enoxaparin Injectable 40 milliGRAM(s) SubCutaneous daily  glucagon  Injectable 1 milliGRAM(s) IntraMuscular once  insulin glargine Injectable (LANTUS) 40 Unit(s) SubCutaneous every morning  insulin glargine Injectable (LANTUS) 70 Unit(s) SubCutaneous at bedtime  insulin lispro (ADMELOG) corrective regimen sliding scale   SubCutaneous at bedtime  insulin lispro Injectable (ADMELOG) 30 Unit(s) SubCutaneous three times a day before meals  latanoprost 0.005% Ophthalmic Solution 1 Drop(s) Both EYES at bedtime  pantoprazole    Tablet 40 milliGRAM(s) Oral before breakfast  sodium chloride 0.9%. 1000 milliLiter(s) (75 mL/Hr) IV Continuous <Continuous>    Allergies: No Known Allergies    SOCIAL HISTORY: No illicit drug use    FAMILY HISTORY:  FH: lung cancer  Father    Vital Signs Last 24 Hrs  T(C): 36.2 (2021 12:59), Max: 36.8 (2021 03:45)  T(F): 97.2 (2021 12:59), Max: 98.3 (2021 03:45)  HR: 78 (2021 12:59) (78 - 90)  BP: 131/77 (2021 12:59) (131/77 - 175/76)  RR: 18 (2021 12:59) (18 - 18)  SpO2: 97% (2021 07:51) (97% - 97%)    PHYSICAL EXAM:  Constitutional: NAD, well-developed, well nourished, comfortably laying in bed  HEENT: NC/AT  Back: No CVA tenderness bilaterally  Respiratory: No accessory respiratory muscle use  Abd: Soft, NT throughout, obese   Cardio: +S1/S2  : no suprapubic tenderness to palpation  Extremities: No edema or cyanosis, KEARNEY x 4  Neurological: Awake and alert  Psychiatric: Normal mood, normal affect  Skin: Good color, non diaphoretic    I&O's Summary  2021 07:01  -  2021 17:37  --------------------------------------------------------  IN: 0 mL / OUT: 500 mL / NET: -500 mL    LABS:             12.5   4.88  )-----------( 160      ( 2021 10:59 )             37.8         135  |  101  |  43<H>  ----------------------------<  173<H>  4.2   |  22  |  1.8<H>    Ca    8.7      2021 10:59  Mg     1.9         TPro  6.5  /  Alb  3.6  /  TBili  1.7<H>  /  DBili  x   /  AST  102<H>  /  ALT  163<H>  /  AlkPhos  166<H>      PT/INR - ( 2021 10:59 )   PT: 13.00 sec;   INR: 1.13 ratio         PTT - ( 2021 10:59 )  PTT:29.8 sec  Urinalysis Basic - ( 2021 16:34 )    Color: Yellow / Appearance: Clear / S.015 / pH: x  Gluc: x / Ketone: Trace  / Bili: Negative / Urobili: 3 mg/dL   Blood: x / Protein: 30 mg/dL / Nitrite: Negative   Leuk Esterase: Negative / RBC: 3 /HPF / WBC 2 /HPF   Sq Epi: x / Non Sq Epi: 1 /HPF / Bacteria: Negative    RADIOLOGY & ADDITIONAL STUDIES:  < from: US Renal (21 @ 12:29) >    EXAM:  US KIDNEY(S)            PROCEDURE DATE:  2021            INTERPRETATION:  CLINICAL INFORMATION: Acute kidney injury    Correlation is made with CT abdomen and pelvis 2021    TECHNIQUE: Sonography of the kidneys and bladder.    FINDINGS:    Right kidney: Post right nephrectomy.    Left kidney:  13.6 cm. No hydronephrosis or solid renal mass. Left lower pole 3.4 cm cyst. Multiple left sided calculi measuring up to 1.4 cm.    Urinary bladder: Underdistended limiting evaluation.    IMPRESSION:    Post right nephrectomy.    Left sided calculi measuring up to 1.4 cm. No left-sided hydronephrosis.    --- End of Report ---              ELLIOT LANDAU MD; Attending Radiologist  This document has been electronically signed.  12:34PM    < end of copied text >  < from: US Abdomen Upper Quadrant Right (21 @ 01:37) >    EXAM:  US ABDOMEN RT UPR QUADRANT            PROCEDURE DATE:  2021            INTERPRETATION:  CLINICAL INFORMATION: Elevated liver enzymes.    COMPARISON: Correlation is made with same day CT abdomen and pelvis.    TECHNIQUE: Sonography of the right upper quadrant.    FINDINGS:    Liver: Within normal limits.  Bile ducts: Normal caliber. Common bile duct measures 4 mm.  Gallbladder: Cholelithiasis. No definite gallbladder wall thickening.  Pancreas: Poorly visualized.  Right kidney: Nephrectomy.  Ascites: None.  IVC: Visualized portions are within normal limits.    IMPRESSION:    Cholelithiasis without sonographic evidence of acute cholecystitis.            ROMERO MOJICA MD; Resident Radiologist  This document has been electronically signed.  CHUY SHERIDAN MD; Attending Radiologist  This document has been electronically signed. 2021  2:05AM    < end of copied text >  < from: CT Abdomen and Pelvis No Cont (21 @ 21:11) >    EXAM:  CT ABDOMEN AND PELVIS            PROCEDURE DATE:  2021            INTERPRETATION:  CLINICAL HISTORY / REASON FOR EXAM: Hematuria.    TECHNIQUE: Contiguous axial CT images were obtained from the lower chest to the pubic symphysis without intravenous contrast. Oral contrast was not administered. Reformatted images in the coronal and sagittal planes were acquired.    COMPARISON: CT ABDOMEN/PELVIS 2020.    FINDINGS:    LOWER CHEST: Calcified coronary arteries. Partially imaged calcified right hilar lymph nodes. Right lower lobe calcified granuloma.    HEPATOBILIARY: Cholelithiasis. Unenhanced liver unremarkable.    SPLEEN: Unchanged splenic artery embolization coils.    PANCREAS: Unremarkable.    ADRENAL GLANDS: Unremarkable.    KIDNEYS: Status post right nephrectomy. Left staghorn calculus, difficult to measure, measuring approximately 3.3 x 1.3 x 1.3 cm, and appearing slightly decreased in size since prior CT (average Hounsfield units 500). No left hydronephrosis. Left lower pole cyst.    ABDOMINOPELVIC NODES: No lymphadenopathy.    PELVIC ORGANS: Unremarkable.    PERITONEUM/MESENTERY/BOWEL: Post gastric banding with associated tubings and port within the right anterior mid abdominal wall. No bowel obstruction, ascites or intraperitoneal free air. Normal appendix.    BONES/SOFT TISSUES: Degenerative change of the spine and bilateral hips. Small fat-containing umbilical and bilateral inguinal hernias.    OTHER: Atherosclerotic calcification.    IMPRESSION:    1. No evidence of acute intra-abdominal pathology.    2. Status post right nephrectomy. Left staghorn calculus, difficult to measure, measuring approximately 3.3 x 1.3 x 1.3 cm, and appearing slightly decreased in size since prior CT.            MISSY BOJORQUEZ MD; Resident Radiologist  This document has been electronically signed.  RORY VALADEZ MD; Attending Radiologist  This document has been electronically signed. 2021  9:39PM    < end of copied text >   Urology Consult Note: 65 year old male w/ pmhx of DM, HTN, HLD, glaucoma, CKD III, nephrolithiasis, renal mass s/p R nephrectomy in 2017 (patient does not remember who performed the R nephrectomy), obesity, MSSA bacteremia, COVID positive on 12/10, L kidney with staghorn calculus and multiple calculi presents with hematuria since yesterday and fever tmax of 102.7 at home. Patient states that he went to urgent care yesterday and was told to come to the ED, but wanted to wait until to day to see how he was feeling and when the patient began to feel febrile, he believed that it may be due to possibly moo covid for the second time so patient took a dose of tylenol and eliquis and reported to the ED for further evaluation.  called to evaluate patient for LEFT staghorn calculus in the setting of RIGHT nephrectomy. Patient states that he follows Dr. Falk in [facility] for management of his LEFT staghorn calculus. While in the ED, VS wnl, pt afebrile. UA negative, Labs wnl. CT A/P No evidence of acute intra-abdominal pathology. Status post right nephrectomy. Left staghorn calculus, difficult to measure, measuring approximately 3.3 x 1.3 x 1.3 cm, and appearing slightly decreased in size since prior CT. No left hydronephrosis. Left lower pole cyst. Hematuria had resolved. Patient at this time denies fevers, chills, n/v, cp, sob, chest pain, abd/flank pain, dysuria, penile pain/discharge or hematuria.      PAST MEDICAL & SURGICAL HISTORY:  Diabetes mellitus  HTN (hypertension)  Glaucoma  DVT (deep venous thrombosis)  CKD (chronic kidney disease)  HLD (hyperlipidemia)  H/O right nephrectomy    [ x ] A 10 Point Review of Systems was negative except where noted    MEDICATIONS  (STANDING):  amLODIPine   Tablet 5 milliGRAM(s) Oral daily  atorvastatin 20 milliGRAM(s) Oral at bedtime  cefTRIAXone   IVPB 1000 milliGRAM(s) IV Intermittent every 24 hours  dextrose 40% Gel 15 Gram(s) Oral once  dextrose 5%. 1000 milliLiter(s) (50 mL/Hr) IV Continuous <Continuous>  dextrose 5%. 1000 milliLiter(s) (100 mL/Hr) IV Continuous <Continuous>  dextrose 50% Injectable 25 Gram(s) IV Push once  dextrose 50% Injectable 12.5 Gram(s) IV Push once  dextrose 50% Injectable 25 Gram(s) IV Push once  dorzolamide 2%/timolol 0.5% Ophthalmic Solution 1 Drop(s) Both EYES once  enoxaparin Injectable 40 milliGRAM(s) SubCutaneous daily  glucagon  Injectable 1 milliGRAM(s) IntraMuscular once  insulin glargine Injectable (LANTUS) 40 Unit(s) SubCutaneous every morning  insulin glargine Injectable (LANTUS) 70 Unit(s) SubCutaneous at bedtime  insulin lispro (ADMELOG) corrective regimen sliding scale   SubCutaneous at bedtime  insulin lispro Injectable (ADMELOG) 30 Unit(s) SubCutaneous three times a day before meals  latanoprost 0.005% Ophthalmic Solution 1 Drop(s) Both EYES at bedtime  pantoprazole    Tablet 40 milliGRAM(s) Oral before breakfast  sodium chloride 0.9%. 1000 milliLiter(s) (75 mL/Hr) IV Continuous <Continuous>    Allergies: No Known Allergies    SOCIAL HISTORY: No illicit drug use    FAMILY HISTORY:  FH: lung cancer  Father    Vital Signs Last 24 Hrs  T(C): 36.2 (2021 12:59), Max: 36.8 (2021 03:45)  T(F): 97.2 (2021 12:59), Max: 98.3 (2021 03:45)  HR: 78 (2021 12:59) (78 - 90)  BP: 131/77 (2021 12:59) (131/77 - 175/76)  RR: 18 (2021 12:59) (18 - 18)  SpO2: 97% (2021 07:51) (97% - 97%)    PHYSICAL EXAM:  Constitutional: NAD, well-developed, well nourished, comfortably laying in bed  massive obesity  HEENT: NC/AT  Back: No CVA tenderness bilaterally  Respiratory: No accessory respiratory muscle use  Abd: Soft, NT throughout, obese   Cardio: +S1/S2  : no suprapubic tenderness to palpation  Extremities: No edema or cyanosis, KEARNEY x 4  Neurological: Awake and alert  Psychiatric: Normal mood, normal affect  Skin: Good color, non diaphoretic    I&O's Summary  2021 07:01  -  2021 17:37  --------------------------------------------------------  IN: 0 mL / OUT: 500 mL / NET: -500 mL    LABS:             12.5   4.88  )-----------( 160      ( 2021 10:59 )             37.8         135  |  101  |  43<H>  ----------------------------<  173<H>  4.2   |  22  |  1.8<H>    Ca    8.7      2021 10:59  Mg     1.9         TPro  6.5  /  Alb  3.6  /  TBili  1.7<H>  /  DBili  x   /  AST  102<H>  /  ALT  163<H>  /  AlkPhos  166<H>      PT/INR - ( 2021 10:59 )   PT: 13.00 sec;   INR: 1.13 ratio         PTT - ( 2021 10:59 )  PTT:29.8 sec  Urinalysis Basic - ( 2021 16:34 )    Color: Yellow / Appearance: Clear / S.015 / pH: x  Gluc: x / Ketone: Trace  / Bili: Negative / Urobili: 3 mg/dL   Blood: x / Protein: 30 mg/dL / Nitrite: Negative   Leuk Esterase: Negative / RBC: 3 /HPF / WBC 2 /HPF   Sq Epi: x / Non Sq Epi: 1 /HPF / Bacteria: Negative    RADIOLOGY & ADDITIONAL STUDIES:  < from: US Renal (21 @ 12:29) >    EXAM:  US KIDNEY(S)            PROCEDURE DATE:  2021            INTERPRETATION:  CLINICAL INFORMATION: Acute kidney injury    Correlation is made with CT abdomen and pelvis 2021    TECHNIQUE: Sonography of the kidneys and bladder.    FINDINGS:    Right kidney: Post right nephrectomy.    Left kidney:  13.6 cm. No hydronephrosis or solid renal mass. Left lower pole 3.4 cm cyst. Multiple left sided calculi measuring up to 1.4 cm.    Urinary bladder: Underdistended limiting evaluation.    IMPRESSION:    Post right nephrectomy.    Left sided calculi measuring up to 1.4 cm. No left-sided hydronephrosis.    --- End of Report ---              ELLIOT LANDAU MD; Attending Radiologist  This document has been electronically signed.  12:34PM    < end of copied text >  < from: US Abdomen Upper Quadrant Right (21 @ 01:37) >    EXAM:  US ABDOMEN RT UPR QUADRANT            PROCEDURE DATE:  2021            INTERPRETATION:  CLINICAL INFORMATION: Elevated liver enzymes.    COMPARISON: Correlation is made with same day CT abdomen and pelvis.    TECHNIQUE: Sonography of the right upper quadrant.    FINDINGS:    Liver: Within normal limits.  Bile ducts: Normal caliber. Common bile duct measures 4 mm.  Gallbladder: Cholelithiasis. No definite gallbladder wall thickening.  Pancreas: Poorly visualized.  Right kidney: Nephrectomy.  Ascites: None.  IVC: Visualized portions are within normal limits.    IMPRESSION:    Cholelithiasis without sonographic evidence of acute cholecystitis.            ROMERO MOJICA MD; Resident Radiologist  This document has been electronically signed.  CHUY SHERIDAN MD; Attending Radiologist  This document has been electronically signed. 2021  2:05AM    < end of copied text >  < from: CT Abdomen and Pelvis No Cont (21 @ 21:11) >    EXAM:  CT ABDOMEN AND PELVIS            PROCEDURE DATE:  2021            INTERPRETATION:  CLINICAL HISTORY / REASON FOR EXAM: Hematuria.    TECHNIQUE: Contiguous axial CT images were obtained from the lower chest to the pubic symphysis without intravenous contrast. Oral contrast was not administered. Reformatted images in the coronal and sagittal planes were acquired.    COMPARISON: CT ABDOMEN/PELVIS 2020.    FINDINGS:    LOWER CHEST: Calcified coronary arteries. Partially imaged calcified right hilar lymph nodes. Right lower lobe calcified granuloma.    HEPATOBILIARY: Cholelithiasis. Unenhanced liver unremarkable.    SPLEEN: Unchanged splenic artery embolization coils.    PANCREAS: Unremarkable.    ADRENAL GLANDS: Unremarkable.    KIDNEYS: Status post right nephrectomy. Left staghorn calculus, difficult to measure, measuring approximately 3.3 x 1.3 x 1.3 cm, and appearing slightly decreased in size since prior CT (average Hounsfield units 500). No left hydronephrosis. Left lower pole cyst.    ABDOMINOPELVIC NODES: No lymphadenopathy.    PELVIC ORGANS: Unremarkable.    PERITONEUM/MESENTERY/BOWEL: Post gastric banding with associated tubings and port within the right anterior mid abdominal wall. No bowel obstruction, ascites or intraperitoneal free air. Normal appendix.    BONES/SOFT TISSUES: Degenerative change of the spine and bilateral hips. Small fat-containing umbilical and bilateral inguinal hernias.    OTHER: Atherosclerotic calcification.    IMPRESSION:    1. No evidence of acute intra-abdominal pathology.    2. Status post right nephrectomy. Left staghorn calculus, difficult to measure, measuring approximately 3.3 x 1.3 x 1.3 cm, and appearing slightly decreased in size since prior CT.            MISSY BOJORQUEZ MD; Resident Radiologist  This document has been electronically signed.  RORY VALADEZ MD; Attending Radiologist  This document has been electronically signed. 2021  9:39PM    < end of copied text >

## 2021-07-06 NOTE — H&P ADULT - NSHPPHYSICALEXAM_GEN_ALL_CORE
GENERAL: Obese laying in bed, NAD, speaks in full sentences, no signs of respiratory distress  CHEST/LUNG: Clear to auscultation bilaterally; No wheeze; No crackles; No accessory muscles used  HEART: Regular rate and rhythm; No murmurs;   ABDOMEN: Soft, Nontender, Bowel sounds present; No guarding  EXTREMITIES:  2+ Peripheral Pulses, No cyanosis or edema  PSYCH: AAOx3  NEUROLOGY: non-focal

## 2021-07-06 NOTE — H&P ADULT - ASSESSMENT
66 y/o m w/ pmhx of DM, HTN, HLD, glaucoma, CKD III, nephrolithiasis, renal mass s/p R nephrectomy, obesity, MSSA bacteremia, COVID positive on 12/10, L kidney with staghorn calculus and multiple calculi presents with hematuria since yesterday associated with fever tmax of 102.7 at home.  Pt went to urgent care yesterday and was told to come to the ED, but wanted to wait until to day to see how he was feeling. Hematuria resolved this AM per pt.  Denies n/v, cp, sob, pleuritic chest pain, palpitations, diaphoresis, cough, abd/flank pain, diarrhea, constipation, melena/brbpr, dysuria, burning upon urination, penile pain/discharge, testicular pain/swelling/erythema, rectal pain or tenesmus, sick contacts, recent travel or rash.       #Fever and hematuria   Resolved  Hx of nephrolithiasis (takes 1 tablespoon of baking soda at home mixed with water)  No sepsis on admission, afebrile, no leukocytosis   Lactate 1.4  CXR- No signs of acute cardiopulmonary disease, pending official read   UA neg, f/u UCx   CT A/P No evidence of acute intra-abdominal pathology. Status post right nephrectomy. Left staghorn calculus, difficult to measure, measuring approximately 3.3 x 1.3 x 1.3 cm, and appearing slightly decreased in size since prior CT. No left hydronephrosis. Left lower pole cyst.  cont empiric Abx      #BATOOL on CKD III   -Creatine around 1.3 at baseline   -No left hydronephrosis   -Renal mass s/p R nephrectomy in 2017  -Avoid nephrotoxic meds   -Monitor BUN/Cr  -Recently started seeing Dr. Hurst as an o/p        #Transaminitis   No abdominal pain   Bilirubin 3.3   /   Trend LFTs   RUQ U/S Common bile duct measures 4 mm. Cholelithiasis without sonographic evidence of acute cholecystitis.        #Troponemia likely secondary to CKD   0.05 on admission, chronically elevated up to 0.09 on previous admission  Denies chest pain   No new changes on EKG   Trend       # DM II  On Insulin at home   - trend FS  -cont home dose of insulin Lantus 70u at bedtime and 40 u in AM, Lispor 30U TID       # HTN - controlled  - c/w amlodipine, losartan      # HLD   -c/w atorvastatin    # Glaucoma  - continue with timolol      # Morbid obesity   BMI - 53.8  Diet and exercise counselling provided     # Diet: DASH/TLC  # DVT Prophylaxis: Heparin 5000 sc q8  # GI Prophylaxis: Protonix  # Activity: IAT  # Dispo: Acute  # Code Status: Full code   66 y/o m w/ pmhx of DM, HTN, HLD, glaucoma, CKD III, nephrolithiasis, renal mass s/p R nephrectomy, obesity, MSSA bacteremia, COVID positive on 12/10, L kidney with staghorn calculus and multiple calculi presents with hematuria since yesterday associated with fever tmax of 102.7 at home.  Pt went to urgent care yesterday and was told to come to the ED, but wanted to wait until to day to see how he was feeling. Hematuria resolved this AM per pt.  Denies n/v, cp, sob, pleuritic chest pain, palpitations, diaphoresis, cough, abd/flank pain, diarrhea, constipation, melena/brbpr, dysuria, burning upon urination, penile pain/discharge, testicular pain/swelling/erythema, rectal pain or tenesmus, sick contacts, recent travel or rash.       #Fever and hematuria   Resolved  Hx of nephrolithiasis (takes 1 tablespoon of baking soda at home mixed with water)  No sepsis on admission, afebrile, no leukocytosis   Lactate 1.4  CXR- No signs of acute cardiopulmonary disease, pending official read   UA neg, f/u UCx   CT A/P No evidence of acute intra-abdominal pathology. Status post right nephrectomy. Left staghorn calculus, difficult to measure, measuring approximately 3.3 x 1.3 x 1.3 cm, and appearing slightly decreased in size since prior CT. No left hydronephrosis. Left lower pole cyst.  s/p cefepime and 2L LR in the ED  cont empiric Abx  urology f/u in AM      #BATOOL on CKD III   -Creatine around 1.3 at baseline   -No left hydronephrosis   -Renal mass s/p R nephrectomy in 2017  -Avoid nephrotoxic meds   -Monitor BUN/Cr  -Recently started seeing Dr. Hurst as an o/p  -hold olmesartan for now        #Transaminitis   No abdominal pain   Bilirubin 3.3   /   Trend LFTs   RUQ U/S Common bile duct measures 4 mm. Cholelithiasis without sonographic evidence of acute cholecystitis.        #Troponemia likely secondary to CKD   0.05 on admission, chronically elevated up to 0.09 on previous admission  Denies chest pain   No new changes on EKG   Trend       # DM II  On Insulin at home   - trend FS  -cont home dose of insulin Lantus 70u at bedtime and 40 u in AM, Lispro 30U TID       # HTN - controlled  - c/w amlodipine      # HLD   -c/w atorvastatin    # Glaucoma  - continue with timolol and latanoprost       # Morbid obesity   BMI - 53.8  Diet and exercise counselling provided     # Diet: DASH/TLC  # DVT Prophylaxis: Heparin 5000 sc q8  # GI Prophylaxis: Protonix  # Activity: IAT  # Dispo: Acute  # Code Status: Full code   64 y/o m w/ pmhx of DM, HTN, HLD, glaucoma, CKD III, nephrolithiasis, renal mass s/p R nephrectomy, obesity, MSSA bacteremia, COVID positive on 12/10, L kidney with staghorn calculus and multiple calculi presents with hematuria since yesterday associated with fever tmax of 102.7 at home.  Pt went to urgent care yesterday and was told to come to the ED, but wanted to wait until to day to see how he was feeling. Hematuria resolved this AM per pt.  Denies n/v, cp, sob, pleuritic chest pain, palpitations, diaphoresis, cough, abd/flank pain, diarrhea, constipation, melena/brbpr, dysuria, burning upon urination, penile pain/discharge, testicular pain/swelling/erythema, rectal pain or tenesmus, sick contacts, recent travel or rash.       #Fever and hematuria   Resolved  Hx of nephrolithiasis (takes 1 tablespoon of baking soda at home mixed with water)  No sepsis on admission, afebrile, no leukocytosis   Lactate 1.4  CXR- No signs of acute cardiopulmonary disease, pending official read   UA neg, f/u UCx   CT A/P No evidence of acute intra-abdominal pathology. Status post right nephrectomy. Left staghorn calculus, difficult to measure, measuring approximately 3.3 x 1.3 x 1.3 cm, and appearing slightly decreased in size since prior CT. No left hydronephrosis. Left lower pole cyst.  s/p cefepime and 2L LR in the ED  cont empiric Abx  urology f/u in AM      #BATOOL on CKD III   -Creatine around 1.3 at baseline   -No left hydronephrosis   -Renal mass s/p R nephrectomy in 2017  -Avoid nephrotoxic meds   -Monitor BUN/Cr  -Recently started seeing Dr. Hurst as an o/p  -hold olmesartan for now        #Transaminitis   No abdominal pain   Bilirubin 3.3   /   Trend LFTs   RUQ U/S Common bile duct measures 4 mm. Cholelithiasis without sonographic evidence of acute cholecystitis.        #Troponemia likely secondary to CKD   0.05 on admission, chronically elevated up to 0.09 on previous admission  Denies chest pain   No new changes on EKG   Trend       # DM II  On Insulin at home   - trend FS  -cont home dose of insulin Lantus 70u at bedtime and 40 u in AM, Lispro 30U TID       # HTN - controlled  - c/w amlodipine      # HLD   -c/w atorvastatin    # Glaucoma  - continue with timolol and latanoprost       # Morbid obesity   BMI - 53.8  Diet and exercise counselling provided     # Diet: DASH/TLC/CC  # DVT Prophylaxis: lovenox   # GI Prophylaxis: Protonix  # Activity: IAT  # Dispo: Acute  # Code Status: Full code

## 2021-07-06 NOTE — H&P ADULT - HISTORY OF PRESENT ILLNESS
64 y/o m w/ pmhx of DM, HTN, HLD, glaucoma, CKD III, nephrolithiasis, renal mass s/p R nephrectomy, obesity, MSSA bacteremia, COVID positive on 12/10, L kidney with staghorn calculus and multiple calculi presents with hematuria since yesterday associated with fever tmax of 102.7 at home. Pt follows with nephrologist Dr. Hurst as a /p. t to urgent care yesterday and was told to come to ed, but wanted to wait until to day to see how he was feeling. denies n/v, cp, sob, pleuritic chest pain, palpitations, diaphoresis, cough, abd pain, diarrhea, constipation, melena/brbpr, dysuria, burning upon urination,, back/ flank pain, penile pain/discharge, testicular pain/swelling/erythema, rectal pain or tenesmus, sick contacts, recent travel or rash.       on exam: WDWN appearing  sitting on stretcher in nad, no rash, mmm, regular rate, radial pulses 2/4 b/l, no jvd, ctabl w/ breath sounds present b/l, no wheezing or crackles,no accessory muscle use, no tachypnea, no stridor, bs present throughout all 4 quadrants, abd soft, nd, nt, no rebound tenderness or guarding, no cvat. FROM of ext, no edema, no calf pain/swelling/erythema, AAOx3. No focal deficits 66 y/o m w/ pmhx of DM, HTN, HLD, glaucoma, CKD III, nephrolithiasis, renal mass s/p R nephrectomy, obesity, MSSA bacteremia, COVID positive on 12/10, L kidney with staghorn calculus and multiple calculi presents with hematuria since yesterday associated with fever tmax of 102.7 at home.  Pt went to urgent care yesterday and was told to come to the ED, but wanted to wait until to day to see how he was feeling. Hematuria resolved this AM per pt.  Denies n/v, cp, sob, pleuritic chest pain, palpitations, diaphoresis, cough, abd/flank pain, diarrhea, constipation, melena/brbpr, dysuria, burning upon urination, penile pain/discharge, testicular pain/swelling/erythema, rectal pain or tenesmus, sick contacts, recent travel or rash.     Pt also endorses itching that started today.     In ED, VS wnl, pt afebrile. UA negative, Labs wnl. 64 y/o m w/ pmhx of DM, HTN, HLD, glaucoma, CKD III, nephrolithiasis, renal mass s/p R nephrectomy, obesity, MSSA bacteremia, COVID positive on 12/10, L kidney with staghorn calculus and multiple calculi presents with hematuria since yesterday associated with fever tmax of 102.7 at home.  Pt went to urgent care yesterday and was told to come to the ED, but wanted to wait until to day to see how he was feeling. Hematuria resolved this AM per pt.  Denies n/v, cp, sob, pleuritic chest pain, palpitations, diaphoresis, cough, abd/flank pain, diarrhea, constipation, melena/brbpr, dysuria, burning upon urination, penile pain/discharge, testicular pain/swelling/erythema, rectal pain or tenesmus, sick contacts, recent travel or rash.     Pt also endorses itching that started today.     In ED, VS wnl, pt afebrile. UA negative, Labs wnl. CT A/P No evidence of acute intra-abdominal pathology. Status post right nephrectomy. Left staghorn calculus, difficult to measure, measuring approximately 3.3 x 1.3 x 1.3 cm, and appearing slightly decreased in size since prior CT. No left hydronephrosis. Left lower pole cyst.    s/p cefepime and 2L LR in the ED.

## 2021-07-06 NOTE — H&P ADULT - ATTENDING COMMENTS
Patient seen and examined independently. Agree with resident note/ history / physical exam and plan of care with following exceptions/additions/updates. Case discussed with patient/pt decision maker, house-staff and nursing. My notes supersedes the resident's notes in case of discrepancies.    pt is feeling better, no hematuria today.   has sleep apnea, but is refusing any sleep machine   check bladder us for post voidal residue and start fluids.   dx infected staghorn stone with coleen on ckd  cont abx, check cultures     #Progress Note Handoff  Pending (specify):  Consults__urology_______, Tests___cultures, us bladder, repeat labs ____  Family discussion: dw pt , fully aaox3  Disposition: Home

## 2021-07-06 NOTE — H&P ADULT - NSHPLABSRESULTS_GEN_ALL_CORE
14.0   8.46  )-----------( 162      ( 2021 16:34 )             42.1       07-    133<L>  |  98  |  49<H>  ----------------------------<  164<H>  4.7   |  19  |  2.3<H>    Ca    9.0      2021 16:34    TPro  7.3  /  Alb  4.0  /  TBili  3.3<H>  /  DBili  x   /  AST  186<H>  /  ALT  245<H>  /  AlkPhos  173<H>  07-              Urinalysis Basic - ( 2021 16:34 )    Color: Yellow / Appearance: Clear / S.015 / pH: x  Gluc: x / Ketone: Trace  / Bili: Negative / Urobili: 3 mg/dL   Blood: x / Protein: 30 mg/dL / Nitrite: Negative   Leuk Esterase: Negative / RBC: 3 /HPF / WBC 2 /HPF   Sq Epi: x / Non Sq Epi: 1 /HPF / Bacteria: Negative            Lactate Trend      CARDIAC MARKERS ( 2021 16:34 )  x     / 0.05 ng/mL / x     / x     / x            CAPILLARY BLOOD GLUCOSE      POCT Blood Glucose.: 151 mg/dL (2021 03:30)

## 2021-07-07 ENCOUNTER — TRANSCRIPTION ENCOUNTER (OUTPATIENT)
Age: 66
End: 2021-07-07

## 2021-07-07 LAB
A1C WITH ESTIMATED AVERAGE GLUCOSE RESULT: 7.5 % — HIGH (ref 4–5.6)
ALBUMIN SERPL ELPH-MCNC: 3.5 G/DL — SIGNIFICANT CHANGE UP (ref 3.5–5.2)
ALP SERPL-CCNC: 181 U/L — HIGH (ref 30–115)
ALT FLD-CCNC: 135 U/L — HIGH (ref 0–41)
ANION GAP SERPL CALC-SCNC: 10 MMOL/L — SIGNIFICANT CHANGE UP (ref 7–14)
APPEARANCE UR: CLEAR — SIGNIFICANT CHANGE UP
AST SERPL-CCNC: 91 U/L — HIGH (ref 0–41)
BACTERIA # UR AUTO: NEGATIVE — SIGNIFICANT CHANGE UP
BILIRUB SERPL-MCNC: 1.2 MG/DL — SIGNIFICANT CHANGE UP (ref 0.2–1.2)
BILIRUB UR-MCNC: ABNORMAL
BUN SERPL-MCNC: 39 MG/DL — HIGH (ref 10–20)
CALCIUM SERPL-MCNC: 8.6 MG/DL — SIGNIFICANT CHANGE UP (ref 8.5–10.1)
CHLORIDE SERPL-SCNC: 105 MMOL/L — SIGNIFICANT CHANGE UP (ref 98–110)
CO2 SERPL-SCNC: 21 MMOL/L — SIGNIFICANT CHANGE UP (ref 17–32)
COLOR SPEC: ABNORMAL
COVID-19 SPIKE DOMAIN AB INTERP: POSITIVE
COVID-19 SPIKE DOMAIN ANTIBODY RESULT: >250 U/ML — HIGH
CREAT SERPL-MCNC: 1.8 MG/DL — HIGH (ref 0.7–1.5)
CULTURE RESULTS: SIGNIFICANT CHANGE UP
DIFF PNL FLD: ABNORMAL
EPI CELLS # UR: 2 /HPF — SIGNIFICANT CHANGE UP (ref 0–5)
ESTIMATED AVERAGE GLUCOSE: 169 MG/DL — HIGH (ref 68–114)
GLUCOSE BLDC GLUCOMTR-MCNC: 142 MG/DL — HIGH (ref 70–99)
GLUCOSE BLDC GLUCOMTR-MCNC: 175 MG/DL — HIGH (ref 70–99)
GLUCOSE BLDC GLUCOMTR-MCNC: 185 MG/DL — HIGH (ref 70–99)
GLUCOSE BLDC GLUCOMTR-MCNC: 186 MG/DL — HIGH (ref 70–99)
GLUCOSE SERPL-MCNC: 194 MG/DL — HIGH (ref 70–99)
GLUCOSE UR QL: ABNORMAL
HCT VFR BLD CALC: 38 % — LOW (ref 42–52)
HGB BLD-MCNC: 12.5 G/DL — LOW (ref 14–18)
HYALINE CASTS # UR AUTO: 2 /LPF — SIGNIFICANT CHANGE UP (ref 0–7)
KETONES UR-MCNC: ABNORMAL
LEUKOCYTE ESTERASE UR-ACNC: NEGATIVE — SIGNIFICANT CHANGE UP
MAGNESIUM SERPL-MCNC: 2 MG/DL — SIGNIFICANT CHANGE UP (ref 1.8–2.4)
MCHC RBC-ENTMCNC: 28.5 PG — SIGNIFICANT CHANGE UP (ref 27–31)
MCHC RBC-ENTMCNC: 32.9 G/DL — SIGNIFICANT CHANGE UP (ref 32–37)
MCV RBC AUTO: 86.6 FL — SIGNIFICANT CHANGE UP (ref 80–94)
NITRITE UR-MCNC: NEGATIVE — SIGNIFICANT CHANGE UP
NRBC # BLD: 0 /100 WBCS — SIGNIFICANT CHANGE UP (ref 0–0)
PH UR: 6 — SIGNIFICANT CHANGE UP (ref 5–8)
PLATELET # BLD AUTO: 154 K/UL — SIGNIFICANT CHANGE UP (ref 130–400)
POTASSIUM SERPL-MCNC: 4.4 MMOL/L — SIGNIFICANT CHANGE UP (ref 3.5–5)
POTASSIUM SERPL-SCNC: 4.4 MMOL/L — SIGNIFICANT CHANGE UP (ref 3.5–5)
PROT SERPL-MCNC: 6.3 G/DL — SIGNIFICANT CHANGE UP (ref 6–8)
PROT UR-MCNC: ABNORMAL
RBC # BLD: 4.39 M/UL — LOW (ref 4.7–6.1)
RBC # FLD: 14.3 % — SIGNIFICANT CHANGE UP (ref 11.5–14.5)
RBC CASTS # UR COMP ASSIST: 5 /HPF — HIGH (ref 0–4)
SARS-COV-2 IGG+IGM SERPL QL IA: >250 U/ML — HIGH
SARS-COV-2 IGG+IGM SERPL QL IA: POSITIVE
SODIUM SERPL-SCNC: 136 MMOL/L — SIGNIFICANT CHANGE UP (ref 135–146)
SP GR SPEC: 1.02 — SIGNIFICANT CHANGE UP (ref 1.01–1.03)
SPECIMEN SOURCE: SIGNIFICANT CHANGE UP
TROPONIN T SERPL-MCNC: 0.03 NG/ML — CRITICAL HIGH
UROBILINOGEN FLD QL: ABNORMAL
WBC # BLD: 5.1 K/UL — SIGNIFICANT CHANGE UP (ref 4.8–10.8)
WBC # FLD AUTO: 5.1 K/UL — SIGNIFICANT CHANGE UP (ref 4.8–10.8)
WBC UR QL: 1 /HPF — SIGNIFICANT CHANGE UP (ref 0–5)

## 2021-07-07 PROCEDURE — 71045 X-RAY EXAM CHEST 1 VIEW: CPT | Mod: 26

## 2021-07-07 PROCEDURE — 99232 SBSQ HOSP IP/OBS MODERATE 35: CPT

## 2021-07-07 PROCEDURE — 76705 ECHO EXAM OF ABDOMEN: CPT | Mod: 26

## 2021-07-07 RX ORDER — ACETAMINOPHEN 500 MG
650 TABLET ORAL ONCE
Refills: 0 | Status: COMPLETED | OUTPATIENT
Start: 2021-07-07 | End: 2021-07-07

## 2021-07-07 RX ORDER — INSULIN LISPRO 100/ML
VIAL (ML) SUBCUTANEOUS
Refills: 0 | Status: DISCONTINUED | OUTPATIENT
Start: 2021-07-07 | End: 2021-07-10

## 2021-07-07 RX ADMIN — Medication 650 MILLIGRAM(S): at 16:38

## 2021-07-07 RX ADMIN — ATORVASTATIN CALCIUM 20 MILLIGRAM(S): 80 TABLET, FILM COATED ORAL at 21:45

## 2021-07-07 RX ADMIN — SODIUM CHLORIDE 75 MILLILITER(S): 9 INJECTION INTRAMUSCULAR; INTRAVENOUS; SUBCUTANEOUS at 22:58

## 2021-07-07 RX ADMIN — INSULIN GLARGINE 40 UNIT(S): 100 INJECTION, SOLUTION SUBCUTANEOUS at 08:09

## 2021-07-07 RX ADMIN — Medication 30 UNIT(S): at 08:07

## 2021-07-07 RX ADMIN — LATANOPROST 1 DROP(S): 0.05 SOLUTION/ DROPS OPHTHALMIC; TOPICAL at 21:46

## 2021-07-07 RX ADMIN — CEFTRIAXONE 100 MILLIGRAM(S): 500 INJECTION, POWDER, FOR SOLUTION INTRAMUSCULAR; INTRAVENOUS at 05:59

## 2021-07-07 RX ADMIN — Medication 30 MILLILITER(S): at 12:48

## 2021-07-07 RX ADMIN — Medication 650 MILLIGRAM(S): at 21:46

## 2021-07-07 NOTE — PROGRESS NOTE ADULT - ASSESSMENT
66 y/o m w/ pmhx of DM, HTN, HLD, glaucoma, CKD III, nephrolithiasis, renal mass s/p R nephrectomy, obesity, MSSA bacteremia, COVID positive on 12/10, L kidney with staghorn calculus and multiple calculi presents with hematuria since yesterday associated with fever tmax of 102.7 at home.  Pt went to urgent care yesterday and was told to come to the ED, but wanted to wait until to day to see how he was feeling. Hematuria resolved this AM per pt.  Denies n/v, cp, sob, pleuritic chest pain, palpitations, diaphoresis, cough, abd/flank pain, diarrhea, constipation, melena/brbpr, dysuria, burning upon urination, penile pain/discharge, testicular pain/swelling/erythema, rectal pain or tenesmus, sick contacts, recent travel or rash.       #Fever and hematuria (now resolved)  - Hx of nephrolithiasis (takes 1 tablespoon of baking soda at home mixed with water)  - No sepsis on admission, afebrile, no leukocytosis   - CXR clear   - UCx and BCx shows NGTD  - CT A/P showed no evidence of acute intra-abdominal pathology. s/p right nephrectomy. Left staghorn calculus, difficult to measure, measuring approximately 3.3 x 1.3 x 1.3 cm, and appearing slightly decreased in size since prior CT. No left hydronephrosis. Left lower pole cyst.  - c/w ceftriaxone (7/6 - ); s/p one-time cefepime dose in ED  - Urology consulted, no surgical intervention recommended at this time (pt will f/u outpatient w/ Dr. Falk)      #BATOOL on CKD III   - Creatine around 1.4-1.7 at baseline per pt  - No left hydronephrosis   - Renal mass s/p R nephrectomy in 2017  - Avoid nephrotoxic meds   - Monitor BUN/Cr  - Recently started seeing Dr. Hurst as an o/p  - hold olmesartan for now    #Transaminitis   - Now w/ RUQ abdominal pain   - T Bilirubin 1.2 on AM labs  - ALT 91 /   - continue to trend LFTs   - RUQ U/S Common bile duct measures 4 mm. Cholelithiasis without sonographic evidence of acute cholecystitis.    #Troponemia likely secondary to CKD   - 0.05 on admission, chronically elevated up to 0.09 on previous admission  - Repeat troponin 0.03  - Denies chest pain   - No new changes on EKG     # DM II  - On Insulin at home   - trend FS  - c/w home dose of insulin Lantus 70u at bedtime and 40 u in AM, Lispro 30U TID     # HTN - controlled  - c/w amlodipine    # HLD   -c/w atorvastatin    # Glaucoma  - c/w timolol and latanoprost     # Morbid obesity   BMI - 53.8  Diet and exercise counselling provided     # Diet: DASH/TLC/CC  # DVT Prophylaxis: lovenox   # GI Prophylaxis: Protonix  # Activity: IAT  # Dispo: Acute  # Code Status: Full code 66 y/o m w/ pmhx of DM, HTN, HLD, glaucoma, CKD III, nephrolithiasis, renal mass s/p R nephrectomy, obesity, MSSA bacteremia, COVID positive on 12/10, L kidney with staghorn calculus and multiple calculi presents with hematuria since yesterday associated with fever tmax of 102.7 at home.  Pt went to urgent care yesterday and was told to come to the ED, but wanted to wait until to day to see how he was feeling. Hematuria resolved this AM per pt.  Denies n/v, cp, sob, pleuritic chest pain, palpitations, diaphoresis, cough, abd/flank pain, diarrhea, constipation, melena/brbpr, dysuria, burning upon urination, penile pain/discharge, testicular pain/swelling/erythema, rectal pain or tenesmus, sick contacts, recent travel or rash.       #Fever and hematuria (now resolved)  - Hx of nephrolithiasis (takes 1 tablespoon of baking soda at home mixed with water)  - No sepsis on admission, afebrile, no leukocytosis   - CXR clear   - UCx and BCx shows NGTD  - CT A/P showed no evidence of acute intra-abdominal pathology. s/p right nephrectomy. Left staghorn calculus, difficult to measure, measuring approximately 3.3 x 1.3 x 1.3 cm, and appearing slightly decreased in size since prior CT. No left hydronephrosis. Left lower pole cyst.  - c/w ceftriaxone (7/6 - ); s/p one-time cefepime dose in ED  - Urology consulted, no surgical intervention recommended at this time (pt will f/u outpatient w/ Dr. Falk)      #BATOOL on CKD III   - Creatine around 1.4-1.7 at baseline per pt  - No left hydronephrosis   - Renal mass s/p R nephrectomy in 2017  - Avoid nephrotoxic meds   - Monitor BUN/Cr  - Recently started seeing Dr. Hurst as an o/p  - hold olmesartan for now    #Transaminitis   - Now w/ RUQ abdominal pain   - T Bilirubin 1.2 on AM labs  - ALT 91 /   - continue to trend LFTs   - RUQ U/S Common bile duct measures 4 mm. Cholelithiasis without sonographic evidence of acute cholecystitis.    #Troponemia likely secondary to CKD   - 0.05 on admission, chronically elevated up to 0.09 on previous admission  - Repeat troponin 0.03  - Denies chest pain   - No new changes on EKG     # DM II  - A1c 7.5%  - On Insulin at home   - trend FS  - c/w home dose of insulin Lantus 70u at bedtime and 40 u in AM, Lispro 30U TID     # HTN - controlled  - c/w amlodipine    # HLD   -c/w atorvastatin    # Glaucoma  - c/w timolol and latanoprost     # Morbid obesity   BMI - 53.8  Diet and exercise counselling provided     # Diet: DASH/TLC/CC  # DVT Prophylaxis: lovenox   # GI Prophylaxis: Protonix  # Activity: IAT  # Dispo: Acute  # Code Status: Full code

## 2021-07-07 NOTE — PROGRESS NOTE ADULT - SUBJECTIVE AND OBJECTIVE BOX
************************************************  Tejas Weiner MD (PGY-1)  Spectra: [phone]  ************************************************    SUBJECTIVE / OVERNIGHT EVENTS  Patient slept well overnight. This AM, pt reports RUQ abdominal pain that started a few hours after eating, but transiently relieved when burping. Otherwise, no other acute complaints reported this AM. Pt able to urinate with no blood noted in urine. Patient does not report fevers, chills, CP, SOB, or n/v/d/c.    ========================MEDICATIONS========================    atorvastatin   20 milliGRAM(s) Oral (21 @ 21:53)    cefTRIAXone   IVPB   100 mL/Hr IV Intermittent (21 @ 05:59)    insulin glargine Injectable (LANTUS)   40 Unit(s) SubCutaneous (21 @ 08:09)    insulin lispro Injectable (ADMELOG)   30 Unit(s) SubCutaneous (21 @ 08:07)   30 Unit(s) SubCutaneous (21 @ 11:48)    latanoprost 0.005% Ophthalmic Solution   1 Drop(s) Both EYES (21 @ 21:53)      ========================VITALS/EXAM========================  VITALS  T(C): 35.8 (21 @ 05:30), Max: 36.4 (21 @ 20:29)  HR: 71 (21 @ 05:30) (70 - 78)  BP: 132/63 (21 @ 05:30) (131/77 - 139/65)  RR: 18 (21 @ 05:30) (18 - 18)  SpO2: --  POCT Blood Glucose.: 142 mg/dL (21 @ 10:57)  POCT Blood Glucose.: 185 mg/dL (21 @ 07:22)  POCT Blood Glucose.: 125 mg/dL (21 @ 20:39)  POCT Blood Glucose.: 83 mg/dL (21 @ 16:17)    PHYSICAL EXAM  GENERAL: NAD, large body habitus  CHEST/LUNG: Clear to auscultation bilaterally; No wheezes, rales or rhonchi  HEART: Regular rate and rhythm; No murmurs, rubs, or gallops  ABDOMEN: Soft, Nontender, Nondistended; Bowel sounds present, no masses.  EXTREMITIES:  2+ Peripheral Pulses, No clubbing, cyanosis, or edema    =============================I/O=============================     21 @ 07:01  -  21 @ 07:00  --------------------------------------------------------  IN:    sodium chloride 0.9%: 900 mL  Total IN: 900 mL    OUT:    Voided (mL): 500 mL  Total OUT: 500 mL    Total NET: 400 mL    ============================LABS============================             12.5<L>  5.10  )-----------( 154      ( 21 @ 06:48 )             38.0<L>    136  |  105  |  39<H>  ----------------------------<  194<H>   21 @ 06:48  4.4  |  21  |  1.8<H>    Ca      8.6     21 @ 06:48  Mg     2.0     21 @ 06:48    TPro  6.3  /  Alb  3.5  /  TBili  1.2  /  DBili  x   /  AST  91<H>  /  ALT  135<H>  /  AlkPhos  181<H>  /  GGT  x     21 @ 06:48    PT/INR - ( 21 @ 10:59 )   PT: 13.00 sec<H>;   INR: 1.13 ratio  PTT - ( 21 @ 10:59 )  PTT:29.8 sec    Blood Gas Venous - Lactate: 1.4 mmoL/L (21 @ 21:29)    Urinalysis Basic - ( 2021 16:34 )    Color: Yellow / Appearance: Clear / S.015 / pH: x  Gluc: x / Ketone: Trace  / Bili: Negative / Urobili: 3 mg/dL   Blood: x / Protein: 30 mg/dL / Nitrite: Negative   Leuk Esterase: Negative / RBC: 3 /HPF / WBC 2 /HPF   Sq Epi: x / Non Sq Epi: 1 /HPF / Bacteria: Negative      =======================Micro/Rad/Cardio=======================  CULTURES    Culture - Blood (collected 21 @ 16:38)  Source: .Blood Blood-Peripheral  Preliminary Report:    No growth to date.    Culture - Urine (collected 21 @ 16:34)  Source: .Urine Clean Catch (Midstream)  Final Report:    <10,000 CFU/mL Normal Urogenital Priti    Culture - Blood (collected 21 @ 16:34)  Source: .Blood Blood-Peripheral  Preliminary Report:    No growth to date.      IMAGING    < from: US Renal (21 @ 12:29) >  IMPRESSION:    Post right nephrectomy.    Left sided calculi measuring up to 1.4 cm. No left-sided hydronephrosis.    < end of copied text >    < from: US Abdomen Upper Quadrant Right (21 @ 01:37) >  IMPRESSION:    Cholelithiasis without sonographic evidence of acute cholecystitis.    < end of copied text >

## 2021-07-07 NOTE — DISCHARGE NOTE PROVIDER - PROVIDER TOKENS
PROVIDER:[TOKEN:[[id number]],FOLLOWUP:[2 weeks]] PROVIDER:[TOKEN:[[id number]],FOLLOWUP:[2 weeks]],PROVIDER:[TOKEN:[[id number]],FOLLOWUP:[2 weeks]] PROVIDER:[TOKEN:[[id number]],FOLLOWUP:[1 week],ESTABLISHEDPATIENT:[T]],PROVIDER:[TOKEN:[[id number]],FOLLOWUP:[2 weeks],ESTABLISHEDPATIENT:[T]],FREE:[LAST:[Falk],FIRST:[Mantu],PHONE:[phone]],FAX:[(   )    -],ADDRESS:[Urology  [address] [place] [address]],FOLLOWUP:[1 week],ESTABLISHEDPATIENT:[T]]

## 2021-07-07 NOTE — DISCHARGE NOTE PROVIDER - CARE PROVIDER_API CALL
Mayo Scott  INTERNAL MEDICINE  [address] [place] [address]  Phone: [phone]  Fax: [phone]  Follow Up Time: 2 weeks   Mayo Scott  INTERNAL MEDICINE  [address] [place] [address]  Phone: [phone]  Fax: [phone]  Follow Up Time: 2 weeks    Mark Hurst  INTERNAL MEDICINE  [address] [facility] [facility] [address]  [place] [address]  Phone: [phone]  Fax: [phone]  Follow Up Time: 2 weeks   Mayo Scott  INTERNAL MEDICINE  [address] [place] [address]  Phone: [phone]  Fax: [phone]  Established Patient  Follow Up Time: 1 week    Mark Hurst  INTERNAL MEDICINE  [address] [facility], [facility] [address]  [place] [address]  Phone: [phone]  Fax: [phone]  Established Patient  Follow Up Time: 2 weeks    Nancy Falk  Urology  [address] [place] [address]  Phone: [phone]  Fax: (   )    -  Established Patient  Follow Up Time: 1 week

## 2021-07-07 NOTE — DISCHARGE NOTE PROVIDER - CARE PROVIDERS DIRECT ADDRESSES
,[email or web address] ,[email or web address],DirectAddress_Unknown ,[email or web address],DirectAddress_Unknown,[email or web address]

## 2021-07-07 NOTE — DISCHARGE NOTE PROVIDER - NSDCMRMEDTOKEN_GEN_ALL_CORE_FT
amlodipine-olmesartan 5 mg-40 mg oral tablet: 1 tab(s) orally once a day  atorvastatin 20 mg oral tablet: 1 tab(s) orally once a day  Cosopt 2.23%-0.68% ophthalmic solution: 1 drop(s) to each affected eye 2 times a day  HumaLOG 100 units/mL subcutaneous solution: 30 unit(s) subcutaneous 3 times a day (before meals)  insulin glargine 100 units/mL subcutaneous solution: 70 units in morning and 40 units at night  Lumigan 0.01% ophthalmic solution: 1 drop(s) to each affected eye once a day (in the evening)  pantoprazole 40 mg oral delayed release tablet: 1 tab(s) orally once a day    amlodipine-olmesartan 5 mg-40 mg oral tablet: 1 tab(s) orally once a day  atorvastatin 20 mg oral tablet: 1 tab(s) orally once a day  Bactrim  mg-160 mg oral tablet: 1 tab(s) orally 3 times a week   Cosopt 2.23%-0.68% ophthalmic solution: 1 drop(s) to each affected eye 2 times a day  HumaLOG 100 units/mL subcutaneous solution: 30 unit(s) subcutaneous 3 times a day (before meals)  insulin glargine 100 units/mL subcutaneous solution: 70 units in morning and 40 units at night  Lumigan 0.01% ophthalmic solution: 1 drop(s) to each affected eye once a day (in the evening)  pantoprazole 40 mg oral delayed release tablet: 1 tab(s) orally once a day    amlodipine-olmesartan 5 mg-40 mg oral tablet: 1 tab(s) orally once a day  atorvastatin 20 mg oral tablet: 1 tab(s) orally once a day  Bactrim  mg-160 mg oral tablet: 1 tab(s) orally 3 times a week start after finishing 1 week of Vantin (7/11/21)  cefpodoxime 200 mg oral tablet: 1 tab(s) orally 2 times a day   Cosopt 2.23%-0.68% ophthalmic solution: 1 drop(s) to each affected eye 2 times a day  HumaLOG 100 units/mL subcutaneous solution: 30 unit(s) subcutaneous 3 times a day (before meals)  insulin glargine 100 units/mL subcutaneous solution: 70 units in morning and 40 units at night  Lumigan 0.01% ophthalmic solution: 1 drop(s) to each affected eye once a day (in the evening)  pantoprazole 40 mg oral delayed release tablet: 1 tab(s) orally once a day

## 2021-07-07 NOTE — DISCHARGE NOTE PROVIDER - HOSPITAL COURSE
66 y/o m w/ pmhx of DM, HTN, HLD, glaucoma, CKD III, nephrolithiasis, renal mass s/p R nephrectomy, obesity, MSSA bacteremia, COVID positive on 12/10, L kidney with staghorn calculus and multiple calculi presents with hematuria since yesterday associated with fever tmax of 102.7 at home.  Pt went to urgent care yesterday and was told to come to the ED, but wanted to wait until to day to see how he was feeling. Hematuria resolved this AM per pt.  Denies n/v, cp, sob, pleuritic chest pain, palpitations, diaphoresis, cough, abd/flank pain, diarrhea, constipation, melena/brbpr, dysuria, burning upon urination, penile pain/discharge, testicular pain/swelling/erythema, rectal pain or tenesmus, sick contacts, recent travel or rash.     Pt also endorses itching that started today.     In ED, VS wnl, pt afebrile. UA negative, Labs wnl. CT A/P No evidence of acute intra-abdominal pathology. Status post right nephrectomy. Left staghorn calculus, difficult to measure, measuring approximately 3.3 x 1.3 x 1.3 cm, and appearing slightly decreased in size since prior CT. No left hydronephrosis. Left lower pole cyst.    s/p cefepime and 2L LR in the ED.     64 y/o m w/ pmhx of DM, HTN, HLD, glaucoma, CKD III, nephrolithiasis, renal mass s/p R nephrectomy, obesity, MSSA bacteremia, COVID positive on 12/10, L kidney with staghorn calculus and multiple calculi presents with hematuria since yesterday associated with fever tmax of 102.7 at home.  Pt went to urgent care yesterday and was told to come to the ED, but wanted to wait until to day to see how he was feeling. Hematuria resolved this AM per pt.  Denies n/v, cp, sob, pleuritic chest pain, palpitations, diaphoresis, cough, abd/flank pain, diarrhea, constipation, melena/brbpr, dysuria, burning upon urination, penile pain/discharge, testicular pain/swelling/erythema, rectal pain or tenesmus, sick contacts, recent travel or rash.     In ED, VS wnl, pt afebrile. UA negative, Labs wnl. CT A/P No evidence of acute intra-abdominal pathology. Status post right nephrectomy. Left staghorn calculus, difficult to measure, measuring approximately 3.3 x 1.3 x 1.3 cm, and appearing slightly decreased in size since prior CT. No left hydronephrosis. Left lower pole cyst. s/p cefepime and 2L LR in the ED.    Patient was evaluated by Urology, no acute surgical intervention. Patient can follow up as outpatient with his private urologist.   Urinanalysis and Urine cultures were negative. He completed 3 days of antibiotics    Patient is medically stable and safe to be discharged home. 64 y/o m w/ pmhx of DM, HTN, HLD, glaucoma, CKD III, nephrolithiasis, renal mass s/p R nephrectomy, obesity, MSSA bacteremia, COVID positive on 12/10, L kidney with staghorn calculus and multiple calculi presents with hematuria since yesterday associated with fever tmax of 102.7 at home.  Pt went to urgent care yesterday and was told to come to the ED, but wanted to wait until to day to see how he was feeling. Hematuria resolved this AM per pt.  Denies n/v, cp, sob, pleuritic chest pain, palpitations, diaphoresis, cough, abd/flank pain, diarrhea, constipation, melena/brbpr, dysuria, burning upon urination, penile pain/discharge, testicular pain/swelling/erythema, rectal pain or tenesmus, sick contacts, recent travel or rash.     In ED, VS wnl, pt afebrile. UA negative, Labs wnl. CT A/P No evidence of acute intra-abdominal pathology. Status post right nephrectomy. Left staghorn calculus, difficult to measure, measuring approximately 3.3 x 1.3 x 1.3 cm, and appearing slightly decreased in size since prior CT. No left hydronephrosis. Left lower pole cyst. s/p cefepime and 2L LR in the ED.    Patient was evaluated by Urology, no acute surgical intervention. Patient can follow up as outpatient with his private urologist.   Urinanalysis and Urine cultures were negative. He received 5 days of ceftriaxone. Will transition to cefpodoxime for an additional 7 days to treat complicated UTI given hx of staghorn calculus.     Hospitalization complicated by repeat fever 101.4 on 7/7 evening a/w RUQ abdominal pain.     Patient is medically stable and safe to be discharged home. 64 y/o m w/ pmhx of DM, HTN, HLD, glaucoma, CKD III, nephrolithiasis, renal mass s/p R nephrectomy, obesity, MSSA bacteremia, COVID positive on 12/10, L kidney with staghorn calculus and multiple calculi presents with hematuria since yesterday associated with fever tmax of 102.7 at home.  Pt went to urgent care yesterday and was told to come to the ED, but wanted to wait until to day to see how he was feeling. Hematuria resolved this AM per pt.  Denies n/v, cp, sob, pleuritic chest pain, palpitations, diaphoresis, cough, abd/flank pain, diarrhea, constipation, melena/brbpr, dysuria, burning upon urination, penile pain/discharge, testicular pain/swelling/erythema, rectal pain or tenesmus, sick contacts, recent travel or rash.     In ED, VS wnl, pt afebrile. UA negative, Labs wnl. CT A/P No evidence of acute intra-abdominal pathology. Status post right nephrectomy. Left staghorn calculus, difficult to measure, measuring approximately 3.3 x 1.3 x 1.3 cm, and appearing slightly decreased in size since prior CT. No left hydronephrosis. Left lower pole cyst. s/p cefepime and 2L LR in the ED.    Patient was evaluated by Urology, no acute surgical intervention. Patient can follow up as outpatient with his private urologist.   Urinanalysis and Urine cultures were negative. He received 5 days of ceftriaxone. Will transition to cefpodoxime for an additional 7 days to treat complicated UTI given hx of staghorn calculus.     Hospitalization complicated by repeat fever 101.4 on 7/7 evening a/w RUQ abdominal pain and rising LFT's and T. bili. RUQ U/S and MRCP performed, both of which demonstrated cholelithiasis, but no acute cholecystitis. Pt instructed to f/u surgery after discharge for possible outpatient cholecystectomy.     Patient is medically stable and safe to be discharged home.

## 2021-07-07 NOTE — DISCHARGE NOTE PROVIDER - NSDCCPCAREPLAN_GEN_ALL_CORE_FT
PRINCIPAL DISCHARGE DIAGNOSIS  Diagnosis: Nephrolithiasis  Assessment and Plan of Treatment: You were admitted to the hospital for fevers and hematuria. You were noted to have kidney stones. You were evaluated by Urology who recommended no acute surgical intervention at this time. Follow up with your Urologist      SECONDARY DISCHARGE DIAGNOSES  Diagnosis: Cholelithiases  Assessment and Plan of Treatment:     Diagnosis: Transaminitis  Assessment and Plan of Treatment:      PRINCIPAL DISCHARGE DIAGNOSIS  Diagnosis: Nephrolithiasis  Assessment and Plan of Treatment: You were admitted to the hospital for fevers and hematuria. You were noted to have kidney stones. You were evaluated by Urology who recommended no acute surgical intervention at this time. Follow up with your Urologist. You will likely need some type of intervention as outpatient in discussion with your urologist.      SECONDARY DISCHARGE DIAGNOSES  Diagnosis: Cholelithiases  Assessment and Plan of Treatment: You were noted to have gallstones, although no acute cholecystitis.    Diagnosis: Transaminitis  Assessment and Plan of Treatment:      PRINCIPAL DISCHARGE DIAGNOSIS  Diagnosis: Nephrolithiasis  Assessment and Plan of Treatment: You were admitted to the hospital for fevers and hematuria. You were noted to have a staghorn calculus in your kidney, also known as nephrolithiasis. You were evaluated by the inpatient urology team who recommended no surgical intervention at this time. You were started on antibiotics (ceftriaxone) during your hospitalization to treat a urinary tract infection caused by this kidney stone. Please continue to take antibiotics (cefpodoxime) for seven more days after you are discharged from the hospital.  Once you finish these antibiotics, you will be started on another antibiotic as a preventative measure to prevent future infections. This antiobiotic is called Bactrim DS. Please start this medication AFTER you finish cefpodoxime. Please take this medications three times a week. Additionally, follow up with you nephrologist (Dr. Hurst) within one week after discharge from the hospital.  Please also follow up with your urologist (Dr. Falk) once you are discharged from the hospital. You will likely need some type of intervention as an outpatient in discussion with your urologist.      SECONDARY DISCHARGE DIAGNOSES  Diagnosis: Cholelithiases  Assessment and Plan of Treatment: You were noted to have gallstones in your gall bladder, but no evidence of gall bladder infection, also known as acute cholecystitis. However, given the fever, abdominal pain, and elevated liver enzymes you experienced during your stay, you likely had a blockage in your bile duct that resolved on its own. Please follow up with your primary care physician within one week of discharge from the hospital and have them order labs to recheck your liver enzymes to ensure they return to normal. Additionally, please follow up with a general surgeon after you are discharged from the hospital to discuss the possibility of having your gall bladder removed (also known as a cholecystectomy) to prevent future similar episodes from happening.

## 2021-07-08 LAB
ALBUMIN SERPL ELPH-MCNC: 3.7 G/DL — SIGNIFICANT CHANGE UP (ref 3.5–5.2)
ALP SERPL-CCNC: 351 U/L — HIGH (ref 30–115)
ALT FLD-CCNC: 193 U/L — HIGH (ref 0–41)
ANION GAP SERPL CALC-SCNC: 14 MMOL/L — SIGNIFICANT CHANGE UP (ref 7–14)
AST SERPL-CCNC: 168 U/L — HIGH (ref 0–41)
BASOPHILS # BLD AUTO: 0.04 K/UL — SIGNIFICANT CHANGE UP (ref 0–0.2)
BASOPHILS NFR BLD AUTO: 0.6 % — SIGNIFICANT CHANGE UP (ref 0–1)
BILIRUB SERPL-MCNC: 3.2 MG/DL — HIGH (ref 0.2–1.2)
BUN SERPL-MCNC: 34 MG/DL — HIGH (ref 10–20)
CALCIUM SERPL-MCNC: 8.9 MG/DL — SIGNIFICANT CHANGE UP (ref 8.5–10.1)
CHLORIDE SERPL-SCNC: 101 MMOL/L — SIGNIFICANT CHANGE UP (ref 98–110)
CO2 SERPL-SCNC: 18 MMOL/L — SIGNIFICANT CHANGE UP (ref 17–32)
CREAT SERPL-MCNC: 1.8 MG/DL — HIGH (ref 0.7–1.5)
EOSINOPHIL # BLD AUTO: 0.1 K/UL — SIGNIFICANT CHANGE UP (ref 0–0.7)
EOSINOPHIL NFR BLD AUTO: 1.4 % — SIGNIFICANT CHANGE UP (ref 0–8)
GLUCOSE BLDC GLUCOMTR-MCNC: 125 MG/DL — HIGH (ref 70–99)
GLUCOSE BLDC GLUCOMTR-MCNC: 145 MG/DL — HIGH (ref 70–99)
GLUCOSE BLDC GLUCOMTR-MCNC: 187 MG/DL — HIGH (ref 70–99)
GLUCOSE BLDC GLUCOMTR-MCNC: 210 MG/DL — HIGH (ref 70–99)
GLUCOSE BLDC GLUCOMTR-MCNC: 82 MG/DL — SIGNIFICANT CHANGE UP (ref 70–99)
GLUCOSE SERPL-MCNC: 191 MG/DL — HIGH (ref 70–99)
HCT VFR BLD CALC: 42 % — SIGNIFICANT CHANGE UP (ref 42–52)
HGB BLD-MCNC: 13.9 G/DL — LOW (ref 14–18)
IMM GRANULOCYTES NFR BLD AUTO: 0.4 % — HIGH (ref 0.1–0.3)
LYMPHOCYTES # BLD AUTO: 1.34 K/UL — SIGNIFICANT CHANGE UP (ref 1.2–3.4)
LYMPHOCYTES # BLD AUTO: 18.6 % — LOW (ref 20.5–51.1)
MAGNESIUM SERPL-MCNC: 2.1 MG/DL — SIGNIFICANT CHANGE UP (ref 1.8–2.4)
MCHC RBC-ENTMCNC: 28.7 PG — SIGNIFICANT CHANGE UP (ref 27–31)
MCHC RBC-ENTMCNC: 33.1 G/DL — SIGNIFICANT CHANGE UP (ref 32–37)
MCV RBC AUTO: 86.8 FL — SIGNIFICANT CHANGE UP (ref 80–94)
MONOCYTES # BLD AUTO: 0.9 K/UL — HIGH (ref 0.1–0.6)
MONOCYTES NFR BLD AUTO: 12.5 % — HIGH (ref 1.7–9.3)
NEUTROPHILS # BLD AUTO: 4.8 K/UL — SIGNIFICANT CHANGE UP (ref 1.4–6.5)
NEUTROPHILS NFR BLD AUTO: 66.5 % — SIGNIFICANT CHANGE UP (ref 42.2–75.2)
NRBC # BLD: 0 /100 WBCS — SIGNIFICANT CHANGE UP (ref 0–0)
PLATELET # BLD AUTO: 187 K/UL — SIGNIFICANT CHANGE UP (ref 130–400)
POTASSIUM SERPL-MCNC: 4.4 MMOL/L — SIGNIFICANT CHANGE UP (ref 3.5–5)
POTASSIUM SERPL-SCNC: 4.4 MMOL/L — SIGNIFICANT CHANGE UP (ref 3.5–5)
PROT SERPL-MCNC: 6.9 G/DL — SIGNIFICANT CHANGE UP (ref 6–8)
RBC # BLD: 4.84 M/UL — SIGNIFICANT CHANGE UP (ref 4.7–6.1)
RBC # FLD: 14.5 % — SIGNIFICANT CHANGE UP (ref 11.5–14.5)
SODIUM SERPL-SCNC: 133 MMOL/L — LOW (ref 135–146)
WBC # BLD: 7.21 K/UL — SIGNIFICANT CHANGE UP (ref 4.8–10.8)
WBC # FLD AUTO: 7.21 K/UL — SIGNIFICANT CHANGE UP (ref 4.8–10.8)

## 2021-07-08 PROCEDURE — 99232 SBSQ HOSP IP/OBS MODERATE 35: CPT

## 2021-07-08 PROCEDURE — 99223 1ST HOSP IP/OBS HIGH 75: CPT

## 2021-07-08 RX ORDER — TIMOLOL 0.5 %
1 DROPS OPHTHALMIC (EYE) DAILY
Refills: 0 | Status: DISCONTINUED | OUTPATIENT
Start: 2021-07-08 | End: 2021-07-09

## 2021-07-08 RX ORDER — DORZOLAMIDE HYDROCHLORIDE 20 MG/ML
1 SOLUTION/ DROPS OPHTHALMIC THREE TIMES A DAY
Refills: 0 | Status: DISCONTINUED | OUTPATIENT
Start: 2021-07-08 | End: 2021-07-09

## 2021-07-08 RX ADMIN — CEFTRIAXONE 100 MILLIGRAM(S): 500 INJECTION, POWDER, FOR SOLUTION INTRAMUSCULAR; INTRAVENOUS at 05:51

## 2021-07-08 RX ADMIN — DORZOLAMIDE HYDROCHLORIDE 1 DROP(S): 20 SOLUTION/ DROPS OPHTHALMIC at 17:10

## 2021-07-08 RX ADMIN — Medication 30 UNIT(S): at 18:14

## 2021-07-08 RX ADMIN — AMLODIPINE BESYLATE 5 MILLIGRAM(S): 2.5 TABLET ORAL at 05:51

## 2021-07-08 RX ADMIN — PANTOPRAZOLE SODIUM 40 MILLIGRAM(S): 20 TABLET, DELAYED RELEASE ORAL at 05:51

## 2021-07-08 RX ADMIN — LATANOPROST 1 DROP(S): 0.05 SOLUTION/ DROPS OPHTHALMIC; TOPICAL at 21:31

## 2021-07-08 RX ADMIN — ATORVASTATIN CALCIUM 20 MILLIGRAM(S): 80 TABLET, FILM COATED ORAL at 21:30

## 2021-07-08 RX ADMIN — DORZOLAMIDE HYDROCHLORIDE 1 DROP(S): 20 SOLUTION/ DROPS OPHTHALMIC at 21:30

## 2021-07-08 RX ADMIN — SODIUM CHLORIDE 75 MILLILITER(S): 9 INJECTION INTRAMUSCULAR; INTRAVENOUS; SUBCUTANEOUS at 08:35

## 2021-07-08 RX ADMIN — Medication 2: at 08:28

## 2021-07-08 RX ADMIN — Medication 30 UNIT(S): at 08:27

## 2021-07-08 RX ADMIN — INSULIN GLARGINE 40 UNIT(S): 100 INJECTION, SOLUTION SUBCUTANEOUS at 08:29

## 2021-07-08 RX ADMIN — Medication 1 DROP(S): at 17:11

## 2021-07-08 NOTE — PROGRESS NOTE ADULT - SUBJECTIVE AND OBJECTIVE BOX
patient seen and examined earlier today on rounds with resident and nurse.  over night had episode of severe RUQ abd. pain and fever.  also c/o itching and noticed that his eyes turned yellow.  this am patient has no pain, no fever, no chills.  no n/v  Vital Signs Last 24 Hrs  T(C): 36 (08 Jul 2021 13:21), Max: 38.6 (07 Jul 2021 16:32)  T(F): 96.8 (08 Jul 2021 13:21), Max: 101.4 (07 Jul 2021 16:32)  HR: 66 (08 Jul 2021 13:21) (66 - 80)  BP: 126/58 (08 Jul 2021 13:21) (126/58 - 172/81)  RR: 20 (08 Jul 2021 05:31) (18 - 20)    conj pink, pos. jaundice today  neck supple.  no JVD  lungs clear to auscultation .  good air entry bilaterally  heart regular rate and rhythm. no murmur heard  abd. obese BS pos. soft nontender. - specifically no tenderness mid-epigastrium or RUQ.  neg. Carmichael sign    no CVA tenderness  extremities no edema.  good skin turgor noted  Labs                                 13.9   7.21  )-----------( 187      ( 08 Jul 2021 07:14 )             42.0   07-08    133<L>  |  101  |  34<H>  ----------------------------<  191<H>  4.4   |  18  |  1.8<H>    Ca    8.9      08 Jul 2021 07:14  Mg     2.1     07-08    TPro  6.9  /  Alb  3.7  /  TBili  3.2<H>  /  DBili  x   /  AST  168<H>  /  ALT  193<H>  /  AlkPhos  351<H>  07-08      A: SEpsis present on admission  secondary to UTI - even with NG patient has s/s compatible with UTI  staghorn calculus in a solitary kidney  new onset fever/RUQ pain/abnormal LFT's in an obstructive pattern suspect choledocholelithiasis doubt cholangitis since patient no afebrile.  Diabetes mellitus - well controlled  BATOOL on CKD - improving back to baseline    P:order MRCP  Keep same antibiotics but check renal sono to make sure no anatomic abnormality interfering with response to antibiotics   continue IV ceftriaxone for now.  urology to address stag horn calculus = as outpatient after DC  ?chronic suppressive therapy - will speak to patient's nephrologist  continue IV fluids and monitor renal function .

## 2021-07-08 NOTE — PROGRESS NOTE ADULT - ASSESSMENT
65M w/ h/o DM, HTN, HLD, glaucoma, CKD III, nephrolithiasis, renal mass s/p R nephrectomy, obesity, MSSA bacteremia, COVID positive on 12/10, L kidney with staghorn calculus and multiple calculi presents with hematuria since yesterday associated with fever tmax of 102.7 at home.  Pt went to urgent care yesterday and was told to come to the ED, but wanted to wait until to day to see how he was feeling. Hematuria resolved this AM per pt.  Denies n/v, cp, sob, pleuritic chest pain, palpitations, diaphoresis, cough, abd/flank pain, diarrhea, constipation, melena/brbpr, dysuria, burning upon urination, penile pain/discharge, testicular pain/swelling/erythema, rectal pain or tenesmus, sick contacts, recent travel or rash.     #RUQ pain  #Acute transaminitis  - started 7/7 PM and a/w fever (tmax 101.4F)  - Rising liver enzymes (AST//193 from 91/135) and T Landon (3.2 from 1.2)  - Repeat abd u/s showed no acute pathology  - MRCP non-contrast today  - ERCP tomorrow if MRCP results significant  - GI following, recs appreciated    #Fever and hematuria  - Hx of nephrolithiasis (takes 1 tablespoon of baking soda at home mixed with water)  - No sepsis on admission, afebrile, no leukocytosis   - CXR clear   - UCx and BCx shows NGTD  - CT A/P showed no evidence of acute intra-abdominal pathology. s/p right nephrectomy. Left staghorn calculus, difficult to measure, measuring approximately 3.3 x 1.3 x 1.3 cm, and appearing slightly decreased in size since prior CT. No left hydronephrosis. Left lower pole cyst.  - c/w ceftriaxone (7/6 - ); s/p one-time cefepime dose in ED  - Urology consulted, no surgical intervention recommended at this time (pt will f/u outpatient w/ Dr. Falk)      #BATOOL on CKD III   - Creatine around 1.4-1.7 at baseline per pt  - No left hydronephrosis   - Renal mass s/p R nephrectomy in 2017  - Avoid nephrotoxic meds   - Monitor BUN/Cr  - Recently started seeing Dr. Hurst as an o/p  - hold olmesartan for now    #Troponemia likely secondary to CKD   - 0.05 on admission, chronically elevated up to 0.09 on previous admission  - Repeat troponin 0.03  - Denies chest pain   - No new changes on EKG     # DM II  - A1c 7.5%  - On Insulin at home   - trend FS  - c/w home dose of insulin Lantus 70u at bedtime and 40 u in AM, Lispro 30U TID     # HTN - controlled  - c/w amlodipine    # HLD   -c/w atorvastatin    # Glaucoma  - c/w timolol and latanoprost     # Morbid obesity   BMI - 53.8  Diet and exercise counselling provided     # Diet: DASH/TLC/CC  # DVT Prophylaxis: lovenox   # GI Prophylaxis: Protonix  # Activity: IAT  # Dispo: Acute  # Code Status: Full code

## 2021-07-08 NOTE — CONSULT NOTE ADULT - ATTENDING COMMENTS
Patient seen and examined, assessment and plan above
I personally saw and examined the patient, reviewed the chart and available data. I discussed the situation with the patient and PA staff. I also reviewed and/or amended the note as necessary.    patient seen on the day in question. Note not reviewed and cosigned until now due to scheduling issues

## 2021-07-08 NOTE — PROGRESS NOTE ADULT - SUBJECTIVE AND OBJECTIVE BOX
************************************************  Tejas Weiner MD (PGY-1)  Spectra: [phone]  ************************************************    SUBJECTIVE / OVERNIGHT EVENTS  Overnight, pt febrile to 101.4    ========================MEDICATIONS========================    acetaminophen   Tablet ..   650 milliGRAM(s) Oral (21 @ 16:38)    amLODIPine   Tablet   5 milliGRAM(s) Oral (21 @ 05:51)    atorvastatin   20 milliGRAM(s) Oral (21 @ 21:45)    cefTRIAXone   IVPB   100 mL/Hr IV Intermittent (21 @ 05:51)    insulin glargine Injectable (LANTUS)   40 Unit(s) SubCutaneous (21 @ 08:29)    insulin lispro (ADMELOG) corrective regimen sliding scale   2 Unit(s) SubCutaneous (21 @ 08:28)    insulin lispro Injectable (ADMELOG)   30 Unit(s) SubCutaneous (21 @ 08:27)    latanoprost 0.005% Ophthalmic Solution   1 Drop(s) Both EYES (21 @ 21:46)    pantoprazole    Tablet   40 milliGRAM(s) Oral (21 @ 05:51)    sodium chloride 0.9%.   75 mL/Hr IV Continuous (21 @ 22:58)      ========================VITALS/EXAM========================  VITALS  T(C): 36 (21 @ 13:21), Max: 38.6 (21 @ 16:32)  HR: 66 (21 @ 13:21) (66 - 80)  BP: 126/58 (21 @ 13:21) (126/58 - 172/81)  RR: 20 (21 @ 05:31) (18 - 20)  SpO2: --  POCT Blood Glucose.: 82 mg/dL (21 @ 11:27)  POCT Blood Glucose.: 210 mg/dL (21 @ 07:23)  POCT Blood Glucose.: 175 mg/dL (21 @ 21:04)  POCT Blood Glucose.: 186 mg/dL (21 @ 16:23)    PHYSICAL EXAM  GENERAL: NAD, well-developed  CHEST/LUNG: Clear to auscultation bilaterally; No wheezes, rales or rhonchi  HEART: Regular rate and rhythm; No murmurs, rubs, or gallops  ABDOMEN: Soft, Nontender, Nondistended; Bowel sounds present, no masses.  EXTREMITIES:  2+ Peripheral Pulses, No clubbing, cyanosis, or edema    =============================I/O=============================     21 @ 07:01  -  21 @ 07:00  --------------------------------------------------------  IN:    Oral Fluid: 140 mL    sodium chloride 0.9%: 525 mL  Total IN: 665 mL    OUT:  Total OUT: 0 mL    Total NET: 665 mL      ============================LABS============================             13.9<L>  7.21  )-----------( 187      ( 07-08-21 @ 07:14 )             42.0     133<L>  |  101  |  34<H>  ----------------------------<  191<H>   21 @ 07:14  4.4  |  18  |  1.8<H>    Ca      8.9     21 @ 07:14  Mg     2.1     21 @ 07:14    TPro  6.9  /  Alb  3.7  /  TBili  3.2<H>  /  DBili  x   /  AST  168<H>  /  ALT  193<H>  /  AlkPhos  351<H>  /  GGT  x     21 @ 07:14    Blood Gas Venous - Lactate: 1.4 mmoL/L (21 @ 21:29)    Urinalysis Basic - ( 2021 19:04 )    Color: Rina / Appearance: Clear / S.020 / pH: x  Gluc: x / Ketone: Small  / Bili: Small / Urobili: 3 mg/dL   Blood: x / Protein: 100 mg/dL / Nitrite: Negative   Leuk Esterase: Negative / RBC: 5 /HPF / WBC 1 /HPF   Sq Epi: x / Non Sq Epi: 2 /HPF / Bacteria: Negative      =======================Micro/Rad/Cardio=======================  Telemetry: Reviewed   EKG: Reviewed    CULTURES    Culture - Blood (collected 21 @ 16:38)  Source: .Blood Blood-Peripheral  Preliminary Report:    No growth to date.    Culture - Urine (collected 21 @ 16:34)  Source: .Urine Clean Catch (Midstream)  Final Report:    <10,000 CFU/mL Normal Urogenital Priti    Culture - Blood (collected 21 @ 16:34)  Source: .Blood Blood-Peripheral  Preliminary Report:    No growth to date.      IMAGING  Xray Chest 1 View-PORTABLE IMMEDIATE:   EXAM:  XR CHEST PORTABLE IMMED 1V            PROCEDURE DATE:  / <TRUNCATED> (21 @ 22:21)    CARDIOLOGY     ************************************************  Tejas Weiner MD (PGY-1)  Spectra: [phone]  ************************************************    SUBJECTIVE / OVERNIGHT EVENTS  Overnight, pt febrile to 101.4 and experienced RUQ. Sepsis workup performed (CXR, UA, UCx, and BCx) and abdominal u/s performed. This morning pt w/o fever and chills, but still with RUQ pain.    ========================MEDICATIONS========================    acetaminophen   Tablet ..   650 milliGRAM(s) Oral (21 @ 16:38)    amLODIPine   Tablet   5 milliGRAM(s) Oral (21 @ 05:51)    atorvastatin   20 milliGRAM(s) Oral (21 @ 21:45)    cefTRIAXone   IVPB   100 mL/Hr IV Intermittent (21 @ 05:51)    insulin glargine Injectable (LANTUS)   40 Unit(s) SubCutaneous (21 @ 08:29)    insulin lispro (ADMELOG) corrective regimen sliding scale   2 Unit(s) SubCutaneous (21 @ 08:28)    insulin lispro Injectable (ADMELOG)   30 Unit(s) SubCutaneous (21 @ 08:27)    latanoprost 0.005% Ophthalmic Solution   1 Drop(s) Both EYES (21 @ 21:46)    pantoprazole    Tablet   40 milliGRAM(s) Oral (21 @ 05:51)    sodium chloride 0.9%.   75 mL/Hr IV Continuous (21 @ 22:58)      ========================VITALS/EXAM========================  VITALS  T(C): 36 (21 @ 13:21), Max: 38.6 (21 @ 16:32)  HR: 66 (21 @ 13:21) (66 - 80)  BP: 126/58 (21 @ 13:21) (126/58 - 172/81)  RR: 20 (21 @ 05:31) (18 - 20)  SpO2: --  POCT Blood Glucose.: 82 mg/dL (21 @ 11:27)  POCT Blood Glucose.: 210 mg/dL (21 @ 07:23)  POCT Blood Glucose.: 175 mg/dL (21 @ 21:04)  POCT Blood Glucose.: 186 mg/dL (21 @ 16:23)    PHYSICAL EXAM  GENERAL: NAD, well-developed  CHEST/LUNG: Clear to auscultation bilaterally; No wheezes, rales or rhonchi  HEART: Regular rate and rhythm; No murmurs, rubs, or gallops  ABDOMEN: Soft, Nontender, Nondistended; Bowel sounds present, no masses.  EXTREMITIES:  2+ Peripheral Pulses, No clubbing, cyanosis, or edema    =============================I/O=============================     21 @ 07:01  -  21 @ 07:00  --------------------------------------------------------  IN:    Oral Fluid: 140 mL    sodium chloride 0.9%: 525 mL  Total IN: 665 mL    OUT:  Total OUT: 0 mL    Total NET: 665 mL      ============================LABS============================             13.9<L>  7.21  )-----------( 187      ( 21 @ 07:14 )             42.0     133<L>  |  101  |  34<H>  ----------------------------<  191<H>   21 @ 07:14  4.4  |  18  |  1.8<H>    Ca      8.9     21 @ 07:14  Mg     2.1     21 @ 07:14    TPro  6.9  /  Alb  3.7  /  TBili  3.2<H>  /  DBili  x   /  AST  168<H>  /  ALT  193<H>  /  AlkPhos  351<H>  /  GGT  x     21 @ 07:14    Blood Gas Venous - Lactate: 1.4 mmoL/L (21 @ 21:29)    Urinalysis Basic - ( 2021 19:04 )    Color: Rina / Appearance: Clear / S.020 / pH: x  Gluc: x / Ketone: Small  / Bili: Small / Urobili: 3 mg/dL   Blood: x / Protein: 100 mg/dL / Nitrite: Negative   Leuk Esterase: Negative / RBC: 5 /HPF / WBC 1 /HPF   Sq Epi: x / Non Sq Epi: 2 /HPF / Bacteria: Negative      =======================Micro/Rad/Cardio=======================  Telemetry: Reviewed   EKG: Reviewed    CULTURES    Culture - Blood (collected 21 @ 16:38)  Source: .Blood Blood-Peripheral  Preliminary Report:    No growth to date.    Culture - Urine (collected 21 @ 16:34)  Source: .Urine Clean Catch (Midstream)  Final Report:    <10,000 CFU/mL Normal Urogenital Priti    Culture - Blood (collected 21 @ 16:34)  Source: .Blood Blood-Peripheral  Preliminary Report:    No growth to date.      IMAGING    < from: US Abdomen Upper Quadrant Right (21 @ 21:47) >    IMPRESSION:    Markedly suboptimal exam. Prominence of the CBD up to 7 mm, although poorly visualized. Imaged portions of the gallbladder are unremarkable.    < end of copied text >    < from: Xray Chest 1 View-PORTABLE IMMEDIATE (Xray Chest 1 View-PORTABLE IMMEDIATE .) (21 @ 22:21) >  Impression:    No radiographic evidence of acute cardiopulmonary disease.    Without difference.    < end of copied text >

## 2021-07-08 NOTE — CONSULT NOTE ADULT - ASSESSMENT
Patient is a 64 y/o with PMHx of DM, HTN, Obesity, Glaucoma, Covid PNA c/b DVT, CKD, prior Nephrectomy, and HLD who presented to [facility] with complaints of fever at home. He was being treated for a left sided staghorn calculi. Patient noted that while admitted he developed abdominal pain 7/7. Pain was sharp yesterday located over his right abdomen. Pain was without known precipitating event and he notes he has never had pain like this prior. Patient notes pain seemed to improve on its own and now feels well. Patient states his daughters were visiting him today and noticed yellow discoloration of his eyes and patient admits to generalized itching. Patient with notable Total Bilirubin to 3.2 as well as abdominal U/S which notes CBD of 7mm as well as Cholelithiasis. CT scan on July 5th without Biliary issues or Cholecystitis. Patient notes he is able to tolerate MRI as he has had in the past. Please obtain MRCP ( Non Contrast study). Make NPO  Patient is a 64 y/o with PMHx of DM, HTN, Obesity, Glaucoma, Covid PNA c/b DVT, CKD, prior Nephrectomy, and HLD who presented to [facility] with complaints of fever at home. He was being treated for a left sided staghorn calculi. Patient noted that while admitted he developed abdominal pain 7/7. Pain was sharp yesterday located over his right abdomen. Pain was without known precipitating event and he notes he has never had pain like this prior. Patient notes pain seemed to improve on its own and now feels well. Patient states his daughters were visiting him today and noticed yellow discoloration of his eyes and patient admits to generalized itching. Patient with notable Total Bilirubin to 3.2 as well as abdominal U/S which notes CBD of 7mm as well as Cholelithiasis. CT scan on July 5th without Biliary issues or Cholecystitis. Patient notes he is able to tolerate MRI as he has had in the past. Please obtain MRCP ( Non Contrast study). Make NPO after midnight and Hold Lovenox for the AM.     Abdominal Pain/ Abnormal LFT/ CBD 7mm  - CBD 7mm, Cholelithiasis, T Bili 3.2  - MRCP- Non Contrast today  - NPO after midnight  - Hold Lovenox 7/9  - If MRCP with stone will add on for ERCP 7/9  - May benefit from Cholecystectomy in the future , outpatient surgical referral   - Will follow

## 2021-07-08 NOTE — CONSULT NOTE ADULT - SUBJECTIVE AND OBJECTIVE BOX
Patient is a 64 y/o with PMHx of DM, HTN, Obesity, Glaucoma, Covid PNA c/b DVT, CKD, prior Nephrectomy, and HLD who presented to [facility] with complaints of fever at home. He was being treated for a left sided staghorn calculi. Patient noted that while admitted he developed abdominal pain 7/7. PAin was sharp yesterday located over his right abdomen. Pain was without known precipitating event and he notes he has never had pain like this prior. Patient notes pain seemed to improve on its own and now feels well. Patient states his daughters were visiting him today and noticed yellow discoloration of his eyes and patient admits to generalized itching. He notes no prior Gallbladder or Liver issues. Patient currently on Ceftriaxone for UTI.         PAST MEDICAL & SURGICAL HISTORY:  Diabetes mellitus  HTN (hypertension)  Glaucoma  DVT (deep venous thrombosis)  CKD (chronic kidney disease)  HLD (hyperlipidemia)  H/O right nephrectomy          MEDICATIONS  (STANDING):  amLODIPine   Tablet 5 milliGRAM(s) Oral daily  atorvastatin 20 milliGRAM(s) Oral at bedtime  cefTRIAXone   IVPB 1000 milliGRAM(s) IV Intermittent every 24 hours  dextrose 40% Gel 15 Gram(s) Oral once  dextrose 5%. 1000 milliLiter(s) (50 mL/Hr) IV Continuous <Continuous>  dextrose 5%. 1000 milliLiter(s) (100 mL/Hr) IV Continuous <Continuous>  dextrose 50% Injectable 25 Gram(s) IV Push once  dextrose 50% Injectable 12.5 Gram(s) IV Push once  dextrose 50% Injectable 25 Gram(s) IV Push once  dorzolamide 2%/timolol 0.5% Ophthalmic Solution 1 Drop(s) Both EYES once  enoxaparin Injectable 40 milliGRAM(s) SubCutaneous daily  glucagon  Injectable 1 milliGRAM(s) IntraMuscular once  insulin glargine Injectable (LANTUS) 40 Unit(s) SubCutaneous every morning  insulin glargine Injectable (LANTUS) 70 Unit(s) SubCutaneous at bedtime  insulin lispro (ADMELOG) corrective regimen sliding scale   SubCutaneous three times a day before meals  insulin lispro Injectable (ADMELOG) 30 Unit(s) SubCutaneous three times a day before meals  latanoprost 0.005% Ophthalmic Solution 1 Drop(s) Both EYES at bedtime  pantoprazole    Tablet 40 milliGRAM(s) Oral before breakfast  sodium chloride 0.9%. 1000 milliLiter(s) (75 mL/Hr) IV Continuous <Continuous>    MEDICATIONS  (PRN):  aluminum hydroxide/magnesium hydroxide/simethicone Suspension 30 milliLiter(s) Oral every 6 hours PRN Dyspepsia      Allergies  No Known Allergies        Review of Systems  General:  Denies Fatigue, Denies Fever, Denies Weakness ,Denies Weight Loss   HEENT: Denies Trouble Swallowing ,Denies  Sore Throat , Denies Change in hearing/vision/speech ,Denies Dizziness    Cardio: Denies  Chest Pain , Palpitations    Respiratory: Denies worsening of SOB, Denies Cough  Abdomen: See detailed HPI  Neuro: Denies Headache Denies Dizziness, Denies Paresthesias  MSK: Denies pain in Bones/Joints/Muscles   Psych: Patient denies depression, denies suicidal or homicidal ideations  Integ: See HPI      Vital Signs   T(F): 97.6 (08 Jul 2021 05:31), Max: 101.4 (07 Jul 2021 16:32)  HR: 80 (08 Jul 2021 05:31) (80 - 89)  BP: 172/81 (08 Jul 2021 05:31) (146/70 - 172/81)  RR: 20 (08 Jul 2021 05:31) (18 - 20)  Physical Exam  Gen: NAD  HEENT: NC/AT, Mucosal Membranes Moist, Icteric Sclera   Cardio: S1/S2 No S3/S4, Regular  Resp: CTA B/L  Abdomen: Soft, ND/ TTP on deep palpation RUQ  Neuro: AAOx3, Cranial Nerve II-XII intact   Extremities: FROM x 4  Skin: Jaundice noted       LABS:                        13.9   7.21  )-----------( 187      ( 08 Jul 2021 07:14 )             42.0       Auto Neutrophil %: 66.5 % (07-08-21 @ 07:14)  Auto Immature Granulocyte %: 0.4 % (07-08-21 @ 07:14)    07-08    133<L>  |  101  |  34<H>  ----------------------------<  191<H>  4.4   |  18  |  1.8<H>      Calcium, Total Serum: 8.9 mg/dL (07-08-21 @ 07:14)      LFTs:             6.9  | 3.2  | 168      ------------------[351     ( 08 Jul 2021 07:14 )  3.7  | x    | 193         Lipase:x      Amylase:x        Blood Gas Venous - Lactate: 1.4 mmoL/L (07-05-21 @ 21:29)    Culture - Blood (collected 05 Jul 2021 16:38)  Source: .Blood Blood-Peripheral  Preliminary Report (06 Jul 2021 21:01):    No growth to date.    Culture - Urine (collected 05 Jul 2021 16:34)  Source: .Urine Clean Catch (Midstream)  Final Report (07 Jul 2021 03:54):    <10,000 CFU/mL Normal Urogenital Priti    Culture - Blood (collected 05 Jul 2021 16:34)  Source: .Blood Blood-Peripheral  Preliminary Report (06 Jul 2021 21:01):    No growth to date.        RADIOLOGY & ADDITIONAL STUDIES:  US Abdomen Upper Quadrant Right 07.07.21   IMPRESSION:      Markedly suboptimal exam. Prominence of the CBD up to 7 mm, although poorly visualized. Imaged portions of the gallbladder are unremarkable.

## 2021-07-08 NOTE — PROVIDER CONTACT NOTE (OTHER) - SITUATION
spoke with MD to inform that pt is complaining of itching on his abdomen and hematuria in which I was unable to see but he stated it was not remarkable

## 2021-07-09 LAB
ALBUMIN SERPL ELPH-MCNC: 3.4 G/DL — LOW (ref 3.5–5.2)
ALP SERPL-CCNC: 280 U/L — HIGH (ref 30–115)
ALT FLD-CCNC: 121 U/L — HIGH (ref 0–41)
ANION GAP SERPL CALC-SCNC: 13 MMOL/L — SIGNIFICANT CHANGE UP (ref 7–14)
APTT BLD: 31.3 SEC — SIGNIFICANT CHANGE UP (ref 27–39.2)
AST SERPL-CCNC: 85 U/L — HIGH (ref 0–41)
BASOPHILS # BLD AUTO: 0.03 K/UL — SIGNIFICANT CHANGE UP (ref 0–0.2)
BASOPHILS NFR BLD AUTO: 0.5 % — SIGNIFICANT CHANGE UP (ref 0–1)
BILIRUB SERPL-MCNC: 1.1 MG/DL — SIGNIFICANT CHANGE UP (ref 0.2–1.2)
BLD GP AB SCN SERPL QL: SIGNIFICANT CHANGE UP
BUN SERPL-MCNC: 33 MG/DL — HIGH (ref 10–20)
CALCIUM SERPL-MCNC: 8.4 MG/DL — LOW (ref 8.5–10.1)
CHLORIDE SERPL-SCNC: 106 MMOL/L — SIGNIFICANT CHANGE UP (ref 98–110)
CO2 SERPL-SCNC: 19 MMOL/L — SIGNIFICANT CHANGE UP (ref 17–32)
CREAT SERPL-MCNC: 1.5 MG/DL — SIGNIFICANT CHANGE UP (ref 0.7–1.5)
CULTURE RESULTS: NO GROWTH — SIGNIFICANT CHANGE UP
EOSINOPHIL # BLD AUTO: 0.17 K/UL — SIGNIFICANT CHANGE UP (ref 0–0.7)
EOSINOPHIL NFR BLD AUTO: 2.8 % — SIGNIFICANT CHANGE UP (ref 0–8)
GGT SERPL-CCNC: 389 U/L — HIGH (ref 1–40)
GLUCOSE BLDC GLUCOMTR-MCNC: 102 MG/DL — HIGH (ref 70–99)
GLUCOSE BLDC GLUCOMTR-MCNC: 104 MG/DL — HIGH (ref 70–99)
GLUCOSE BLDC GLUCOMTR-MCNC: 144 MG/DL — HIGH (ref 70–99)
GLUCOSE BLDC GLUCOMTR-MCNC: 99 MG/DL — SIGNIFICANT CHANGE UP (ref 70–99)
GLUCOSE SERPL-MCNC: 92 MG/DL — SIGNIFICANT CHANGE UP (ref 70–99)
HCT VFR BLD CALC: 36.7 % — LOW (ref 42–52)
HGB BLD-MCNC: 11.9 G/DL — LOW (ref 14–18)
IMM GRANULOCYTES NFR BLD AUTO: 0.5 % — HIGH (ref 0.1–0.3)
INR BLD: 1.1 RATIO — SIGNIFICANT CHANGE UP (ref 0.65–1.3)
LYMPHOCYTES # BLD AUTO: 1.04 K/UL — LOW (ref 1.2–3.4)
LYMPHOCYTES # BLD AUTO: 17 % — LOW (ref 20.5–51.1)
MAGNESIUM SERPL-MCNC: 2.1 MG/DL — SIGNIFICANT CHANGE UP (ref 1.8–2.4)
MCHC RBC-ENTMCNC: 28.2 PG — SIGNIFICANT CHANGE UP (ref 27–31)
MCHC RBC-ENTMCNC: 32.4 G/DL — SIGNIFICANT CHANGE UP (ref 32–37)
MCV RBC AUTO: 87 FL — SIGNIFICANT CHANGE UP (ref 80–94)
MONOCYTES # BLD AUTO: 0.66 K/UL — HIGH (ref 0.1–0.6)
MONOCYTES NFR BLD AUTO: 10.8 % — HIGH (ref 1.7–9.3)
NEUTROPHILS # BLD AUTO: 4.17 K/UL — SIGNIFICANT CHANGE UP (ref 1.4–6.5)
NEUTROPHILS NFR BLD AUTO: 68.4 % — SIGNIFICANT CHANGE UP (ref 42.2–75.2)
NRBC # BLD: 0 /100 WBCS — SIGNIFICANT CHANGE UP (ref 0–0)
PLATELET # BLD AUTO: 158 K/UL — SIGNIFICANT CHANGE UP (ref 130–400)
POTASSIUM SERPL-MCNC: 4.2 MMOL/L — SIGNIFICANT CHANGE UP (ref 3.5–5)
POTASSIUM SERPL-SCNC: 4.2 MMOL/L — SIGNIFICANT CHANGE UP (ref 3.5–5)
PROT SERPL-MCNC: 6 G/DL — SIGNIFICANT CHANGE UP (ref 6–8)
PROTHROM AB SERPL-ACNC: 12.6 SEC — SIGNIFICANT CHANGE UP (ref 9.95–12.87)
RBC # BLD: 4.22 M/UL — LOW (ref 4.7–6.1)
RBC # FLD: 14.6 % — HIGH (ref 11.5–14.5)
SARS-COV-2 RNA SPEC QL NAA+PROBE: SIGNIFICANT CHANGE UP
SODIUM SERPL-SCNC: 138 MMOL/L — SIGNIFICANT CHANGE UP (ref 135–146)
SPECIMEN SOURCE: SIGNIFICANT CHANGE UP
WBC # BLD: 6.1 K/UL — SIGNIFICANT CHANGE UP (ref 4.8–10.8)
WBC # FLD AUTO: 6.1 K/UL — SIGNIFICANT CHANGE UP (ref 4.8–10.8)

## 2021-07-09 PROCEDURE — 74181 MRI ABDOMEN W/O CONTRAST: CPT | Mod: 26

## 2021-07-09 PROCEDURE — 99233 SBSQ HOSP IP/OBS HIGH 50: CPT

## 2021-07-09 PROCEDURE — 99232 SBSQ HOSP IP/OBS MODERATE 35: CPT

## 2021-07-09 RX ORDER — DORZOLAMIDE HYDROCHLORIDE 20 MG/ML
1 SOLUTION/ DROPS OPHTHALMIC
Refills: 0 | Status: DISCONTINUED | OUTPATIENT
Start: 2021-07-09 | End: 2021-07-10

## 2021-07-09 RX ORDER — TIMOLOL 0.5 %
1 DROPS OPHTHALMIC (EYE)
Refills: 0 | Status: DISCONTINUED | OUTPATIENT
Start: 2021-07-09 | End: 2021-07-10

## 2021-07-09 RX ORDER — INSULIN LISPRO 100/ML
20 VIAL (ML) SUBCUTANEOUS ONCE
Refills: 0 | Status: COMPLETED | OUTPATIENT
Start: 2021-07-09 | End: 2021-07-09

## 2021-07-09 RX ADMIN — INSULIN GLARGINE 70 UNIT(S): 100 INJECTION, SOLUTION SUBCUTANEOUS at 21:49

## 2021-07-09 RX ADMIN — PANTOPRAZOLE SODIUM 40 MILLIGRAM(S): 20 TABLET, DELAYED RELEASE ORAL at 05:20

## 2021-07-09 RX ADMIN — DORZOLAMIDE HYDROCHLORIDE 1 DROP(S): 20 SOLUTION/ DROPS OPHTHALMIC at 19:43

## 2021-07-09 RX ADMIN — SODIUM CHLORIDE 75 MILLILITER(S): 9 INJECTION INTRAMUSCULAR; INTRAVENOUS; SUBCUTANEOUS at 22:08

## 2021-07-09 RX ADMIN — Medication 1 DROP(S): at 11:53

## 2021-07-09 RX ADMIN — ATORVASTATIN CALCIUM 20 MILLIGRAM(S): 80 TABLET, FILM COATED ORAL at 21:41

## 2021-07-09 RX ADMIN — AMLODIPINE BESYLATE 5 MILLIGRAM(S): 2.5 TABLET ORAL at 05:20

## 2021-07-09 RX ADMIN — SODIUM CHLORIDE 75 MILLILITER(S): 9 INJECTION INTRAMUSCULAR; INTRAVENOUS; SUBCUTANEOUS at 00:24

## 2021-07-09 RX ADMIN — Medication 1 DROP(S): at 19:44

## 2021-07-09 RX ADMIN — Medication 20 UNIT(S): at 17:58

## 2021-07-09 RX ADMIN — LATANOPROST 1 DROP(S): 0.05 SOLUTION/ DROPS OPHTHALMIC; TOPICAL at 21:50

## 2021-07-09 RX ADMIN — DORZOLAMIDE HYDROCHLORIDE 1 DROP(S): 20 SOLUTION/ DROPS OPHTHALMIC at 05:20

## 2021-07-09 RX ADMIN — CEFTRIAXONE 100 MILLIGRAM(S): 500 INJECTION, POWDER, FOR SOLUTION INTRAMUSCULAR; INTRAVENOUS at 05:20

## 2021-07-09 NOTE — PROGRESS NOTE ADULT - ASSESSMENT
65M w/ h/o DM, HTN, HLD, glaucoma, CKD III, nephrolithiasis, renal mass s/p R nephrectomy, obesity, MSSA bacteremia, COVID positive on 12/10, L kidney with staghorn calculus and multiple calculi presents with hematuria since yesterday associated with fever tmax of 102.7 at home.  Pt went to urgent care yesterday and was told to come to the ED, but wanted to wait until to day to see how he was feeling. Hematuria resolved this AM per pt.  Denies n/v, cp, sob, pleuritic chest pain, palpitations, diaphoresis, cough, abd/flank pain, diarrhea, constipation, melena/brbpr, dysuria, burning upon urination, penile pain/discharge, testicular pain/swelling/erythema, rectal pain or tenesmus, sick contacts, recent travel or rash.     #RUQ pain  #Acute transaminitis  - started 7/7 PM and a/w fever (tmax 101.4F; repeat abd u/s 7/7 showed no acute pathology  - Likely cholestatic in nature in context of elevated liver enzymes, T bili, and GGT (389)  - Down trending liver enzymes (AST/ALT 85/121 from 168/193) and T Bili (1.1 from 3.2)  - MRCP non-contrast today  - ERCP if MRCP results significant  - GI following, recs appreciated    #Hematuria  - Hx of nephrolithiasis (takes 1 tablespoon of baking soda at home mixed with water)  - No sepsis on admission, afebrile, no leukocytosis   - CXR clear   - UCx and BCx shows NGTD  - CT A/P showed no evidence of acute intra-abdominal pathology. s/p right nephrectomy. Left staghorn calculus, difficult to measure, measuring approximately 3.3 x 1.3 x 1.3 cm, and appearing slightly decreased in size since prior CT. No left hydronephrosis. Left lower pole cyst.  - c/w ceftriaxone (7/6 - ); s/p one-time cefepime dose in ED  - Urology consulted, no surgical intervention recommended at this time (pt will f/u outpatient w/ Dr. Falk)      #BATOOL on CKD III   - Creatinine 1.5 on AM labs (baseline around 1.4-1.7 per pt)  - No left hydronephrosis   - R nephrectomy in 2017 s/p renal mass discovery  - Avoid nephrotoxic meds   - Monitor BUN/Cr  - Recently started seeing Dr. Hurst as an o/p  - hold olmesartan for now    #Troponemia likely secondary to CKD   - 0.05 on admission, chronically elevated up to 0.09 on previous admission  - Repeat troponin 0.03  - Denies chest pain   - No new changes on EKG     # DM II  - A1c 7.5%  - On Insulin at home   - trend FS  - c/w home dose of insulin Lantus 70u at bedtime and 40 u in AM, Lispro 30U TID     # HTN - controlled  - c/w amlodipine    # HLD   -c/w atorvastatin    # Glaucoma  - c/w timolol and latanoprost     # Morbid obesity   BMI - 53.8  Diet and exercise counselling provided     # Diet: DASH/TLC/CC  # DVT Prophylaxis: lovenox   # GI Prophylaxis: Protonix  # Activity: IAT  # Dispo: Acute  # Code Status: Full code

## 2021-07-09 NOTE — PROGRESS NOTE ADULT - SUBJECTIVE AND OBJECTIVE BOX
************************************************  Tejas Weiner MD (PGY-1)  Spectra: [phone]  ************************************************    SUBJECTIVE / OVERNIGHT EVENTS  Patient slept well overnight. No acute complaints this AM. No longer reports abdominal pain that he experienced yesterday. Patient does not report fevers, chills, CP, SOB, or n/v/d/c.     ========================MEDICATIONS========================    amLODIPine   Tablet   5 milliGRAM(s) Oral (21 @ 05:20)    atorvastatin   20 milliGRAM(s) Oral (21 @ 21:30)    cefTRIAXone   IVPB   100 mL/Hr IV Intermittent (21 @ 05:20)    dorzolamide 2% Ophthalmic Solution   1 Drop(s) Both EYES (21 @ 05:20)   1 Drop(s) Both EYES (21 @ 21:30)   1 Drop(s) Both EYES (21 @ 17:10)    insulin lispro Injectable (ADMELOG)   30 Unit(s) SubCutaneous (21 @ 18:14)    latanoprost 0.005% Ophthalmic Solution   1 Drop(s) Both EYES (21 @ 21:31)    pantoprazole    Tablet   40 milliGRAM(s) Oral (21 @ 05:20)    timolol 0.5% Solution   1 Drop(s) Both EYES (21 @ 17:11)      ========================VITALS/EXAM========================  VITALS  T(C): 36.2 (21 @ 05:07), Max: 36.2 (21 @ 05:07)  HR: 68 (21 @ 05:07) (64 - 68)  BP: 152/70 (21 @ 05:07) (126/58 - 159/74)  RR: 18 (21 @ 05:07) (18 - 18)  SpO2: --  POCT Blood Glucose.: 99 mg/dL (21 @ 08:01)  POCT Blood Glucose.: 125 mg/dL (21 @ 20:57)  POCT Blood Glucose.: 187 mg/dL (21 @ 17:48)  POCT Blood Glucose.: 145 mg/dL (21 @ 16:19)  POCT Blood Glucose.: 82 mg/dL (21 @ 11:27)    PHYSICAL EXAM  GENERAL: NAD, well-developed  CHEST/LUNG: Clear to auscultation bilaterally; No wheezes, rales or rhonchi  HEART: Regular rate and rhythm; No murmurs, rubs, or gallops  ABDOMEN: Soft, Nontender, Nondistended; Bowel sounds present, no masses.  EXTREMITIES:  2+ Peripheral Pulses, No clubbing, cyanosis, or edema    =============================I/O=============================     21 @ 07:01  -  21 @ 07:00  --------------------------------------------------------  IN:    IV PiggyBack: 50 mL    sodium chloride 0.9%: 900 mL  Total IN: 950 mL    OUT:    Voided (mL): 520 mL  Total OUT: 520 mL    Total NET: 430 mL    ============================LABS============================             11.9<L>  6.10  )-----------( 158      ( 21 @ 05:39 )             36.7<L>    138  |  106  |  33<H>  ----------------------------<  92   21 @ 05:39  4.2  |  19  |  1.5    Ca      8.4<L>     21 @ 05:39  Mg     2.1     21 @ 05:39    TPro  6.0  /  Alb  3.4<L>  /  TBili  1.1  /  DBili  x   /  AST  85<H>  /  ALT  121<H>  /  AlkPhos  280<H>  /  GGT  389<H>    21 @ 05:39    PT/INR - ( 21 @ 05:39 )   PT: 12.60 sec;   INR: 1.10 ratio  PTT - ( 21 @ 05:39 )  PTT:31.3 sec    Urinalysis Basic - ( 2021 19:04 )    Color: Rina / Appearance: Clear / S.020 / pH: x  Gluc: x / Ketone: Small  / Bili: Small / Urobili: 3 mg/dL   Blood: x / Protein: 100 mg/dL / Nitrite: Negative   Leuk Esterase: Negative / RBC: 5 /HPF / WBC 1 /HPF   Sq Epi: x / Non Sq Epi: 2 /HPF / Bacteria: Negative    =======================Micro/Rad/Cardio=======================  Telemetry: Reviewed   EKG: Reviewed    CULTURES    Culture - Blood (collected 21 @ 22:40)  Source: .Blood Blood  Preliminary Report:    No growth to date.    Culture - Urine (collected 21 @ 19:04)  Source: .Urine Clean Catch (Midstream)  Final Report:    No growth    Culture - Blood (collected 21 @ 16:38)  Source: .Blood Blood-Peripheral  Preliminary Report:    No growth to date.    Culture - Urine (collected 21 @ 16:34)  Source: .Urine Clean Catch (Midstream)  Final Report:    <10,000 CFU/mL Normal Urogenital Priti    Culture - Blood (collected 21 @ 16:34)  Source: .Blood Blood-Peripheral  Preliminary Report:    No growth to date.      IMAGING  Xray Chest 1 View-PORTABLE IMMEDIATE:   EXAM:  XR CHEST PORTABLE IMMED 1V            PROCEDURE DATE:  / <TRUNCATED> (21 @ 22:21)    CARDIOLOGY

## 2021-07-09 NOTE — PROGRESS NOTE ADULT - ASSESSMENT
Patient is a 64 y/o with PMHx of DM, HTN, Obesity, Glaucoma, Covid PNA c/b DVT, CKD, prior Nephrectomy, and HLD who presented to [facility] with complaints of fever at home. He was being treated for a left sided staghorn calculi. Patient noted that while admitted he developed abdominal pain 7/7.Patient notes improvement of pain and notes that the last five times he urinated it was clear. Patient without any overnight events. MRCP still pending. I performed a COVID swab on him during evaluation today.         Abdominal Pain/ Abnormal LFT/ CBD 7mm  - CBD 7mm, Cholelithiasis, T Bili 3.2 to 1.1  - MRCP- Non Contrast today  - NPO for now  - Hold Lovenox 7/9  - If MRCP with stone will add on for ERCP 7/9  - COVID Swab done during evaluation today and is in lab  - Will continue to follow  16

## 2021-07-09 NOTE — PROGRESS NOTE ADULT - SUBJECTIVE AND OBJECTIVE BOX
Patient is a 64 y/o with PMHx of DM, HTN, Obesity, Glaucoma, Covid PNA c/b DVT, CKD, prior Nephrectomy, and HLD who presented to [facility] with complaints of fever at home. He was being treated for a left sided staghorn calculi. Patient noted that while admitted he developed abdominal pain 7/7.Patient notes improvement of pain and notes that the last five times he urinated it was clear. Patient without any overnight events.       PAST MEDICAL & SURGICAL HISTORY:  Diabetes mellitus  HTN (hypertension)  Glaucoma  DVT (deep venous thrombosis)  CKD (chronic kidney disease)  HLD (hyperlipidemia)  H/O right nephrectomy          MEDICATIONS  (STANDING):  amLODIPine   Tablet 5 milliGRAM(s) Oral daily  atorvastatin 20 milliGRAM(s) Oral at bedtime  cefTRIAXone   IVPB 1000 milliGRAM(s) IV Intermittent every 24 hours  dextrose 40% Gel 15 Gram(s) Oral once  dextrose 5%. 1000 milliLiter(s) (50 mL/Hr) IV Continuous <Continuous>  dextrose 5%. 1000 milliLiter(s) (100 mL/Hr) IV Continuous <Continuous>  dextrose 50% Injectable 25 Gram(s) IV Push once  dextrose 50% Injectable 12.5 Gram(s) IV Push once  dextrose 50% Injectable 25 Gram(s) IV Push once  dorzolamide 2%/timolol 0.5% Ophthalmic Solution 1 Drop(s) Both EYES once  enoxaparin Injectable 40 milliGRAM(s) SubCutaneous daily  glucagon  Injectable 1 milliGRAM(s) IntraMuscular once  insulin glargine Injectable (LANTUS) 40 Unit(s) SubCutaneous every morning  insulin glargine Injectable (LANTUS) 70 Unit(s) SubCutaneous at bedtime  insulin lispro (ADMELOG) corrective regimen sliding scale   SubCutaneous three times a day before meals  insulin lispro Injectable (ADMELOG) 30 Unit(s) SubCutaneous three times a day before meals  latanoprost 0.005% Ophthalmic Solution 1 Drop(s) Both EYES at bedtime  pantoprazole    Tablet 40 milliGRAM(s) Oral before breakfast  sodium chloride 0.9%. 1000 milliLiter(s) (75 mL/Hr) IV Continuous <Continuous>    MEDICATIONS  (PRN):  aluminum hydroxide/magnesium hydroxide/simethicone Suspension 30 milliLiter(s) Oral every 6 hours PRN Dyspepsia      Allergies  No Known Allergies        Review of Systems  General:  Denies Fatigue, Denies Fever, Denies Weakness ,Denies Weight Loss   HEENT: Denies Trouble Swallowing ,Denies  Sore Throat , Denies Change in hearing/vision/speech ,Denies Dizziness    Cardio: Denies  Chest Pain , Palpitations    Respiratory: Denies worsening of SOB, Denies Cough  Abdomen: See detailed HPI  Neuro: Denies Headache Denies Dizziness, Denies Paresthesias  MSK: Denies pain in Bones/Joints/Muscles   Psych: Patient denies depression, denies suicidal or homicidal ideations  Integ: See HPI      Vital Signs  T(F): 97.2 (09 Jul 2021 05:07), Max: 97.2 (09 Jul 2021 05:07)  HR: 68 (09 Jul 2021 05:07) (64 - 68)  BP: 152/70 (09 Jul 2021 05:07) (126/58 - 159/74)  BP(mean): 106 (08 Jul 2021 21:19) (106 - 106)  RR: 18 (09 Jul 2021 05:07) (18 - 18)  Physical Exam  Gen: NAD  HEENT: NC/AT, Mucosal Membranes Moist, Icteric Sclera   Cardio: S1/S2 No S3/S4, Regular  Resp: CTA B/L  Abdomen: Soft, ND/ NT  Neuro: AAOx3, Cranial Nerve II-XII intact   Extremities: FROM x 4  Skin: Jaundice noted but appears somewhat better       LABS:                        11.9   6.10  )-----------( 158      ( 09 Jul 2021 05:39 )             36.7     07-09    138  |  106  |  33<H>  ----------------------------<  92  4.2   |  19  |  1.5    Ca    8.4<L>      09 Jul 2021 05:39  Mg     2.1     07-09    TPro  6.0  /  Alb  3.4<L>  /  TBili  1.1  /  DBili  x   /  AST  85<H>  /  ALT  121<H>  /  AlkPhos  280<H>  07-09    RADIOLOGY & ADDITIONAL STUDIES:  US Abdomen Upper Quadrant Right 07.07.21   IMPRESSION:      Markedly suboptimal exam. Prominence of the CBD up to 7 mm, although poorly visualized. Imaged portions of the gallbladder are unremarkable.

## 2021-07-10 ENCOUNTER — TRANSCRIPTION ENCOUNTER (OUTPATIENT)
Age: 66
End: 2021-07-10

## 2021-07-10 VITALS
SYSTOLIC BLOOD PRESSURE: 145 MMHG | TEMPERATURE: 96 F | DIASTOLIC BLOOD PRESSURE: 64 MMHG | HEART RATE: 54 BPM | RESPIRATION RATE: 18 BRPM

## 2021-07-10 LAB
ALBUMIN SERPL ELPH-MCNC: 3.5 G/DL — SIGNIFICANT CHANGE UP (ref 3.5–5.2)
ALP SERPL-CCNC: 264 U/L — HIGH (ref 30–115)
ALT FLD-CCNC: 94 U/L — HIGH (ref 0–41)
ANION GAP SERPL CALC-SCNC: 11 MMOL/L — SIGNIFICANT CHANGE UP (ref 7–14)
AST SERPL-CCNC: 57 U/L — HIGH (ref 0–41)
BASOPHILS # BLD AUTO: 0.04 K/UL — SIGNIFICANT CHANGE UP (ref 0–0.2)
BASOPHILS NFR BLD AUTO: 0.6 % — SIGNIFICANT CHANGE UP (ref 0–1)
BILIRUB SERPL-MCNC: 0.7 MG/DL — SIGNIFICANT CHANGE UP (ref 0.2–1.2)
BUN SERPL-MCNC: 30 MG/DL — HIGH (ref 10–20)
CALCIUM SERPL-MCNC: 8.4 MG/DL — LOW (ref 8.5–10.1)
CHLORIDE SERPL-SCNC: 107 MMOL/L — SIGNIFICANT CHANGE UP (ref 98–110)
CO2 SERPL-SCNC: 21 MMOL/L — SIGNIFICANT CHANGE UP (ref 17–32)
CREAT SERPL-MCNC: 1.3 MG/DL — SIGNIFICANT CHANGE UP (ref 0.7–1.5)
CULTURE RESULTS: SIGNIFICANT CHANGE UP
CULTURE RESULTS: SIGNIFICANT CHANGE UP
EOSINOPHIL # BLD AUTO: 0.17 K/UL — SIGNIFICANT CHANGE UP (ref 0–0.7)
EOSINOPHIL NFR BLD AUTO: 2.6 % — SIGNIFICANT CHANGE UP (ref 0–8)
GLUCOSE BLDC GLUCOMTR-MCNC: 127 MG/DL — HIGH (ref 70–99)
GLUCOSE BLDC GLUCOMTR-MCNC: 142 MG/DL — HIGH (ref 70–99)
GLUCOSE BLDC GLUCOMTR-MCNC: 57 MG/DL — LOW (ref 70–99)
GLUCOSE SERPL-MCNC: 134 MG/DL — HIGH (ref 70–99)
HCT VFR BLD CALC: 37.1 % — LOW (ref 42–52)
HGB BLD-MCNC: 12.1 G/DL — LOW (ref 14–18)
IMM GRANULOCYTES NFR BLD AUTO: 1.2 % — HIGH (ref 0.1–0.3)
LYMPHOCYTES # BLD AUTO: 1.11 K/UL — LOW (ref 1.2–3.4)
LYMPHOCYTES # BLD AUTO: 17.3 % — LOW (ref 20.5–51.1)
MAGNESIUM SERPL-MCNC: 2.2 MG/DL — SIGNIFICANT CHANGE UP (ref 1.8–2.4)
MCHC RBC-ENTMCNC: 28.4 PG — SIGNIFICANT CHANGE UP (ref 27–31)
MCHC RBC-ENTMCNC: 32.6 G/DL — SIGNIFICANT CHANGE UP (ref 32–37)
MCV RBC AUTO: 87.1 FL — SIGNIFICANT CHANGE UP (ref 80–94)
MONOCYTES # BLD AUTO: 0.61 K/UL — HIGH (ref 0.1–0.6)
MONOCYTES NFR BLD AUTO: 9.5 % — HIGH (ref 1.7–9.3)
NEUTROPHILS # BLD AUTO: 4.42 K/UL — SIGNIFICANT CHANGE UP (ref 1.4–6.5)
NEUTROPHILS NFR BLD AUTO: 68.8 % — SIGNIFICANT CHANGE UP (ref 42.2–75.2)
NRBC # BLD: 0 /100 WBCS — SIGNIFICANT CHANGE UP (ref 0–0)
PLATELET # BLD AUTO: 193 K/UL — SIGNIFICANT CHANGE UP (ref 130–400)
POTASSIUM SERPL-MCNC: 4.3 MMOL/L — SIGNIFICANT CHANGE UP (ref 3.5–5)
POTASSIUM SERPL-SCNC: 4.3 MMOL/L — SIGNIFICANT CHANGE UP (ref 3.5–5)
PROT SERPL-MCNC: 6.2 G/DL — SIGNIFICANT CHANGE UP (ref 6–8)
RBC # BLD: 4.26 M/UL — LOW (ref 4.7–6.1)
RBC # FLD: 14.5 % — SIGNIFICANT CHANGE UP (ref 11.5–14.5)
SODIUM SERPL-SCNC: 139 MMOL/L — SIGNIFICANT CHANGE UP (ref 135–146)
SPECIMEN SOURCE: SIGNIFICANT CHANGE UP
SPECIMEN SOURCE: SIGNIFICANT CHANGE UP
WBC # BLD: 6.43 K/UL — SIGNIFICANT CHANGE UP (ref 4.8–10.8)
WBC # FLD AUTO: 6.43 K/UL — SIGNIFICANT CHANGE UP (ref 4.8–10.8)

## 2021-07-10 PROCEDURE — 99232 SBSQ HOSP IP/OBS MODERATE 35: CPT

## 2021-07-10 PROCEDURE — 99239 HOSP IP/OBS DSCHRG MGMT >30: CPT

## 2021-07-10 RX ORDER — AZTREONAM 2 G
1 VIAL (EA) INJECTION
Qty: 13 | Refills: 0
Start: 2021-07-10 | End: 2021-08-08

## 2021-07-10 RX ORDER — CEFPODOXIME PROXETIL 100 MG
1 TABLET ORAL
Qty: 14 | Refills: 0
Start: 2021-07-10 | End: 2021-07-16

## 2021-07-10 RX ADMIN — PANTOPRAZOLE SODIUM 40 MILLIGRAM(S): 20 TABLET, DELAYED RELEASE ORAL at 05:17

## 2021-07-10 RX ADMIN — Medication 30 UNIT(S): at 08:46

## 2021-07-10 RX ADMIN — INSULIN GLARGINE 40 UNIT(S): 100 INJECTION, SOLUTION SUBCUTANEOUS at 08:46

## 2021-07-10 RX ADMIN — DORZOLAMIDE HYDROCHLORIDE 1 DROP(S): 20 SOLUTION/ DROPS OPHTHALMIC at 08:46

## 2021-07-10 RX ADMIN — AMLODIPINE BESYLATE 5 MILLIGRAM(S): 2.5 TABLET ORAL at 05:17

## 2021-07-10 RX ADMIN — Medication 30 UNIT(S): at 13:21

## 2021-07-10 RX ADMIN — CEFTRIAXONE 100 MILLIGRAM(S): 500 INJECTION, POWDER, FOR SOLUTION INTRAMUSCULAR; INTRAVENOUS at 05:17

## 2021-07-10 RX ADMIN — Medication 1 DROP(S): at 08:47

## 2021-07-10 NOTE — PROGRESS NOTE ADULT - ATTENDING COMMENTS
Patient seen and examined independently. Agree with resident note/ history / physical exam and plan of care with following exceptions/additions/updates. Case discussed with patient/pt decision maker, house-staff and nursing. My notes supersedes the resident's notes in case of discrepancies.      #Progress Note Handoff  Pending (specify):  stable for dc and outpt followup, needs LFT and surgery consult as outpt, as well as urology and PMD   Family discussion: dw pt , fully aaox3  Disposition: Home_    time spent 35 min   fu with Dr Freedman( pmd) repeat LFT as outpt, pt is aware of plan as outpt and is agreeable
Abdominal Pain/ Abnormal LFT/ CBD 7mm  - CBD 7mm, Cholelithiasis, T Bili 3.2 to 0.7  - MRCP--> 1. No biliary distention, filling defect, or stricture. 2. Cholelithiasis.  - LFTs are trending down except ALP increased  - afebrile, no leukocytosis, no abdominal pain  - Low fat diet  - HAV IgM/IgG, HBsAg, HBsAb, HBcAb IgM/IgG, HCV Ab, Anti HEV, Serum Ferritin, Transferrin Saturation, Ceruloplasmin level, CHAYO, SMA, gamma globulin, AMA.  - Monitor LFts  - surgical eval for CCY
Patient seen ans examined during rounds 7/9/21 with GI PA, Advanced fellow and GI fellow , appeared overall stable, assessment and plan above
patient seen and examined earlier today on rounds with resident and nurse.  no fever last night.  no abd. paiin.  notes that his urine is back to a yellow color.  this am patient has no fever, no chills.  no n/v  Vital Signs Last 24 Hrs  T(C): 36.2 (09 Jul 2021 05:07), Max: 36.2 (09 Jul 2021 05:07)  T(F): 97.2 (09 Jul 2021 05:07), Max: 97.2 (09 Jul 2021 05:07)  HR: 68 (09 Jul 2021 05:07) (64 - 68)  BP: 152/70 (09 Jul 2021 05:07) (152/70 - 159/74)  BP(mean): 106 (08 Jul 2021 21:19) (106 - 106)  RR: 18 (09 Jul 2021 05:07) (18 - 18)    conj pink, pos. no  jaundice today  neck supple.  no JVD  lungs clear to auscultation .  good air entry bilaterally  heart regular rate and rhythm. no murmur heard  abd. obese BS pos. soft nontender. - specifically no tenderness mid-epigastrium or RUQ.  neg. Carmichael sign    no CVA tenderness  extremities no edema.  good skin turgor noted  Labs                                                  11.9   6.10  )-----------( 158      ( 09 Jul 2021 05:39 )             36.7   07-09    138  |  106  |  33<H>  ----------------------------<  92  4.2   |  19  |  1.5    Ca    8.4<L>      09 Jul 2021 05:39  Mg     2.1     07-09    TPro  6.0  /  Alb  3.4<L>  /  TBili  1.1  /  DBili  x   /  AST  85<H>  /  ALT  121<H>  /  AlkPhos  280<H>  07-09        A: Sepsis present on admission  secondary to UTI - even with no growth patient has s/s compatible with UTI  staghorn calculus in a solitary kidney  abnormal LFT's with s/s choledocholelithiasis - seems to be trending down  Diabetes mellitus - well controlled  BATOOL on CKD - improving back to baseline    P:  MRCP - to be done for now  Keep same antibiotics    continue IV ceftriaxone - total 7 days  urology to address stag horn calculus = as outpatient after DC  chronic suppressive therapy - I spoke to patient's nephrologist Dr. Hurst who advised TMP/SMX 3 x / week until staghorn calculus is taken care of.  continue IV fluids and monitor renal function .    Patient handoff:  pending - MRCP result - choledocholelithiasis?  LFT's improving  also on DC 7 more days po antibiotics plus long term suppression TMP/SMx 3x/week  f/u with urologist (Dr. Falk) and nephrologist (Dr. Hurst)  discussed with patient who is awake , alert, and oriented x 3
patient seen and examined earlier today on rounds with resident and nurse.  no fever. no chills. no hematuria. no back pain. no frequency or urgency.  Vital Signs Last 24 Hrs  T(C): 35.9 (07 Jul 2021 12:54), Max: 36.4 (06 Jul 2021 20:29)  T(F): 96.6 (07 Jul 2021 12:54), Max: 97.6 (06 Jul 2021 20:29)  HR: 89 (07 Jul 2021 12:54) (70 - 89)  BP: 172/79 (07 Jul 2021 12:54) (131/77 - 172/79)  BP(mean): 89 (07 Jul 2021 05:30) (89 - 89)  RR: 18 (07 Jul 2021 12:54) (18 - 18)    conj pink, no jaundice  neck supple.  no JVD  lungs clear to auscultation .  good air entry bilaterally  heart regular rate and rhythm. no murmur heard  abd. obese BS pos. soft nontender.    no CVA tenderness  extremities no edema.  good skin turgor noted  Labs                    12.5   5.10  )-----------( 154      ( 07 Jul 2021 06:48 )             38.0   07-07    136  |  105  |  39<H>  ----------------------------<  194<H>  4.4   |  21  |  1.8<H>    Ca    8.6      07 Jul 2021 06:48  Mg     2.0     07-07    TPro  6.3  /  Alb  3.5  /  TBili  1.2  /  DBili  x   /  AST  91<H>  /  ALT  135<H>  /  AlkPhos  181<H>  07-07  CAPILLARY BLOOD GLUCOSE      POCT Blood Glucose.: 142 mg/dL (07 Jul 2021 10:57)  POCT Blood Glucose.: 185 mg/dL (07 Jul 2021 07:22)  POCT Blood Glucose.: 125 mg/dL (06 Jul 2021 20:39)  POCT Blood Glucose.: 83 mg/dL (06 Jul 2021 16:17)      A: SEpsis present on admission  secondary to UTI - even with NG patient has s/s compatible with UTI  staghorn calculus in a solitary kidney  Diabetes mellitus - well controlled  BATOOL on CKD - improving back to baseline    P: 5 days IV ceftriaxone - last day will be Fri 7/9/2021  urology to address stag horn calculus = as outpatient after DC  ?chronic suppressive therapy - will speak to patient's nephrologist  continue IV fluids and monitor renal function

## 2021-07-10 NOTE — PROGRESS NOTE ADULT - PROVIDER SPECIALTY LIST ADULT
Internal Medicine
Internal Medicine
Hospitalist
Gastroenterology
Gastroenterology
Internal Medicine
Internal Medicine

## 2021-07-10 NOTE — PROGRESS NOTE ADULT - SUBJECTIVE AND OBJECTIVE BOX
Gastroenterology progress note:     Patient is a 65y old  Male who presents with a chief complaint of Fever and Hematuria (10 Jul 2021 08:21)       Admitted on: 07-06-21    We are following the patient for: abnormal LFTs     Interval History:  patient denies abdominal pain, can tolerate po intake, normal BM,       PAST MEDICAL & SURGICAL HISTORY:  Diabetes mellitus    HTN (hypertension)    Glaucoma    DVT (deep venous thrombosis)    CKD (chronic kidney disease)    HLD (hyperlipidemia)    H/O right nephrectomy        MEDICATIONS  (STANDING):  amLODIPine   Tablet 5 milliGRAM(s) Oral daily  atorvastatin 20 milliGRAM(s) Oral at bedtime  dextrose 40% Gel 15 Gram(s) Oral once  dextrose 5%. 1000 milliLiter(s) (50 mL/Hr) IV Continuous <Continuous>  dextrose 5%. 1000 milliLiter(s) (100 mL/Hr) IV Continuous <Continuous>  dextrose 50% Injectable 25 Gram(s) IV Push once  dextrose 50% Injectable 12.5 Gram(s) IV Push once  dextrose 50% Injectable 25 Gram(s) IV Push once  dorzolamide 2% Ophthalmic Solution 1 Drop(s) Both EYES <User Schedule>  glucagon  Injectable 1 milliGRAM(s) IntraMuscular once  insulin glargine Injectable (LANTUS) 40 Unit(s) SubCutaneous every morning  insulin glargine Injectable (LANTUS) 70 Unit(s) SubCutaneous at bedtime  insulin lispro (ADMELOG) corrective regimen sliding scale   SubCutaneous three times a day before meals  insulin lispro Injectable (ADMELOG) 30 Unit(s) SubCutaneous three times a day before meals  latanoprost 0.005% Ophthalmic Solution 1 Drop(s) Both EYES at bedtime  pantoprazole    Tablet 40 milliGRAM(s) Oral before breakfast  sodium chloride 0.9%. 1000 milliLiter(s) (75 mL/Hr) IV Continuous <Continuous>  timolol 0.5% Solution 1 Drop(s) Both EYES <User Schedule>    MEDICATIONS  (PRN):  aluminum hydroxide/magnesium hydroxide/simethicone Suspension 30 milliLiter(s) Oral every 6 hours PRN Dyspepsia      Allergies  No Known Allergies      Review of Systems:   Constitutional: Ne Fever, No chills, No weakness  ENT: No visual changes, No throat pain  Cardiovascular:  No Chest Pain, No Palpitations  Respiratory:  No Cough, No Dyspnea  Gastrointestinal:  As described in HPI  Neurological: No numbness or weakness  Skin: No rash, no itching    Physical Examination:  T(C): 35.7 (07-10-21 @ 05:07), Max: 36.1 (07-09-21 @ 13:27)  HR: 64 (07-10-21 @ 05:07) (56 - 64)  BP: 156/82 (07-10-21 @ 05:07) (150/70 - 156/82)  RR: 18 (07-10-21 @ 05:07) (18 - 19)  SpO2: --      07-09-21 @ 07:01  -  07-10-21 @ 07:00  --------------------------------------------------------  IN: 950 mL / OUT: 0 mL / NET: 950 mL      Constitutional: No acute distress.  Respiratory:  No signs of respiratory distress. Lung sounds are clear bilaterally.  Cardiovascular:  S1 S2, Regular rate and rhythm.  Abdominal: Abdomen is soft, symmetric, and non-tender without distention. There are no visible lesions. Bowel sounds are present and normoactive in all four quadrants. No masses, hepatomegaly, or splenomegaly are noted.   Neurology: AAOX3, Non-focal  Vascular: No varicose vein, No cyanosis, No edema        Data: (reviewed by attending)                        12.1   6.43  )-----------( 193      ( 10 Jul 2021 05:48 )             37.1     Hgb trend:  12.1  07-10-21 @ 05:48  11.9  07-09-21 @ 05:39  13.9  07-08-21 @ 07:14        07-10    139  |  107  |  30<H>  ----------------------------<  134<H>  4.3   |  21  |  1.3    Ca    8.4<L>      10 Jul 2021 05:48  Mg     2.2     07-10    TPro  6.2  /  Alb  3.5  /  TBili  0.7  /  DBili  x   /  AST  57<H>  /  ALT  94<H>  /  AlkPhos  264<H>  07-10    Liver panel trend:  TBili 0.7   /   AST 57   /   ALT 94   /   AlkP 264   /   Tptn 6.2   /   Alb 3.5    /   DBili --      07-10  TBili 1.1   /   AST 85   /      /   AlkP 280   /   Tptn 6.0   /   Alb 3.4    /   DBili --      07-09  TBili 3.2   /      /      /   AlkP 351   /   Tptn 6.9   /   Alb 3.7    /   DBili --      07-08  TBili 1.2   /   AST 91   /      /   AlkP 181   /   Tptn 6.3   /   Alb 3.5    /   DBili --      07-07  TBili 1.7   /      /      /   AlkP 166   /   Tptn 6.5   /   Alb 3.6    /   DBili --      07-06  TBili 3.3   /      /      /   AlkP 173   /   Tptn 7.3   /   Alb 4.0    /   DBili --      07-05      PT/INR - ( 09 Jul 2021 05:39 )   PT: 12.60 sec;   INR: 1.10 ratio         PTT - ( 09 Jul 2021 05:39 )  PTT:31.3 sec    Culture - Blood (collected 07 Jul 2021 22:40)  Source: .Blood Blood  Preliminary Report (09 Jul 2021 07:01):    No growth to date.    Culture - Urine (collected 07 Jul 2021 19:04)  Source: .Urine Clean Catch (Midstream)  Final Report (09 Jul 2021 01:06):    No growth         Radiology: (reviewed by attending)      MR MRCP No Cont:   EXAM:  MR MRCP            PROCEDURE DATE:  07/09/2021            INTERPRETATION:  MRCP/MRI abdomen without IV contrast    Indication: Abdominal pain. Post nephrectomy.    Technique: MRCP/MRI abdomen without IV contrast performed. Multiplanar, multisequential T1, T2-weighted images of the abdomen were obtained without IV contrast administration. MRCP images obtained. Diffusion weighted images obtained.    Comparison: CT abdomen and pelvis July 5, 2021, abdominal ultrasound July 7, 2021.    Findings:    Images degraded by motion, dielectric artifact, body habitus artifact.    Liver: Noncirrhotic.  No focal hepatic mass on degraded noncontrast evaluation.    Gallbladder: Cholelithiasis.    Biliary system: No biliary distention, filling defect, or stricture.    Pancreas: Normal pancreas on noncontrast evaluation. Nondilated main pancreatic duct.    Additional abdominal organs: Spleen and adrenal glands are unremarkable. Post right nephrectomy. Signal void corresponding to left lower renal pole staghorn calculus. Left renal cyst. No left hydronephrosis.    Additional findings: No ascites or lymphadenopathy. Degenerative change of spine. Post gastric lap band surgery. Signal void corresponding to splenic artery coils.    Impression:  1. No biliary distention, filling defect, or stricture.  2. Cholelithiasis.    --- End of Report ---              RORY VALADEZ MD; Attending Radiologist  This document has been electronically signed. Jul 10 2021  8:36AM (07-09-21 @ 21:24)

## 2021-07-10 NOTE — DISCHARGE NOTE NURSING/CASE MANAGEMENT/SOCIAL WORK - PATIENT PORTAL LINK FT
You can access the FollowMyHealth Patient Portal offered by [facility] by registering at the following website: [email or web address]. By joining [employer]’s FollowMyHealth portal, you will also be able to view your health information using other applications (apps) compatible with our system.

## 2021-07-10 NOTE — PROGRESS NOTE ADULT - ASSESSMENT
Patient is a 64 y/o with PMHx of DM, HTN, Obesity, Glaucoma, Covid PNA c/b DVT, CKD, prior Nephrectomy, and HLD who presented to [facility] with complaints of fever at home. He was being treated for a left sided staghorn calculi. Patient noted that while admitted he developed abdominal pain 7/7.Patient notes improvement of pain and notes that the last five times he urinated it was clear. Patient without any overnight events. MRCP still pending. I performed a COVID swab on him during evaluation today.         Abdominal Pain/ Abnormal LFT/ CBD 7mm  - CBD 7mm, Cholelithiasis, T Bili 3.2 to 0.7  - MRCP--> 1. No biliary distention, filling defect, or stricture. 2. Cholelithiasis.  - LFTs are trending down except ALP increased  - afebrile, no leukocytosis, no abdominal pain  - Low fat diet  - HAV IgM/IgG, HBsAg, HBsAb, HBcAb IgM/IgG, HCV Ab, Anti HEV, Serum Ferritin, Transferrin Saturation, Ceruloplasmin level, CHAYO, SMA, gamma globulin, AMA.  - Monitor LFts  - surgical eval for CCY  - follow up as outpatient with GI

## 2021-07-10 NOTE — PROGRESS NOTE ADULT - SUBJECTIVE AND OBJECTIVE BOX
************************************************  Tejas Weiner MD (PGY-1)  Spectra: [phone]  ************************************************    SUBJECTIVE / OVERNIGHT EVENTS  Patient slept well overnight. No acute complaints this AM. No abdominal pain or hematuria reported. Patient does not report fevers, chills, CP, SOB, or n/v/d/c.    ========================MEDICATIONS========================    amLODIPine   Tablet   5 milliGRAM(s) Oral (07-10-21 @ 05:17)    atorvastatin   20 milliGRAM(s) Oral (07-09-21 @ 21:41)    cefTRIAXone   IVPB   100 mL/Hr IV Intermittent (07-10-21 @ 05:17)    dorzolamide 2% Ophthalmic Solution   1 Drop(s) Both EYES (07-09-21 @ 19:43)    insulin glargine Injectable (LANTUS)   70 Unit(s) SubCutaneous (07-09-21 @ 21:49)    insulin lispro Injectable (ADMELOG).   20 Unit(s) SubCutaneous (07-09-21 @ 17:58)    latanoprost 0.005% Ophthalmic Solution   1 Drop(s) Both EYES (07-09-21 @ 21:50)    pantoprazole    Tablet   40 milliGRAM(s) Oral (07-10-21 @ 05:17)    timolol 0.5% Solution   1 Drop(s) Both EYES (07-09-21 @ 19:44)    timolol 0.5% Solution   1 Drop(s) Both EYES (07-09-21 @ 11:53)      ========================VITALS/EXAM========================  VITALS  T(C): 35.7 (07-10-21 @ 05:07), Max: 36.1 (07-09-21 @ 13:27)  HR: 64 (07-10-21 @ 05:07) (56 - 64)  BP: 156/82 (07-10-21 @ 05:07) (150/70 - 156/82)  RR: 18 (07-10-21 @ 05:07) (18 - 19)  SpO2: --  POCT Blood Glucose.: 142 mg/dL (07-10-21 @ 07:21)  POCT Blood Glucose.: 144 mg/dL (07-09-21 @ 21:44)  POCT Blood Glucose.: 104 mg/dL (07-09-21 @ 16:05)  POCT Blood Glucose.: 102 mg/dL (07-09-21 @ 11:40)    PHYSICAL EXAM  GENERAL: NAD, well-developed  CHEST/LUNG: Clear to auscultation bilaterally; No wheezes, rales or rhonchi  HEART: Regular rate and rhythm; No murmurs, rubs, or gallops  ABDOMEN: Soft, Nontender, Nondistended; Bowel sounds present, no masses.  EXTREMITIES:  2+ Peripheral Pulses, No clubbing, cyanosis, or edema    =============================I/O=============================     07-09-21 @ 07:01  -  07-10-21 @ 07:00  --------------------------------------------------------  IN:    IV PiggyBack: 50 mL    sodium chloride 0.9%: 900 mL  Total IN: 950 mL    OUT:  Total OUT: 0 mL    Total NET: 950 mL    ============================LABS============================             12.1<L>  6.43  )-----------( 193      ( 07-10-21 @ 05:48 )             37.1<L>    139  |  107  |  30<H>  ----------------------------<  134<H>   07-10-21 @ 05:48  4.3  |  21  |  1.3    Ca      8.4<L>     07-10-21 @ 05:48  Mg     2.2     07-10-21 @ 05:48    TPro  6.2  /  Alb  3.5  /  TBili  0.7  /  DBili  x   /  AST  57<H>  /  ALT  94<H>  /  AlkPhos  264<H>  /  GGT  x     07-10-21 @ 05:48    PT/INR - ( 07-09-21 @ 05:39 )   PT: 12.60 sec;   INR: 1.10 ratio  PTT - ( 07-09-21 @ 05:39 )  PTT:31.3 sec    =======================Micro/Rad/Cardio=======================    CULTURES    Culture - Blood (collected 07-07-21 @ 22:40)  Source: .Blood Blood  Preliminary Report:    No growth to date.    Culture - Urine (collected 07-07-21 @ 19:04)  Source: .Urine Clean Catch (Midstream)  Final Report:    No growth

## 2021-07-10 NOTE — PROGRESS NOTE ADULT - REASON FOR ADMISSION
Fever and Hematuria

## 2021-07-10 NOTE — PROGRESS NOTE ADULT - ASSESSMENT
65M w/ h/o DM, HTN, HLD, glaucoma, CKD III, nephrolithiasis, renal mass s/p R nephrectomy, obesity, MSSA bacteremia, COVID positive on 12/10, L kidney with staghorn calculus and multiple calculi presents with hematuria since yesterday associated with fever tmax of 102.7 at home.  Pt went to urgent care yesterday and was told to come to the ED, but wanted to wait until to day to see how he was feeling. Hematuria resolved this AM per pt.  Denies n/v, cp, sob, pleuritic chest pain, palpitations, diaphoresis, cough, abd/flank pain, diarrhea, constipation, melena/brbpr, dysuria, burning upon urination, penile pain/discharge, testicular pain/swelling/erythema, rectal pain or tenesmus, sick contacts, recent travel or rash.     #RUQ pain  #Acute transaminitis  - started 7/7 in PM and a/w fever (tmax 101.4F); repeat abd u/s 7/7 showed no acute pathology  - Likely cholestatic in nature in context of elevated liver enzymes, T bili, and GGT (389)  - Down trending liver enzymes (AST/ALT 57/94 from 85/121) and T Bili (0.7  from 1.1)  - MRCP performed 7/9, f/u results  - ERCP if MRCP results significant  - GI following, recs appreciated    #Hematuria  #Nephrolithiasis (staghorn calculus)  - Hx of nephrolithiasis (takes 1 tablespoon of baking soda at home mixed with water)  - No sepsis on admission, afebrile, no leukocytosis   - CXR clear   - UCx and BCx shows NGTD  - CT A/P showed no evidence of acute intra-abdominal pathology. s/p right nephrectomy. Left staghorn calculus, difficult to measure, measuring approximately 3.3 x 1.3 x 1.3 cm, and appearing slightly decreased in size since prior CT. No left hydronephrosis. Left lower pole cyst.  - c/w ceftriaxone (7/6 - ); s/p one-time cefepime dose in ED  - Urology consulted, no surgical intervention recommended at this time (pt will f/u outpatient w/ Dr. Falk)  - Start Bactrim DS three times a week once discharged from hospital for UTI ppx in context of staghorn calculus    #BATOOL on CKD III   - Creatinine 1.3 on AM labs from 1.5 yesterday (baseline around 1.4-1.7 per pt)  - R nephrectomy in 2017 s/p renal mass discovery  - No left hydronephrosis  - Avoid nephrotoxic meds   - Trend BUN/Cr w/ daily BMP  - hold olmesartan for now    #Troponemia likely secondary to CKD   - 0.05 on admission, chronically elevated up to 0.09 on previous admission  - Repeat troponin 0.03  - Denies chest pain   - No new changes on EKG     # DM II  - A1c 7.5%  - On Insulin at home   - trend FS  - c/w home dose of insulin Lantus 70u at bedtime and 40 u in AM, Lispro 30U TID     # HTN - controlled  - c/w amlodipine    # HLD   -c/w atorvastatin    # Glaucoma  - c/w timolol and latanoprost     # Morbid obesity   BMI - 53.8  Diet and exercise counselling provided     # Diet: DASH/TLC/CC  # DVT Prophylaxis: lovenox   # GI Prophylaxis: Protonix  # Activity: IAT  # Dispo: Acute  # Code Status: Full code

## 2021-07-13 LAB
CULTURE RESULTS: SIGNIFICANT CHANGE UP
SPECIMEN SOURCE: SIGNIFICANT CHANGE UP

## 2021-07-26 NOTE — CDI QUERY NOTE - NSCDIOTHERTXTBX_GEN_ALL_CORE_HH
______________________________________________________________________________________________________________________________________    65 M, with Diagnosis:  Fever, Hematuria  ED provider: 7/5/2021  presents with hematuria since yesterday night associated with fever tmax of 102.7, has been taking tyl last was yesterday    H&P:  7/6/2021: Attending:    No sepsis on admission, afebrile, no leukocytosis   Progress Notes: 7/7 and 7/9  Attending: sepsis on admission, resident states no sepsis on admission               7/7/2021:  Attending: ?chronic suppressive therapy - will speak to patient's nephrologist (home meds with Prednisone 20 mg 2 tabs once a day)               7/9/2021: Attending A: Sepsis present on admission secondary to UTI - even with no growth patient has s/s compatible with UTI  staghorn calculus in a solitary kidney  7/10/21 attending no mention of sepsis    Clinical Indicator:   Flowsheet: ER: 7/5/2021:   T= 100,  HR=92                       7/7/2021:    T= 96.5 – 101.4,   HR= 71-89     Meds:  Cefepime IV stat (7/5/2021),  ceftriaxone IV (7/6/2021-  7/10/2021)    Based on your professional judgment and clinical indicators, can the diagnosis of Sepsis be further clarified as:    [ ]  Sepsis ruled in  [ ]  Sepsis ruled out  [ ]  Other (please specify)  [ ]  Clinically unable to determine    Thank you.

## 2021-09-09 ENCOUNTER — OUTPATIENT (OUTPATIENT)
Dept: OUTPATIENT SERVICES | Facility: HOSPITAL | Age: 66
LOS: 1 days | Discharge: HOME | End: 2021-09-09
Payer: MEDICARE

## 2021-09-09 DIAGNOSIS — K21.9 GASTRO-ESOPHAGEAL REFLUX DISEASE WITHOUT ESOPHAGITIS: ICD-10-CM

## 2021-09-09 DIAGNOSIS — Z90.5 ACQUIRED ABSENCE OF KIDNEY: Chronic | ICD-10-CM

## 2021-09-09 PROCEDURE — 74220 X-RAY XM ESOPHAGUS 1CNTRST: CPT | Mod: 26

## 2021-09-10 NOTE — PATIENT PROFILE ADULT - ..

## 2022-02-23 NOTE — DISCHARGE NOTE NURSING/CASE MANAGEMENT/SOCIAL WORK - NSSCCARECORD_GEN_ALL_CORE
Called and informed patient of message below  Patient was thankful for call and confirmed appointment for next Monday    ----- Message from Chary Carmen PA-C sent at 2/23/2022  7:06 AM EST -----  Patient had last dose of Lupron Feb 2021  Rise in PSA expected  Will monitor closely  [facility]

## 2022-10-06 ENCOUNTER — INPATIENT (INPATIENT)
Facility: HOSPITAL | Age: 67
LOS: 6 days | Discharge: HOME | End: 2022-10-13
Attending: INTERNAL MEDICINE | Admitting: INTERNAL MEDICINE

## 2022-10-06 VITALS
OXYGEN SATURATION: 98 % | RESPIRATION RATE: 18 BRPM | SYSTOLIC BLOOD PRESSURE: 136 MMHG | DIASTOLIC BLOOD PRESSURE: 68 MMHG | TEMPERATURE: 98 F | HEART RATE: 115 BPM | HEIGHT: 70 IN

## 2022-10-06 DIAGNOSIS — Z90.5 ACQUIRED ABSENCE OF KIDNEY: Chronic | ICD-10-CM

## 2022-10-06 LAB
ALBUMIN SERPL ELPH-MCNC: 4 G/DL — SIGNIFICANT CHANGE UP (ref 3.5–5.2)
ALBUMIN SERPL ELPH-MCNC: 4 G/DL — SIGNIFICANT CHANGE UP (ref 3.5–5.2)
ALP SERPL-CCNC: 116 U/L — HIGH (ref 30–115)
ALP SERPL-CCNC: 117 U/L — HIGH (ref 30–115)
ALT FLD-CCNC: 140 U/L — HIGH (ref 0–41)
ALT FLD-CCNC: 142 U/L — HIGH (ref 0–41)
ANION GAP SERPL CALC-SCNC: 15 MMOL/L — HIGH (ref 7–14)
ANION GAP SERPL CALC-SCNC: 16 MMOL/L — HIGH (ref 7–14)
APPEARANCE UR: ABNORMAL
APTT BLD: 28.8 SEC — SIGNIFICANT CHANGE UP (ref 27–39.2)
AST SERPL-CCNC: 141 U/L — HIGH (ref 0–41)
AST SERPL-CCNC: 144 U/L — HIGH (ref 0–41)
BACTERIA # UR AUTO: NEGATIVE — SIGNIFICANT CHANGE UP
BASE EXCESS BLDV CALC-SCNC: -8 MMOL/L — LOW (ref -2–3)
BASOPHILS # BLD AUTO: 0.05 K/UL — SIGNIFICANT CHANGE UP (ref 0–0.2)
BASOPHILS NFR BLD AUTO: 0.3 % — SIGNIFICANT CHANGE UP (ref 0–1)
BILIRUB DIRECT SERPL-MCNC: <0.2 MG/DL — SIGNIFICANT CHANGE UP (ref 0–0.3)
BILIRUB INDIRECT FLD-MCNC: >0.9 MG/DL — SIGNIFICANT CHANGE UP (ref 0.2–1.2)
BILIRUB SERPL-MCNC: 1 MG/DL — SIGNIFICANT CHANGE UP (ref 0.2–1.2)
BILIRUB SERPL-MCNC: 1.1 MG/DL — SIGNIFICANT CHANGE UP (ref 0.2–1.2)
BILIRUB UR-MCNC: NEGATIVE — SIGNIFICANT CHANGE UP
BUN SERPL-MCNC: 51 MG/DL — HIGH (ref 10–20)
BUN SERPL-MCNC: 52 MG/DL — HIGH (ref 10–20)
CA-I SERPL-SCNC: 1.19 MMOL/L — SIGNIFICANT CHANGE UP (ref 1.15–1.33)
CALCIUM SERPL-MCNC: 8.5 MG/DL — SIGNIFICANT CHANGE UP (ref 8.4–10.5)
CALCIUM SERPL-MCNC: 8.9 MG/DL — SIGNIFICANT CHANGE UP (ref 8.4–10.4)
CHLORIDE SERPL-SCNC: 100 MMOL/L — SIGNIFICANT CHANGE UP (ref 98–110)
CHLORIDE SERPL-SCNC: 102 MMOL/L — SIGNIFICANT CHANGE UP (ref 98–110)
CO2 SERPL-SCNC: 17 MMOL/L — SIGNIFICANT CHANGE UP (ref 17–32)
CO2 SERPL-SCNC: 18 MMOL/L — SIGNIFICANT CHANGE UP (ref 17–32)
COLOR SPEC: YELLOW — SIGNIFICANT CHANGE UP
CREAT SERPL-MCNC: 2.3 MG/DL — HIGH (ref 0.7–1.5)
CREAT SERPL-MCNC: 2.4 MG/DL — HIGH (ref 0.7–1.5)
DIFF PNL FLD: NEGATIVE — SIGNIFICANT CHANGE UP
EGFR: 28 ML/MIN/1.73M2 — LOW
EGFR: 30 ML/MIN/1.73M2 — LOW
EOSINOPHIL # BLD AUTO: 0 K/UL — SIGNIFICANT CHANGE UP (ref 0–0.7)
EOSINOPHIL NFR BLD AUTO: 0 % — SIGNIFICANT CHANGE UP (ref 0–8)
EPI CELLS # UR: 3 /HPF — SIGNIFICANT CHANGE UP (ref 0–5)
GAS PNL BLDV: 129 MMOL/L — LOW (ref 136–145)
GAS PNL BLDV: SIGNIFICANT CHANGE UP
GLUCOSE BLDC GLUCOMTR-MCNC: 283 MG/DL — HIGH (ref 70–99)
GLUCOSE SERPL-MCNC: 308 MG/DL — HIGH (ref 70–99)
GLUCOSE SERPL-MCNC: 440 MG/DL — HIGH (ref 70–99)
GLUCOSE UR QL: ABNORMAL
HCO3 BLDV-SCNC: 18 MMOL/L — LOW (ref 22–29)
HCT VFR BLD CALC: 40.8 % — LOW (ref 42–52)
HCT VFR BLDA CALC: 39 % — SIGNIFICANT CHANGE UP (ref 39–51)
HGB BLD CALC-MCNC: 12.9 G/DL — SIGNIFICANT CHANGE UP (ref 12.6–17.4)
HGB BLD-MCNC: 13.8 G/DL — LOW (ref 14–18)
HYALINE CASTS # UR AUTO: 2 /LPF — SIGNIFICANT CHANGE UP (ref 0–7)
IMM GRANULOCYTES NFR BLD AUTO: 0.8 % — HIGH (ref 0.1–0.3)
INR BLD: 1.08 RATIO — SIGNIFICANT CHANGE UP (ref 0.65–1.3)
KETONES UR-MCNC: SIGNIFICANT CHANGE UP
LACTATE BLDV-MCNC: 2.3 MMOL/L — HIGH (ref 0.5–2)
LACTATE SERPL-SCNC: 3.5 MMOL/L — HIGH (ref 0.7–2)
LEUKOCYTE ESTERASE UR-ACNC: NEGATIVE — SIGNIFICANT CHANGE UP
LIDOCAIN IGE QN: 2006 U/L — HIGH (ref 7–60)
LYMPHOCYTES # BLD AUTO: 0.44 K/UL — LOW (ref 1.2–3.4)
LYMPHOCYTES # BLD AUTO: 2.5 % — LOW (ref 20.5–51.1)
MCHC RBC-ENTMCNC: 30.3 PG — SIGNIFICANT CHANGE UP (ref 27–31)
MCHC RBC-ENTMCNC: 33.8 G/DL — SIGNIFICANT CHANGE UP (ref 32–37)
MCV RBC AUTO: 89.7 FL — SIGNIFICANT CHANGE UP (ref 80–94)
MONOCYTES # BLD AUTO: 1.22 K/UL — HIGH (ref 0.1–0.6)
MONOCYTES NFR BLD AUTO: 7 % — SIGNIFICANT CHANGE UP (ref 1.7–9.3)
NEUTROPHILS # BLD AUTO: 15.59 K/UL — HIGH (ref 1.4–6.5)
NEUTROPHILS NFR BLD AUTO: 89.4 % — HIGH (ref 42.2–75.2)
NITRITE UR-MCNC: NEGATIVE — SIGNIFICANT CHANGE UP
NRBC # BLD: 0 /100 WBCS — SIGNIFICANT CHANGE UP (ref 0–0)
PCO2 BLDV: 36 MMHG — LOW (ref 42–55)
PH BLDV: 7.3 — LOW (ref 7.32–7.43)
PH UR: 5.5 — SIGNIFICANT CHANGE UP (ref 5–8)
PLATELET # BLD AUTO: 168 K/UL — SIGNIFICANT CHANGE UP (ref 130–400)
PO2 BLDV: 53 MMHG — SIGNIFICANT CHANGE UP
POTASSIUM BLDV-SCNC: 5.4 MMOL/L — HIGH (ref 3.5–5.1)
POTASSIUM SERPL-MCNC: 5 MMOL/L — SIGNIFICANT CHANGE UP (ref 3.5–5)
POTASSIUM SERPL-MCNC: 6.2 MMOL/L — CRITICAL HIGH (ref 3.5–5)
POTASSIUM SERPL-SCNC: 5 MMOL/L — SIGNIFICANT CHANGE UP (ref 3.5–5)
POTASSIUM SERPL-SCNC: 6.2 MMOL/L — CRITICAL HIGH (ref 3.5–5)
PROT SERPL-MCNC: 7.2 G/DL — SIGNIFICANT CHANGE UP (ref 6–8)
PROT SERPL-MCNC: 7.4 G/DL — SIGNIFICANT CHANGE UP (ref 6–8)
PROT UR-MCNC: ABNORMAL
PROTHROM AB SERPL-ACNC: 12.3 SEC — SIGNIFICANT CHANGE UP (ref 9.95–12.87)
RBC # BLD: 4.55 M/UL — LOW (ref 4.7–6.1)
RBC # FLD: 13.3 % — SIGNIFICANT CHANGE UP (ref 11.5–14.5)
RBC CASTS # UR COMP ASSIST: 6 /HPF — HIGH (ref 0–4)
SAO2 % BLDV: 83.4 % — SIGNIFICANT CHANGE UP
SARS-COV-2 RNA SPEC QL NAA+PROBE: SIGNIFICANT CHANGE UP
SODIUM SERPL-SCNC: 133 MMOL/L — LOW (ref 135–146)
SODIUM SERPL-SCNC: 135 MMOL/L — SIGNIFICANT CHANGE UP (ref 135–146)
SP GR SPEC: 1.02 — SIGNIFICANT CHANGE UP (ref 1.01–1.03)
TRIGL SERPL-MCNC: 124 MG/DL — SIGNIFICANT CHANGE UP
TROPONIN T SERPL-MCNC: 0.04 NG/ML — CRITICAL HIGH
TROPONIN T SERPL-MCNC: 0.04 NG/ML — CRITICAL HIGH
UROBILINOGEN FLD QL: ABNORMAL
WBC # BLD: 17.44 K/UL — HIGH (ref 4.8–10.8)
WBC # FLD AUTO: 17.44 K/UL — HIGH (ref 4.8–10.8)
WBC UR QL: 3 /HPF — SIGNIFICANT CHANGE UP (ref 0–5)

## 2022-10-06 PROCEDURE — 71045 X-RAY EXAM CHEST 1 VIEW: CPT | Mod: 26

## 2022-10-06 PROCEDURE — 93010 ELECTROCARDIOGRAM REPORT: CPT

## 2022-10-06 PROCEDURE — 99223 1ST HOSP IP/OBS HIGH 75: CPT

## 2022-10-06 PROCEDURE — 76705 ECHO EXAM OF ABDOMEN: CPT | Mod: 26

## 2022-10-06 PROCEDURE — 74176 CT ABD & PELVIS W/O CONTRAST: CPT | Mod: 26,MA

## 2022-10-06 PROCEDURE — 99285 EMERGENCY DEPT VISIT HI MDM: CPT

## 2022-10-06 RX ORDER — GLUCAGON INJECTION, SOLUTION 0.5 MG/.1ML
1 INJECTION, SOLUTION SUBCUTANEOUS ONCE
Refills: 0 | Status: DISCONTINUED | OUTPATIENT
Start: 2022-10-06 | End: 2022-10-13

## 2022-10-06 RX ORDER — SODIUM CHLORIDE 9 MG/ML
1000 INJECTION, SOLUTION INTRAVENOUS ONCE
Refills: 0 | Status: COMPLETED | OUTPATIENT
Start: 2022-10-06 | End: 2022-10-06

## 2022-10-06 RX ORDER — DORZOLAMIDE HYDROCHLORIDE TIMOLOL MALEATE 20; 5 MG/ML; MG/ML
1 SOLUTION/ DROPS OPHTHALMIC
Qty: 0 | Refills: 0 | DISCHARGE

## 2022-10-06 RX ORDER — ACETAMINOPHEN 500 MG
650 TABLET ORAL EVERY 6 HOURS
Refills: 0 | Status: DISCONTINUED | OUTPATIENT
Start: 2022-10-06 | End: 2022-10-13

## 2022-10-06 RX ORDER — DIATRIZOATE MEGLUMINE 180 MG/ML
30 INJECTION, SOLUTION INTRAVESICAL ONCE
Refills: 0 | Status: COMPLETED | OUTPATIENT
Start: 2022-10-06 | End: 2022-10-06

## 2022-10-06 RX ORDER — DEXTROSE 50 % IN WATER 50 %
25 SYRINGE (ML) INTRAVENOUS ONCE
Refills: 0 | Status: DISCONTINUED | OUTPATIENT
Start: 2022-10-06 | End: 2022-10-07

## 2022-10-06 RX ORDER — LATANOPROST 0.05 MG/ML
1 SOLUTION/ DROPS OPHTHALMIC; TOPICAL AT BEDTIME
Refills: 0 | Status: DISCONTINUED | OUTPATIENT
Start: 2022-10-06 | End: 2022-10-13

## 2022-10-06 RX ORDER — INSULIN LISPRO 100/ML
30 VIAL (ML) SUBCUTANEOUS
Qty: 0 | Refills: 0 | DISCHARGE

## 2022-10-06 RX ORDER — OLMESARTAN MEDOXOMIL-HYDROCHLOROTHIAZIDE 25; 40 MG/1; MG/1
0 TABLET, FILM COATED ORAL
Qty: 0 | Refills: 1 | DISCHARGE

## 2022-10-06 RX ORDER — ONDANSETRON 8 MG/1
4 TABLET, FILM COATED ORAL ONCE
Refills: 0 | Status: COMPLETED | OUTPATIENT
Start: 2022-10-06 | End: 2022-10-06

## 2022-10-06 RX ORDER — DORZOLAMIDE HYDROCHLORIDE 20 MG/ML
1 SOLUTION/ DROPS OPHTHALMIC
Refills: 0 | Status: DISCONTINUED | OUTPATIENT
Start: 2022-10-06 | End: 2022-10-13

## 2022-10-06 RX ORDER — CEFEPIME 1 G/1
2000 INJECTION, POWDER, FOR SOLUTION INTRAMUSCULAR; INTRAVENOUS ONCE
Refills: 0 | Status: COMPLETED | OUTPATIENT
Start: 2022-10-06 | End: 2022-10-06

## 2022-10-06 RX ORDER — INSULIN GLARGINE 100 [IU]/ML
30 INJECTION, SOLUTION SUBCUTANEOUS EVERY MORNING
Refills: 0 | Status: DISCONTINUED | OUTPATIENT
Start: 2022-10-07 | End: 2022-10-07

## 2022-10-06 RX ORDER — BIMATOPROST 0.3 MG/ML
1 SOLUTION/ DROPS OPHTHALMIC
Qty: 0 | Refills: 0 | DISCHARGE

## 2022-10-06 RX ORDER — METRONIDAZOLE 500 MG
500 TABLET ORAL ONCE
Refills: 0 | Status: COMPLETED | OUTPATIENT
Start: 2022-10-06 | End: 2022-10-06

## 2022-10-06 RX ORDER — INSULIN LISPRO 100/ML
VIAL (ML) SUBCUTANEOUS
Refills: 0 | Status: DISCONTINUED | OUTPATIENT
Start: 2022-10-06 | End: 2022-10-07

## 2022-10-06 RX ORDER — SODIUM CHLORIDE 9 MG/ML
1000 INJECTION, SOLUTION INTRAVENOUS
Refills: 0 | Status: DISCONTINUED | OUTPATIENT
Start: 2022-10-06 | End: 2022-10-13

## 2022-10-06 RX ORDER — TOBRAMYCIN AND DEXAMETHASONE 1; 3 MG/ML; MG/ML
0 SUSPENSION/ DROPS OPHTHALMIC
Qty: 0 | Refills: 0 | DISCHARGE

## 2022-10-06 RX ORDER — DORZOLAMIDE HYDROCHLORIDE TIMOLOL MALEATE 20; 5 MG/ML; MG/ML
0 SOLUTION/ DROPS OPHTHALMIC
Qty: 0 | Refills: 2 | DISCHARGE

## 2022-10-06 RX ORDER — ATORVASTATIN CALCIUM 80 MG/1
1 TABLET, FILM COATED ORAL
Qty: 0 | Refills: 0 | DISCHARGE

## 2022-10-06 RX ORDER — PANTOPRAZOLE SODIUM 20 MG/1
40 TABLET, DELAYED RELEASE ORAL
Refills: 0 | Status: DISCONTINUED | OUTPATIENT
Start: 2022-10-06 | End: 2022-10-13

## 2022-10-06 RX ORDER — DEXTROSE 50 % IN WATER 50 %
15 SYRINGE (ML) INTRAVENOUS ONCE
Refills: 0 | Status: DISCONTINUED | OUTPATIENT
Start: 2022-10-06 | End: 2022-10-07

## 2022-10-06 RX ORDER — LANOLIN ALCOHOL/MO/W.PET/CERES
3 CREAM (GRAM) TOPICAL AT BEDTIME
Refills: 0 | Status: DISCONTINUED | OUTPATIENT
Start: 2022-10-06 | End: 2022-10-13

## 2022-10-06 RX ORDER — SODIUM CHLORIDE 9 MG/ML
250 INJECTION, SOLUTION INTRAVENOUS ONCE
Refills: 0 | Status: COMPLETED | OUTPATIENT
Start: 2022-10-06 | End: 2022-10-06

## 2022-10-06 RX ORDER — DORZOLAMIDE HYDROCHLORIDE TIMOLOL MALEATE 20; 5 MG/ML; MG/ML
1 SOLUTION/ DROPS OPHTHALMIC
Refills: 0 | Status: DISCONTINUED | OUTPATIENT
Start: 2022-10-06 | End: 2022-10-06

## 2022-10-06 RX ORDER — INSULIN GLARGINE 100 [IU]/ML
20 INJECTION, SOLUTION SUBCUTANEOUS AT BEDTIME
Refills: 0 | Status: DISCONTINUED | OUTPATIENT
Start: 2022-10-06 | End: 2022-10-07

## 2022-10-06 RX ORDER — ONDANSETRON 8 MG/1
4 TABLET, FILM COATED ORAL EVERY 8 HOURS
Refills: 0 | Status: DISCONTINUED | OUTPATIENT
Start: 2022-10-06 | End: 2022-10-13

## 2022-10-06 RX ORDER — TIMOLOL 0.5 %
1 DROPS OPHTHALMIC (EYE)
Refills: 0 | Status: DISCONTINUED | OUTPATIENT
Start: 2022-10-06 | End: 2022-10-13

## 2022-10-06 RX ORDER — AMLODIPINE BESYLATE AND OLMESARTRAN MEDOXOMIL 10; 40 MG/1; MG/1
1 TABLET, FILM COATED ORAL
Qty: 0 | Refills: 0 | DISCHARGE

## 2022-10-06 RX ORDER — SODIUM CHLORIDE 9 MG/ML
1000 INJECTION, SOLUTION INTRAVENOUS
Refills: 0 | Status: DISCONTINUED | OUTPATIENT
Start: 2022-10-06 | End: 2022-10-11

## 2022-10-06 RX ORDER — HEPARIN SODIUM 5000 [USP'U]/ML
5000 INJECTION INTRAVENOUS; SUBCUTANEOUS EVERY 8 HOURS
Refills: 0 | Status: DISCONTINUED | OUTPATIENT
Start: 2022-10-06 | End: 2022-10-13

## 2022-10-06 RX ORDER — MORPHINE SULFATE 50 MG/1
4 CAPSULE, EXTENDED RELEASE ORAL ONCE
Refills: 0 | Status: DISCONTINUED | OUTPATIENT
Start: 2022-10-06 | End: 2022-10-06

## 2022-10-06 RX ORDER — ATORVASTATIN CALCIUM 80 MG/1
20 TABLET, FILM COATED ORAL AT BEDTIME
Refills: 0 | Status: DISCONTINUED | OUTPATIENT
Start: 2022-10-06 | End: 2022-10-13

## 2022-10-06 RX ADMIN — MORPHINE SULFATE 4 MILLIGRAM(S): 50 CAPSULE, EXTENDED RELEASE ORAL at 07:53

## 2022-10-06 RX ADMIN — ATORVASTATIN CALCIUM 20 MILLIGRAM(S): 80 TABLET, FILM COATED ORAL at 21:59

## 2022-10-06 RX ADMIN — SODIUM CHLORIDE 1000 MILLILITER(S): 9 INJECTION, SOLUTION INTRAVENOUS at 07:53

## 2022-10-06 RX ADMIN — HEPARIN SODIUM 5000 UNIT(S): 5000 INJECTION INTRAVENOUS; SUBCUTANEOUS at 21:59

## 2022-10-06 RX ADMIN — ONDANSETRON 4 MILLIGRAM(S): 8 TABLET, FILM COATED ORAL at 07:52

## 2022-10-06 RX ADMIN — INSULIN GLARGINE 20 UNIT(S): 100 INJECTION, SOLUTION SUBCUTANEOUS at 21:59

## 2022-10-06 RX ADMIN — Medication 100 MILLIGRAM(S): at 10:30

## 2022-10-06 RX ADMIN — LATANOPROST 1 DROP(S): 0.05 SOLUTION/ DROPS OPHTHALMIC; TOPICAL at 22:00

## 2022-10-06 RX ADMIN — Medication 6: at 19:00

## 2022-10-06 RX ADMIN — SODIUM CHLORIDE 1000 MILLILITER(S): 9 INJECTION, SOLUTION INTRAVENOUS at 12:53

## 2022-10-06 RX ADMIN — DIATRIZOATE MEGLUMINE 30 MILLILITER(S): 180 INJECTION, SOLUTION INTRAVESICAL at 08:21

## 2022-10-06 RX ADMIN — SODIUM CHLORIDE 100 MILLILITER(S): 9 INJECTION, SOLUTION INTRAVENOUS at 17:59

## 2022-10-06 RX ADMIN — MORPHINE SULFATE 4 MILLIGRAM(S): 50 CAPSULE, EXTENDED RELEASE ORAL at 08:30

## 2022-10-06 RX ADMIN — CEFEPIME 100 MILLIGRAM(S): 1 INJECTION, POWDER, FOR SOLUTION INTRAMUSCULAR; INTRAVENOUS at 10:30

## 2022-10-06 NOTE — ED PROVIDER NOTE - PHYSICAL EXAMINATION
CONSTITUTIONAL: in no acute distress, afebrile  SKIN: Warm, dry  HEAD: Normocephalic; atraumatic  EYES: No conjunctival injection. EOMI  ENT: No nasal discharge; oropharynx nonerythematous; airway clear  NECK: Supple; non tender  CARD:  Regular rate and rhythm  RESP: CTAB; No wheezes, crackles, rales or rhonchi  ABD: Soft obese habitus, non distended, + RUQ and upper TTP, pos cruz, no flank or cva tenderness; No guarding or rebound tenderness  EXT: Normal ROM.  No clubbing or cyanosis.  No edema. No calf tenderness  NEURO: A&O x3, grossly unremarkable, no focal deficits  *Chaperone MS3 Kori was used during the encounter. A professional environment was maintained and discussed with patient

## 2022-10-06 NOTE — H&P ADULT - NSHPLABSRESULTS_GEN_ALL_CORE
LABS:                          13.8   17.44 )-----------( 168      ( 06 Oct 2022 07:56 )             40.8     10-    133<L>  |  100  |  51<H>  ----------------------------<  440<H>  6.2<HH>   |  17  |  2.4<H>    Ca    8.9      06 Oct 2022 07:56    TPro  7.2  /  Alb  4.0  /  TBili  1.0  /  DBili  <0.2  /  AST  141<H>  /  ALT  140<H>  /  AlkPhos  117<H>  10-06    LIVER FUNCTIONS - ( 06 Oct 2022 07:56 )  Alb: 4.0 g/dL / Pro: 7.2 g/dL / ALK PHOS: 117 U/L / ALT: 140 U/L / AST: 141 U/L / GGT: x           PT/INR - ( 06 Oct 2022 07:56 )   PT: 12.30 sec;   INR: 1.08 ratio         PTT - ( 06 Oct 2022 07:56 )  PTT:28.8 sec  Urinalysis Basic - ( 06 Oct 2022 14:25 )    Color: Yellow / Appearance: Slightly Turbid / S.020 / pH: x  Gluc: x / Ketone: Trace  / Bili: Negative / Urobili: 3 mg/dL   Blood: x / Protein: 100 mg/dL / Nitrite: Negative   Leuk Esterase: Negative / RBC: 6 /HPF / WBC 3 /HPF   Sq Epi: x / Non Sq Epi: 3 /HPF / Bacteria: Negative

## 2022-10-06 NOTE — CONSULT NOTE ADULT - ASSESSMENT
Patient is a 68 y/o with PMHx of DM, HTN, Obesity, Glaucoma, Covid PNA c/b DVT, CKD, prior Nephrectomy, HLD, hx of gastric band insertion many years ago who presented to [facility] with abdominal pain, nausea, vomiting. Patient reports that last night after dinner, he started having epigastric pain radiating to RUQ and LUQ associated with nausea and 3 episodes of nonbloody nonbilious vomiting. no other symptoms. no fever. patient tachycardic, afebrile.  labs showed leukocytosis 17K, Hct 40, metabolic acidosis present. K 6.2 hemolyzed, BATOOL on top of CKD, LFTs showed ALP: 117, , . Bili: 1, lactate 3.5, lipase 2000. TG normal. US abd very limited study. Large fatty liver. Sludge with echogenic material within the gallbladder. CT abdomen No evidence of acute intra-abdominal pathology. Status post right nephrectomy. Unchanged left staghorn calculus, measuring approximately 3 x 2.5 x 1 cm.    Patient seen 1 year ago by GI team for choledocholithiasis. at that time MRCP was done and showed No biliary distention, filling defect, or stricture. Cholelithiasis present. Patient was supposed to get cholecystectomy but it was not done yet. Patient is also supposed to get his gastric band removed due to dysphagia but procedure not done yet    In ED, Patient given broad spectrum antibiotics. he is on /hr after 2 liters fluid resuscitation. Patient is a 66 y/o with PMHx of DM, HTN, Obesity, Glaucoma, Covid PNA c/b DVT, CKD, prior Nephrectomy, HLD, hx of gastric band insertion many years ago who presented to [facility] with abdominal pain, nausea, vomiting. Patient reports that last night after dinner, he started having epigastric pain radiating to RUQ and LUQ associated with nausea and 3 episodes of nonbloody nonbilious vomiting. no other symptoms. no fever. patient tachycardic, afebrile.  labs showed leukocytosis 17K, Hct 40, metabolic acidosis present. K 6.2 hemolyzed, BATOOL on top of CKD, LFTs showed ALP: 117, , . Bili: 1, lactate 3.5, lipase 2000. TG normal. US abd very limited study. Large fatty liver. Sludge with echogenic material within the gallbladder. CT abdomen No evidence of acute intra-abdominal pathology. Status post right nephrectomy. Unchanged left staghorn calculus, measuring approximately 3 x 2.5 x 1 cm.  Patient seen 1 year ago by GI team for choledocholithiasis. at that time MRCP was done and showed No biliary distention, filling defect, or stricture. Cholelithiasis present. Patient was supposed to get cholecystectomy but it was not done yet. Patient is also supposed to get his gastric band removed due to dysphagia but procedure not done yet  In ED, Patient given broad spectrum antibiotics. he is on /hr after 2 liters fluid resuscitation.    # Acute Pancreatitis - moderate to severe, likely gallstone related  # Choledocholithiasis 1 year ago  - no alcohol use  - TG normal  - epigastric pain present. lipase 2000. CT abd with oral contrast only showed no evidence of intraabd pathology  - US abdomen: limited study due to obesity. fatty liver. sludge with echogenic material in gallbladder. CBD 5 mm  - MRCP 2021 --> No biliary distention, filling defect, or stricture. Cholelithiasis present  - LFTs --> ALP: 117, , . Bili: 1, lactate 3.5  - Patient with hx of gastric band insertion years ago    Plan:  - keep NPO  - aggressive fluid therapy /hr  - Pain control and antiemetics  - Trend LFTs  - Surgery consult for cholecystectomy  - weight loss education Patient is a 68 y/o with PMHx of DM, HTN, Obesity, Glaucoma, Covid PNA c/b DVT, CKD, prior Nephrectomy, HLD, hx of gastric band insertion many years ago who presented to [facility] with abdominal pain, nausea, vomiting. Patient reports that last night after dinner, he started having epigastric pain radiating to RUQ and LUQ associated with nausea and 3 episodes of nonbloody nonbilious vomiting. no other symptoms. no fever. patient tachycardic, afebrile.  labs showed leukocytosis 17K, Hct 40, metabolic acidosis present. K 6.2 hemolyzed, BATOOL on top of CKD, LFTs showed ALP: 117, , . Bili: 1, lactate 3.5, lipase 2000. TG normal. US abd very limited study. Large fatty liver. Sludge with echogenic material within the gallbladder. CT abdomen No evidence of acute intra-abdominal pathology. Status post right nephrectomy. Unchanged left staghorn calculus, measuring approximately 3 x 2.5 x 1 cm.  Patient seen 1 year ago by GI team for choledocholithiasis. at that time MRCP was done and showed No biliary distention, filling defect, or stricture. Cholelithiasis present. Patient was supposed to get cholecystectomy but it was not done yet. Patient is also supposed to get his gastric band removed due to dysphagia but procedure not done yet  In ED, Patient given broad spectrum antibiotics. he is on /hr after 2 liters fluid resuscitation.    # Acute Pancreatitis - moderate to severe, likely gallstone related  # Choledocholithiasis 1 year ago  - no alcohol use  - TG normal  - epigastric pain present. lipase 2000. CT abd with oral contrast only showed no evidence of intraabd pathology  - US abdomen: limited study due to obesity. fatty liver. sludge with echogenic material in gallbladder. CBD 5 mm  - MRCP 2021 --> No biliary distention, filling defect, or stricture. Cholelithiasis present  - LFTs --> ALP: 117, , . Bili: 1, lactate 3.5  - Patient with hx of gastric band insertion years ago    Plan:  - keep NPO  - aggressive fluid therapy /hr  - Please do MRCP  - Pain control and antiemetics  - Trend LFTs  - Surgery consult for cholecystectomy  - weight loss education Patient is a 66 y/o with PMHx of DM, HTN, Obesity, Glaucoma, Covid PNA c/b DVT, CKD, prior Nephrectomy, HLD, hx of gastric band insertion many years ago who presented to [facility] with abdominal pain, nausea, vomiting. Patient reports that last night after dinner, he started having epigastric pain radiating to RUQ and LUQ associated with nausea and 3 episodes of nonbloody nonbilious vomiting. no other symptoms. no fever. patient tachycardic, afebrile.  labs showed leukocytosis 17K, Hct 40, metabolic acidosis present. K 6.2 hemolyzed, BATOOL on top of CKD, LFTs showed ALP: 117, , . Bili: 1, lactate 3.5, lipase 2000. TG normal. US abd very limited study. Large fatty liver. Sludge with echogenic material within the gallbladder. CT abdomen No evidence of acute intra-abdominal pathology. Status post right nephrectomy. Unchanged left staghorn calculus, measuring approximately 3 x 2.5 x 1 cm.  Patient seen 1 year ago by GI team for choledocholithiasis. at that time MRCP was done and showed No biliary distention, filling defect, or stricture. Cholelithiasis present. Patient was supposed to get cholecystectomy but it was not done yet. Patient is also supposed to get his gastric band removed due to dysphagia but procedure not done yet  In ED, Patient given broad spectrum antibiotics. he is on /hr after 2 liters fluid resuscitation.    # Acute Pancreatitis - moderate to severe, likely gallstone related  # transaminitis   #fatty liver  - no alcohol use  - TG normal  - epigastric pain present. lipase 2000. CT abd with oral contrast only showed no evidence of intraabd pathology  - US abdomen: limited study due to obesity. fatty liver. sludge with echogenic material in gallbladder. CBD 5 mm  - MRCP 2021 --> No biliary distention, filling defect, or stricture. Cholelithiasis present  - LFTs --> ALP: 117, , . Bili: 1, lactate 3.5  - Patient with hx of gastric band insertion years ago    Plan:  - keep NPO  - aggressive fluid therapy /hr  - Please do MRCP  - Pain control and antiemetics if no contraindication   - Trend LFTs  - Surgery consult for cholecystectomy  - weight loss education  - patient to follow up with hepatology regarding fatty liver   -outpatient EUS  Patient is a 68 y/o with PMHx of DM, HTN, Obesity, Glaucoma, Covid PNA c/b DVT, CKD, prior Nephrectomy, HLD, hx of gastric band insertion many years ago who presented to [facility] with abdominal pain, nausea, vomiting. Patient reports that last night after dinner, he started having epigastric pain radiating to RUQ and LUQ associated with nausea and 3 episodes of nonbloody nonbilious vomiting. no other symptoms. no fever. patient tachycardic, afebrile.  labs showed leukocytosis 17K, Hct 40, metabolic acidosis present. K 6.2 hemolyzed, BATOOL on top of CKD, LFTs showed ALP: 117, , . Bili: 1, lactate 3.5, lipase 2000. TG normal. US abd very limited study. Large fatty liver. Sludge with echogenic material within the gallbladder. CT abdomen No evidence of acute intra-abdominal pathology. Status post right nephrectomy. Unchanged left staghorn calculus, measuring approximately 3 x 2.5 x 1 cm.  Patient seen 1 year ago by GI team for choledocholithiasis. at that time MRCP was done and showed No biliary distention, filling defect, or stricture. Cholelithiasis present. Patient was supposed to get cholecystectomy but it was not done yet. Patient is also supposed to get his gastric band removed due to dysphagia but procedure not done yet  In ED, Patient given broad spectrum antibiotics. he is on /hr after 2 liters fluid resuscitation.    # Acute Pancreatitis - moderate to severe, likely gallstone related  leucocytosis / BATOOL   # transaminitis   #fatty liver  - no alcohol use  - TG normal  - epigastric pain present. lipase 2000. CT abd with oral contrast only showed no evidence of intraabd pathology  - US abdomen: limited study due to obesity. fatty liver. sludge with echogenic material in gallbladder. CBD 5 mm  - MRCP 2021 --> No biliary distention, filling defect, or stricture. Cholelithiasis present  - LFTs --> ALP: 117, , . Bili: 1, lactate 3.5  - Patient with hx of gastric band insertion years ago    Plan:  - keep NPO  - aggressive fluid therapy /hr  - Please do MRCP  - Pain control and antiemetics if no contraindication   - Trend LFTs  - Surgery consult for cholecystectomy  - weight loss education  - patient to follow up with hepatology regarding fatty liver   -outpatient EUS

## 2022-10-06 NOTE — CONSULT NOTE ADULT - ATTENDING COMMENTS
IMPRESSION:    Acute on chronic gallstone pancreatitis  Sepsis POA  BATOOL on CKD stage III  Lactic acidosis  NSTEMI, likely type 2 demand ischemia. No ECG changes  Transaminitis  HO DM, HTN, HLD, glaucoma, CKD III, nephrolithiasis, renal mass s/p R nephrectomy, obesity, MSSA bacteremia, L kidney with staghorn calculus and multiple calculi    Impression and plan are my own.
I edited the note

## 2022-10-06 NOTE — CONSULT NOTE ADULT - ASSESSMENT
IMPRESSION:  Acute pancreatitis, likely secondary to gallstones  Sepsis POA  BATOOL on CKD stage III  Lactic acidosis  NSTEMI, likely type 2 demand ischemia  Transaminitis  HO DM, HTN, HLD, glaucoma, CKD III, nephrolithiasis, renal mass s/p R nephrectomy, obesity, MSSA bacteremia, L kidney with staghorn calculus and multiple calculi    PLAN:    CNS: Avoid sedatives    HEENT: Oral care    PULMONARY: HOB @ 45 degrees. Aspiration precautions    CARDIOVASCULAR: Goal-directed fluid resuscitation    GI: GI prophylaxis. Feeding: NPO. Bowel regimen.    RENAL: Follow up renal function and lytes. Correct as needed.    INFECTIOUS DISEASE: Monitor vital signs. Follow up cultures    HEMATOLOGICAL: DVT prophylaxis    ENDOCRINE: Follow up fasting sugar. Insulin protocol if needed.     MUSCULOSKELETAL: out of bed as tolerated    DISPO: Patient requires MICU monitoring at this time IMPRESSION:  Acute on chronic gallstone pancreatitis  Sepsis POA  BATOOL on CKD stage III  Lactic acidosis  NSTEMI, likely type 2 demand ischemia. No ECG changes  Transaminitis  HO DM, HTN, HLD, glaucoma, CKD III, nephrolithiasis, renal mass s/p R nephrectomy, obesity, MSSA bacteremia, L kidney with staghorn calculus and multiple calculi    PLAN:    CNS: Avoid sedatives    HEENT: Oral care    PULMONARY: HOB @ 45 degrees. Aspiration precautions    CARDIOVASCULAR: Goal-directed fluid resuscitation for adequate urine output    GI: GI prophylaxis. Feeding: NPO. Bowel regimen. GI eval. Will possibly need surgery    RENAL: Follow up renal function and lytes. Correct as needed. Urine lytes. Renal US    INFECTIOUS DISEASE: Monitor vital signs. Follow up cultures    HEMATOLOGICAL: DVT prophylaxis    ENDOCRINE: Follow up fasting sugar. Insulin protocol if needed.     MUSCULOSKELETAL: out of bed as tolerated    DISPO: Patient does not require MICU monitoring at this time. Please recall if status changes  IMPRESSION:    Acute on chronic gallstone pancreatitis  Sepsis POA  BATOOL on CKD stage III  Lactic acidosis  NSTEMI, likely type 2 demand ischemia. No ECG changes  Transaminitis  HO DM, HTN, HLD, glaucoma, CKD III, nephrolithiasis, renal mass s/p R nephrectomy, obesity, MSSA bacteremia, L kidney with staghorn calculus and multiple calculi    PLAN:    CNS: Avoid sedatives. Mental status is at baseline.    HEENT: Oral care    PULMONARY: HOB @ 45 degrees. Aspiration precautions. CXR is clear of infiltrates.     CARDIOVASCULAR: Goal-directed fluid resuscitation: LR at 100cc/hr for now. Trend cardiac enzymes.     GI: GI prophylaxis. Feeding: advance diet when ok with GI. Bowel regimen. GI eval. Likely had gallstone pancreatitis. Repeat LFTs.     RENAL: BATOOL likely pre-renal. Repeat set of labs as initial labs were hemolyzed. Kidney and bladder US. LR at 100cc/hr. Correct electrolytes.    INFECTIOUS DISEASE: Monitor vital signs. Follow up cultures. No indications for abx.     HEMATOLOGICAL: DVT prophylaxis: subcutaneous heparin for now.     ENDOCRINE: Follow up fasting sugar. Insulin protocol if needed. TG level noted and normal.     MUSCULOSKELETAL: out of bed as tolerated.     DISPO: No indications for ICU level of care at the moment. Recall as needed.

## 2022-10-06 NOTE — ED PROVIDER NOTE - INPATIENT RESIDENT/ACP NOTIFIED DATE
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    Rehab: Gait Instability, ADL impairments and Functional impairments: Continue Comprehensive Rehab Program of PT/OT/ST    #Left JESSE/MCA CVA with petechial hemorrhage  - Continue ASA 81mg daily  - Continue Lipitor 80mg QHS  - Prozac 20mg daily (inc. 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  "This should provide protection for stroke from Right ICA dissection while minimizing chance for worsening petechial ICH."    #Leukocytosis   - repeat labs 2/24 15.08, afebrile, VSS, no signs of active infection    #Pain: Tylenol PRN  - Prior opioid use: well managed without meds at the moment, patient has h/o opioid dependence, was on Suboxone treatment as outpatient.      #Hypertension  - Continue Losartan 25mg daily  - Continue amlodipine 10mg daily     #WV prolongation  - Per cardio at [facility] "This is associated with WV prolongation consistent with elevated vagal tone. Patient was reportedly awake during these times. It is possible elevated vagal tone from cerebral disease is causing these pauses. Pacemaker implantation would not help this and fortunately he is asymptomatic. He should be evaluated for sleep apnea at some point."  - Avoid metoprolol and/or other AVN blockers  - Outpatient cardiology followup     #Resp   - at prior hospital pt had an episode of desat and was placed on O2 prn   - may need r/o sleep apnea with overnight pulse ox.     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    Rehab: Gait Instability, ADL impairments and Functional impairments: Continue Comprehensive Rehab Program of PT/OT/ST    #Left JESSE/MCA CVA with petechial hemorrhage  - Continue ASA 81mg daily  - Continue Lipitor 80mg QHS  - Prozac 20mg daily (inc. 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  "This should provide protection for stroke from Right ICA dissection while minimizing chance for worsening petechial ICH."    #Leukocytosis   - repeat labs 2/24 15.08, afebrile, VSS, no signs of active infection    #Pain: Tylenol PRN  - Prior opioid use: well managed without meds at the moment, patient has h/o opioid dependence, was on Suboxone treatment as outpatient.      #Hypertension--SBP elevated to 160s  - Increase Losartan 50mg daily  - Continue amlodipine 10mg daily     #TX prolongation  - Per cardio at [facility] "This is associated with TX prolongation consistent with elevated vagal tone. Patient was reportedly awake during these times. It is possible elevated vagal tone from cerebral disease is causing these pauses. Pacemaker implantation would not help this and fortunately he is asymptomatic. He should be evaluated for sleep apnea at some point."  - Avoid metoprolol and/or other AVN blockers  - Outpatient cardiology followup     #Resp   - at prior hospital pt had an episode of desat and was placed on O2 prn   - may need r/o sleep apnea with overnight pulse ox.     #GI/Bowel: Continent    #Diet: Mechanical soft with thin liquids    #Renal/Bladder: voiding    #Precautions / PROPHYLAXIS:   - Falls    - Lungs: Aspiration, Incentive Spirometer   - Pressure injury/Skin: Turn Q2hrs while in bed, OOB to Chair, PT/OT    - DVT ppx: SCDs, TEDs 06-Oct-2022 14:14

## 2022-10-06 NOTE — H&P ADULT - ATTENDING COMMENTS
68 y/o with PMHx of DM, HTN, Obesity, Glaucoma, DVT, CKD, prior Nephrectomy, HLD, hx of gastric band insertion many years ago presenting for epigastric pain -> found ot have acute pancreatitis.    Agree  with  Above  except for changes outlined  Below.       IMPRESSION  Acute on chronic gallstone pancreatitis  >no alcohol use  >TG normal  >Epigastric pain present. lipase 2000. CT abd with oral contrast only showed no evidence of intraabd pathology  >US abdomen: limited study due to obesity. fatty liver. sludge with echogenic material in gallbladder. CBD 5 mm  Sepsis On Admission   Fatty Liver   BATOOL on CKD stage III  Lactic acidosis  NSTEMI, likely type 2 demand ischemia. No ECG changes  Transaminitis  HO DM, HTN, HLD, glaucoma, CKD III, nephrolithiasis, renal mass s/p R nephrectomy, obesity, MSSA bacteremia, L kidney with staghorn calculus and multiple calculi Unchanged on CT    Plan   NPO  Continue Aggressive Fluid therapy   F/u MRCP   F/u Surgery and GI  Weight Loss Counseling  Hold oral meds;  check fs;  start insulin  protocol and adjust insulin  Lantus/Lispro  Hold for Hypoglycemia Goal  Gaol 140-180  f/u Cultures  Avoid nephrotoxic agents, Monitor BUN/creatinine, Send  Urine Lytes 68 y/o with PMHx of DM, HTN, Obesity, Glaucoma, DVT, CKD, prior Nephrectomy, HLD, hx of gastric band insertion many years ago presenting for epigastric pain -> found ot have acute pancreatitis.    Agree  with  Above  except for changes outlined  Below.       IMPRESSION  Acute on chronic gallstone pancreatitis  >no alcohol use  >TG normal  >Epigastric pain present. lipase 2000. CT abd with oral contrast only showed no evidence of intraabd pathology  >US abdomen: limited study due to obesity. fatty liver. sludge with echogenic material in gallbladder. CBD 5 mm  Sepsis On Admission   Fatty Liver   BATOOL on CKD stage III  Lactic acidosis  NSTEMI, likely type 2 demand ischemia. No ECG changes  Transaminitis  HO DM, HTN, HLD, glaucoma, CKD III, nephrolithiasis, renal mass s/p R nephrectomy, obesity, MSSA bacteremia, L kidney with staghorn calculus and multiple calculi Unchanged on CT    Plan   NPO  Continue Aggressive Fluid therapy   F/u MRCP   F/u Surgery and GI  Weight Loss Counseling  Hold oral meds;  check fs;  start insulin  protocol and adjust insulin  Lantus/Lispro  Hold for Hypoglycemia Goal  Gaol 140-180  f/u Cultures  Avoid nephrotoxic agents, Monitor BUN/creatinine, Send  Urine Lytes    Seen 10/06

## 2022-10-06 NOTE — H&P ADULT - NSHPREVIEWOFSYSTEMS_GEN_ALL_CORE
Review of Systems:  •	CONSTITUTIONAL - No fever, No diaphoresis, No weight change  •	SKIN - No rash  •	HEMATOLOGIC - No abnormal bleeding or bruising  •	EYES - No eye pain, No blurred vision  •	ENT - No change in hearing, No sore throat, No neck pain, No rhinorrhea, No ear pain  •	RESPIRATORY - No shortness of breath, No cough  •	CARDIAC -No chest pain, No palpitations  •	GI - Mild  abdominal pain, no N/V  •             - No dysuria, frequency, hematuria.   •	ENDO - No polydypsia, No polyuria, No heat/cold intolerance  •	MUSCULOSKELETAL - No joint paint, No swelling, No back pain  •	NEUROLOGIC - mild  numbness, No focal weakness, No headache, No dizziness  All other systems negative, unless specified in HPI

## 2022-10-06 NOTE — ED PROVIDER NOTE - PROGRESS NOTE DETAILS
AH - BISAP score 3; ICU fellow consulted, will see pt AH - Trop baseline, ekg unchanged from prior; Patient evaluated by ICU fellow, recommending medical floor admission.  Recommending GI consult.  Signed out to PURVI Carr

## 2022-10-06 NOTE — ED ADULT NURSE NOTE - DOES PATIENT HAVE ADVANCE DIRECTIVE
Results   MICROALBUMIN, URINE   25 Jan 2017 01:32 PM  -   MICROALBUMIN: <0.50  -   CREATININE RANDOM URINE: 68.30  -   MICROALB/CREAT RATIO: UNABLE TO CALCULATE DUE TO LOW ANALYTE CONCENTRATION.  Reference Range: <30.  LIPID PNL   25 Jan 2017 01:32 PM  -   FASTING STATUS: NOT FASTING  -   CHOLESTEROL: 123  Reference Range: <200  -   HDL CHOLESTEROL: 50  Reference Range: >59  Flag: L  -   TRIGLYCERIDES: 121  Reference Range: <150  -   LDL CHOLESTEROL (CALCULATED): 49  Reference Range: <130  -   NON-HDL CHOLESTEROL: 73 mg/dl  -   CHOLESTEROL/HDL RATIO: 2.5   Reference Range: <4.5.  HEMOGLOBIN A1C GLYCOSYLATED   25 Jan 2017 01:32 PM  -   HEMOGLOBIN A1C GLYH: 6.2 %  Reference Range: 4.5-5.6  Flag: H.  COMP METABOLIC PNL   25 Jan 2017 01:32 PM  -   SODIUM: 143  Reference Range: 135-145  -   POTASSIUM: 3.8  Reference Range: 3.4-5.1  -   CHLORIDE: 105  Reference Range:   -   CARBON DIOXIDE: 28  Reference Range: 21-32  -   ANION GAP: 14 mmol/L  Reference Range: 10-20  -   GLUCOSE: 60  Reference Range: 65-99  Flag: L  -   BUN: 13  Reference Range: 10-20  -   CREATININE: 1.04  Reference Range: 0.51-0.95  Flag: H  -   GFR EST. AMER: 64   -   GFR EST.NONAFRI AMER: 56   -   BUN/CREATININE RATIO: 13   Reference Range: 7-25  -   BILIRUBIN TOTAL: 0.9 mg/dl  Reference Range: 0.2-1.0  -   GOT/AST: 18 Units/L  Reference Range: <38  -   ALKALINE PHOSPHATASE: 109  Reference Range:   -   ALBUMIN: 4.3  Reference Range: 3.6-5.1  -   TOTAL PROTEIN: 7.6  Reference Range: 6.4-8.2  -   GLOBULIN (CALCULATED): 3.3  Reference Range: 2.0-4.0  -   A/G RATIO: 1.3   Reference Range: 1.0-2.4  -   CALCIUM: 9.5  Reference Range: 8.4-10.2  -   GPT/ALT: 43 Units/L  Reference Range: <79  -   FASTING STATUS: NOT FASTING.  Discussed   Ms Nancy- the diabetes is very well controlled with an AIC at 6.2% while the glucose fasting was 60 on this test... even though you only take metformin. The cholesterol is very good with total of 120, LDLs at  49 and the good HDLs are your best on file at 50. The  liver, kidney, urine and electrolytes are normal.  Keep up the good work!  Dr Kasper  .   No

## 2022-10-06 NOTE — H&P ADULT - HISTORY OF PRESENT ILLNESS
66 y/o with PMHx of DM, HTN, Obesity, Glaucoma, Covid PNA c/b DVT, CKD, prior Nephrectomy, HLD, hx of gastric band insertion many years ago presenting for the above chief complaint. history goes back to yesterday  night after dinner,when patient started complaining of epigastric pain radiating to RUQ and LUQ associated with nausea and 3 episodes of nonbloody nonbilious vomiting.      VITALS:   T(C): 37.6 (10-06-22 @ 15:02), Max: 37.6 (10-06-22 @ 15:02)  HR: 93 (10-06-22 @ 15:02) (92 - 115)  BP: 134/68 (10-06-22 @ 15:02) (134/68 - 136/68)  RR: 16 (10-06-22 @ 15:02) (16 - 18)  SpO2: 97% (10-06-22 @ 15:02) (97% - 98%)    in ED, vitals were significant for a HR of 115 bpm. labs were significant for leukocytosis (17K), hyperkalemia (6.2 but hemolyzed), lipase 2000, creat 2.4, lactate 3.5, transaminits (ALP: 117, , ), trop 0.04. US abd very limited study. Large fatty liver. Sludge with echogenic material within the gallbladder. CT abdomen No evidence of acute intra-abdominal pathology. patient received LR bolus on 2.250 L, zofran 4 mg IV, 4 mg IV, cefepime and flagyl. admitted for management of pancreatitis   68 y/o M with PMHx of DM, HTN, Obesity, Glaucoma, Covid PNA c/b DVT, CKD, prior Nephrectomy, HLD, hx of gastric band insertion many years ago presenting for the above chief complaint. history goes back to yesterday  after lunch when patient started complaining of sudden onset epigastric pain, dull in nature, 10/10 in severity, radiating to RUQ and LUQ, constant, associated with nausea and 3 episodes of nonbloody nonbilious vomiting. pain persisted throughout the night until today when patient decided to go to the ED. he reported decreased PO intake but denied fever, chills, chest pain, cough, jaundice, urine or bowel changes.    VITALS:   T(C): 37.6 (10-06-22 @ 15:02), Max: 37.6 (10-06-22 @ 15:02)  HR: 93 (10-06-22 @ 15:02) (92 - 115)  BP: 134/68 (10-06-22 @ 15:02) (134/68 - 136/68)  RR: 16 (10-06-22 @ 15:02) (16 - 18)  SpO2: 97% (10-06-22 @ 15:02) (97% - 98%)    in ED, vitals were significant for a HR of 115 bpm. labs were significant for leukocytosis (17K), hyperkalemia (6.2 but hemolyzed), lipase 2000, creat 2.4, lactate 3.5, transaminits (ALP: 117, , ), trop 0.04. US abd very limited study. Large fatty liver. Sludge with echogenic material within the gallbladder. CT abdomen No evidence of acute intra-abdominal pathology. patient received LR bolus on 2.250 L, zofran 4 mg IV, 4 mg IV, cefepime and flagyl. admitted for management of pancreatitis

## 2022-10-06 NOTE — ED PROVIDER NOTE - NS ED ROS FT
Review of Systems:  CONSTITUTIONAL: No fever, No diaphoresis, No generalized weakness  SKIN: No rash  HEMATOLOGIC: No abnormal bleeding or bruising  EYES: No eye pain, No blurred vision  ENT: No change in hearing, No sore throat, No neck pain, No rhinorrhea, No ear pain  RESPIRATORY: No shortness of breath, No cough  CARDIAC: No chest pain, No palpitations  GI: + abdominal pain, + nausea, + vomiting, No diarrhea, No constipation, No bright red blood per rectum, No melena. No flank pain  : No dysuria, frequency, hematuria  MUSCULOSKELETAL: No joint paint, No swelling, No back pain  NEUROLOGIC: No numbness, No focal weakness, No headache, No dizziness  All other systems negative, unless specified in HPI

## 2022-10-06 NOTE — ED PROVIDER NOTE - OBJECTIVE STATEMENT
66 y/o m w/ pmhx of DM, HTN, HLD, glaucoma, CKD III, nephrolithiasis, renal mass s/p R nephrectomy, obesity, MSSA bacteremia, L kidney with staghorn calculus and multiple calculi

## 2022-10-06 NOTE — H&P ADULT - ASSESSMENT
66 y/o with PMHx of DM, HTN, Obesity, Glaucoma, Covid PNA c/b DVT, CKD, prior Nephrectomy, HLD, hx of gastric band insertion many years ago presenting for epigastric pain -> found ot have acute pancreatitis.    #acute pancreatitis  #lactic acidosis  - sepsis POA  - epigastric pain associated with N/V  - s/p 2.25 L LR bolus -> c/w LR @ 100 cc/h  - s/p cefepime/flagyl in ED  - lipase 2000. CT abd with oral contrast only showed no evidence of intraabd pathology  - US abdomen: limited study due to obesity. fatty liver. sludge with echogenic material in gallbladder. CBD 5 mm  - no alcohol use  - TG normal  - MRCP 2021 --> No biliary distention, filling defect, or stricture. Cholelithiasis present  - LFTs --> ALP: 117, , . Bili: 1, lactate 3.5  - trend lactate  - GI eval -> keep NPO, MRCP,  Surgery consult for cholecystectomy  - crit care eval -> no need for ICU at the moment  - f/u blood culture      #BATOOL on CKD III   - creat 2.4 (baseline Creatinine 1.3)  - R nephrectomy in 2017 s/p renal mass discovery  - No left hydronephrosis  - CTAP -> 2. Status post right nephrectomy. Unchanged left staghorn calculus, measuring approximately 3 x 2.5 x 1 cm.  - Avoid nephrotoxic meds   - Trend BUN/Cr w/ daily BMP  - hold ARB  - most likely prerenal -> c/w IVF  - consider nephro eval and urine lytes if no improvement    #Troponemia likely secondary to CKD   - 0.05 on admission, chronically elevated up to 0.09 on previous admission  - will Repeat troponin  - Denies chest pain   - No new changes on EKG     # DM II  - A1c 7.5%  - On Insulin at home   - trend FS  - c/w lantus but will hold on premeal due to NPO status    # HTN - controlled  - c/w amlodipine    # HLD   -c/w atorvastatin    # Glaucoma  - c/w timolol and latanoprost     # Morbid obesity  - hx of gastric band insertion many years ago  - Patient is also supposed to get his gastric band removed due to dysphagia but procedure not done yet    #MISC  Diet: NPO  DVT Prophylaxis: lovenox   GI Prophylaxis: Protonix  Dispo: Acute  Code Status: Full code     66 y/o with PMHx of DM, HTN, Obesity, Glaucoma, DVT, CKD, prior Nephrectomy, HLD, hx of gastric band insertion many years ago presenting for epigastric pain -> found ot have acute pancreatitis.    #acute pancreatitis  #lactic acidosis  - sepsis POA  - epigastric pain associated with N/V  - s/p 2.25 L LR bolus -> c/w LR @ 100 cc/h  - s/p cefepime/flagyl in ED  - lipase 2000. CT abd with oral contrast only showed no evidence of intraabd pathology  - US abdomen: limited study due to obesity. fatty liver. sludge with echogenic material in gallbladder. CBD 5 mm  - no alcohol use  - TG normal  - MRCP 2021 --> No biliary distention, filling defect, or stricture. Cholelithiasis present  - LFTs --> ALP: 117, , . Bili: 1, lactate 3.5  - ABGs with metabolic acidosis -> HAGMA (most likely lactic acidosis) + NAGMA and respiratory acidosis (possible hypoventilation element due to morbi obesity)  - trend lactate  - GI eval -> keep NPO, MRCP,  Surgery consult for cholecystectomy  - crit care eval -> no need for ICU at the moment  - f/u blood culture      #BATOOL on CKD III   - creat 2.4 (baseline Creatinine 1.3)  - R nephrectomy in 2017 s/p renal mass discovery  - No left hydronephrosis  - CTAP -> 2. Status post right nephrectomy. Unchanged left staghorn calculus, measuring approximately 3 x 2.5 x 1 cm.  - Avoid nephrotoxic meds   - Trend BUN/Cr w/ daily BMP  - hold ARB  - most likely prerenal -> c/w IVF  - consider nephro eval and urine lytes if no improvement    #Troponemia likely secondary to CKD   - 0.05 on admission, chronically elevated up to 0.09 on previous admission  - will Repeat troponin  - Denies chest pain   - No new changes on EKG     # DM II  - A1c 7.5% in 2021  - On Insulin at home   - trend FS  - c/w lantus but will hold on premeal due to NPO status    # HTN - controlled  - will hold ARB/diuretics in setting of BATOOL    # HLD   -c/w atorvastatin    # Glaucoma  - c/w eye drops     # Morbid obesity  - hx of gastric band insertion many years ago  - Patient is also supposed to get his gastric band removed due to dysphagia but procedure not done yet    #MISC  Diet: NPO  DVT Prophylaxis: heparin sc  GI Prophylaxis: Protonix  Dispo: Acute  Code Status: Full code

## 2022-10-06 NOTE — H&P ADULT - NSHPSOCIALHISTORY_GEN_ALL_CORE
non alcoholic  non smoker  ambulated without assistance usually but lately was not sedentary  retired  lives wit 2 daughters (Nurse assitants)

## 2022-10-06 NOTE — ED ADULT NURSE NOTE - OBJECTIVE STATEMENT
Pt complains of upper quadrant pain since yesterday. Pt had 3 episodes of vomiting prior to arrival. PMH of gallstones. AOX4.

## 2022-10-06 NOTE — ED PROVIDER NOTE - ATTENDING CONTRIBUTION TO CARE
67-year-old male to ED with abdominal pain.  Patient also complaining of mid epigastric pain with vomiting.  Worse over the past 24 hours with symptoms similar to prior episode of choledocholithiasis.  Patient does have a history of gallstones kidney stones and Lap-Band surgery by Dr. [place].  Afebrile vital signs stable exam as noted clear lungs bilaterally conjunctiva pink HEENT normal, regular rate and rhythm abdomen RUQ tenderness and neuro nonfocal.

## 2022-10-06 NOTE — CONSULT NOTE ADULT - SUBJECTIVE AND OBJECTIVE BOX
Patient is a 67y old  Male who presents with a chief complaint of abdominal pain    HPI:      PAST MEDICAL & SURGICAL HISTORY:  Diabetes mellitus      HTN (hypertension)      Glaucoma      DVT (deep venous thrombosis)      CKD (chronic kidney disease)      HLD (hyperlipidemia)      H/O right nephrectomy          Occupational hx:    Social hx:    FAMILY HISTORY:  FH: lung cancer  Father    :  No known cardiovacular family hisotry     ROS:  See HPI     Allergies    No Known Allergies    Intolerances          PHYSICAL EXAM    ICU Vital Signs Last 24 Hrs  T(C): 36.8 (06 Oct 2022 06:57), Max: 36.8 (06 Oct 2022 06:57)  T(F): 98.2 (06 Oct 2022 06:57), Max: 98.2 (06 Oct 2022 06:57)  HR: 115 (06 Oct 2022 06:57) (115 - 115)  BP: 136/68 (06 Oct 2022 06:57) (136/68 - 136/68)  BP(mean): --  ABP: --  ABP(mean): --  RR: 18 (06 Oct 2022 06:57) (18 - 18)  SpO2: 98% (06 Oct 2022 06:57) (98% - 98%)    O2 Parameters below as of 06 Oct 2022 06:57  Patient On (Oxygen Delivery Method): room air      CONSTITUTIONAL: No acute distress, well-developed, well-groomed, AAOx3  HEAD: Atraumatic, normocephalic  EYES: EOM intact, PERRLA, conjunctiva and sclera clear  ENT: Supple, no masses, no thyromegaly, no bruits, no JVD; moist mucous membranes  PULMONARY: Clear to auscultation bilaterally; no wheezes, rales, or rhonchi  CARDIOVASCULAR: Regular rate and rhythm; no murmurs, rubs, or gallops  GASTROINTESTINAL: Soft, non-tender, non-distended; bowel sounds present  MUSCULOSKELETAL: 2+ peripheral pulses; no clubbing, no cyanosis, no edema  NEUROLOGY: non-focal  SKIN: No rashes or lesions; warm and dry    LABS:                          13.8   17.44 )-----------( 168      ( 06 Oct 2022 07:56 )             40.8                                               10-06    133<L>  |  100  |  51<H>  ----------------------------<  440<H>  6.2<HH>   |  17  |  2.4<H>    Ca    8.9      06 Oct 2022 07:56    TPro  7.2  /  Alb  4.0  /  TBili  1.0  /  DBili  <0.2  /  AST  141<H>  /  ALT  140<H>  /  AlkPhos  117<H>  10-06      PT/INR - ( 06 Oct 2022 07:56 )   PT: 12.30 sec;   INR: 1.08 ratio         PTT - ( 06 Oct 2022 07:56 )  PTT:28.8 sec                                           CARDIAC MARKERS ( 06 Oct 2022 07:56 )  x     / 0.04 ng/mL / x     / x     / x                                                LIVER FUNCTIONS - ( 06 Oct 2022 07:56 )  Alb: 4.0 g/dL / Pro: 7.2 g/dL / ALK PHOS: 117 U/L / ALT: 140 U/L / AST: 141 U/L / GGT: x                                                                                                                                       CXR reviewed by myself    MEDICATIONS  (STANDING):  lactated ringers Bolus 1000 milliLiter(s) IV Bolus once  lactated ringers Bolus 250 milliLiter(s) IV Bolus once    MEDICATIONS  (PRN):         Patient is a 67y old  Male who presents with a chief complaint of abdominal pain    HPI:    HPI: Patient is a 68 y/o with PMHx of DM, HTN, Obesity, Glaucoma, Covid PNA c/b DVT, CKD, prior Nephrectomy, and HLD who presented to [facility] with abdominal pain, nausea, vomiting. patient tachycardic, afebrile.  labs showed leukocytosis 17K, Hct 40, metabolic acidosis present. K 6.2 hemolyzed, BATOOL on top of CKD, LFTs showed ALP: 117, , . Bili: 1, lactate 3.5, lipase 2000. TG normal. US abd Very limited study. Large fatty liver. Sludge with echogenic material   within the gallbladder. CT abdomen  1. No evidence of acute intra-abdominal pathology.    2. Status post right nephrectomy. Unchanged left staghorn calculus,   measuring approximately 3 x 2.5 x 1 cm.    Patient seen 1 year ago by GI team for choledocholithiasis. at that time MRCP was done and showed No biliary distention, filling defect, or stricture. Cholelithiasis. post gastric lap band surgery.    Patient given broad spectrum antibiotics. he is on /hr        PAST MEDICAL & SURGICAL HISTORY:  Diabetes mellitus      HTN (hypertension)      Glaucoma      DVT (deep venous thrombosis)      CKD (chronic kidney disease)      HLD (hyperlipidemia)      H/O right nephrectomy          Occupational hx:    Social hx:    FAMILY HISTORY:  FH: lung cancer  Father    :  No known cardiovacular family hisotry     ROS:  See HPI     Allergies    No Known Allergies    Intolerances          PHYSICAL EXAM    ICU Vital Signs Last 24 Hrs  T(C): 36.8 (06 Oct 2022 06:57), Max: 36.8 (06 Oct 2022 06:57)  T(F): 98.2 (06 Oct 2022 06:57), Max: 98.2 (06 Oct 2022 06:57)  HR: 115 (06 Oct 2022 06:57) (115 - 115)  BP: 136/68 (06 Oct 2022 06:57) (136/68 - 136/68)  BP(mean): --  ABP: --  ABP(mean): --  RR: 18 (06 Oct 2022 06:57) (18 - 18)  SpO2: 98% (06 Oct 2022 06:57) (98% - 98%)    O2 Parameters below as of 06 Oct 2022 06:57  Patient On (Oxygen Delivery Method): room air      CONSTITUTIONAL: No acute distress, well-developed, well-groomed, AAOx3  HEAD: Atraumatic, normocephalic  EYES: EOM intact, PERRLA, conjunctiva and sclera clear  ENT: Supple, no masses, no thyromegaly, no bruits, no JVD; moist mucous membranes  PULMONARY: Clear to auscultation bilaterally; no wheezes, rales, or rhonchi  CARDIOVASCULAR: Regular rate and rhythm; no murmurs, rubs, or gallops  GASTROINTESTINAL: Soft, non-tender, non-distended; bowel sounds present  MUSCULOSKELETAL: 2+ peripheral pulses; no clubbing, no cyanosis, no edema  NEUROLOGY: non-focal  SKIN: No rashes or lesions; warm and dry    LABS:                          13.8   17.44 )-----------( 168      ( 06 Oct 2022 07:56 )             40.8                                               10-06    133<L>  |  100  |  51<H>  ----------------------------<  440<H>  6.2<HH>   |  17  |  2.4<H>    Ca    8.9      06 Oct 2022 07:56    TPro  7.2  /  Alb  4.0  /  TBili  1.0  /  DBili  <0.2  /  AST  141<H>  /  ALT  140<H>  /  AlkPhos  117<H>  10-06      PT/INR - ( 06 Oct 2022 07:56 )   PT: 12.30 sec;   INR: 1.08 ratio         PTT - ( 06 Oct 2022 07:56 )  PTT:28.8 sec                                           CARDIAC MARKERS ( 06 Oct 2022 07:56 )  x     / 0.04 ng/mL / x     / x     / x                                                LIVER FUNCTIONS - ( 06 Oct 2022 07:56 )  Alb: 4.0 g/dL / Pro: 7.2 g/dL / ALK PHOS: 117 U/L / ALT: 140 U/L / AST: 141 U/L / GGT: x                                                                                                                                       CXR reviewed by myself    MEDICATIONS  (STANDING):  lactated ringers Bolus 1000 milliLiter(s) IV Bolus once  lactated ringers Bolus 250 milliLiter(s) IV Bolus once    MEDICATIONS  (PRN):

## 2022-10-06 NOTE — H&P ADULT - NSHPPHYSICALEXAM_GEN_ALL_CORE
GENERAL: NAD, lying in bed comfortably  HEAD:  Atraumatic, Normocephalic  EYES: EOMI, PERRLA, conjunctiva and sclera clear  ENT: Moist mucous membranes  NECK: Supple, No JVD  CHEST/LUNG: Clear to auscultation bilaterally; No rales, rhonchi, wheezing, or rubs. Unlabored respirations  HEART: Regular rate and rhythm; No murmurs, rubs, or gallops  ABDOMEN: BSx4; Soft, nontender, nondistended  EXTREMITIES:  2+ Peripheral Pulses, brisk capillary refill. No clubbing, cyanosis, or edema  NERVOUS SYSTEM:  A&Ox3, no focal deficits   SKIN: No rashes or lesions GENERAL: NAD, lying in bed comfortably, morbidly obese  HEAD:  Atraumatic, Normocephalic  EYES: EOMI, PERRLA, conjunctiva and sclera clear  ENT: Moist mucous membranes  NECK: Supple, No JVD  CHEST/LUNG: Clear to auscultation bilaterally; No rales, rhonchi, wheezing, or rubs. Unlabored respirations  HEART: Regular rate and rhythm; No murmurs, rubs, or gallops  ABDOMEN: BSx4; Soft, nontender, nondistended  EXTREMITIES:  2+ Peripheral Pulses, brisk capillary refill. No clubbing, cyanosis, or edema  NERVOUS SYSTEM:  A&Ox3, no focal deficits

## 2022-10-07 LAB
A1C WITH ESTIMATED AVERAGE GLUCOSE RESULT: 9.4 % — HIGH (ref 4–5.6)
ALBUMIN SERPL ELPH-MCNC: 3.6 G/DL — SIGNIFICANT CHANGE UP (ref 3.5–5.2)
ALP SERPL-CCNC: 93 U/L — SIGNIFICANT CHANGE UP (ref 30–115)
ALT FLD-CCNC: 89 U/L — HIGH (ref 0–41)
ANION GAP SERPL CALC-SCNC: 13 MMOL/L — SIGNIFICANT CHANGE UP (ref 7–14)
ANION GAP SERPL CALC-SCNC: 14 MMOL/L — SIGNIFICANT CHANGE UP (ref 7–14)
AST SERPL-CCNC: 48 U/L — HIGH (ref 0–41)
BASOPHILS # BLD AUTO: 0.05 K/UL — SIGNIFICANT CHANGE UP (ref 0–0.2)
BASOPHILS NFR BLD AUTO: 0.5 % — SIGNIFICANT CHANGE UP (ref 0–1)
BILIRUB SERPL-MCNC: 1 MG/DL — SIGNIFICANT CHANGE UP (ref 0.2–1.2)
BUN SERPL-MCNC: 49 MG/DL — HIGH (ref 10–20)
BUN SERPL-MCNC: 52 MG/DL — HIGH (ref 10–20)
CALCIUM SERPL-MCNC: 8.5 MG/DL — SIGNIFICANT CHANGE UP (ref 8.4–10.4)
CALCIUM SERPL-MCNC: 8.6 MG/DL — SIGNIFICANT CHANGE UP (ref 8.4–10.5)
CHLORIDE SERPL-SCNC: 101 MMOL/L — SIGNIFICANT CHANGE UP (ref 98–110)
CHLORIDE SERPL-SCNC: 102 MMOL/L — SIGNIFICANT CHANGE UP (ref 98–110)
CO2 SERPL-SCNC: 18 MMOL/L — SIGNIFICANT CHANGE UP (ref 17–32)
CO2 SERPL-SCNC: 19 MMOL/L — SIGNIFICANT CHANGE UP (ref 17–32)
CREAT SERPL-MCNC: 2.3 MG/DL — HIGH (ref 0.7–1.5)
CREAT SERPL-MCNC: 2.3 MG/DL — HIGH (ref 0.7–1.5)
CULTURE RESULTS: SIGNIFICANT CHANGE UP
EGFR: 30 ML/MIN/1.73M2 — LOW
EGFR: 30 ML/MIN/1.73M2 — LOW
EOSINOPHIL # BLD AUTO: 0.05 K/UL — SIGNIFICANT CHANGE UP (ref 0–0.7)
EOSINOPHIL NFR BLD AUTO: 0.5 % — SIGNIFICANT CHANGE UP (ref 0–8)
ESTIMATED AVERAGE GLUCOSE: 223 MG/DL — HIGH (ref 68–114)
GLUCOSE BLDC GLUCOMTR-MCNC: 218 MG/DL — HIGH (ref 70–99)
GLUCOSE BLDC GLUCOMTR-MCNC: 232 MG/DL — HIGH (ref 70–99)
GLUCOSE BLDC GLUCOMTR-MCNC: 284 MG/DL — HIGH (ref 70–99)
GLUCOSE BLDC GLUCOMTR-MCNC: 310 MG/DL — HIGH (ref 70–99)
GLUCOSE BLDC GLUCOMTR-MCNC: 321 MG/DL — HIGH (ref 70–99)
GLUCOSE BLDC GLUCOMTR-MCNC: 325 MG/DL — HIGH (ref 70–99)
GLUCOSE BLDC GLUCOMTR-MCNC: 335 MG/DL — HIGH (ref 70–99)
GLUCOSE BLDC GLUCOMTR-MCNC: 362 MG/DL — HIGH (ref 70–99)
GLUCOSE SERPL-MCNC: 290 MG/DL — HIGH (ref 70–99)
GLUCOSE SERPL-MCNC: 339 MG/DL — HIGH (ref 70–99)
HCT VFR BLD CALC: 38.1 % — LOW (ref 42–52)
HGB BLD-MCNC: 12.7 G/DL — LOW (ref 14–18)
IMM GRANULOCYTES NFR BLD AUTO: 0.5 % — HIGH (ref 0.1–0.3)
LACTATE SERPL-SCNC: 1.7 MMOL/L — SIGNIFICANT CHANGE UP (ref 0.7–2)
LYMPHOCYTES # BLD AUTO: 0.73 K/UL — LOW (ref 1.2–3.4)
LYMPHOCYTES # BLD AUTO: 7.1 % — LOW (ref 20.5–51.1)
MAGNESIUM SERPL-MCNC: 1.7 MG/DL — LOW (ref 1.8–2.4)
MCHC RBC-ENTMCNC: 30.5 PG — SIGNIFICANT CHANGE UP (ref 27–31)
MCHC RBC-ENTMCNC: 33.3 G/DL — SIGNIFICANT CHANGE UP (ref 32–37)
MCV RBC AUTO: 91.4 FL — SIGNIFICANT CHANGE UP (ref 80–94)
MONOCYTES # BLD AUTO: 0.96 K/UL — HIGH (ref 0.1–0.6)
MONOCYTES NFR BLD AUTO: 9.3 % — SIGNIFICANT CHANGE UP (ref 1.7–9.3)
NEUTROPHILS # BLD AUTO: 8.44 K/UL — HIGH (ref 1.4–6.5)
NEUTROPHILS NFR BLD AUTO: 82.1 % — HIGH (ref 42.2–75.2)
NRBC # BLD: 0 /100 WBCS — SIGNIFICANT CHANGE UP (ref 0–0)
PLATELET # BLD AUTO: 154 K/UL — SIGNIFICANT CHANGE UP (ref 130–400)
POTASSIUM SERPL-MCNC: 4.9 MMOL/L — SIGNIFICANT CHANGE UP (ref 3.5–5)
POTASSIUM SERPL-MCNC: 5 MMOL/L — SIGNIFICANT CHANGE UP (ref 3.5–5)
POTASSIUM SERPL-SCNC: 4.9 MMOL/L — SIGNIFICANT CHANGE UP (ref 3.5–5)
POTASSIUM SERPL-SCNC: 5 MMOL/L — SIGNIFICANT CHANGE UP (ref 3.5–5)
PROT SERPL-MCNC: 6.4 G/DL — SIGNIFICANT CHANGE UP (ref 6–8)
RBC # BLD: 4.17 M/UL — LOW (ref 4.7–6.1)
RBC # FLD: 13.4 % — SIGNIFICANT CHANGE UP (ref 11.5–14.5)
SODIUM SERPL-SCNC: 133 MMOL/L — LOW (ref 135–146)
SODIUM SERPL-SCNC: 134 MMOL/L — LOW (ref 135–146)
SPECIMEN SOURCE: SIGNIFICANT CHANGE UP
TROPONIN T SERPL-MCNC: 0.05 NG/ML — CRITICAL HIGH
WBC # BLD: 10.28 K/UL — SIGNIFICANT CHANGE UP (ref 4.8–10.8)
WBC # FLD AUTO: 10.28 K/UL — SIGNIFICANT CHANGE UP (ref 4.8–10.8)

## 2022-10-07 PROCEDURE — 99233 SBSQ HOSP IP/OBS HIGH 50: CPT

## 2022-10-07 RX ORDER — INSULIN GLARGINE 100 [IU]/ML
54 INJECTION, SOLUTION SUBCUTANEOUS AT BEDTIME
Refills: 0 | Status: DISCONTINUED | OUTPATIENT
Start: 2022-10-07 | End: 2022-10-09

## 2022-10-07 RX ORDER — INSULIN LISPRO 100/ML
VIAL (ML) SUBCUTANEOUS
Refills: 0 | Status: DISCONTINUED | OUTPATIENT
Start: 2022-10-07 | End: 2022-10-13

## 2022-10-07 RX ORDER — DEXTROSE 50 % IN WATER 50 %
25 SYRINGE (ML) INTRAVENOUS ONCE
Refills: 0 | Status: DISCONTINUED | OUTPATIENT
Start: 2022-10-07 | End: 2022-10-13

## 2022-10-07 RX ORDER — DEXTROSE 50 % IN WATER 50 %
15 SYRINGE (ML) INTRAVENOUS ONCE
Refills: 0 | Status: DISCONTINUED | OUTPATIENT
Start: 2022-10-07 | End: 2022-10-13

## 2022-10-07 RX ORDER — INSULIN LISPRO 100/ML
18 VIAL (ML) SUBCUTANEOUS
Refills: 0 | Status: DISCONTINUED | OUTPATIENT
Start: 2022-10-07 | End: 2022-10-09

## 2022-10-07 RX ORDER — MAGNESIUM SULFATE 500 MG/ML
2 VIAL (ML) INJECTION ONCE
Refills: 0 | Status: COMPLETED | OUTPATIENT
Start: 2022-10-07 | End: 2022-10-07

## 2022-10-07 RX ORDER — DEXTROSE 50 % IN WATER 50 %
12.5 SYRINGE (ML) INTRAVENOUS ONCE
Refills: 0 | Status: DISCONTINUED | OUTPATIENT
Start: 2022-10-07 | End: 2022-10-13

## 2022-10-07 RX ADMIN — DORZOLAMIDE HYDROCHLORIDE 1 DROP(S): 20 SOLUTION/ DROPS OPHTHALMIC at 08:30

## 2022-10-07 RX ADMIN — ATORVASTATIN CALCIUM 20 MILLIGRAM(S): 80 TABLET, FILM COATED ORAL at 22:54

## 2022-10-07 RX ADMIN — Medication 8: at 08:30

## 2022-10-07 RX ADMIN — Medication 18 UNIT(S): at 17:00

## 2022-10-07 RX ADMIN — Medication 1 DROP(S): at 06:01

## 2022-10-07 RX ADMIN — Medication 3 MILLIGRAM(S): at 22:54

## 2022-10-07 RX ADMIN — Medication 8: at 12:40

## 2022-10-07 RX ADMIN — INSULIN GLARGINE 54 UNIT(S): 100 INJECTION, SOLUTION SUBCUTANEOUS at 22:53

## 2022-10-07 RX ADMIN — PANTOPRAZOLE SODIUM 40 MILLIGRAM(S): 20 TABLET, DELAYED RELEASE ORAL at 07:45

## 2022-10-07 RX ADMIN — HEPARIN SODIUM 5000 UNIT(S): 5000 INJECTION INTRAVENOUS; SUBCUTANEOUS at 06:01

## 2022-10-07 RX ADMIN — HEPARIN SODIUM 5000 UNIT(S): 5000 INJECTION INTRAVENOUS; SUBCUTANEOUS at 14:17

## 2022-10-07 RX ADMIN — INSULIN GLARGINE 30 UNIT(S): 100 INJECTION, SOLUTION SUBCUTANEOUS at 08:30

## 2022-10-07 RX ADMIN — DORZOLAMIDE HYDROCHLORIDE 1 DROP(S): 20 SOLUTION/ DROPS OPHTHALMIC at 21:15

## 2022-10-07 RX ADMIN — HEPARIN SODIUM 5000 UNIT(S): 5000 INJECTION INTRAVENOUS; SUBCUTANEOUS at 22:54

## 2022-10-07 RX ADMIN — LATANOPROST 1 DROP(S): 0.05 SOLUTION/ DROPS OPHTHALMIC; TOPICAL at 22:53

## 2022-10-07 RX ADMIN — SODIUM CHLORIDE 100 MILLILITER(S): 9 INJECTION, SOLUTION INTRAVENOUS at 11:39

## 2022-10-07 RX ADMIN — Medication 25 GRAM(S): at 11:38

## 2022-10-07 RX ADMIN — Medication 1 DROP(S): at 19:00

## 2022-10-07 NOTE — PROGRESS NOTE ADULT - SUBJECTIVE AND OBJECTIVE BOX
Pt seen and examined at bedside. No CP or SOB.      PAST MEDICAL & SURGICAL HISTORY:  Diabetes mellitus    HTN (hypertension)    Glaucoma    DVT (deep venous thrombosis)    CKD (chronic kidney disease)    HLD (hyperlipidemia)    H/O right nephrectomy        VITAL SIGNS (Last 24 hrs):  T(C): 36.6 (10-07-22 @ 08:17), Max: 37.5 (10-07-22 @ 01:05)  HR: 89 (10-07-22 @ 08:17) (89 - 93)  BP: 119/65 (10-07-22 @ 08:17) (119/65 - 120/59)  RR: 18 (10-07-22 @ 08:17) (18 - 19)  SpO2: 98% (10-07-22 @ 08:17) (93% - 98%)  Wt(kg): --  Daily     Daily     I&O's Summary      PHYSICAL EXAM:  GENERAL: NAD, morbid obesity   HEAD:  Atraumatic, Normocephalic  EYES: EOMI, PERRLA, conjunctiva and sclera clear  NECK: Supple, No JVD  CHEST/LUNG: Clear to auscultation bilaterally; No wheeze  HEART: Regular rate and rhythm; No murmurs, rubs, or gallops  ABDOMEN: Soft, mild tender, Nondistended; Bowel sounds present  EXTREMITIES:  2+ Peripheral Pulses, No clubbing, cyanosis, or edema  PSYCH: AAOx3  NEUROLOGY: non-focal  SKIN: No rashes or lesions    Labs Reviewed  Spoke to patient in regards to abnormal labs.    CBC Full  -  ( 07 Oct 2022 07:16 )  WBC Count : 10.28 K/uL  Hemoglobin : 12.7 g/dL  Hematocrit : 38.1 %  Platelet Count - Automated : 154 K/uL  Mean Cell Volume : 91.4 fL  Mean Cell Hemoglobin : 30.5 pg  Mean Cell Hemoglobin Concentration : 33.3 g/dL  Auto Neutrophil # : 8.44 K/uL  Auto Lymphocyte # : 0.73 K/uL  Auto Monocyte # : 0.96 K/uL  Auto Eosinophil # : 0.05 K/uL  Auto Basophil # : 0.05 K/uL  Auto Neutrophil % : 82.1 %  Auto Lymphocyte % : 7.1 %  Auto Monocyte % : 9.3 %  Auto Eosinophil % : 0.5 %  Auto Basophil % : 0.5 %    BMP:    10-07 @ 07:16    Blood Urea Nitrogen - 49  Calcium - 8.5  Carbond Dioxide - 19  Chloride - 101  Creatinine - 2.3  Glucose - 339  Potassium - 5.0  Sodium - 134      Hemoglobin A1c -   PT/INR - ( 06 Oct 2022 07:56 )   PT: 12.30 sec;   INR: 1.08 ratio         PTT - ( 06 Oct 2022 07:56 )  PTT:28.8 sec  Urine Culture:        COVID Labs  CRP:      D-Dimer:            Imaging reviewed independently and reviewed read    < from: CT Abdomen and Pelvis w/ Oral Cont (10.06.22 @ 10:14) >  IMPRESSION:    1. No evidence of acute intra-abdominal pathology.    2. Status post right nephrectomy. Unchanged left staghorn calculus,   measuring approximately 3 x 2.5 x 1 cm.    < end of copied text >    < from: US Abdomen Upper Quadrant Right (10.06.22 @ 08:59) >  IMPRESSION:  Very limited study. Large fatty liver. Sludge with echogenic material   within the gallbladder.    < end of copied text >    MEDICATIONS  (STANDING):  atorvastatin 20 milliGRAM(s) Oral at bedtime  dextrose 5%. 1000 milliLiter(s) (100 mL/Hr) IV Continuous <Continuous>  dorzolamide 2% Ophthalmic Solution 1 Drop(s) Both EYES <User Schedule>  glucagon  Injectable 1 milliGRAM(s) IntraMuscular once  heparin   Injectable 5000 Unit(s) SubCutaneous every 8 hours  insulin glargine Injectable (LANTUS) 54 Unit(s) SubCutaneous at bedtime  insulin lispro Injectable (ADMELOG) 18 Unit(s) SubCutaneous three times a day before meals  lactated ringers. 1000 milliLiter(s) (100 mL/Hr) IV Continuous <Continuous>  latanoprost 0.005% Ophthalmic Solution 1 Drop(s) Both EYES at bedtime  pantoprazole    Tablet 40 milliGRAM(s) Oral before breakfast  timolol 0.5% Solution 1 Drop(s) Both EYES two times a day    MEDICATIONS  (PRN):  acetaminophen     Tablet .. 650 milliGRAM(s) Oral every 6 hours PRN Temp greater or equal to 38C (100.4F), Mild Pain (1 - 3)  aluminum hydroxide/magnesium hydroxide/simethicone Suspension 30 milliLiter(s) Oral every 4 hours PRN Dyspepsia  melatonin 3 milliGRAM(s) Oral at bedtime PRN Insomnia  ondansetron Injectable 4 milliGRAM(s) IV Push every 8 hours PRN Nausea and/or Vomiting

## 2022-10-07 NOTE — PROGRESS NOTE ADULT - ASSESSMENT
68 y/o with PMHx of DM, HTN, Obesity, Glaucoma, DVT, CKD, prior Nephrectomy, HLD, hx of gastric band insertion many years ago presenting for epigastric pain -> found ot have acute pancreatitis.    #acute pancreatitis  #lactic acidosis  - sepsis POA  - epigastric pain associated with N/V  - s/p 2.25 L LR bolus -> c/w LR @ 100 cc/h  - s/p cefepime/flagyl in ED  - lipase 2000. CT abd with oral contrast only showed no evidence of intraabd pathology  - US abdomen: limited study due to obesity. fatty liver. sludge with echogenic material in gallbladder. CBD 5 mm  - no alcohol use  - TG normal  - MRCP 2021 --> No biliary distention, filling defect, or stricture. Cholelithiasis present  - LFTs --> ALP: 117, , . Bili: 1, lactate 3.5-->LFTs and lactate trending down  - ABGs with metabolic acidosis -> HAGMA (most likely lactic acidosis) + NAGMA and respiratory acidosis (possible hypoventilation element due to morbi obesity)  - trend lactate  - GI eval -> Pt unable to get MRCP (unable to fit in machine), advance diet to clear liquid, outpt ERCP and EUS, Surgery consult for cholecystectomy  - crit care eval -> no need for ICU at the moment  - f/u blood culture      #BATOOL on CKD III   - creat 2.4 (baseline Creatinine 1.3)  - R nephrectomy in 2017 s/p renal mass discovery  - No left hydronephrosis  - CTAP -> 2. Status post right nephrectomy. Unchanged left staghorn calculus, measuring approximately 3 x 2.5 x 1 cm.  - Avoid nephrotoxic meds   - Trend BUN/Cr w/ daily BMP  - hold ARB  - most likely prerenal -> c/w IVF  - TORY  - renal consult    #Troponemia likely secondary to CKD   - 0.05 on admission, chronically elevated up to 0.09 on previous admission-->0.06  - will Repeat troponin  - Denies chest pain   - No new changes on EKG   -  check echo    # DM II  - A1c 7.5% in 2021  - On Insulin at home   - trend FS  - started on insulin regimen and diet being advanced     # HTN - controlled  - will hold ARB/diuretics in setting of BATOOL    # HLD   -c/w atorvastatin    # Glaucoma  - c/w eye drops     # Morbid obesity  - hx of gastric band insertion many years ago  - Patient is also supposed to get his gastric band removed due to dysphagia but procedure not done yet    #Progress Note Handoff  Pending (specify):  acute pancreatitis management, surgery   Family discussion: house staff updated pt family  Disposition: home  Anticipated date: 48-72 hrs

## 2022-10-07 NOTE — PHYSICAL THERAPY INITIAL EVALUATION ADULT - NSACTIVITYREC_GEN_A_PT
discussed patient status pre and post session with SAVANNA Green . patient able to walk with HD rolling walker to bathroom . educated and performed seated therepeutic exercise

## 2022-10-07 NOTE — CONSULT NOTE ADULT - SUBJECTIVE AND OBJECTIVE BOX
SURGERY CONSULT NOTE    Patient: JOSHUA HAM , 67y (51)Male   MRN: [id number]  Location: [facility] ER Hold 032 A  Visit: 10-06-22 Inpatient  Date: 10-07-22 @ 17:41    "HPI:  68 y/o M with PMHx of DM, HTN, Obesity, Glaucoma, Covid PNA c/b DVT, CKD, prior Nephrectomy, HLD, hx of gastric band insertion many years ago presenting for the above chief complaint. history goes back to yesterday  after lunch when patient started complaining of sudden onset epigastric pain, dull in nature, 10/10 in severity, radiating to RUQ and LUQ, constant, associated with nausea and 3 episodes of nonbloody nonbilious vomiting. pain persisted throughout the night until today when patient decided to go to the ED. he reported decreased PO intake but denied fever, chills, chest pain, cough, jaundice, urine or bowel changes.    VITALS:   T(C): 37.6 (10-06-22 @ 15:02), Max: 37.6 (10-06-22 @ 15:02)  HR: 93 (10-06-22 @ 15:02) (92 - 115)  BP: 134/68 (10-06-22 @ 15:02) (134/68 - 136/68)  RR: 16 (10-06-22 @ 15:02) (16 - 18)  SpO2: 97% (10-06-22 @ 15:02) (97% - 98%)    in ED, vitals were significant for a HR of 115 bpm. labs were significant for leukocytosis (17K), hyperkalemia (6.2 but hemolyzed), lipase 2000, creat 2.4, lactate 3.5, transaminits (ALP: 117, , ), trop 0.04. US abd very limited study. Large fatty liver. Sludge with echogenic material within the gallbladder. CT abdomen No evidence of acute intra-abdominal pathology. patient received LR bolus on 2.250 L, zofran 4 mg IV, 4 mg IV, cefepime and flagyl. admitted for management of pancreatitis   (06 Oct 2022 16:44)  "    Reason for Surgical Consult: Gallstone pancreatitis      PAST MEDICAL & SURGICAL HISTORY:  Diabetes mellitus      HTN (hypertension)      Glaucoma      DVT (deep venous thrombosis)      CKD (chronic kidney disease)      HLD (hyperlipidemia)      H/O right nephrectomy          Home Medications:  atorvastatin 20 mg oral tablet: 1 tab(s) orally once a day (06 Oct 2022 17:46)  Cosopt 2.23%-0.68% ophthalmic solution: 1 drop(s) to each affected eye 2 times a day (06 Oct 2022 17:46)  HumaLOG 100 units/mL subcutaneous solution: 30 unit(s) subcutaneous 3 times a day (before meals) (06 Oct 2022 17:46)  insulin glargine 100 units/mL subcutaneous solution: 70 units in morning and 40 units at night (06 Oct 2022 17:46)  Lumigan 0.01% ophthalmic solution: 1 drop(s) to each affected eye once a day (in the evening) (06 Oct 2022 17:46)  OLMESARTAN MEDOX/HCTZ 40-12.5MG TAB: TAKE 1 TABLET BY MOUTH EVERY DAY (06 Oct 2022 17:46)        VITALS:  T(F): 99.1 (10-07-22 @ 16:35), Max: 99.5 (10-07-22 @ 01:05)  HR: 91 (10-07-22 @ 16:35) (89 - 93)  BP: 150/68 (10-07-22 @ 16:35) (119/65 - 150/68)  RR: 18 (10-07-22 @ 16:35) (18 - 19)  SpO2: 97% (10-07-22 @ 16:35) (93% - 98%)    PHYSICAL EXAM:  General: NAD, AAOx3  Cardiac: RRR  Respiratory: Unlabored breathing at rest  Abdomen: Soft, nondistended non-tender  Musculoskeletal: Strength 5/5 BL UE/LE, ROM intact, compartments soft  Neuro: Sensation grossly intact and equal throughout, no focal deficits  Skin: Warm/dry, normal color, no jaundice      MEDICATIONS  (STANDING):  atorvastatin 20 milliGRAM(s) Oral at bedtime  dextrose 5%. 1000 milliLiter(s) (100 mL/Hr) IV Continuous <Continuous>  dorzolamide 2% Ophthalmic Solution 1 Drop(s) Both EYES <User Schedule>  glucagon  Injectable 1 milliGRAM(s) IntraMuscular once  heparin   Injectable 5000 Unit(s) SubCutaneous every 8 hours  insulin glargine Injectable (LANTUS) 54 Unit(s) SubCutaneous at bedtime  insulin lispro Injectable (ADMELOG) 18 Unit(s) SubCutaneous three times a day before meals  lactated ringers. 1000 milliLiter(s) (100 mL/Hr) IV Continuous <Continuous>  latanoprost 0.005% Ophthalmic Solution 1 Drop(s) Both EYES at bedtime  pantoprazole    Tablet 40 milliGRAM(s) Oral before breakfast  timolol 0.5% Solution 1 Drop(s) Both EYES two times a day    MEDICATIONS  (PRN):  acetaminophen     Tablet .. 650 milliGRAM(s) Oral every 6 hours PRN Temp greater or equal to 38C (100.4F), Mild Pain (1 - 3)  aluminum hydroxide/magnesium hydroxide/simethicone Suspension 30 milliLiter(s) Oral every 4 hours PRN Dyspepsia  melatonin 3 milliGRAM(s) Oral at bedtime PRN Insomnia  ondansetron Injectable 4 milliGRAM(s) IV Push every 8 hours PRN Nausea and/or Vomiting      LAB/STUDIES:                        12.7   10.28 )-----------( 154      ( 07 Oct 2022 07:16 )             38.1     10-07    134<L>  |  101  |  49<H>  ----------------------------<  339<H>  5.0   |  19  |  2.3<H>    Ca    8.5      07 Oct 2022 07:16  Mg     1.7     10-07    TPro  6.4  /  Alb  3.6  /  TBili  1.0  /  DBili  x   /  AST  48<H>  /  ALT  89<H>  /  AlkPhos  93  10-07    PT/INR - ( 06 Oct 2022 07:56 )   PT: 12.30 sec;   INR: 1.08 ratio         PTT - ( 06 Oct 2022 07:56 )  PTT:28.8 sec  LIVER FUNCTIONS - ( 07 Oct 2022 07:16 )  Alb: 3.6 g/dL / Pro: 6.4 g/dL / ALK PHOS: 93 U/L / ALT: 89 U/L / AST: 48 U/L / GGT: x           Urinalysis Basic - ( 06 Oct 2022 14:25 )    Color: Yellow / Appearance: Slightly Turbid / S.020 / pH: x  Gluc: x / Ketone: Trace  / Bili: Negative / Urobili: 3 mg/dL   Blood: x / Protein: 100 mg/dL / Nitrite: Negative   Leuk Esterase: Negative / RBC: 6 /HPF / WBC 3 /HPF   Sq Epi: x / Non Sq Epi: 3 /HPF / Bacteria: Negative      CARDIAC MARKERS ( 06 Oct 2022 23:24 )  x     / 0.05 ng/mL / x     / x     / x      CARDIAC MARKERS ( 06 Oct 2022 17:58 )  x     / 0.04 ng/mL / x     / x     / x      CARDIAC MARKERS ( 06 Oct 2022 07:56 )  x     / 0.04 ng/mL / x     / x     / x                  IMAGING:  < from: CT Abdomen and Pelvis w/ Oral Cont (10.06.22 @ 10:14) >  IMPRESSION:    1. No evidence of acute intra-abdominal pathology.    2. Status post right nephrectomy. Unchanged left staghorn calculus,   measuring approximately 3 x 2.5 x 1 cm.    < end of copied text >

## 2022-10-07 NOTE — PROGRESS NOTE ADULT - ASSESSMENT
IMPRESSION:    Acute on chronic gallstone pancreatitis  Sepsis POA  BATOOL on CKD stage III  Lactic acidosis  NSTEMI, likely type 2 demand ischemia. No ECG changes  Transaminitis  HO DM, HTN, HLD, glaucoma, CKD III, nephrolithiasis, renal mass s/p R nephrectomy, obesity, MSSA bacteremia, L kidney with staghorn calculus and multiple calculi    PLAN:    CNS: Avoid sedatives. Mental status is at baseline.    HEENT: Oral care    PULMONARY: HOB @ 45 degrees. Aspiration precautions. CXR is clear of infiltrates. On room air.     CARDIOVASCULAR: Goal-directed fluid resuscitation: KEep fluid balance equal, avoid overload, no need for aggressive fluid resuscitation.     GI: GI prophylaxis. Feeding: advance diet as tolerated. Bowel regimen. Likely had gallstone pancreatitis. LFTs stable. MRCP is pending. GI following.     RENAL: BATOOL likely pre-renal; BUN crat stable since admission. Correct electrolytes as needed. Keep equal. No need for de la cruz.     INFECTIOUS DISEASE: Monitor vital signs. Follow up cultures. No indications for abx.     HEMATOLOGICAL: DVT prophylaxis: subcutaneous heparin for now.     ENDOCRINE: Follow up fasting sugar. Insulin protocol if needed. TG level noted and normal.     MUSCULOSKELETAL: out of bed as tolerated.     DISPO: No indications for ICU level of care at the moment. Recall as needed. Will continue to follow as needed.

## 2022-10-07 NOTE — PHYSICAL THERAPY INITIAL EVALUATION ADULT - PERTINENT HX OF CURRENT PROBLEM, REHAB EVAL
66 y/o with PMHx of DM, HTN, Obesity, Glaucoma, DVT, CKD, prior Nephrectomy, HLD, hx of gastric band insertion many years ago presenting for epigastric pain -> found ot have acute pancreatitis.

## 2022-10-07 NOTE — PATIENT PROFILE ADULT - FUNCTIONAL ASSESSMENT - BASIC MOBILITY 6.
3-calculated by average/Not able to assess (calculate score using [facility] averaging method)

## 2022-10-07 NOTE — CONSULT NOTE ADULT - ASSESSMENT
67M w/ PMH of DM, HTN, Obesity, Glaucoma, Covid PNA c/b DVT, CKD, prior Nephrectomy, HLD, hx of gastric band, s/p episode of choledocholithiasis one year ago, who presented with clinical evidence of gallstone pancreatitis, currently admitted to medical service for resuscitation and symptom control. Surgery consulted for laparoscopic cholecystectomy this admission    Plan:   -Patient will require laparoscopic cholecystectomy this admission   -Medical/Cardiac optimization and risk stratification prior to OR   -IVF, serial abdominal exams   -Daily Hepatic function panels   -FU MRCP   -Requesting RUQ US   -Surgery to follow   -Discussed with surgical attending

## 2022-10-07 NOTE — PROGRESS NOTE ADULT - SUBJECTIVE AND OBJECTIVE BOX
Gastroenterology progress note:     Patient is a 67y old  Male who presents with a chief complaint of      Admitted on: 10-06-22    We are following the patient for: pancreatitis       Interval History: Patient doing fine. he reports improved abdominal pain. no nausea or vomiting    No acute events overnight.   - Diet - NPO  - last BM - yesterday  - Abdominal pain - pain      PAST MEDICAL & SURGICAL HISTORY:  Diabetes mellitus      HTN (hypertension)      Glaucoma      DVT (deep venous thrombosis)      CKD (chronic kidney disease)      HLD (hyperlipidemia)      H/O right nephrectomy          MEDICATIONS  (STANDING):  atorvastatin 20 milliGRAM(s) Oral at bedtime  dextrose 5%. 1000 milliLiter(s) (100 mL/Hr) IV Continuous <Continuous>  dextrose 50% Injectable 25 Gram(s) IV Push once  dorzolamide 2% Ophthalmic Solution 1 Drop(s) Both EYES <User Schedule>  glucagon  Injectable 1 milliGRAM(s) IntraMuscular once  heparin   Injectable 5000 Unit(s) SubCutaneous every 8 hours  insulin glargine Injectable (LANTUS) 30 Unit(s) SubCutaneous every morning  insulin glargine Injectable (LANTUS) 20 Unit(s) SubCutaneous at bedtime  insulin lispro (ADMELOG) corrective regimen sliding scale   SubCutaneous three times a day before meals  lactated ringers. 1000 milliLiter(s) (100 mL/Hr) IV Continuous <Continuous>  latanoprost 0.005% Ophthalmic Solution 1 Drop(s) Both EYES at bedtime  pantoprazole    Tablet 40 milliGRAM(s) Oral before breakfast  timolol 0.5% Solution 1 Drop(s) Both EYES two times a day    MEDICATIONS  (PRN):  acetaminophen     Tablet .. 650 milliGRAM(s) Oral every 6 hours PRN Temp greater or equal to 38C (100.4F), Mild Pain (1 - 3)  aluminum hydroxide/magnesium hydroxide/simethicone Suspension 30 milliLiter(s) Oral every 4 hours PRN Dyspepsia  dextrose Oral Gel 15 Gram(s) Oral once PRN Blood Glucose LESS THAN 70 milliGRAM(s)/deciliter  melatonin 3 milliGRAM(s) Oral at bedtime PRN Insomnia  ondansetron Injectable 4 milliGRAM(s) IV Push every 8 hours PRN Nausea and/or Vomiting      Allergies  No Known Allergies      Review of Systems:   Cardiovascular:  No Chest Pain, No Palpitations  Respiratory:  No Cough, No Dyspnea  Gastrointestinal:  As described in HPI  Skin:  No Skin Lesions, No Jaundice  Neuro:  No Syncope, No Dizziness    Physical Examination:  T(C): 36.6 (10-07-22 @ 08:17), Max: 37.6 (10-06-22 @ 15:02)  HR: 89 (10-07-22 @ 08:17) (89 - 93)  BP: 119/65 (10-07-22 @ 08:17) (119/65 - 134/68)  RR: 18 (10-07-22 @ 08:17) (16 - 19)  SpO2: 98% (10-07-22 @ 08:17) (93% - 98%)        GENERAL:  Appears stated age, well-groomed, well-nourished, no distress, obese  HEENT:   conjunctivae clear and pink, sclera -anicteric  CHEST:  Full & symmetric excursion, no increased effort, breath sounds clear  HEART:  Regular rhythm, S1, S2, no murmur  ABDOMEN:  Soft, nontender. juvencio negative  EXTEREMITIES:  no cyanosis,clubbing or edema  SKIN:  No rash/erythema  NEURO:  Alert, oriented, no asterixis, no tremor, no encephalopathy        Data:                        12.7   10.28 )-----------( 154      ( 07 Oct 2022 07:16 )             38.1     Hgb trend:  12.7  10-07-22 @ 07:16  13.8  10-06-22 @ 07:56      10-07    134<L>  |  101  |  49<H>  ----------------------------<  339<H>  5.0   |  19  |  2.3<H>    Ca    8.5      07 Oct 2022 07:16  Mg     1.7     10-07    TPro  6.4  /  Alb  3.6  /  TBili  1.0  /  DBili  x   /  AST  48<H>  /  ALT  89<H>  /  AlkPhos  93  10-07    Liver panel trend:  TBili 1.0   /   AST 48   /   ALT 89   /   AlkP 93   /   Tptn 6.4   /   Alb 3.6    /   DBili --      10-07  TBili 1.0   /      /      /   AlkP 117   /   Tptn 7.2   /   Alb 4.0    /   DBili <0.2      10-06      PT/INR - ( 06 Oct 2022 07:56 )   PT: 12.30 sec;   INR: 1.08 ratio         PTT - ( 06 Oct 2022 07:56 )  PTT:28.8 sec       Radiology:  CT Abdomen and Pelvis w/ Oral Cont:   ACC: [id number] EXAM:  CT ABDOMEN AND PELVIS OC                          PROCEDURE DATE:  10/06/2022          INTERPRETATION:  CLINICAL STATEMENT: Abdominal pain.    TECHNIQUE: Contiguous axial CT images were obtained from the lower chest   to the pubic symphysis without intravenous contrast.  Oral contrast was   administered.  Reformatted images in the coronal and sagittal planes were   acquired.    Comparison made with CT abdomen and pelvis 7/5/2021, MR abdomen 7/9/2021.    FINDINGS:    LOWER CHEST: Calcified coronary arteries. Partially imaged calcified   right hilar lymph nodes. Elevated right hemidiaphragm with subsegmental   atelectasis.    HEPATOBILIARY: Cholelithiasis. Unenhanced liver unremarkable. No biliary   dilation.    SPLEEN: Unchanged splenic artery embolization coils.    PANCREAS: Unremarkable.    ADRENAL GLANDS: Unremarkable.    KIDNEYS: Status post right nephrectomy. Unchanged left staghorn calculus,   measuring approximately 3 x 2.5 x 1 cm (average Hounsfield units 500). No   left hydronephrosis. Left lower pole cyst.    ABDOMINOPELVIC NODES: No lymphadenopathy.    PELVIC ORGANS: Unremarkable.    PERITONEUM/MESENTERY/BOWEL: Post gastric banding with associated tubings   and port within the right anterior mid abdominal wall. No bowel   obstruction, ascites or intraperitoneal free air. Normal appendix.    BONES/SOFT TISSUES: Degenerative change of the spine and bilateral hips.   Small fat-containing umbilical and bilateral inguinal hernias.    OTHER: Atherosclerotic calcification.    IMPRESSION:    1. No evidence of acute intra-abdominal pathology.    2. Status post right nephrectomy. Unchanged left staghorn calculus,   measuring approximately 3 x 2.5 x 1 cm.    --- End of Report ---            RORY VALADEZ MD; Attending Radiologist  This document has been electronically signed. Oct  6 2022 10:21AM (10-06-22 @ 10:14)    US Abdomen Upper Quadrant Right:   ACC: [id number] EXAM:  US ABDOMEN RT UPR QUADRANT                          PROCEDURE DATE:  10/06/2022          INTERPRETATION:  CLINICAL INFORMATION: Right upper quadrant pain    COMPARISON: Abdominal ultrasound from July 7, 2021    TECHNIQUE: Sonography of the right upper quadrant.    FINDINGS:    Very limited study due to patient body habitus.  Liver: Large fatty liver. Liver measures 24 cm in length.  Bile ducts: Normal caliber. Common bile duct measures 5 mm.  Gallbladder: Large with possible small stones within the gallbladder.  Pancreas: Obscured by overlying bowel gas.  Right kidney: Status post right nephrectomy. Not applicable.  Ascites: None.  IVC: Not well visualized    IMPRESSION:  Very limited study. Large fatty liver. Sludge with echogenic material   within the gallbladder.        --- End of Report ---            GERARDO NJ MD; Attending Radiologist  This document has been electronically signed. Oct  6 2022  9:03AM (10-06-22 @ 08:59)     Gastroenterology progress note:     Patient is a 67y old  Male who presents with a chief complaint of      Admitted on: 10-06-22    We are following the patient for: pancreatitis       Interval History: Patient doing fine. he reports improved abdominal pain. no nausea or vomiting    No acute events overnight.   - Diet - NPO  - last BM - yesterday  - Abdominal pain - pain      PAST MEDICAL & SURGICAL HISTORY:  Diabetes mellitus  HTN (hypertension)  Glaucoma  DVT (deep venous thrombosis)  CKD (chronic kidney disease  HLD (hyperlipidemia)  H/O right nephrectomy          MEDICATIONS  (STANDING):  atorvastatin 20 milliGRAM(s) Oral at bedtime  dextrose 5%. 1000 milliLiter(s) (100 mL/Hr) IV Continuous <Continuous>  dextrose 50% Injectable 25 Gram(s) IV Push once  dorzolamide 2% Ophthalmic Solution 1 Drop(s) Both EYES <User Schedule>  glucagon  Injectable 1 milliGRAM(s) IntraMuscular once  heparin   Injectable 5000 Unit(s) SubCutaneous every 8 hours  insulin glargine Injectable (LANTUS) 30 Unit(s) SubCutaneous every morning  insulin glargine Injectable (LANTUS) 20 Unit(s) SubCutaneous at bedtime  insulin lispro (ADMELOG) corrective regimen sliding scale   SubCutaneous three times a day before meals  lactated ringers. 1000 milliLiter(s) (100 mL/Hr) IV Continuous <Continuous>  latanoprost 0.005% Ophthalmic Solution 1 Drop(s) Both EYES at bedtime  pantoprazole    Tablet 40 milliGRAM(s) Oral before breakfast  timolol 0.5% Solution 1 Drop(s) Both EYES two times a day    MEDICATIONS  (PRN):  acetaminophen     Tablet .. 650 milliGRAM(s) Oral every 6 hours PRN Temp greater or equal to 38C (100.4F), Mild Pain (1 - 3)  aluminum hydroxide/magnesium hydroxide/simethicone Suspension 30 milliLiter(s) Oral every 4 hours PRN Dyspepsia  dextrose Oral Gel 15 Gram(s) Oral once PRN Blood Glucose LESS THAN 70 milliGRAM(s)/deciliter  melatonin 3 milliGRAM(s) Oral at bedtime PRN Insomnia  ondansetron Injectable 4 milliGRAM(s) IV Push every 8 hours PRN Nausea and/or Vomiting      Allergies  No Known Allergies      Review of Systems:   Cardiovascular:  No Chest Pain, No Palpitations  Respiratory:  No Cough, No Dyspnea  Gastrointestinal:  As described in HPI  Skin:  No Skin Lesions, No Jaundice  Neuro:  No Syncope, No Dizziness    Physical Examination:  T(C): 36.6 (10-07-22 @ 08:17), Max: 37.6 (10-06-22 @ 15:02)  HR: 89 (10-07-22 @ 08:17) (89 - 93)  BP: 119/65 (10-07-22 @ 08:17) (119/65 - 134/68)  RR: 18 (10-07-22 @ 08:17) (16 - 19)  SpO2: 98% (10-07-22 @ 08:17) (93% - 98%)        GENERAL:  Appears stated age, well-groomed, well-nourished, no distress, obese  HEENT:   conjunctivae clear and pink, sclera -anicteric  CHEST:  Full & symmetric excursion, no increased effort, breath sounds clear  HEART:  Regular rhythm, S1, S2, no murmur  ABDOMEN:  Soft, nontender. juvencio negative  EXTEREMITIES:  no cyanosis,clubbing or edema  SKIN:  No rash/erythema  NEURO:  Alert, oriented, no asterixis, no tremor, no encephalopathy        Data:                        12.7   10.28 )-----------( 154      ( 07 Oct 2022 07:16 )             38.1     Hgb trend:  12.7  10-07-22 @ 07:16  13.8  10-06-22 @ 07:56      10-07    134<L>  |  101  |  49<H>  ----------------------------<  339<H>  5.0   |  19  |  2.3<H>    Ca    8.5      07 Oct 2022 07:16  Mg     1.7     10-07    TPro  6.4  /  Alb  3.6  /  TBili  1.0  /  DBili  x   /  AST  48<H>  /  ALT  89<H>  /  AlkPhos  93  10-07    Liver panel trend:  TBili 1.0   /   AST 48   /   ALT 89   /   AlkP 93   /   Tptn 6.4   /   Alb 3.6    /   DBili --      10-07  TBili 1.0   /      /      /   AlkP 117   /   Tptn 7.2   /   Alb 4.0    /   DBili <0.2      10-06      PT/INR - ( 06 Oct 2022 07:56 )   PT: 12.30 sec;   INR: 1.08 ratio         PTT - ( 06 Oct 2022 07:56 )  PTT:28.8 sec       Radiology:  CT Abdomen and Pelvis w/ Oral Cont:   ACC: [id number] EXAM:  CT ABDOMEN AND PELVIS OC                          PROCEDURE DATE:  10/06/2022          INTERPRETATION:  CLINICAL STATEMENT: Abdominal pain.    TECHNIQUE: Contiguous axial CT images were obtained from the lower chest   to the pubic symphysis without intravenous contrast.  Oral contrast was   administered.  Reformatted images in the coronal and sagittal planes were   acquired.    Comparison made with CT abdomen and pelvis 7/5/2021, MR abdomen 7/9/2021.    FINDINGS:    LOWER CHEST: Calcified coronary arteries. Partially imaged calcified   right hilar lymph nodes. Elevated right hemidiaphragm with subsegmental   atelectasis.    HEPATOBILIARY: Cholelithiasis. Unenhanced liver unremarkable. No biliary   dilation.    SPLEEN: Unchanged splenic artery embolization coils.    PANCREAS: Unremarkable.    ADRENAL GLANDS: Unremarkable.    KIDNEYS: Status post right nephrectomy. Unchanged left staghorn calculus,   measuring approximately 3 x 2.5 x 1 cm (average Hounsfield units 500). No   left hydronephrosis. Left lower pole cyst.    ABDOMINOPELVIC NODES: No lymphadenopathy.    PELVIC ORGANS: Unremarkable.    PERITONEUM/MESENTERY/BOWEL: Post gastric banding with associated tubings   and port within the right anterior mid abdominal wall. No bowel   obstruction, ascites or intraperitoneal free air. Normal appendix.    BONES/SOFT TISSUES: Degenerative change of the spine and bilateral hips.   Small fat-containing umbilical and bilateral inguinal hernias.    OTHER: Atherosclerotic calcification.    IMPRESSION:    1. No evidence of acute intra-abdominal pathology.    2. Status post right nephrectomy. Unchanged left staghorn calculus,   measuring approximately 3 x 2.5 x 1 cm.    --- End of Report ---            RORY VALADEZ MD; Attending Radiologist  This document has been electronically signed. Oct  6 2022 10:21AM (10-06-22 @ 10:14)    US Abdomen Upper Quadrant Right:   ACC: [id number] EXAM:  US ABDOMEN RT UPR QUADRANT                          PROCEDURE DATE:  10/06/2022          INTERPRETATION:  CLINICAL INFORMATION: Right upper quadrant pain    COMPARISON: Abdominal ultrasound from July 7, 2021    TECHNIQUE: Sonography of the right upper quadrant.    FINDINGS:    Very limited study due to patient body habitus.  Liver: Large fatty liver. Liver measures 24 cm in length.  Bile ducts: Normal caliber. Common bile duct measures 5 mm.  Gallbladder: Large with possible small stones within the gallbladder.  Pancreas: Obscured by overlying bowel gas.  Right kidney: Status post right nephrectomy. Not applicable.  Ascites: None.  IVC: Not well visualized    IMPRESSION:  Very limited study. Large fatty liver. Sludge with echogenic material   within the gallbladder.        --- End of Report ---            GERARDO NJ MD; Attending Radiologist  This document has been electronically signed. Oct  6 2022  9:03AM (10-06-22 @ 08:59)

## 2022-10-07 NOTE — CONSULT NOTE ADULT - ASSESSMENT
66 y/o with PMHx of DM, HTN, Obesity, Glaucoma, DVT, CKD, prior Nephrectomy, HLD, hx of gastric band insertion many years ago presenting for epigastric pain -> found ot have acute pancreatitis and SERGIO.    #SERGIO - due to prerenal from severe volume depletion  - check urine lytes, creat   - CTAP n/c showed Rt nephrectomy and Lt kidney with large staghorn calculus 3 cm, no hydro  - proteinuria 100 - likely due to DM, check SPC  - cont  cc/hr  -strict Is and Os  -check Bladder sono for PVR  - baseline creat 1.3 in July 2021  -check phos, mag  - met acidosis due to Sergio, high lactate 2.3 -repeat    # Kidney stones - check iPTH, uric acid   as outpatient - will need stone removal    #acute pancreatitis NPO  - s/p 2.25 L LR bolus -> c/w LR @ 125 cc/h  - s/p cefepime/flagyl in ED  - lipase 2000. CT abd with oral contrast only showed no evidence of intraabd pathology  - US abdomen: limited study due to obesity. fatty liver. sludge with echogenic material in gallbladder. CBD 5 mm  - no alcohol use  - LFTs --> ALP: 117, , . improving  Bili: 1, lactate 3.5 -2.23  - GI eval -> keep NPO, MRCP,  Surgery consult for cholecystectomy   # DM II - on Insulin, high FS, needs better control  # HTN - controlled  -  hold ARB/diuretics in setting of SERGIO  # Morbid obesity - hx of gastric band insertion many years ago  - Patient is also supposed to get his gastric band removed due to dysphagia but procedure not done yet  will follow

## 2022-10-07 NOTE — PROGRESS NOTE ADULT - SUBJECTIVE AND OBJECTIVE BOX
Patient is a 67y old  Male who presents with a chief complaint of       Over Night Events:    Intermittent abdominal pain   No fevers noted         ROS:  See HPI    PHYSICAL EXAM    ICU Vital Signs Last 24 Hrs  T(C): 37.5 (07 Oct 2022 01:05), Max: 37.6 (06 Oct 2022 15:02)  T(F): 99.5 (07 Oct 2022 01:05), Max: 99.7 (06 Oct 2022 15:02)  HR: 93 (07 Oct 2022 01:05) (92 - 93)  BP: 120/59 (07 Oct 2022 01:05) (120/59 - 134/68)  BP(mean): --  ABP: --  ABP(mean): --  RR: 19 (07 Oct 2022 01:05) (16 - 19)  SpO2: 93% (07 Oct 2022 01:05) (93% - 97%)    O2 Parameters below as of 07 Oct 2022 01:05  Patient On (Oxygen Delivery Method): room air            General: NAD   HEENT: LANIE             Lymphatic system: No cervical LN   Lungs: Bilateral BS, clear   Cardiovascular: Regular   Gastrointestinal: Soft, Positive BS, no significant tenderness   Extremities: No clubbing.  Moves extremities.  Full Range of motion   Skin: Warm, intact  Neurological: No motor or sensory deficit         LABS:                            13.8   17.44 )-----------( 168      ( 06 Oct 2022 07:56 )             40.8                                               10-06    133<L>  |  102  |  52<H>  ----------------------------<  290<H>  4.9   |  18  |  2.3<H>    Ca    8.6      06 Oct 2022 23:24    TPro  7.2  /  Alb  4.0  /  TBili  1.0  /  DBili  <0.2  /  AST  141<H>  /  ALT  140<H>  /  AlkPhos  117<H>  10-06      PT/INR - ( 06 Oct 2022 07:56 )   PT: 12.30 sec;   INR: 1.08 ratio         PTT - ( 06 Oct 2022 07:56 )  PTT:28.8 sec                                       Urinalysis Basic - ( 06 Oct 2022 14:25 )    Color: Yellow / Appearance: Slightly Turbid / S.020 / pH: x  Gluc: x / Ketone: Trace  / Bili: Negative / Urobili: 3 mg/dL   Blood: x / Protein: 100 mg/dL / Nitrite: Negative   Leuk Esterase: Negative / RBC: 6 /HPF / WBC 3 /HPF   Sq Epi: x / Non Sq Epi: 3 /HPF / Bacteria: Negative        CARDIAC MARKERS ( 06 Oct 2022 23:24 )  x     / 0.05 ng/mL / x     / x     / x      CARDIAC MARKERS ( 06 Oct 2022 17:58 )  x     / 0.04 ng/mL / x     / x     / x      CARDIAC MARKERS ( 06 Oct 2022 07:56 )  x     / 0.04 ng/mL / x     / x     / x                                                LIVER FUNCTIONS - ( 06 Oct 2022 07:56 )  Alb: 4.0 g/dL / Pro: 7.2 g/dL / ALK PHOS: 117 U/L / ALT: 140 U/L / AST: 141 U/L / GGT: x                                                                                                                                       MEDICATIONS  (STANDING):  atorvastatin 20 milliGRAM(s) Oral at bedtime  dextrose 5%. 1000 milliLiter(s) (100 mL/Hr) IV Continuous <Continuous>  dextrose 50% Injectable 25 Gram(s) IV Push once  dorzolamide 2% Ophthalmic Solution 1 Drop(s) Both EYES <User Schedule>  glucagon  Injectable 1 milliGRAM(s) IntraMuscular once  heparin   Injectable 5000 Unit(s) SubCutaneous every 8 hours  insulin glargine Injectable (LANTUS) 30 Unit(s) SubCutaneous every morning  insulin glargine Injectable (LANTUS) 20 Unit(s) SubCutaneous at bedtime  insulin lispro (ADMELOG) corrective regimen sliding scale   SubCutaneous three times a day before meals  lactated ringers. 1000 milliLiter(s) (100 mL/Hr) IV Continuous <Continuous>  latanoprost 0.005% Ophthalmic Solution 1 Drop(s) Both EYES at bedtime  pantoprazole    Tablet 40 milliGRAM(s) Oral before breakfast  timolol 0.5% Solution 1 Drop(s) Both EYES two times a day    MEDICATIONS  (PRN):  acetaminophen     Tablet .. 650 milliGRAM(s) Oral every 6 hours PRN Temp greater or equal to 38C (100.4F), Mild Pain (1 - 3)  aluminum hydroxide/magnesium hydroxide/simethicone Suspension 30 milliLiter(s) Oral every 4 hours PRN Dyspepsia  dextrose Oral Gel 15 Gram(s) Oral once PRN Blood Glucose LESS THAN 70 milliGRAM(s)/deciliter  melatonin 3 milliGRAM(s) Oral at bedtime PRN Insomnia  ondansetron Injectable 4 milliGRAM(s) IV Push every 8 hours PRN Nausea and/or Vomiting      Xrays:  normal                                                                                   ECHO

## 2022-10-07 NOTE — PHYSICAL THERAPY INITIAL EVALUATION ADULT - GAIT TRAINING, PT EVAL
Goal: amb with rolling walker 100' require supervision , Navigate 3 steps with 1 HR require cg by discharge

## 2022-10-07 NOTE — PROGRESS NOTE ADULT - ASSESSMENT
Patient is a 68 y/o with PMHx of DM, HTN, Obesity, Glaucoma, Covid PNA c/b DVT, CKD, prior Nephrectomy, HLD, hx of gastric band insertion many years ago who presented to [facility] with abdominal pain, nausea, vomiting. Patient reports that last night after dinner, he started having epigastric pain radiating to RUQ and LUQ associated with nausea and 3 episodes of nonbloody nonbilious vomiting. no other symptoms. no fever. patient tachycardic, afebrile.  labs showed leukocytosis 17K, Hct 40, metabolic acidosis present. K 6.2 hemolyzed, BATOOL on top of CKD, LFTs showed ALP: 117, , . Bili: 1, lactate 3.5, lipase 2000. TG normal. US abd very limited study. Large fatty liver. Sludge with echogenic material within the gallbladder. CT abdomen No evidence of acute intra-abdominal pathology. Status post right nephrectomy. Unchanged left staghorn calculus, measuring approximately 3 x 2.5 x 1 cm.  Patient seen 1 year ago by GI team for choledocholithiasis. at that time MRCP was done and showed No biliary distention, filling defect, or stricture. Cholelithiasis present. Patient was supposed to get cholecystectomy but it was not done yet. Patient is also supposed to get his gastric band removed due to dysphagia but procedure not done yet  In ED, Patient given broad spectrum antibiotics. he is on /hr after 2 liters fluid resuscitation.    # Acute Pancreatitis - moderate to severe, likely gallstone related  leucocytosis / BATOOL   # transaminitis   #fatty liver  - no alcohol use  - TG normal  - epigastric pain present. lipase 2000. CT abd with oral contrast only showed no evidence of intraabd pathology  - US abdomen: limited study due to obesity. fatty liver. sludge with echogenic material in gallbladder. CBD 5 mm  - MRCP 2021 --> No biliary distention, filling defect, or stricture. Cholelithiasis present  - LFTs --> ALP: 117 --> 93,  --> 48,  --> 89. Bili: 1, lactate 3.5 -->2.1  - Patient with hx of gastric band insertion years ago    Plan:  - keep NPO. start clear liquid diet today  - decrease fluids to /hr  - Please do MRCP  - Pain control and antiemetics if no contraindication   - Trend LFTs --> improving  - Surgery consult for cholecystectomy  - weight loss education  - patient to follow up with hepatology regarding fatty liver   -outpatient EUS  Patient is a 66 y/o with PMHx of DM, HTN, Obesity, Glaucoma, Covid PNA c/b DVT, CKD, prior Nephrectomy, HLD, hx of gastric band insertion many years ago who presented to [facility] with abdominal pain, nausea, vomiting. Patient reports that last night after dinner, he started having epigastric pain radiating to RUQ and LUQ associated with nausea and 3 episodes of nonbloody nonbilious vomiting. no other symptoms. no fever. patient tachycardic, afebrile.  labs showed leukocytosis 17K, Hct 40, metabolic acidosis present. K 6.2 hemolyzed, BATOOL on top of CKD, LFTs showed ALP: 117, , . Bili: 1, lactate 3.5, lipase 2000. TG normal. US abd very limited study. Large fatty liver. Sludge with echogenic material within the gallbladder. CT abdomen No evidence of acute intra-abdominal pathology. Status post right nephrectomy. Unchanged left staghorn calculus, measuring approximately 3 x 2.5 x 1 cm.  Patient seen 1 year ago by GI team for choledocholithiasis. at that time MRCP was done and showed No biliary distention, filling defect, or stricture. Cholelithiasis present. Patient was supposed to get cholecystectomy but it was not done yet. Patient is also supposed to get his gastric band removed due to dysphagia but procedure not done yet  In ED, Patient given broad spectrum antibiotics. he is on /hr after 2 liters fluid resuscitation.    # Acute Pancreatitis - moderate to severe, likely gallstone related  leucocytosis / BATOOL   # transaminitis   #fatty liver  - no alcohol use  - TG normal  - epigastric pain present. lipase 2000. CT abd with oral contrast only showed no evidence of intraabd pathology  - US abdomen: limited study due to obesity. fatty liver. sludge with echogenic material in gallbladder. CBD 5 mm  - MRCP 2021 --> No biliary distention, filling defect, or stricture. Cholelithiasis present  - LFTs --> ALP: 117 --> 93,  --> 48,  --> 89. Bili: 1, lactate 3.5 -->2.1  - Patient with hx of gastric band insertion years ago    Plan:  - keep NPO. start clear liquid diet today  - decrease fluids to /hr  - Please do MRCP if possible  - Pain control and antiemetics if no contraindication   - Trend LFTs --> improving  - Surgery consult for cholecystectomy  - weight loss education  - patient to follow up with hepatology regarding fatty liver   -outpatient EUS and ERCP Patient is a 68 y/o with PMHx of DM, HTN, Obesity, Glaucoma, Covid PNA c/b DVT, CKD, prior Nephrectomy, HLD, hx of gastric band insertion many years ago who presented to [facility] with abdominal pain, nausea, vomiting. Patient reports that last night after dinner, he started having epigastric pain radiating to RUQ and LUQ associated with nausea and 3 episodes of nonbloody nonbilious vomiting. no other symptoms. no fever. patient tachycardic, afebrile.  labs showed leukocytosis 17K, Hct 40, metabolic acidosis present. K 6.2 hemolyzed, BATOOL on top of CKD, LFTs showed ALP: 117, , . Bili: 1, lactate 3.5, lipase 2000. TG normal. US abd very limited study. Large fatty liver. Sludge with echogenic material within the gallbladder. CT abdomen No evidence of acute intra-abdominal pathology. Status post right nephrectomy. Unchanged left staghorn calculus, measuring approximately 3 x 2.5 x 1 cm.  Patient seen 1 year ago by GI team for choledocholithiasis. at that time MRCP was done and showed No biliary distention, filling defect, or stricture. Cholelithiasis present. Patient was supposed to get cholecystectomy but it was not done yet. Patient is also supposed to get his gastric band removed due to dysphagia but procedure not done yet  In ED, Patient given broad spectrum antibiotics. he is on /hr after 2 liters fluid resuscitation.    # Acute Pancreatitis - moderate to severe, likely gallstone related  leucocytosis / BATOOL   # transaminitis   #fatty liver  - no alcohol use  - TG normal  - epigastric pain present. lipase 2000. CT abd with oral contrast only showed no evidence of intraabd pathology  - US abdomen: limited study due to obesity. fatty liver. sludge with echogenic material in gallbladder. CBD 5 mm  - MRCP 2021 --> No biliary distention, filling defect, or stricture. Cholelithiasis present  - LFTs --> ALP: 117 --> 93,  --> 48,  --> 89. Bili: 1, lactate 3.5 -->2.1  - Patient with hx of gastric band insertion years ago    Plan:  - start clear liquid diet today  - decrease fluids to /hr  - Please do MRCP if possible  - Pain control and antiemetics if no contraindication   - Trend LFTs --> improving  - Surgery consult for cholecystectomy  - weight loss education  - patient to follow up with hepatology regarding fatty liver   -outpatient EUS and ERCP Patient is a 66 y/o with PMHx of DM, HTN, Obesity, Glaucoma, Covid PNA c/b DVT, CKD, prior Nephrectomy, HLD, hx of gastric band insertion many years ago who presented to [facility] with abdominal pain, nausea, vomiting. Patient reports that last night after dinner, he started having epigastric pain radiating to RUQ and LUQ associated with nausea and 3 episodes of nonbloody nonbilious vomiting. no other symptoms. no fever. patient tachycardic, afebrile.  labs showed leukocytosis 17K, Hct 40, metabolic acidosis present. K 6.2 hemolyzed, BATOOL on top of CKD, LFTs showed ALP: 117, , . Bili: 1, lactate 3.5, lipase 2000. TG normal. US abd very limited study. Large fatty liver. Sludge with echogenic material within the gallbladder. CT abdomen No evidence of acute intra-abdominal pathology. Status post right nephrectomy. Unchanged left staghorn calculus, measuring approximately 3 x 2.5 x 1 cm.  Patient seen 1 year ago by GI team for choledocholithiasis. at that time MRCP was done and showed No biliary distention, filling defect, or stricture. Cholelithiasis present. Patient was supposed to get cholecystectomy but it was not done yet. Patient is also supposed to get his gastric band removed due to dysphagia but procedure not done yet  In ED, Patient given broad spectrum antibiotics. he is on /hr after 2 liters fluid resuscitation.    # Acute Pancreatitis - moderate to severe, likely gallstone biliary pancreatitis, no cholangitis  leucocytosis / BATOOL   # transaminitis   #fatty liver  - no alcohol use  - TG normal  - epigastric pain present. lipase 2000. CT abd with oral contrast only showed no evidence of intraabd pathology  - US abdomen: limited study due to obesity. fatty liver. sludge with echogenic material in gallbladder. CBD 5 mm  - MRCP 2021 --> No biliary distention, filling defect, or stricture. Cholelithiasis present  - LFTs --> ALP: 117 --> 93,  --> 48,  --> 89. Bili: 1, lactate 3.5 -->2.1  - Patient with hx of gastric band insertion years ago    Plan:  - start clear liquid diet today  - decrease fluids to /hr  - Pain control and antiemetics if no contraindication   - Trend LFTs --> improving  - Surgery consult for cholecystectomy  - weight loss education  - patient to follow up with hepatology regarding fatty liver   -outpatient EUS and ERCP, no evidence of biliary obstruction  -His Body weigh is a limitation to ERCP

## 2022-10-07 NOTE — CONSULT NOTE ADULT - SUBJECTIVE AND OBJECTIVE BOX
NEPHROLOGY CONSULTATION NOTE    66 y/o M with PMHx of DM, HTN, Obesity, Glaucoma, Covid PNA c/b DVT, CKD, prior Nephrectomy for a tumor, HLD, hx of gastric band insertion many years ago presenting with epigastric pain, vomiting, poor po intake. History goes back to yesterday  after lunch when patient started complaining of sudden onset epigastric pain, dull in nature, 10/10 in severity, radiating to RUQ and LUQ, constant, associated with nausea and 3 episodes of nonbloody nonbilious vomiting. pain persisted throughout the night until today when patient decided to go to the ED. he reported decreased PO intake but denied fever, chills, chest pain, cough, jaundice, urine or bowel changes.    Found to have pancreatitis, lipase .  Renal was called for BATOOL.      PAST MEDICAL & SURGICAL HISTORY:  Diabetes mellitus      HTN (hypertension)      Glaucoma      DVT (deep venous thrombosis)      CKD (chronic kidney disease)      HLD (hyperlipidemia)      H/O right nephrectomy        Allergies:  No Known Allergies    Home Medications Reviewed  Hospital Medications:   MEDICATIONS  (STANDING):  atorvastatin 20 milliGRAM(s) Oral at bedtime  dextrose 5%. 1000 milliLiter(s) (100 mL/Hr) IV Continuous <Continuous>  dorzolamide 2% Ophthalmic Solution 1 Drop(s) Both EYES <User Schedule>  glucagon  Injectable 1 milliGRAM(s) IntraMuscular once  heparin   Injectable 5000 Unit(s) SubCutaneous every 8 hours  insulin glargine Injectable (LANTUS) 54 Unit(s) SubCutaneous at bedtime  insulin lispro Injectable (ADMELOG) 18 Unit(s) SubCutaneous three times a day before meals  lactated ringers. 1000 milliLiter(s) (100 mL/Hr) IV Continuous <Continuous>  latanoprost 0.005% Ophthalmic Solution 1 Drop(s) Both EYES at bedtime  pantoprazole    Tablet 40 milliGRAM(s) Oral before breakfast  timolol 0.5% Solution 1 Drop(s) Both EYES two times a day      SOCIAL HISTORY:  Denies ETOH,Smoking,   FAMILY HISTORY:  FH: lung cancer  Father          REVIEW OF SYSTEMS:  CONSTITUTIONAL: No weakness, fevers or chills  RESPIRATORY: No cough, wheezing, hemoptysis; No shortness of breath  CARDIOVASCULAR: No chest pain or palpitations.  GASTROINTESTINAL: No abdominal or epigastric pain. No nausea, vomiting. No diarrhea or constipation  GENITOURINARY: No dysuria, frequency, foamy urine, urinary urgency, incontinence or hematuria  NEUROLOGICAL: No numbness or weakness  SKIN: No itching, burning, rashes, or lesions   VASCULAR: No bilateral lower extremity edema.   All other review of systems is negative unless indicated above.    VITALS:  T(F): 97.8 (10-07-22 @ 08:17), Max: 99.5 (10-07-22 @ 01:05)  HR: 89 (10-07-22 @ 08:17)  BP: 119/65 (10-07-22 @ 08:17)  RR: 18 (10-07-22 @ 08:17)  SpO2: 98% (10-07-22 @ 08:17)      Weight (kg): 176 (10-07 @ 15:44)    I&O's Detail        PHYSICAL EXAM:  Constitutional: NAD. Morbidly obese  HEENT: anicteric sclera  Respiratory: BS b/l + decreased at bases  Cardiovascular: S1, S2, RRR  Gastrointestinal: BS+, soft, NT/ND  Extremities:  No peripheral edema  Neurological: A/O x 3  Psychiatric: Normal mood, normal affect  : No CVA tenderness. No de la cruz.   Skin: No rashes  Vascular Access:    LABS:  10-07    134<L>  |  101  |  49<H>  ----------------------------<  339<H>  5.0   |  19  |  2.3<H>    Ca    8.5      07 Oct 2022 07:16  Mg     1.7     10-07    TPro  6.4  /  Alb  3.6  /  TBili  1.0  /  DBili      /  AST  48<H>  /  ALT  89<H>  /  AlkPhos  93  10-07    Creatinine Trend: 2.3 <--, 2.3 <--, 2.3 <--, 2.4 <--                        12.7   10.28 )-----------( 154      ( 07 Oct 2022 07:16 )             38.1     Urine Studies:  Urinalysis Basic - ( 06 Oct 2022 14:25 )    Color: Yellow / Appearance: Slightly Turbid / S.020 / pH:   Gluc:  / Ketone: Trace  / Bili: Negative / Urobili: 3 mg/dL   Blood:  / Protein: 100 mg/dL / Nitrite: Negative   Leuk Esterase: Negative / RBC: 6 /HPF / WBC 3 /HPF   Sq Epi:  / Non Sq Epi: 3 /HPF / Bacteria: Negative        RADIOLOGY & ADDITIONAL STUDIES:    < from: CT Abdomen and Pelvis w/ Oral Cont (10.06.22 @ 10:14) >  PANCREAS: Unremarkable.    ADRENAL GLANDS: Unremarkable.    KIDNEYS: Status post right nephrectomy. Unchanged left staghorn calculus,   measuring approximately 3 x 2.5 x 1 cm (average Hounsfield units 500). No   left hydronephrosis. Left lower pole cyst.    ABDOMINOPELVIC NODES: No lymphadenopathy.    PELVIC ORGANS: Unremarkable.    PERITONEUM/MESENTERY/BOWEL: Post gastric banding with associated tubings   and port within the right anterior mid abdominal wall. No bowel   obstruction, ascites or intraperitoneal free air. Normal appendix.    BONES/SOFT TISSUES: Degenerative change of the spine and bilateral hips.   Small fat-containing umbilical and bilateral inguinal hernias.    OTHER: Atherosclerotic calcification.    IMPRESSION:    1. No evidence of acute intra-abdominal pathology.    2. Status post right nephrectomy. Unchanged left staghorn calculus,   measuring approximately 3 x 2.5 x 1 cm.    < end of copied text >

## 2022-10-08 LAB
ALBUMIN SERPL ELPH-MCNC: 3.6 G/DL — SIGNIFICANT CHANGE UP (ref 3.5–5.2)
ALP SERPL-CCNC: 97 U/L — SIGNIFICANT CHANGE UP (ref 30–115)
ALT FLD-CCNC: 75 U/L — HIGH (ref 0–41)
AMYLASE P1 CFR SERPL: 94 U/L — SIGNIFICANT CHANGE UP (ref 25–115)
ANION GAP SERPL CALC-SCNC: 14 MMOL/L — SIGNIFICANT CHANGE UP (ref 7–14)
AST SERPL-CCNC: 52 U/L — HIGH (ref 0–41)
BASOPHILS # BLD AUTO: 0.03 K/UL — SIGNIFICANT CHANGE UP (ref 0–0.2)
BASOPHILS NFR BLD AUTO: 0.4 % — SIGNIFICANT CHANGE UP (ref 0–1)
BILIRUB SERPL-MCNC: 0.8 MG/DL — SIGNIFICANT CHANGE UP (ref 0.2–1.2)
BUN SERPL-MCNC: 47 MG/DL — HIGH (ref 10–20)
CALCIUM SERPL-MCNC: 8.6 MG/DL — SIGNIFICANT CHANGE UP (ref 8.4–10.5)
CHLORIDE SERPL-SCNC: 102 MMOL/L — SIGNIFICANT CHANGE UP (ref 98–110)
CHOLEST SERPL-MCNC: 140 MG/DL — SIGNIFICANT CHANGE UP
CO2 SERPL-SCNC: 19 MMOL/L — SIGNIFICANT CHANGE UP (ref 17–32)
CREAT SERPL-MCNC: 2.3 MG/DL — HIGH (ref 0.7–1.5)
EGFR: 30 ML/MIN/1.73M2 — LOW
EOSINOPHIL # BLD AUTO: 0.13 K/UL — SIGNIFICANT CHANGE UP (ref 0–0.7)
EOSINOPHIL NFR BLD AUTO: 1.6 % — SIGNIFICANT CHANGE UP (ref 0–8)
GLUCOSE BLDC GLUCOMTR-MCNC: 201 MG/DL — HIGH (ref 70–99)
GLUCOSE BLDC GLUCOMTR-MCNC: 201 MG/DL — HIGH (ref 70–99)
GLUCOSE BLDC GLUCOMTR-MCNC: 232 MG/DL — HIGH (ref 70–99)
GLUCOSE BLDC GLUCOMTR-MCNC: 241 MG/DL — HIGH (ref 70–99)
GLUCOSE SERPL-MCNC: 233 MG/DL — HIGH (ref 70–99)
HCT VFR BLD CALC: 35.5 % — LOW (ref 42–52)
HDLC SERPL-MCNC: 33 MG/DL — LOW
HGB BLD-MCNC: 12.1 G/DL — LOW (ref 14–18)
IMM GRANULOCYTES NFR BLD AUTO: 0.5 % — HIGH (ref 0.1–0.3)
LIDOCAIN IGE QN: 113 U/L — HIGH (ref 7–60)
LIPID PNL WITH DIRECT LDL SERPL: 65 MG/DL — SIGNIFICANT CHANGE UP
LYMPHOCYTES # BLD AUTO: 0.85 K/UL — LOW (ref 1.2–3.4)
LYMPHOCYTES # BLD AUTO: 10.4 % — LOW (ref 20.5–51.1)
MAGNESIUM SERPL-MCNC: 2.1 MG/DL — SIGNIFICANT CHANGE UP (ref 1.8–2.4)
MCHC RBC-ENTMCNC: 30.6 PG — SIGNIFICANT CHANGE UP (ref 27–31)
MCHC RBC-ENTMCNC: 34.1 G/DL — SIGNIFICANT CHANGE UP (ref 32–37)
MCV RBC AUTO: 89.6 FL — SIGNIFICANT CHANGE UP (ref 80–94)
MONOCYTES # BLD AUTO: 0.92 K/UL — HIGH (ref 0.1–0.6)
MONOCYTES NFR BLD AUTO: 11.2 % — HIGH (ref 1.7–9.3)
NEUTROPHILS # BLD AUTO: 6.24 K/UL — SIGNIFICANT CHANGE UP (ref 1.4–6.5)
NEUTROPHILS NFR BLD AUTO: 75.9 % — HIGH (ref 42.2–75.2)
NON HDL CHOLESTEROL: 107 MG/DL — SIGNIFICANT CHANGE UP
NRBC # BLD: 0 /100 WBCS — SIGNIFICANT CHANGE UP (ref 0–0)
PLATELET # BLD AUTO: 159 K/UL — SIGNIFICANT CHANGE UP (ref 130–400)
POTASSIUM SERPL-MCNC: 4.8 MMOL/L — SIGNIFICANT CHANGE UP (ref 3.5–5)
POTASSIUM SERPL-SCNC: 4.8 MMOL/L — SIGNIFICANT CHANGE UP (ref 3.5–5)
PROT SERPL-MCNC: 6.3 G/DL — SIGNIFICANT CHANGE UP (ref 6–8)
RBC # BLD: 3.96 M/UL — LOW (ref 4.7–6.1)
RBC # FLD: 13.4 % — SIGNIFICANT CHANGE UP (ref 11.5–14.5)
SODIUM SERPL-SCNC: 135 MMOL/L — SIGNIFICANT CHANGE UP (ref 135–146)
TRIGL SERPL-MCNC: 208 MG/DL — HIGH
WBC # BLD: 8.21 K/UL — SIGNIFICANT CHANGE UP (ref 4.8–10.8)
WBC # FLD AUTO: 8.21 K/UL — SIGNIFICANT CHANGE UP (ref 4.8–10.8)

## 2022-10-08 PROCEDURE — 76705 ECHO EXAM OF ABDOMEN: CPT | Mod: 26

## 2022-10-08 PROCEDURE — 76770 US EXAM ABDO BACK WALL COMP: CPT | Mod: 26,59

## 2022-10-08 RX ORDER — SODIUM BICARBONATE 1 MEQ/ML
650 SYRINGE (ML) INTRAVENOUS EVERY 8 HOURS
Refills: 0 | Status: DISCONTINUED | OUTPATIENT
Start: 2022-10-08 | End: 2022-10-13

## 2022-10-08 RX ORDER — MORPHINE SULFATE 50 MG/1
2 CAPSULE, EXTENDED RELEASE ORAL ONCE
Refills: 0 | Status: DISCONTINUED | OUTPATIENT
Start: 2022-10-08 | End: 2022-10-08

## 2022-10-08 RX ADMIN — Medication 4: at 08:30

## 2022-10-08 RX ADMIN — Medication 650 MILLIGRAM(S): at 17:04

## 2022-10-08 RX ADMIN — Medication 650 MILLIGRAM(S): at 12:14

## 2022-10-08 RX ADMIN — Medication 4: at 16:53

## 2022-10-08 RX ADMIN — Medication 650 MILLIGRAM(S): at 23:42

## 2022-10-08 RX ADMIN — ATORVASTATIN CALCIUM 20 MILLIGRAM(S): 80 TABLET, FILM COATED ORAL at 23:42

## 2022-10-08 RX ADMIN — Medication 18 UNIT(S): at 08:49

## 2022-10-08 RX ADMIN — DORZOLAMIDE HYDROCHLORIDE 1 DROP(S): 20 SOLUTION/ DROPS OPHTHALMIC at 08:50

## 2022-10-08 RX ADMIN — Medication 1 DROP(S): at 17:04

## 2022-10-08 RX ADMIN — PANTOPRAZOLE SODIUM 40 MILLIGRAM(S): 20 TABLET, DELAYED RELEASE ORAL at 05:49

## 2022-10-08 RX ADMIN — DORZOLAMIDE HYDROCHLORIDE 1 DROP(S): 20 SOLUTION/ DROPS OPHTHALMIC at 23:41

## 2022-10-08 RX ADMIN — INSULIN GLARGINE 54 UNIT(S): 100 INJECTION, SOLUTION SUBCUTANEOUS at 23:41

## 2022-10-08 RX ADMIN — HEPARIN SODIUM 5000 UNIT(S): 5000 INJECTION INTRAVENOUS; SUBCUTANEOUS at 23:43

## 2022-10-08 RX ADMIN — Medication 1 DROP(S): at 05:56

## 2022-10-08 RX ADMIN — LATANOPROST 1 DROP(S): 0.05 SOLUTION/ DROPS OPHTHALMIC; TOPICAL at 23:42

## 2022-10-08 RX ADMIN — HEPARIN SODIUM 5000 UNIT(S): 5000 INJECTION INTRAVENOUS; SUBCUTANEOUS at 05:49

## 2022-10-08 RX ADMIN — MORPHINE SULFATE 2 MILLIGRAM(S): 50 CAPSULE, EXTENDED RELEASE ORAL at 17:00

## 2022-10-08 RX ADMIN — HEPARIN SODIUM 5000 UNIT(S): 5000 INJECTION INTRAVENOUS; SUBCUTANEOUS at 12:26

## 2022-10-08 RX ADMIN — Medication 4: at 12:17

## 2022-10-08 NOTE — PROGRESS NOTE ADULT - ASSESSMENT
66 y/o with PMHx of DM, HTN, Obesity, Glaucoma, DVT, CKD, prior Nephrectomy, HLD, hx of gastric band insertion many years ago presenting for epigastric pain -> found ot have acute pancreatitis.      #Abdominal pain secondary to acute pancreatitis suspicious for gallstone pancreatitis   continue with lactated ringers   Surgery _ > plan for lap cholecystectomy - need cardiac clearance dr higgins, tyrone delacruz     #BATOOL on CKD III   continue with intravenous fluid   Creatinine Trend: 2.3<--, 2.3<--, 2.3<--, 2.3<--, 2.4<--  stable creatinine     #DM 2   POCT Blood Glucose.: 241 mg/dL (08 Oct 2022 07:21)  POCT Blood Glucose.: 218 mg/dL (07 Oct 2022 22:03)  POCT Blood Glucose.: 232 mg/dL (07 Oct 2022 21:13)  POCT Blood Glucose.: 310 mg/dL (07 Oct 2022 16:52)  POCT Blood Glucose.: 335 mg/dL (07 Oct 2022 12:34)  better controlled     #HTN  BP: 135/65 (08 Oct 2022 05:05) (135/65 - 150/68)  controlled    # HLD   -c/w atorvastatin    # Glaucoma  - c/w eye drops     # Morbid obesity  BMI (kg/m2): 56.4 (07 Oct 2022 20:45)  recommend outpatient bariatric eval  as well as dietitian eval     # left staghorn calculus,  measuring approximately 3 x 2.5 x 1 cm    #Left lower pole cyst.    # subsegmental  atelectasis.    #Anemia no indication for transfusion     PROGRESS NOTE HANDOFF    Pending: ruq sonogram  , cardio eval     Family discussion: patient verbalized understanding and agreeable to plan of care     Disposition: Home

## 2022-10-08 NOTE — PROGRESS NOTE ADULT - SUBJECTIVE AND OBJECTIVE BOX
JOSHUA HAM  67y  [facility] [id number] A      Patient is a 67y old  Male who presents with a chief complaint of gallstone pancreatitis (08 Oct 2022 01:36)      INTERVAL HPI/OVERNIGHT EVENTS:  no acute overnight events       REVIEW OF SYSTEMS:  CONSTITUTIONAL: No fever, weight loss, or fatigue  EYES: No eye pain, visual disturbances, or discharge  ENMT:  No difficulty hearing, tinnitus, vertigo; No sinus or throat pain  NECK: No pain or stiffness  BREASTS: No pain, masses, or nipple discharge  RESPIRATORY: No cough, wheezing, chills or hemoptysis; No shortness of breath  CARDIOVASCULAR: No chest pain, palpitations, dizziness, or leg swelling  GASTROINTESTINAL: Abdominal discomfort unable to tolerate anything more than water , + Flatulus   GENITOURINARY: No dysuria, frequency, hematuria, or incontinence  NEUROLOGICAL: No headaches, memory loss, loss of strength, numbness, or tremors  SKIN: No itching, burning, rashes, or lesions   LYMPH NODES: No enlarged glands  ENDOCRINE: No heat or cold intolerance; No hair loss  MUSCULOSKELETAL: No joint pain or swelling; No muscle, back, or extremity pain  PSYCHIATRIC: No depression, anxiety, mood swings, or difficulty sleeping  HEME/LYMPH: No easy bruising, or bleeding gums  ALLERY AND IMMUNOLOGIC: No hives or eczema  FAMILY HISTORY:  FH: lung cancer  Father      T(C): 37.3 (10-08-22 @ 05:05), Max: 37.7 (10-07-22 @ 20:45)  HR: 80 (10-08-22 @ 05:05) (80 - 91)  BP: 135/65 (10-08-22 @ 05:05) (135/65 - 150/68)  RR: 18 (10-08-22 @ 05:05) (18 - 20)  SpO2: 99% (10-08-22 @ 05:05) (97% - 99%)  Wt(kg): --Vital Signs Last 24 Hrs  T(C): 37.3 (08 Oct 2022 05:05), Max: 37.7 (07 Oct 2022 20:45)  T(F): 99.1 (08 Oct 2022 05:05), Max: 99.9 (07 Oct 2022 20:45)  HR: 80 (08 Oct 2022 05:05) (80 - 91)  BP: 135/65 (08 Oct 2022 05:05) (135/65 - 150/68)  BP(mean): --  RR: 18 (08 Oct 2022 05:05) (18 - 20)  SpO2: 99% (08 Oct 2022 05:05) (97% - 99%)    Parameters below as of 07 Oct 2022 20:45  Patient On (Oxygen Delivery Method): room air        PHYSICAL EXAM:  GENERAL: NAD, well-groomed, well-developed  HEAD:  Atraumatic, Normocephalic  EYES: EOMI, PERRLA, conjunctiva and sclera clear  ENMT: No tonsillar erythema, exudates, or enlargement; Moist mucous membranes, Good dentition, No lesions  NECK: Supple, No JVD, Normal thyroid  NERVOUS SYSTEM:  Alert & Oriented X3, Good concentration; Motor Strength 5/5 B/L upper and lower extremities; DTRs 2+ intact and symmetric  PULM: Clear to auscultation bilaterally  CARDIAC: Regular rate and rhythm; No murmurs, rubs, or gallops  GI: Soft, Nontender, Nondistended; Bowel sounds present  EXTREMITIES:  2+ Peripheral Pulses, No clubbing, cyanosis, or edema  LYMPH: No lymphadenopathy noted  SKIN: No rashes or lesions    Consultant(s) Notes Reviewed:  [x ] YES  [ ] NO  Care Discussed with Consultants/Other Providers [ x] YES  [ ] NO    LABS:                            12.1   8.21  )-----------( 159      ( 08 Oct 2022 07:18 )             35.5   10-08    135  |  102  |  47<H>  ----------------------------<  233<H>  4.8   |  19  |  2.3<H>    Ca    8.6      08 Oct 2022 07:18  Mg     2.1     10-08    TPro  6.3  /  Alb  3.6  /  TBili  0.8  /  DBili  x   /  AST  52<H>  /  ALT  75<H>  /  AlkPhos  97  10-08            Culture - Urine (collected 06 Oct 2022 14:25)  Source: Clean Catch Clean Catch (Midstream)  Final Report (07 Oct 2022 20:33):    <10,000 CFU/mL Normal Urogenital Priti    Culture - Blood (collected 06 Oct 2022 09:55)  Source: .Blood Blood  Preliminary Report (08 Oct 2022 01:01):    No growth to date.    Culture - Blood (collected 06 Oct 2022 09:55)  Source: .Blood Blood  Preliminary Report (08 Oct 2022 01:01):    No growth to date.      acetaminophen     Tablet .. 650 milliGRAM(s) Oral every 6 hours PRN  aluminum hydroxide/magnesium hydroxide/simethicone Suspension 30 milliLiter(s) Oral every 4 hours PRN  atorvastatin 20 milliGRAM(s) Oral at bedtime  dextrose 5%. 1000 milliLiter(s) IV Continuous <Continuous>  dextrose 50% Injectable 25 Gram(s) IV Push once  dextrose 50% Injectable 12.5 Gram(s) IV Push once  dextrose 50% Injectable 25 Gram(s) IV Push once  dextrose Oral Gel 15 Gram(s) Oral once PRN  dorzolamide 2% Ophthalmic Solution 1 Drop(s) Both EYES <User Schedule>  glucagon  Injectable 1 milliGRAM(s) IntraMuscular once  heparin   Injectable 5000 Unit(s) SubCutaneous every 8 hours  insulin glargine Injectable (LANTUS) 54 Unit(s) SubCutaneous at bedtime  insulin lispro (ADMELOG) corrective regimen sliding scale   SubCutaneous three times a day before meals  insulin lispro Injectable (ADMELOG) 18 Unit(s) SubCutaneous three times a day before meals  lactated ringers. 1000 milliLiter(s) IV Continuous <Continuous>  latanoprost 0.005% Ophthalmic Solution 1 Drop(s) Both EYES at bedtime  melatonin 3 milliGRAM(s) Oral at bedtime PRN  ondansetron Injectable 4 milliGRAM(s) IV Push every 8 hours PRN  pantoprazole    Tablet 40 milliGRAM(s) Oral before breakfast  sodium bicarbonate 650 milliGRAM(s) Oral every 8 hours  timolol 0.5% Solution 1 Drop(s) Both EYES two times a day      HEALTH ISSUES - PROBLEM Dx:          Case Discussed with House Staff   42 minutes spent on total encounter; more than 50% of the visit was spent counseling and/or coordinating care by the attending physician including speaking to surgery   Spectra [phone]

## 2022-10-08 NOTE — PROGRESS NOTE ADULT - SUBJECTIVE AND OBJECTIVE BOX
Internal Medicine Progress Note    Location: [facility] [id number] 009 A  Patient Name: JOSHUA HAM  MRN: [id number]    Patient is a 67y old  Male who presents with a chief complaint of gallstone pancreatitis (08 Oct 2022 01:36)      Subjective  NAEON. This morning, patient reports abdominal pain is the same     Objective  Vital Signs Last 24 Hrs  T(C): 37.3 (08 Oct 2022 05:05), Max: 37.7 (07 Oct 2022 20:45)  T(F): 99.1 (08 Oct 2022 05:05), Max: 99.9 (07 Oct 2022 20:45)  HR: 80 (08 Oct 2022 05:05) (80 - 91)  BP: 135/65 (08 Oct 2022 05:05) (135/65 - 150/68)  BP(mean): --  RR: 18 (08 Oct 2022 05:05) (18 - 20)  SpO2: 99% (08 Oct 2022 05:05) (97% - 99%)    Parameters below as of 07 Oct 2022 20:45  Patient On (Oxygen Delivery Method): room air        Physical Exam  General: NAD, nontoxic  Head: normocephalic, atraumatic  Eyes: EOMI, PERRL, conjunctiva and sclera clear  ENT: moist mucous membranes  Chest/lung: nonlabored respirations on room air, CTAB  Heart: RRR, +S1 S2  Abdomen: soft, obese, nontender, nondistended  Extremities: warm and well perfused, trace bilateral LE pitting edema  Neuro: no gross focal neuro deficits  Skin: normal skin color and turgor, no rashes or lesions noted  Psych: AAOx3      Labs  CBC:                       12.1   8.21  )-----------( 159      ( 08 Oct 2022 07:18 )             35.5     BMP: 10-08    135  |  102  |  47<H>  ----------------------------<  233<H>  4.8   |  19  |  2.3<H>    Ca    8.6      08 Oct 2022 07:18  Mg     2.1     10-08    TPro  6.3  /  Alb  3.6  /  TBili  0.8  /  DBili  x   /  AST  52<H>  /  ALT  75<H>  /  AlkPhos  97  10-08    Coag:   ABG:   Cardiac markers: CARDIAC MARKERS ( 06 Oct 2022 23:24 )  x     / 0.05 ng/mL / x     / x     / x      CARDIAC MARKERS ( 06 Oct 2022 17:58 )  x     / 0.04 ng/mL / x     / x     / x            Micro:  Urinalysis Basic - ( 06 Oct 2022 14:25 )    Color: Yellow / Appearance: Slightly Turbid / S.020 / pH: x  Gluc: x / Ketone: Trace  / Bili: Negative / Urobili: 3 mg/dL   Blood: x / Protein: 100 mg/dL / Nitrite: Negative   Leuk Esterase: Negative / RBC: 6 /HPF / WBC 3 /HPF   Sq Epi: x / Non Sq Epi: 3 /HPF / Bacteria: Negative        Culture - Urine (collected 06 Oct 2022 14:25)  Source: Clean Catch Clean Catch (Midstream)  Final Report (07 Oct 2022 20:33):    <10,000 CFU/mL Normal Urogenital Priti    Culture - Blood (collected 06 Oct 2022 09:55)  Source: .Blood Blood  Preliminary Report (08 Oct 2022 01:01):    No growth to date.    Culture - Blood (collected 06 Oct 2022 09:55)  Source: .Blood Blood  Preliminary Report (08 Oct 2022 01:01):    No growth to date.        Standing Medications  MEDICATIONS  (STANDING):  atorvastatin 20 milliGRAM(s) Oral at bedtime  dextrose 5%. 1000 milliLiter(s) (100 mL/Hr) IV Continuous <Continuous>  dextrose 50% Injectable 25 Gram(s) IV Push once  dextrose 50% Injectable 12.5 Gram(s) IV Push once  dextrose 50% Injectable 25 Gram(s) IV Push once  dorzolamide 2% Ophthalmic Solution 1 Drop(s) Both EYES <User Schedule>  glucagon  Injectable 1 milliGRAM(s) IntraMuscular once  heparin   Injectable 5000 Unit(s) SubCutaneous every 8 hours  insulin glargine Injectable (LANTUS) 54 Unit(s) SubCutaneous at bedtime  insulin lispro (ADMELOG) corrective regimen sliding scale   SubCutaneous three times a day before meals  insulin lispro Injectable (ADMELOG) 18 Unit(s) SubCutaneous three times a day before meals  lactated ringers. 1000 milliLiter(s) (100 mL/Hr) IV Continuous <Continuous>  latanoprost 0.005% Ophthalmic Solution 1 Drop(s) Both EYES at bedtime  pantoprazole    Tablet 40 milliGRAM(s) Oral before breakfast  timolol 0.5% Solution 1 Drop(s) Both EYES two times a day    PRN Medications  MEDICATIONS  (PRN):  acetaminophen     Tablet .. 650 milliGRAM(s) Oral every 6 hours PRN Temp greater or equal to 38C (100.4F), Mild Pain (1 - 3)  aluminum hydroxide/magnesium hydroxide/simethicone Suspension 30 milliLiter(s) Oral every 4 hours PRN Dyspepsia  dextrose Oral Gel 15 Gram(s) Oral once PRN Blood Glucose LESS THAN 70 milliGRAM(s)/deciliter  melatonin 3 milliGRAM(s) Oral at bedtime PRN Insomnia  ondansetron Injectable 4 milliGRAM(s) IV Push every 8 hours PRN Nausea and/or Vomiting

## 2022-10-08 NOTE — PROGRESS NOTE ADULT - ASSESSMENT
68 y/o with PMHx of DM, HTN, Obesity, Glaucoma, DVT, CKD, prior Nephrectomy, HLD, hx of gastric band insertion many years ago presenting for epigastric pain -> found ot have acute pancreatitis.    #Acute gallstone pancreatitis  - sepsis POA s/p 2.25 L LR bolus, s/p cefepime/flagyl in ED  - lipase 2000. CT abd with oral contrast only showed no evidence of intraabd pathology  - US abdomen: limited study due to obesity. fatty liver. sludge with echogenic material in gallbladder. CBD 5 mm  - TG normal; no alcohol use   - MRCP 2021 --> No biliary distention, filling defect, or stricture. Cholelithiasis present  - GI eval: MRCP canceled due to body habitus; clear liquid diet, outpatient ERCP and EUS  - surgery consulted for cholecystectomy: pt will need lap darren this admission; IVF; serial abdominal exams; repeat RUQ US  - 10/6 Ucx and Bcx x 2 negative     #BATOOL on CKD III, likely prerenal   - creat 2.3 (baseline Creatinine 1.3)  - R nephrectomy in 2017 s/p renal mass discovery  - No left hydronephrosis  - CTAP: Status post right nephrectomy. Unchanged left staghorn calculus, measuring approximately 3 x 2.5 x 1 cm.  - Trend BUN/Cr w/ daily BMP  - hold ARB  - Nephro eval: IVF, bladder scan for RVP; sodium bicarb 650 q8    #Troponemia likely secondary to CKD   - 0.05 on admission, chronically elevated up to 0.09 on previous admission  - Denies chest pain   - No new changes on EKG     # DM II  - A1c 7.5% in 2021  - On Insulin at home   - trend FS  - c/w lantus but will hold on premeal due to NPO status    # HTN - controlled  - will hold ARB/diuretics in setting of BATOOL    # HLD   -c/w atorvastatin    # Glaucoma  - c/w eye drops     # Morbid obesity  - hx of gastric band insertion many years ago  - Patient is also supposed to get his gastric band removed due to dysphagia but procedure not done yet    #MISC  Diet: clear liquids   DVT Prophylaxis: heparin sc  GI Prophylaxis: Protonix  Dispo: Acute  Code Status: Full code    pending: Surgery

## 2022-10-08 NOTE — PROGRESS NOTE ADULT - ASSESSMENT
66 y/o with PMHx of DM, HTN, Obesity, Glaucoma, DVT, CKD, prior Nephrectomy, HLD, hx of gastric band insertion many years ago presenting for epigastric pain -> found ot have acute pancreatitis and BATOOL.  #BATOOL - due to prerenal from severe volume depletion  - check urine lytes, creat   - CTAP n/c showed Rt nephrectomy and Lt kidney with large staghorn calculus 3 cm, no hydro  - proteinuria 100 - likely due to DM, check SPC  - cr stable   - continue iv fluids   -document UO   -check Bladder sono for PVR  - baseline creat 1.3 in July 2021  -check ph  - start sodium bicarbonate 650 q 8 if repeated bicarbonate < 20  # Kidney stones    as outpatient - will need stone removal  #acute pancreatitis NPO  - GI eval -> keep NPO, MRCP,  Surgery noted for cholecystectomy   will follow

## 2022-10-08 NOTE — CONSULT NOTE ADULT - ASSESSMENT
67y old Male with a PMHx of DM, HTN, Obesity, Glaucoma, DVT, CKD, prior Nephrectomy, HLD, hx of gastric band insertion many years ago who presents with a chief complaint of gallstone pancreatitis    #Type 2 DM   - uncontrolled with A1c 9.4, in the setting of Sergio on CKD 67y old Male with a PMHx of DM, HTN, Obesity, Glaucoma, DVT, CKD, prior right Nephrectomy, HLD, hx of gastric band insertion many years ago who presents with a chief complaint of gallstone pancreatitis. mariettates he was diagnosed with diabetes mellitus around 30 years ago. Was preiously on orals but was switched to only insulin therapy around 10 years ago. He is s/p lap band for weigth loss many years ago. Was also on byetta briefly but could not tolerate it He comes in with pancreatitis. He states his home regimen includes  Toujeo 40 units in the day 70 units at bedtime and short acting insulin 30 units three times a day. He states his blood glucose readings are usually less than 200s but recently have been high which he feels might be due to the pancreatitis.     #Type 2 DM with morbid obesity   - uncontrolled with A1c 9.4, in the setting of Sergio on CKD  - Blood glucose readings improved since admission , stable in the 200s now  - REcommend to switch lantus to 32 units Twice a day , can increase lispro to 20 units three times a day with sliding scale of 2 units for every 50 mg/dl above 150 mg/dl   - Home regimen to be decided based on requirements in the hospital , would avoid GLp1 as patient has pancreatitis, will need to continue on insulin on DC

## 2022-10-08 NOTE — PROGRESS NOTE ADULT - ASSESSMENT
67M w/ PMH of DM, HTN, Obesity, Glaucoma, Covid PNA c/b DVT, CKD, prior Nephrectomy, HLD, hx of gastric band, s/p episode of choledocholithiasis one year ago, who presented with clinical evidence of gallstone pancreatitis, currently admitted to medical service for resuscitation and symptom control. Surgery consulted for laparoscopic cholecystectomy this admission    Plan:   -Patient will require laparoscopic cholecystectomy this admission   -Medical/Cardiac optimization and risk stratification prior to OR   -IVF, serial abdominal exams   -Daily Hepatic function panels   -FU MRCP   -Requesting RUQ US  -Surgery to continue to follow

## 2022-10-08 NOTE — CONSULT NOTE ADULT - SUBJECTIVE AND OBJECTIVE BOX
24H events:    Patient is a 67y old Male with a PMHx of DM, HTN, Obesity, Glaucoma, DVT, CKD, prior Nephrectomy, HLD, hx of gastric band insertion many years ago who presents with a chief complaint of gallstone pancreatitis (08 Oct 2022 01:36)    Primary diagnosis of Pancreatitis       Today is hospital day 2d. This morning patient was seen and examined at bedside, resting comfortably in bed.    No acute or major events overnight.    PAST MEDICAL & SURGICAL HISTORY  Diabetes mellitus    HTN (hypertension)    Glaucoma    DVT (deep venous thrombosis)    CKD (chronic kidney disease)    HLD (hyperlipidemia)    H/O right nephrectomy      SOCIAL HISTORY:  Social History:  non alcoholic  non smoker  ambulated without assistance usually but lately was not sedentary  retired  lives wit 2 daughters (Nurse assitants) (06 Oct 2022 16:44)      ALLERGIES:  No Known Allergies    MEDICATIONS:  STANDING MEDICATIONS  atorvastatin 20 milliGRAM(s) Oral at bedtime  dextrose 5%. 1000 milliLiter(s) IV Continuous <Continuous>  dextrose 50% Injectable 25 Gram(s) IV Push once  dextrose 50% Injectable 25 Gram(s) IV Push once  dextrose 50% Injectable 12.5 Gram(s) IV Push once  dorzolamide 2% Ophthalmic Solution 1 Drop(s) Both EYES <User Schedule>  glucagon  Injectable 1 milliGRAM(s) IntraMuscular once  heparin   Injectable 5000 Unit(s) SubCutaneous every 8 hours  insulin glargine Injectable (LANTUS) 54 Unit(s) SubCutaneous at bedtime  insulin lispro (ADMELOG) corrective regimen sliding scale   SubCutaneous three times a day before meals  insulin lispro Injectable (ADMELOG) 18 Unit(s) SubCutaneous three times a day before meals  lactated ringers. 1000 milliLiter(s) IV Continuous <Continuous>  latanoprost 0.005% Ophthalmic Solution 1 Drop(s) Both EYES at bedtime  pantoprazole    Tablet 40 milliGRAM(s) Oral before breakfast  sodium bicarbonate 650 milliGRAM(s) Oral every 8 hours  timolol 0.5% Solution 1 Drop(s) Both EYES two times a day    PRN MEDICATIONS  acetaminophen     Tablet .. 650 milliGRAM(s) Oral every 6 hours PRN  aluminum hydroxide/magnesium hydroxide/simethicone Suspension 30 milliLiter(s) Oral every 4 hours PRN  dextrose Oral Gel 15 Gram(s) Oral once PRN  melatonin 3 milliGRAM(s) Oral at bedtime PRN  ondansetron Injectable 4 milliGRAM(s) IV Push every 8 hours PRN    VITALS:   T(F): 99.1  HR: 80  BP: 135/65  RR: 18  SpO2: 99%    PHYSICAL EXAM:  GENERAL: NAD, well-groomed, well-developed  HEAD:  Atraumatic, Normocephalic  EYES: EOMI  NECK: Supple  NERVOUS SYSTEM:  Alert & Oriented X3, non focal   CHEST/LUNG: Clear to auscultation bilaterally; No rales, rhonchi, wheezing, or rubs  HEART: Regular rate and rhythm; No murmurs, rubs, or gallops  ABDOMEN: Soft, Nontender, Nondistended; Bowel sounds present  EXTREMITIES:  2+ Peripheral Pulses, No clubbing, cyanosis, or edema  LYMPH: No lymphadenopathy noted  SKIN: No rashes or lesions  LABS:                        12.1   8.21  )-----------( 159      ( 08 Oct 2022 07:18 )             35.5     10-08    135  |  102  |  47<H>  ----------------------------<  233<H>  4.8   |  19  |  2.3<H>    Ca    8.6      08 Oct 2022 07:18  Mg     2.1     10-08    TPro  6.3  /  Alb  3.6  /  TBili  0.8  /  DBili  x   /  AST  52<H>  /  ALT  75<H>  /  AlkPhos  97  10-08              Culture - Urine (collected 06 Oct 2022 14:25)  Source: Clean Catch Clean Catch (Midstream)  Final Report (07 Oct 2022 20:33):    <10,000 CFU/mL Normal Urogenital Priti    Culture - Blood (collected 06 Oct 2022 09:55)  Source: .Blood Blood  Preliminary Report (08 Oct 2022 01:01):    No growth to date.    Culture - Blood (collected 06 Oct 2022 09:55)  Source: .Blood Blood  Preliminary Report (08 Oct 2022 01:01):    No growth to date.      CARDIAC MARKERS ( 06 Oct 2022 23:24 )  x     / 0.05 ng/mL / x     / x     / x      CARDIAC MARKERS ( 06 Oct 2022 17:58 )  x     / 0.04 ng/mL / x     / x     / x          RADIOLOGY:

## 2022-10-08 NOTE — PROGRESS NOTE ADULT - SUBJECTIVE AND OBJECTIVE BOX
GENERAL SURGERY PROGRESS NOTE    Patient: JOSHUA HAM , 67y (51)Male   MRN: [id number]  Location: [address] 009 A  Visit: 10-06-22 Inpatient  Date: 10-08-22 @ 01:36    Hospital Day #: 2  Post-Op Day #:    Procedure/Dx/Injuries: gallstone pancreatitis    Events of past 24 hours:  NAEON  Patient hemodynamically stable  Patient denies any nausea or vomiting  Patient has not had a BM or flatus  Patient reports pain has improved compared to earlier in the day  Patient is tolerating water     PAST MEDICAL & SURGICAL HISTORY:  Diabetes mellitus      HTN (hypertension)      Glaucoma      DVT (deep venous thrombosis)      CKD (chronic kidney disease)      HLD (hyperlipidemia)      H/O right nephrectomy          Vitals:   T(F): 99.1 (10-07-22 @ 16:35), Max: 99.1 (10-07-22 @ 16:35)  HR: 89 (10-07-22 @ 20:02)  BP: 142/67 (10-07-22 @ 20:02)  RR: 18 (10-07-22 @ 20:02)  SpO2: 97% (10-07-22 @ 20:02)      Diet, Clear Liquid      Fluids:     I & O's:    PHYSICAL EXAM:  General: NAD, AAOx3  Cardiac: RRR  Respiratory: Unlabored breathing at rest  Abdomen: Soft, nondistended non-tender  Skin: Warm/dry, normal color, no jaundice    MEDICATIONS  (STANDING):  atorvastatin 20 milliGRAM(s) Oral at bedtime  dextrose 5%. 1000 milliLiter(s) (100 mL/Hr) IV Continuous <Continuous>  dextrose 50% Injectable 25 Gram(s) IV Push once  dextrose 50% Injectable 12.5 Gram(s) IV Push once  dextrose 50% Injectable 25 Gram(s) IV Push once  dorzolamide 2% Ophthalmic Solution 1 Drop(s) Both EYES <User Schedule>  glucagon  Injectable 1 milliGRAM(s) IntraMuscular once  heparin   Injectable 5000 Unit(s) SubCutaneous every 8 hours  insulin glargine Injectable (LANTUS) 54 Unit(s) SubCutaneous at bedtime  insulin lispro (ADMELOG) corrective regimen sliding scale   SubCutaneous three times a day before meals  insulin lispro Injectable (ADMELOG) 18 Unit(s) SubCutaneous three times a day before meals  lactated ringers. 1000 milliLiter(s) (100 mL/Hr) IV Continuous <Continuous>  latanoprost 0.005% Ophthalmic Solution 1 Drop(s) Both EYES at bedtime  pantoprazole    Tablet 40 milliGRAM(s) Oral before breakfast  timolol 0.5% Solution 1 Drop(s) Both EYES two times a day    MEDICATIONS  (PRN):  acetaminophen     Tablet .. 650 milliGRAM(s) Oral every 6 hours PRN Temp greater or equal to 38C (100.4F), Mild Pain (1 - 3)  aluminum hydroxide/magnesium hydroxide/simethicone Suspension 30 milliLiter(s) Oral every 4 hours PRN Dyspepsia  dextrose Oral Gel 15 Gram(s) Oral once PRN Blood Glucose LESS THAN 70 milliGRAM(s)/deciliter  melatonin 3 milliGRAM(s) Oral at bedtime PRN Insomnia  ondansetron Injectable 4 milliGRAM(s) IV Push every 8 hours PRN Nausea and/or Vomiting      DVT PROPHYLAXIS: heparin   Injectable 5000 Unit(s) SubCutaneous every 8 hours    GI PROPHYLAXIS: pantoprazole    Tablet 40 milliGRAM(s) Oral before breakfast    ANTICOAGULATION:   ANTIBIOTICS:            LAB/STUDIES:  Labs:  CAPILLARY BLOOD GLUCOSE      POCT Blood Glucose.: 218 mg/dL (07 Oct 2022 22:03)  POCT Blood Glucose.: 232 mg/dL (07 Oct 2022 21:13)  POCT Blood Glucose.: 310 mg/dL (07 Oct 2022 16:52)  POCT Blood Glucose.: 335 mg/dL (07 Oct 2022 12:34)  POCT Blood Glucose.: 321 mg/dL (07 Oct 2022 08:00)                          12.7   10.28 )-----------( 154      ( 07 Oct 2022 07:16 )             38.1       Auto Neutrophil %: 82.1 % (10-07-22 @ 07:16)  Auto Immature Granulocyte %: 0.5 % (10-07-22 @ 07:16)    10-07    134<L>  |  101  |  49<H>  ----------------------------<  339<H>  5.0   |  19  |  2.3<H>      Calcium, Total Serum: 8.5 mg/dL (10-07-22 @ 07:16)      LFTs:             6.4  | 1.0  | 48       ------------------[93      ( 07 Oct 2022 07:16 )  3.6  | x    | 89          Lipase:x      Amylase:x         Lactate, Blood: 1.7 mmol/L (10-06-22 @ 23:24)  Blood Gas Venous - Lactate: 2.30 mmol/L (10-06-22 @ 09:45)  Lactate, Blood: 3.5 mmol/L (10-06-22 @ 07:56)      Coags:     12.30  ----< 1.08    ( 06 Oct 2022 07:56 )     28.8        CARDIAC MARKERS ( 06 Oct 2022 23:24 )  x     / 0.05 ng/mL / x     / x     / x      CARDIAC MARKERS ( 06 Oct 2022 17:58 )  x     / 0.04 ng/mL / x     / x     / x      CARDIAC MARKERS ( 06 Oct 2022 07:56 )  x     / 0.04 ng/mL / x     / x     / x              Urinalysis Basic - ( 06 Oct 2022 14:25 )    Color: Yellow / Appearance: Slightly Turbid / S.020 / pH: x  Gluc: x / Ketone: Trace  / Bili: Negative / Urobili: 3 mg/dL   Blood: x / Protein: 100 mg/dL / Nitrite: Negative   Leuk Esterase: Negative / RBC: 6 /HPF / WBC 3 /HPF   Sq Epi: x / Non Sq Epi: 3 /HPF / Bacteria: Negative        Culture - Urine (collected 06 Oct 2022 14:25)  Source: Clean Catch Clean Catch (Midstream)  Final Report (07 Oct 2022 20:33):    <10,000 CFU/mL Normal Urogenital Priti    Culture - Blood (collected 06 Oct 2022 09:55)  Source: .Blood Blood  Preliminary Report (08 Oct 2022 01:01):    No growth to date.    Culture - Blood (collected 06 Oct 2022 09:55)  Source: .Blood Blood  Preliminary Report (08 Oct 2022 01:01):    No growth to date.

## 2022-10-08 NOTE — PROGRESS NOTE ADULT - SUBJECTIVE AND OBJECTIVE BOX
seen and examined  no distress   lying comfortable         PAST HISTORY  --------------------------------------------------------------------------------  No significant changes to PMH, PSH, FHx, SHx, unless otherwise noted    ALLERGIES & MEDICATIONS  --------------------------------------------------------------------------------  Allergies    No Known Allergies    Intolerances      Standing Inpatient Medications  atorvastatin 20 milliGRAM(s) Oral at bedtime  dextrose 5%. 1000 milliLiter(s) IV Continuous <Continuous>  dextrose 50% Injectable 25 Gram(s) IV Push once  dextrose 50% Injectable 12.5 Gram(s) IV Push once  dextrose 50% Injectable 25 Gram(s) IV Push once  dorzolamide 2% Ophthalmic Solution 1 Drop(s) Both EYES <User Schedule>  glucagon  Injectable 1 milliGRAM(s) IntraMuscular once  heparin   Injectable 5000 Unit(s) SubCutaneous every 8 hours  insulin glargine Injectable (LANTUS) 54 Unit(s) SubCutaneous at bedtime  insulin lispro (ADMELOG) corrective regimen sliding scale   SubCutaneous three times a day before meals  insulin lispro Injectable (ADMELOG) 18 Unit(s) SubCutaneous three times a day before meals  lactated ringers. 1000 milliLiter(s) IV Continuous <Continuous>  latanoprost 0.005% Ophthalmic Solution 1 Drop(s) Both EYES at bedtime  pantoprazole    Tablet 40 milliGRAM(s) Oral before breakfast  timolol 0.5% Solution 1 Drop(s) Both EYES two times a day    PRN Inpatient Medications  acetaminophen     Tablet .. 650 milliGRAM(s) Oral every 6 hours PRN  aluminum hydroxide/magnesium hydroxide/simethicone Suspension 30 milliLiter(s) Oral every 4 hours PRN  dextrose Oral Gel 15 Gram(s) Oral once PRN  melatonin 3 milliGRAM(s) Oral at bedtime PRN  ondansetron Injectable 4 milliGRAM(s) IV Push every 8 hours PRN          VITALS/PHYSICAL EXAM  --------------------------------------------------------------------------------  T(C): 37.3 (10-08-22 @ 05:05), Max: 37.7 (10-07-22 @ 20:45)  HR: 80 (10-08-22 @ 05:05) (80 - 91)  BP: 135/65 (10-08-22 @ 05:05) (119/65 - 150/68)  RR: 18 (10-08-22 @ 05:05) (18 - 20)  SpO2: 99% (10-08-22 @ 05:05) (97% - 99%)  Wt(kg): --  Height (cm): 177.8 (10-07-22 @ 20:45)  Weight (kg): 178.2 (10-07-22 @ 20:45)  BMI (kg/m2): 56.4 (10-07-22 @ 20:45)  BSA (m2): 2.78 (10-07-22 @ 20:45)      Physical Exam:  	Gen: NAD  	Pulm: decrease BS  B/L  	CV:  S1S2; no rub  	Abd: +distended  	    LABS/STUDIES  --------------------------------------------------------------------------------              12.7   10.28 >-----------<  154      [10-07-22 @ 07:16]              38.1     134  |  101  |  49  ----------------------------<  339      [10-07-22 @ 07:16]  5.0   |  19  |  2.3        Ca     8.5     [10-07-22 @ 07:16]      Mg     1.7     [10-07-22 @ 07:16]    TPro  6.4  /  Alb  3.6  /  TBili  1.0  /  DBili  x   /  AST  48  /  ALT  89  /  AlkPhos  93  [10-07-22 @ 07:16]    PT/INR: PT 12.30, INR 1.08       [10-06-22 @ 07:56]  PTT: 28.8       [10-06-22 @ 07:56]    Troponin 0.05      [10-06-22 @ 23:24]    Creatinine Trend:  SCr 2.3 [10-07 @ 07:16]  SCr 2.3 [10-06 @ 23:24]  SCr 2.3 [10-06 @ 17:58]  SCr 2.4 [10-06 @ 07:56]    Urinalysis - [10-06-22 @ 14:25]      Color Yellow / Appearance Slightly Turbid / SG 1.020 / pH 5.5      Gluc >= 1000 mg/dL / Ketone Trace  / Bili Negative / Urobili 3 mg/dL       Blood Negative / Protein 100 mg/dL / Leuk Est Negative / Nitrite Negative      RBC 6 / WBC 3 / Hyaline 2 / Gran  / Sq Epi  / Non Sq Epi 3 / Bacteria Negative      HbA1c 8.1      [11-02-19 @ 06:07]  Lipid: chol --, , HDL --, LDL --      [10-06-22 @ 07:56]

## 2022-10-09 LAB
ALBUMIN SERPL ELPH-MCNC: 3.3 G/DL — LOW (ref 3.5–5.2)
ALP SERPL-CCNC: 100 U/L — SIGNIFICANT CHANGE UP (ref 30–115)
ALT FLD-CCNC: 64 U/L — HIGH (ref 0–41)
ANION GAP SERPL CALC-SCNC: 11 MMOL/L — SIGNIFICANT CHANGE UP (ref 7–14)
AST SERPL-CCNC: 44 U/L — HIGH (ref 0–41)
BASOPHILS # BLD AUTO: 0.05 K/UL — SIGNIFICANT CHANGE UP (ref 0–0.2)
BASOPHILS NFR BLD AUTO: 0.7 % — SIGNIFICANT CHANGE UP (ref 0–1)
BILIRUB SERPL-MCNC: 0.6 MG/DL — SIGNIFICANT CHANGE UP (ref 0.2–1.2)
BUN SERPL-MCNC: 40 MG/DL — HIGH (ref 10–20)
CALCIUM SERPL-MCNC: 8.5 MG/DL — SIGNIFICANT CHANGE UP (ref 8.4–10.5)
CHLORIDE SERPL-SCNC: 101 MMOL/L — SIGNIFICANT CHANGE UP (ref 98–110)
CO2 SERPL-SCNC: 20 MMOL/L — SIGNIFICANT CHANGE UP (ref 17–32)
CREAT SERPL-MCNC: 1.9 MG/DL — HIGH (ref 0.7–1.5)
EGFR: 38 ML/MIN/1.73M2 — LOW
EOSINOPHIL # BLD AUTO: 0.17 K/UL — SIGNIFICANT CHANGE UP (ref 0–0.7)
EOSINOPHIL NFR BLD AUTO: 2.2 % — SIGNIFICANT CHANGE UP (ref 0–8)
GLUCOSE BLDC GLUCOMTR-MCNC: 137 MG/DL — HIGH (ref 70–99)
GLUCOSE BLDC GLUCOMTR-MCNC: 164 MG/DL — HIGH (ref 70–99)
GLUCOSE BLDC GLUCOMTR-MCNC: 171 MG/DL — HIGH (ref 70–99)
GLUCOSE BLDC GLUCOMTR-MCNC: 221 MG/DL — HIGH (ref 70–99)
GLUCOSE SERPL-MCNC: 185 MG/DL — HIGH (ref 70–99)
HCT VFR BLD CALC: 34.8 % — LOW (ref 42–52)
HGB BLD-MCNC: 11.6 G/DL — LOW (ref 14–18)
IMM GRANULOCYTES NFR BLD AUTO: 0.5 % — HIGH (ref 0.1–0.3)
LYMPHOCYTES # BLD AUTO: 0.86 K/UL — LOW (ref 1.2–3.4)
LYMPHOCYTES # BLD AUTO: 11.3 % — LOW (ref 20.5–51.1)
MAGNESIUM SERPL-MCNC: 2.1 MG/DL — SIGNIFICANT CHANGE UP (ref 1.8–2.4)
MCHC RBC-ENTMCNC: 29.4 PG — SIGNIFICANT CHANGE UP (ref 27–31)
MCHC RBC-ENTMCNC: 33.3 G/DL — SIGNIFICANT CHANGE UP (ref 32–37)
MCV RBC AUTO: 88.1 FL — SIGNIFICANT CHANGE UP (ref 80–94)
MONOCYTES # BLD AUTO: 0.99 K/UL — HIGH (ref 0.1–0.6)
MONOCYTES NFR BLD AUTO: 13 % — HIGH (ref 1.7–9.3)
NEUTROPHILS # BLD AUTO: 5.51 K/UL — SIGNIFICANT CHANGE UP (ref 1.4–6.5)
NEUTROPHILS NFR BLD AUTO: 72.3 % — SIGNIFICANT CHANGE UP (ref 42.2–75.2)
NRBC # BLD: 0 /100 WBCS — SIGNIFICANT CHANGE UP (ref 0–0)
PLATELET # BLD AUTO: 140 K/UL — SIGNIFICANT CHANGE UP (ref 130–400)
POTASSIUM SERPL-MCNC: 4.6 MMOL/L — SIGNIFICANT CHANGE UP (ref 3.5–5)
POTASSIUM SERPL-SCNC: 4.6 MMOL/L — SIGNIFICANT CHANGE UP (ref 3.5–5)
PROT SERPL-MCNC: 6.2 G/DL — SIGNIFICANT CHANGE UP (ref 6–8)
RBC # BLD: 3.95 M/UL — LOW (ref 4.7–6.1)
RBC # FLD: 13.2 % — SIGNIFICANT CHANGE UP (ref 11.5–14.5)
SODIUM SERPL-SCNC: 132 MMOL/L — LOW (ref 135–146)
WBC # BLD: 7.62 K/UL — SIGNIFICANT CHANGE UP (ref 4.8–10.8)
WBC # FLD AUTO: 7.62 K/UL — SIGNIFICANT CHANGE UP (ref 4.8–10.8)

## 2022-10-09 PROCEDURE — 99233 SBSQ HOSP IP/OBS HIGH 50: CPT

## 2022-10-09 PROCEDURE — 93306 TTE W/DOPPLER COMPLETE: CPT | Mod: 26

## 2022-10-09 RX ORDER — INSULIN GLARGINE 100 [IU]/ML
32 INJECTION, SOLUTION SUBCUTANEOUS EVERY MORNING
Refills: 0 | Status: DISCONTINUED | OUTPATIENT
Start: 2022-10-09 | End: 2022-10-13

## 2022-10-09 RX ORDER — INSULIN GLARGINE 100 [IU]/ML
32 INJECTION, SOLUTION SUBCUTANEOUS AT BEDTIME
Refills: 0 | Status: DISCONTINUED | OUTPATIENT
Start: 2022-10-09 | End: 2022-10-13

## 2022-10-09 RX ORDER — OXYCODONE AND ACETAMINOPHEN 5; 325 MG/1; MG/1
1 TABLET ORAL ONCE
Refills: 0 | Status: DISCONTINUED | OUTPATIENT
Start: 2022-10-09 | End: 2022-10-09

## 2022-10-09 RX ORDER — INSULIN LISPRO 100/ML
20 VIAL (ML) SUBCUTANEOUS
Refills: 0 | Status: DISCONTINUED | OUTPATIENT
Start: 2022-10-09 | End: 2022-10-13

## 2022-10-09 RX ORDER — MORPHINE SULFATE 50 MG/1
2 CAPSULE, EXTENDED RELEASE ORAL ONCE
Refills: 0 | Status: DISCONTINUED | OUTPATIENT
Start: 2022-10-09 | End: 2022-10-09

## 2022-10-09 RX ADMIN — INSULIN GLARGINE 32 UNIT(S): 100 INJECTION, SOLUTION SUBCUTANEOUS at 11:15

## 2022-10-09 RX ADMIN — Medication 3 MILLIGRAM(S): at 21:18

## 2022-10-09 RX ADMIN — HEPARIN SODIUM 5000 UNIT(S): 5000 INJECTION INTRAVENOUS; SUBCUTANEOUS at 21:19

## 2022-10-09 RX ADMIN — PANTOPRAZOLE SODIUM 40 MILLIGRAM(S): 20 TABLET, DELAYED RELEASE ORAL at 06:29

## 2022-10-09 RX ADMIN — Medication 1 DROP(S): at 17:12

## 2022-10-09 RX ADMIN — Medication 650 MILLIGRAM(S): at 14:18

## 2022-10-09 RX ADMIN — Medication 1 DROP(S): at 06:32

## 2022-10-09 RX ADMIN — Medication 18 UNIT(S): at 08:07

## 2022-10-09 RX ADMIN — HEPARIN SODIUM 5000 UNIT(S): 5000 INJECTION INTRAVENOUS; SUBCUTANEOUS at 14:18

## 2022-10-09 RX ADMIN — Medication 650 MILLIGRAM(S): at 06:29

## 2022-10-09 RX ADMIN — HEPARIN SODIUM 5000 UNIT(S): 5000 INJECTION INTRAVENOUS; SUBCUTANEOUS at 06:29

## 2022-10-09 RX ADMIN — Medication 2: at 11:14

## 2022-10-09 RX ADMIN — LATANOPROST 1 DROP(S): 0.05 SOLUTION/ DROPS OPHTHALMIC; TOPICAL at 21:19

## 2022-10-09 RX ADMIN — ATORVASTATIN CALCIUM 20 MILLIGRAM(S): 80 TABLET, FILM COATED ORAL at 21:18

## 2022-10-09 RX ADMIN — Medication 20 UNIT(S): at 11:14

## 2022-10-09 RX ADMIN — Medication 4: at 08:07

## 2022-10-09 RX ADMIN — Medication 650 MILLIGRAM(S): at 21:19

## 2022-10-09 RX ADMIN — OXYCODONE AND ACETAMINOPHEN 1 TABLET(S): 5; 325 TABLET ORAL at 21:21

## 2022-10-09 RX ADMIN — MORPHINE SULFATE 2 MILLIGRAM(S): 50 CAPSULE, EXTENDED RELEASE ORAL at 11:45

## 2022-10-09 RX ADMIN — DORZOLAMIDE HYDROCHLORIDE 1 DROP(S): 20 SOLUTION/ DROPS OPHTHALMIC at 21:19

## 2022-10-09 RX ADMIN — DORZOLAMIDE HYDROCHLORIDE 1 DROP(S): 20 SOLUTION/ DROPS OPHTHALMIC at 08:08

## 2022-10-09 RX ADMIN — INSULIN GLARGINE 32 UNIT(S): 100 INJECTION, SOLUTION SUBCUTANEOUS at 21:18

## 2022-10-09 NOTE — CONSULT NOTE ADULT - ASSESSMENT
68 yo male with a hx of dm, renal cell ca, s/p nephrectomy, dvt, morbid obesity, who is preop for a lap darren.  pt is able to do > 4 mets and has no active angina nor chf sxs.  pt had an adenosnie nuclear stress test 9/2021 with no large areas of ischemia.  pt's revised cardiac risk index is 3 ponts , class4 with a 15% 30 day risk of death , mi, or cardiac arrest.    pt is clinically euvolemic  pt to have post ekg and cardiac enzymes  pt and daughter aware of surgical risk but also the need to this surgically needed procedure.

## 2022-10-09 NOTE — CONSULT NOTE ADULT - SUBJECTIVE AND OBJECTIVE BOX
Patient is a 67y old  Male who presents with a chief complaint of gallstone pancreatitis (08 Oct 2022 01:36)      HPI:  68 y/o M with PMHx of DM, HTN, Obesity, Glaucoma, Covid PNA c/b DVT, CKD, prior Nephrectomy, HLD, hx of gastric band insertion many years ago presenting for the above chief complain of abdominal pain.    Pt had sudden onset epigastric pain, dull in nature, 10/10 in severity, radiating to RUQ and LUQ, constant, associated with nausea and 3 episodes of nonbloody nonbilious vomiting. pain persisted throughout the night until day of admission  when patient decided to go to the ED. he reported decreased PO intake but denied fever, chills, chest pain, cough, jaundice, urine or bowel changes. pt was admitted with pancreatitis and tx conservatively.  pt was dehydrated and renal fxn improved with hydration.      VITALS:   T(C): 37.6 (10-06-22 @ 15:02), Max: 37.6 (10-06-22 @ 15:02)  HR: 93 (10-06-22 @ 15:02) (92 - 115)  BP: 134/68 (10-06-22 @ 15:02) (134/68 - 136/68)  RR: 16 (10-06-22 @ 15:02) (16 - 18)  SpO2: 97% (10-06-22 @ 15:02) (97% - 98%)    in ED, vitals were significant for a HR of 115 bpm. labs were significant for leukocytosis (17K), hyperkalemia (6.2 but hemolyzed), lipase 2000, creat 2.4, lactate 3.5, transaminits (ALP: 117, , ), trop 0.04. US abd very limited study. Large fatty liver. Sludge with echogenic material within the gallbladder. CT abdomen No evidence of acute intra-abdominal pathology. patient received LR bolus on 2.250 L, zofran 4 mg IV, 4 mg IV, cefepime and flagyl. admitted for management of pancreatitis  pt is preop for a lap darren.     (06 Oct 2022 16:44)      PAST MEDICAL & SURGICAL HISTORY:  Diabetes mellitus      HTN (hypertension)      Glaucoma      DVT (deep venous thrombosis)      CKD (chronic kidney disease)      HLD (hyperlipidemia)      H/O right nephrectomy          PREVIOUS DIAGNOSTIC TESTING:      ECHO  FINDINGS:Summary:   1. LV Ejection Fraction by Gutierrez's Method with a biplane EF of 58 %.   2. Endocardial visualization was enhanced with intravenous echo contrast.  3. Normal global left ventricular systolic function.   4. Moderately increased LV wall thickness.   5. Normal left atrial size.   6. Normal right atrial size.   7. Trivial pericardial effusion.   8. Dilatation of the ascending aorta.      STRESS TEST  FINDINGS: adenosine nuclear stress 9/2021 with a large area with mils intensity with minimal reversibility and nl ef with no wma and significant diaphragmatic artifact. There were no large areas of ischemia.      CATHETERIZATION  FINDINGS:    MEDICATIONS  (STANDING):  atorvastatin 20 milliGRAM(s) Oral at bedtime  dextrose 5%. 1000 milliLiter(s) (100 mL/Hr) IV Continuous <Continuous>  dextrose 50% Injectable 25 Gram(s) IV Push once  dextrose 50% Injectable 12.5 Gram(s) IV Push once  dextrose 50% Injectable 25 Gram(s) IV Push once  dorzolamide 2% Ophthalmic Solution 1 Drop(s) Both EYES <User Schedule>  glucagon  Injectable 1 milliGRAM(s) IntraMuscular once  heparin   Injectable 5000 Unit(s) SubCutaneous every 8 hours  insulin glargine Injectable (LANTUS) 32 Unit(s) SubCutaneous every morning  insulin glargine Injectable (LANTUS) 32 Unit(s) SubCutaneous at bedtime  insulin lispro (ADMELOG) corrective regimen sliding scale   SubCutaneous three times a day before meals  insulin lispro Injectable (ADMELOG) 20 Unit(s) SubCutaneous three times a day before meals  lactated ringers. 1000 milliLiter(s) (100 mL/Hr) IV Continuous <Continuous>  latanoprost 0.005% Ophthalmic Solution 1 Drop(s) Both EYES at bedtime  pantoprazole    Tablet 40 milliGRAM(s) Oral before breakfast  sodium bicarbonate 650 milliGRAM(s) Oral every 8 hours  timolol 0.5% Solution 1 Drop(s) Both EYES two times a day    MEDICATIONS  (PRN):  acetaminophen     Tablet .. 650 milliGRAM(s) Oral every 6 hours PRN Temp greater or equal to 38C (100.4F), Mild Pain (1 - 3)  aluminum hydroxide/magnesium hydroxide/simethicone Suspension 30 milliLiter(s) Oral every 4 hours PRN Dyspepsia  dextrose Oral Gel 15 Gram(s) Oral once PRN Blood Glucose LESS THAN 70 milliGRAM(s)/deciliter  melatonin 3 milliGRAM(s) Oral at bedtime PRN Insomnia  ondansetron Injectable 4 milliGRAM(s) IV Push every 8 hours PRN Nausea and/or Vomiting      FAMILY HISTORY:  FH: lung cancer  Father        SOCIAL HISTORY:  CIGARETTES: none  ALCOHOL:social  DRUGS:none                      REVIEW OF SYSTEMS:  CONSTITUTIONAL: No distress, Looks stable  NECK: No pain or stiffness  RESPIRATORY: No cough, wheezing, shortness of breath  CARDIOVASCULAR: No chest pain, SOB, palpitations, leg swelling  GASTROINTESTINAL: ruq and epigastric mild soreness. No nausea, vomiting, or hematemesis;  No melena.  NEUROLOGICAL: No dizziness, headaches, memory loss, loss of strength  SKIN: No itching, burning, rashes, or lesions   ENDOCRINE: No heat or cold intolerance  MUSCULOSKELETAL: No joint pain, No  swelling; No muscle pain  ALLERGY: No hives, itching, rash          Vital Signs Last 24 Hrs  T(C): 37.1 (09 Oct 2022 14:19), Max: 37.3 (09 Oct 2022 04:00)  T(F): 98.7 (09 Oct 2022 14:19), Max: 99.1 (09 Oct 2022 04:00)  HR: 74 (09 Oct 2022 14:19) (74 - 91)  BP: 138/72 (09 Oct 2022 14:19) (138/72 - 174/74)  BP(mean): --  RR: 18 (09 Oct 2022 14:19) (18 - 18)  SpO2: 98% (08 Oct 2022 21:00) (98% - 98%)    Parameters below as of 08 Oct 2022 21:00  Patient On (Oxygen Delivery Method): room air                          PHYSICAL EXAM:  GENERAL: No distress, well developed  HEAD:  Atraumatic, Normocephalic  NECK: Supple, No JVD, No Bruit of either carotid arteries  NERVOUS SYSTEM:  Alert, Awake, Oriented to time, place, person; Normal memory and speech; Normal motor Strength 5/5 B/L upper and lower extremities  CHEST/LUNG: Normal air entry to lung base bilaterally; No wheeze, crackle, rales, rhonchi  HEART: Regular heart beat, S1, A2, P2, No S3, No S4, No gallop, No murmur  ABDOMEN: Soft, minimal tenderness, Non distended; Bowel sounds present  EXTREMITIES:  2+ Peripheral Pulses, No clubbing, No edema  SKIN: No rashes or lesions    TELEMETRY:    ECG:  Diagnosis Line Sinus tachycardia with 1st degree A-V block  Left axis deviation  Right bundle branch block  Minimal voltage criteria for LVH, may be normal variant  minimal criteria for Inferior infarct but may be nl.  Abnormal ECG      I&O's Detail    09 Oct 2022 07:01  -  09 Oct 2022 14:35  --------------------------------------------------------  IN:    Oral Fluid: 480 mL  Total IN: 480 mL    OUT:    Voided (mL): 500 mL  Total OUT: 500 mL    Total NET: -20 mL          LABS:                        11.6   7.62  )-----------( 140      ( 09 Oct 2022 10:30 )             34.8     10-09    132<L>  |  101  |  40<H>  ----------------------------<  185<H>  4.6   |  20  |  1.9<H>    Ca    8.5      09 Oct 2022 10:30  Mg     2.1     10-09    TPro  6.2  /  Alb  3.3<L>  /  TBili  0.6  /  DBili  x   /  AST  44<H>  /  ALT  64<H>  /  AlkPhos  100  10-09            I&O's Summary    09 Oct 2022 07:01  -  09 Oct 2022 14:35  --------------------------------------------------------  IN: 480 mL / OUT: 500 mL / NET: -20 mL        RADIOLOGY & ADDITIONAL STUDIES:

## 2022-10-09 NOTE — PROGRESS NOTE ADULT - SUBJECTIVE AND OBJECTIVE BOX
GENERAL SURGERY PROGRESS NOTE    Patient: JOSHUA HAM , 67y ([birth year])Male   MRN: [id number]  Location: [address]  Visit: 10-06-22 Inpatient  Date: 10-09-22 @ 08:09    Procedure/Dx/Injuries: gallstone pancreatitis    Events of past 24 hours: No acute events overnight. Patient denies nausea, vomiting, is passing flatus and is having bowel movements. Pain is controlled with medications, and the patient is voiding without difficulty. Spoke with Dr. Santiago, who will come see the patient and give his recommendations on cardiac clearance.     PAST MEDICAL & SURGICAL HISTORY:  Diabetes mellitus      HTN (hypertension)      Glaucoma      DVT (deep venous thrombosis)      CKD (chronic kidney disease)      HLD (hyperlipidemia)      H/O right nephrectomy          Vitals:   T(F): 99.1 (10-09-22 @ 04:00), Max: 99.1 (10-09-22 @ 04:00)  HR: 79 (10-09-22 @ 04:00)  BP: 147/68 (10-09-22 @ 04:00)  RR: 18 (10-09-22 @ 04:00)  SpO2: 98% (10-08-22 @ 21:00)      Diet, Clear Liquid      Fluids:     I & O's:    PHYSICAL EXAM:  General: NAD, AAOx3  Cardiac: RRR  Respiratory: Unlabored breathing at rest  Abdomen: Soft, nondistended non-tender  Skin: Warm/dry, normal color, no jaundice    MEDICATIONS  (STANDING):  atorvastatin 20 milliGRAM(s) Oral at bedtime  dextrose 5%. 1000 milliLiter(s) (100 mL/Hr) IV Continuous <Continuous>  dextrose 50% Injectable 25 Gram(s) IV Push once  dextrose 50% Injectable 12.5 Gram(s) IV Push once  dextrose 50% Injectable 25 Gram(s) IV Push once  dorzolamide 2% Ophthalmic Solution 1 Drop(s) Both EYES <User Schedule>  glucagon  Injectable 1 milliGRAM(s) IntraMuscular once  heparin   Injectable 5000 Unit(s) SubCutaneous every 8 hours  insulin glargine Injectable (LANTUS) 54 Unit(s) SubCutaneous at bedtime  insulin lispro (ADMELOG) corrective regimen sliding scale   SubCutaneous three times a day before meals  insulin lispro Injectable (ADMELOG) 18 Unit(s) SubCutaneous three times a day before meals  lactated ringers. 1000 milliLiter(s) (100 mL/Hr) IV Continuous <Continuous>  latanoprost 0.005% Ophthalmic Solution 1 Drop(s) Both EYES at bedtime  pantoprazole    Tablet 40 milliGRAM(s) Oral before breakfast  sodium bicarbonate 650 milliGRAM(s) Oral every 8 hours  timolol 0.5% Solution 1 Drop(s) Both EYES two times a day    MEDICATIONS  (PRN):  acetaminophen     Tablet .. 650 milliGRAM(s) Oral every 6 hours PRN Temp greater or equal to 38C (100.4F), Mild Pain (1 - 3)  aluminum hydroxide/magnesium hydroxide/simethicone Suspension 30 milliLiter(s) Oral every 4 hours PRN Dyspepsia  dextrose Oral Gel 15 Gram(s) Oral once PRN Blood Glucose LESS THAN 70 milliGRAM(s)/deciliter  melatonin 3 milliGRAM(s) Oral at bedtime PRN Insomnia  ondansetron Injectable 4 milliGRAM(s) IV Push every 8 hours PRN Nausea and/or Vomiting      DVT PROPHYLAXIS: heparin   Injectable 5000 Unit(s) SubCutaneous every 8 hours    GI PROPHYLAXIS: pantoprazole    Tablet 40 milliGRAM(s) Oral before breakfast    ANTICOAGULATION:   ANTIBIOTICS:            LAB/STUDIES:  Labs:  CAPILLARY BLOOD GLUCOSE      POCT Blood Glucose.: 221 mg/dL (09 Oct 2022 07:58)  POCT Blood Glucose.: 201 mg/dL (08 Oct 2022 23:18)  POCT Blood Glucose.: 201 mg/dL (08 Oct 2022 16:43)  POCT Blood Glucose.: 232 mg/dL (08 Oct 2022 11:38)                          12.1   8.21  )-----------( 159      ( 08 Oct 2022 07:18 )             35.5         10-08    135  |  102  |  47<H>  ----------------------------<  233<H>  4.8   |  19  |  2.3<H>          LFTs:             x    | x    | x        ------------------[x       ( 08 Oct 2022 10:36 )  x    | x    | x           Lipase:113    Amylase:94        Lactate, Blood: 1.7 mmol/L (10-06-22 @ 23:24)  Blood Gas Venous - Lactate: 2.30 mmol/L (10-06-22 @ 09:45)      Coags:                Culture - Urine (collected 06 Oct 2022 14:25)  Source: Clean Catch Clean Catch (Midstream)  Final Report (07 Oct 2022 20:33):    <10,000 CFU/mL Normal Urogenital Priti    Culture - Blood (collected 06 Oct 2022 09:55)  Source: .Blood Blood  Preliminary Report (08 Oct 2022 01:01):    No growth to date.    Culture - Blood (collected 06 Oct 2022 09:55)  Source: .Blood Blood  Preliminary Report (08 Oct 2022 01:01):    No growth to date.    IMAGING:  No new imaging.

## 2022-10-09 NOTE — CONSULT NOTE ADULT - CONSULT REQUESTED DATE/TIME
08-Oct-2022 14:37
09-Oct-2022 10:00
06-Oct-2022 13:47
07-Oct-2022 17:40
06-Oct-2022 12:07
07-Oct-2022 16:32

## 2022-10-09 NOTE — PROGRESS NOTE ADULT - ASSESSMENT
S/p episode of choledocholithiasis one year ago, who presented with clinical evidence of gallstone pancreatitis, currently admitted to medical service for resuscitation and symptom control. Surgery consulted for laparoscopic cholecystectomy this admission    Plan:   -Patient will require laparoscopic cholecystectomy this admission   -Medical/Cardiac optimization and risk stratification prior to OR   -IVF, serial abdominal exams   -Daily Hepatic function panels   -Monitor vitals  - Monitor labs  -Pain control  -DVT/GI PPHX  -Surgery to continue to follow

## 2022-10-09 NOTE — PROGRESS NOTE ADULT - SUBJECTIVE AND OBJECTIVE BOX
JOSHUA HAM  67y  [facility] [id number] [address]      Patient is a 67y old  Male who presents with a chief complaint of gallstone pancreatitis (08 Oct 2022 01:36)      INTERVAL HPI/OVERNIGHT EVENTS:    no acute events overnight     REVIEW OF SYSTEMS:  CONSTITUTIONAL: No fever, weight loss, or fatigue  EYES: No eye pain, visual disturbances, or discharge  ENMT:  No difficulty hearing, tinnitus, vertigo; No sinus or throat pain  NECK: No pain or stiffness  BREASTS: No pain, masses, or nipple discharge  RESPIRATORY: No cough, wheezing, chills or hemoptysis; No shortness of breath  CARDIOVASCULAR: No chest pain, palpitations, dizziness, or leg swelling  GASTROINTESTINAL: abdoinnal discomfort   GENITOURINARY: No dysuria, frequency, hematuria, or incontinence  NEUROLOGICAL: No headaches, memory loss, loss of strength, numbness, or tremors  SKIN: No itching, burning, rashes, or lesions   LYMPH NODES: No enlarged glands  ENDOCRINE: No heat or cold intolerance; No hair loss  MUSCULOSKELETAL: No joint pain or swelling; No muscle, back, or extremity pain  PSYCHIATRIC: No depression, anxiety, mood swings, or difficulty sleeping  HEME/LYMPH: No easy bruising, or bleeding gums  ALLERY AND IMMUNOLOGIC: No hives or eczema  FAMILY HISTORY:  FH: lung cancer  Father      T(C): 37.3 (10-09-22 @ 04:00), Max: 37.3 (10-09-22 @ 04:00)  HR: 79 (10-09-22 @ 04:00) (79 - 91)  BP: 147/68 (10-09-22 @ 04:00) (147/68 - 174/74)  RR: 18 (10-09-22 @ 04:00) (18 - 18)  SpO2: 98% (10-08-22 @ 21:00) (98% - 98%)  Wt(kg): --Vital Signs Last 24 Hrs  T(C): 37.3 (09 Oct 2022 04:00), Max: 37.3 (09 Oct 2022 04:00)  T(F): 99.1 (09 Oct 2022 04:00), Max: 99.1 (09 Oct 2022 04:00)  HR: 79 (09 Oct 2022 04:00) (79 - 91)  BP: 147/68 (09 Oct 2022 04:00) (147/68 - 174/74)  BP(mean): --  RR: 18 (09 Oct 2022 04:00) (18 - 18)  SpO2: 98% (08 Oct 2022 21:00) (98% - 98%)    Parameters below as of 08 Oct 2022 21:00  Patient On (Oxygen Delivery Method): room air        PHYSICAL EXAM:  GENERAL: NAD, well-groomed, well-developed  HEAD:  Atraumatic, Normocephalic  EYES: EOMI, PERRLA, conjunctiva and sclera clear  ENMT: No tonsillar erythema, exudates, or enlargement; Moist mucous membranes, Good dentition, No lesions  NECK: Supple, No JVD, Normal thyroid  NERVOUS SYSTEM:  Alert & Oriented X3,    PULM: Clear to auscultation bilaterally  CARDIAC: Regular rate and rhythm; No murmurs, rubs, or gallops  GI: Soft, Nontender, Nondistended; Bowel sounds present obese   EXTREMITIES:  2+ Peripheral Pulses, No clubbing, cyanosis, or edema  LYMPH: No lymphadenopathy noted  SKIN: No rashes or lesions    Consultant(s) Notes Reviewed:  [x ] YES  [ ] NO  Care Discussed with Consultants/Other Providers [ x] YES  [ ] NO    LABS:                            12.1   8.21  )-----------( 159      ( 08 Oct 2022 07:18 )             35.5   10-08    135  |  102  |  47<H>  ----------------------------<  233<H>  4.8   |  19  |  2.3<H>    Ca    8.6      08 Oct 2022 07:18  Mg     2.1     10-08    TPro  6.3  /  Alb  3.6  /  TBili  0.8  /  DBili  x   /  AST  52<H>  /  ALT  75<H>  /  AlkPhos  97  10-08            Culture - Urine (collected 06 Oct 2022 14:25)  Source: Clean Catch Clean Catch (Midstream)  Final Report (07 Oct 2022 20:33):    <10,000 CFU/mL Normal Urogenital Priti    Culture - Blood (collected 06 Oct 2022 09:55)  Source: .Blood Blood  Preliminary Report (08 Oct 2022 01:01):    No growth to date.    Culture - Blood (collected 06 Oct 2022 09:55)  Source: .Blood Blood  Preliminary Report (08 Oct 2022 01:01):    No growth to date.      acetaminophen     Tablet .. 650 milliGRAM(s) Oral every 6 hours PRN  aluminum hydroxide/magnesium hydroxide/simethicone Suspension 30 milliLiter(s) Oral every 4 hours PRN  atorvastatin 20 milliGRAM(s) Oral at bedtime  dextrose 5%. 1000 milliLiter(s) IV Continuous <Continuous>  dextrose 50% Injectable 25 Gram(s) IV Push once  dextrose 50% Injectable 12.5 Gram(s) IV Push once  dextrose 50% Injectable 25 Gram(s) IV Push once  dextrose Oral Gel 15 Gram(s) Oral once PRN  dorzolamide 2% Ophthalmic Solution 1 Drop(s) Both EYES <User Schedule>  glucagon  Injectable 1 milliGRAM(s) IntraMuscular once  heparin   Injectable 5000 Unit(s) SubCutaneous every 8 hours  insulin glargine Injectable (LANTUS) 32 Unit(s) SubCutaneous every morning  insulin glargine Injectable (LANTUS) 32 Unit(s) SubCutaneous at bedtime  insulin lispro (ADMELOG) corrective regimen sliding scale   SubCutaneous three times a day before meals  insulin lispro Injectable (ADMELOG) 20 Unit(s) SubCutaneous three times a day before meals  lactated ringers. 1000 milliLiter(s) IV Continuous <Continuous>  latanoprost 0.005% Ophthalmic Solution 1 Drop(s) Both EYES at bedtime  melatonin 3 milliGRAM(s) Oral at bedtime PRN  ondansetron Injectable 4 milliGRAM(s) IV Push every 8 hours PRN  pantoprazole    Tablet 40 milliGRAM(s) Oral before breakfast  sodium bicarbonate 650 milliGRAM(s) Oral every 8 hours  timolol 0.5% Solution 1 Drop(s) Both EYES two times a day      HEALTH ISSUES - PROBLEM Dx:          Case Discussed with House Staff     Spectra [phone]

## 2022-10-09 NOTE — PROGRESS NOTE ADULT - ASSESSMENT
66 y/o with PMHx of DM, HTN, Obesity, Glaucoma, DVT, CKD, prior Nephrectomy, HLD, hx of gastric band insertion many years ago presenting for epigastric pain -> found ot have acute pancreatitis.      #Abdominal pain secondary to acute pancreatitis suspicious for gallstone pancreatitis   continue with lactated ringers   Surgery _ > plan for lap cholecystectomy - need cardiac clearance dr higgins, rugarth delacruz performed indicating cholelithiasis     #BATOOL on CKD III   continue with intravenous fluid   renal ultrasound performed  Creatinine Trend: 2.3<--, 2.3<--, 2.3<--, 2.3<--, 2.4<--  stable     #DM 2  CAPILLARY BLOOD GLUCOSE      POCT Blood Glucose.: 221 mg/dL (09 Oct 2022 07:58)  POCT Blood Glucose.: 201 mg/dL (08 Oct 2022 23:18)  POCT Blood Glucose.: 201 mg/dL (08 Oct 2022 16:43)  POCT Blood Glucose.: 232 mg/dL (08 Oct 2022 11:38)  adjusted insulin per endocronilogy recommendations , lantus 32 units qam and qpm with lispro 20 units pre meals     #HTN  BP: 147/68 (09 Oct 2022 04:00) (147/68 - 174/74)  controlled     # HLD   -c/w atorvastatin    # Glaucoma  - c/w eye drops     # Morbid obesity  BMI (kg/m2): 56.4 (07 Oct 2022 20:45)  recommend outpatient bariatric eval  as well as dietitian eval     # left staghorn calculus,  measuring approximately 3 x 2.5 x 1 cm    #Left lower pole cyst.    # subsegmental  atelectasis.    #Anemia no indication for transfusion     PROGRESS NOTE HANDOFF    Pending: cardiac clearance     Family discussion: patient verbalized understanding and agreeable to plan of care     Disposition: Home

## 2022-10-10 ENCOUNTER — TRANSCRIPTION ENCOUNTER (OUTPATIENT)
Age: 67
End: 2022-10-10

## 2022-10-10 LAB
ALBUMIN SERPL ELPH-MCNC: 3.3 G/DL — LOW (ref 3.5–5.2)
ALP SERPL-CCNC: 102 U/L — SIGNIFICANT CHANGE UP (ref 30–115)
ALT FLD-CCNC: 58 U/L — HIGH (ref 0–41)
ANION GAP SERPL CALC-SCNC: 12 MMOL/L — SIGNIFICANT CHANGE UP (ref 7–14)
APTT BLD: 25.2 SEC — LOW (ref 27–39.2)
AST SERPL-CCNC: 42 U/L — HIGH (ref 0–41)
BASOPHILS # BLD AUTO: 0.04 K/UL — SIGNIFICANT CHANGE UP (ref 0–0.2)
BASOPHILS NFR BLD AUTO: 0.5 % — SIGNIFICANT CHANGE UP (ref 0–1)
BILIRUB SERPL-MCNC: 0.8 MG/DL — SIGNIFICANT CHANGE UP (ref 0.2–1.2)
BLD GP AB SCN SERPL QL: SIGNIFICANT CHANGE UP
BUN SERPL-MCNC: 31 MG/DL — HIGH (ref 10–20)
CALCIUM SERPL-MCNC: 8.3 MG/DL — LOW (ref 8.4–10.5)
CHLORIDE SERPL-SCNC: 99 MMOL/L — SIGNIFICANT CHANGE UP (ref 98–110)
CO2 SERPL-SCNC: 21 MMOL/L — SIGNIFICANT CHANGE UP (ref 17–32)
CREAT SERPL-MCNC: 1.6 MG/DL — HIGH (ref 0.7–1.5)
EGFR: 46 ML/MIN/1.73M2 — LOW
EOSINOPHIL # BLD AUTO: 0.17 K/UL — SIGNIFICANT CHANGE UP (ref 0–0.7)
EOSINOPHIL NFR BLD AUTO: 2.1 % — SIGNIFICANT CHANGE UP (ref 0–8)
GLUCOSE BLDC GLUCOMTR-MCNC: 208 MG/DL — HIGH (ref 70–99)
GLUCOSE BLDC GLUCOMTR-MCNC: 211 MG/DL — HIGH (ref 70–99)
GLUCOSE BLDC GLUCOMTR-MCNC: 211 MG/DL — HIGH (ref 70–99)
GLUCOSE BLDC GLUCOMTR-MCNC: 246 MG/DL — HIGH (ref 70–99)
GLUCOSE BLDC GLUCOMTR-MCNC: 89 MG/DL — SIGNIFICANT CHANGE UP (ref 70–99)
GLUCOSE SERPL-MCNC: 212 MG/DL — HIGH (ref 70–99)
HCT VFR BLD CALC: 34 % — LOW (ref 42–52)
HGB BLD-MCNC: 11.5 G/DL — LOW (ref 14–18)
IMM GRANULOCYTES NFR BLD AUTO: 0.7 % — HIGH (ref 0.1–0.3)
INR BLD: 1.03 RATIO — SIGNIFICANT CHANGE UP (ref 0.65–1.3)
LYMPHOCYTES # BLD AUTO: 0.9 K/UL — LOW (ref 1.2–3.4)
LYMPHOCYTES # BLD AUTO: 11 % — LOW (ref 20.5–51.1)
MAGNESIUM SERPL-MCNC: 2 MG/DL — SIGNIFICANT CHANGE UP (ref 1.8–2.4)
MCHC RBC-ENTMCNC: 30.2 PG — SIGNIFICANT CHANGE UP (ref 27–31)
MCHC RBC-ENTMCNC: 33.8 G/DL — SIGNIFICANT CHANGE UP (ref 32–37)
MCV RBC AUTO: 89.2 FL — SIGNIFICANT CHANGE UP (ref 80–94)
MONOCYTES # BLD AUTO: 1 K/UL — HIGH (ref 0.1–0.6)
MONOCYTES NFR BLD AUTO: 12.3 % — HIGH (ref 1.7–9.3)
NEUTROPHILS # BLD AUTO: 5.98 K/UL — SIGNIFICANT CHANGE UP (ref 1.4–6.5)
NEUTROPHILS NFR BLD AUTO: 73.4 % — SIGNIFICANT CHANGE UP (ref 42.2–75.2)
NRBC # BLD: 0 /100 WBCS — SIGNIFICANT CHANGE UP (ref 0–0)
PLATELET # BLD AUTO: 176 K/UL — SIGNIFICANT CHANGE UP (ref 130–400)
POTASSIUM SERPL-MCNC: 4.8 MMOL/L — SIGNIFICANT CHANGE UP (ref 3.5–5)
POTASSIUM SERPL-SCNC: 4.8 MMOL/L — SIGNIFICANT CHANGE UP (ref 3.5–5)
PROT SERPL-MCNC: 6.2 G/DL — SIGNIFICANT CHANGE UP (ref 6–8)
PROTHROM AB SERPL-ACNC: 11.7 SEC — SIGNIFICANT CHANGE UP (ref 9.95–12.87)
RBC # BLD: 3.81 M/UL — LOW (ref 4.7–6.1)
RBC # FLD: 13.2 % — SIGNIFICANT CHANGE UP (ref 11.5–14.5)
SODIUM SERPL-SCNC: 132 MMOL/L — LOW (ref 135–146)
WBC # BLD: 8.15 K/UL — SIGNIFICANT CHANGE UP (ref 4.8–10.8)
WBC # FLD AUTO: 8.15 K/UL — SIGNIFICANT CHANGE UP (ref 4.8–10.8)

## 2022-10-10 PROCEDURE — 99232 SBSQ HOSP IP/OBS MODERATE 35: CPT

## 2022-10-10 RX ORDER — INSULIN GLARGINE 100 [IU]/ML
0 INJECTION, SOLUTION SUBCUTANEOUS
Qty: 0 | Refills: 0 | DISCHARGE

## 2022-10-10 RX ADMIN — HEPARIN SODIUM 5000 UNIT(S): 5000 INJECTION INTRAVENOUS; SUBCUTANEOUS at 05:22

## 2022-10-10 RX ADMIN — Medication 650 MILLIGRAM(S): at 05:22

## 2022-10-10 RX ADMIN — INSULIN GLARGINE 32 UNIT(S): 100 INJECTION, SOLUTION SUBCUTANEOUS at 10:10

## 2022-10-10 RX ADMIN — DORZOLAMIDE HYDROCHLORIDE 1 DROP(S): 20 SOLUTION/ DROPS OPHTHALMIC at 20:50

## 2022-10-10 RX ADMIN — HEPARIN SODIUM 5000 UNIT(S): 5000 INJECTION INTRAVENOUS; SUBCUTANEOUS at 13:36

## 2022-10-10 RX ADMIN — Medication 4: at 11:22

## 2022-10-10 RX ADMIN — Medication 4: at 08:17

## 2022-10-10 RX ADMIN — DORZOLAMIDE HYDROCHLORIDE 1 DROP(S): 20 SOLUTION/ DROPS OPHTHALMIC at 10:11

## 2022-10-10 RX ADMIN — HEPARIN SODIUM 5000 UNIT(S): 5000 INJECTION INTRAVENOUS; SUBCUTANEOUS at 21:22

## 2022-10-10 RX ADMIN — SODIUM CHLORIDE 100 MILLILITER(S): 9 INJECTION, SOLUTION INTRAVENOUS at 18:19

## 2022-10-10 RX ADMIN — Medication 1 DROP(S): at 05:21

## 2022-10-10 RX ADMIN — Medication 20 UNIT(S): at 11:22

## 2022-10-10 RX ADMIN — ATORVASTATIN CALCIUM 20 MILLIGRAM(S): 80 TABLET, FILM COATED ORAL at 21:21

## 2022-10-10 RX ADMIN — PANTOPRAZOLE SODIUM 40 MILLIGRAM(S): 20 TABLET, DELAYED RELEASE ORAL at 05:23

## 2022-10-10 RX ADMIN — Medication 650 MILLIGRAM(S): at 21:21

## 2022-10-10 RX ADMIN — Medication 1 DROP(S): at 17:25

## 2022-10-10 RX ADMIN — LATANOPROST 1 DROP(S): 0.05 SOLUTION/ DROPS OPHTHALMIC; TOPICAL at 22:01

## 2022-10-10 RX ADMIN — Medication 20 UNIT(S): at 08:25

## 2022-10-10 RX ADMIN — INSULIN GLARGINE 32 UNIT(S): 100 INJECTION, SOLUTION SUBCUTANEOUS at 21:21

## 2022-10-10 NOTE — DISCHARGE NOTE PROVIDER - CARE PROVIDER_API CALL
Mayo Scott)  Internal Medicine  [address] [place] [address]  Phone: [phone]  Fax: [phone]  Established Patient  Follow Up Time: 1-3 days    Win Antoine)  Surgery; Surgical Critical Care  [address] [place] [address]  Phone: [phone]  Fax: [phone]  Established Patient  Follow Up Time: 1-3 days   Mayo Scott)  Internal Medicine  [address] [place] [address]  Phone: [phone]  Fax: [phone]  Established Patient  Follow Up Time: 1 week    Win Antoine)  Surgery; Surgical Critical Care  [address] [place] [address]  Phone: [phone]  Fax: [phone]  Established Patient  Follow Up Time: 2 weeks

## 2022-10-10 NOTE — DISCHARGE NOTE PROVIDER - HOSPITAL COURSE
HPI:  66 y/o M with PMHx of DM, HTN, Obesity, Glaucoma, Covid PNA c/b DVT, CKD, prior Nephrectomy, HLD, hx of gastric band insertion many years ago presenting for the above chief complaint. history goes back to yesterday  after lunch when patient started complaining of sudden onset epigastric pain, dull in nature, 10/10 in severity, radiating to RUQ and LUQ, constant, associated with nausea and 3 episodes of nonbloody nonbilious vomiting. pain persisted throughout the night until today when patient decided to go to the ED. he reported decreased PO intake but denied fever, chills, chest pain, cough, jaundice, urine or bowel changes.    VITALS:   T(C): 37.6 (10-06-22 @ 15:02), Max: 37.6 (10-06-22 @ 15:02)  HR: 93 (10-06-22 @ 15:02) (92 - 115)  BP: 134/68 (10-06-22 @ 15:02) (134/68 - 136/68)  RR: 16 (10-06-22 @ 15:02) (16 - 18)  SpO2: 97% (10-06-22 @ 15:02) (97% - 98%)    in ED, vitals were significant for a HR of 115 bpm. labs were significant for leukocytosis (17K), hyperkalemia (6.2 but hemolyzed), lipase 2000, creat 2.4, lactate 3.5, transaminits (ALP: 117, , ), trop 0.04. US abd very limited study. Large fatty liver. Sludge with echogenic material within the gallbladder. CT abdomen No evidence of acute intra-abdominal pathology. patient received LR bolus on 2.250 L, zofran 4 mg IV, 4 mg IV, cefepime and flagyl. admitted for management of pancreatitis    Hospital Course:  Patient admitted to medicine and followed by Surgery, Cardiology, GI, Nephrology and Pulmonology.     Dr. Antoine (from surgery) spoke with patient in length regarding possible cholecystectomy on this admission.     Reviewed cardiology recommendations with patient:   Pt's revised cardiac risk index is 3 ponts , class4 with a 15% 30 day risk of death , mi, or cardiac arrest.      Since patient is high risk, cholecystectomy is not necessary at this time.  Patient declines surgical intervention and fully understands the risk of recurrence. Patient may follow up outpatient and was advised lifestyle modifications, including weight loss and low fat diet.     On last assessment, patient deemed medically stable for discharge.   HPI:  68 y/o M with PMHx of DM, HTN, Obesity, Glaucoma, Covid PNA c/b DVT, CKD, prior Nephrectomy, HLD, hx of gastric band insertion many years ago presenting for the above chief complaint. history goes back to yesterday  after lunch when patient started complaining of sudden onset epigastric pain, dull in nature, 10/10 in severity, radiating to RUQ and LUQ, constant, associated with nausea and 3 episodes of nonbloody nonbilious vomiting. pain persisted throughout the night until today when patient decided to go to the ED. he reported decreased PO intake but denied fever, chills, chest pain, cough, jaundice, urine or bowel changes.    VITALS:   T(C): 37.6 (10-06-22 @ 15:02), Max: 37.6 (10-06-22 @ 15:02)  HR: 93 (10-06-22 @ 15:02) (92 - 115)  BP: 134/68 (10-06-22 @ 15:02) (134/68 - 136/68)  RR: 16 (10-06-22 @ 15:02) (16 - 18)  SpO2: 97% (10-06-22 @ 15:02) (97% - 98%)    in ED, vitals were significant for a HR of 115 bpm. labs were significant for leukocytosis (17K), hyperkalemia (6.2 but hemolyzed), lipase 2000, creat 2.4, lactate 3.5, transaminits (ALP: 117, , ), trop 0.04. US abd very limited study. Large fatty liver. Sludge with echogenic material within the gallbladder. CT abdomen No evidence of acute intra-abdominal pathology. patient received LR bolus on 2.250 L, zofran 4 mg IV, 4 mg IV, cefepime and flagyl. admitted for management of pancreatitis    Hospital Course:  Patient admitted to medicine and followed by Surgery, Cardiology, GI, Nephrology and Pulmonology.     Dr. Antoine (from surgery) spoke with patient in length regarding possible cholecystectomy on this admission.     Reviewed cardiology recommendations with patient:   Pt's revised cardiac risk index is 3 ponts , class4 with a 15% 30 day risk of death , mi, or cardiac arrest.      Since patient is high risk, cholecystectomy is not necessary at this time.  Patient declines surgical intervention and fully understands the risk of recurrence. Patient may follow up outpatient and was advised lifestyle modifications, including weight loss and low fat diet.     On last assessment, patient deemed medically stable for discharge.    # Gallstone pancreatitis  -  CT Abdomen and Pelvis w/ Oral Cont (10.06.22 @ 10:14) >No evidence of acute intra-abdominal pathology.Status post right nephrectomy. Unchanged left staghorn calculus, measuring approximately 3 x 2.5 x 1 cm.  - US Abdomen Upper Quadrant Right (10.08.22 @ 09:53) >Hepatic steatosis, unchanged. Hepatomegaly. Cholelithiasis   - Lipase, Serum: 113 U/L (10.08.22 @ 10:36)  - Triglycerides, Serum: 208 mg/dL (10.08.22 @ 07:18)  - GI eval: outpatient EUS and ERCP, no evidence of biliary obstruction. His Body weigh is a limitation to ERCP  -Surgery eval:  surgical intervention is not indicated at this time due to high risk. Patient is to follow up as an outpatient and pursue lifestyle modifications (low-fat diet, weight loss).   - Pt tolerating diet  - outpt F/u with surgery    # Acute kidney injury, prerenal-resolved  # CKD stage3-stable  # Nephrolithiasis  # H/o right nephrectomy  -  US Renal (10.08.22 @ 09:59) >Left nephrolithiasis.. Left renal 5.4 cm cyst . Status post right nephrectomy, unremarkable.  - outpt F/u with urology for nephrolithiasis    # DM type2  - A1C with Estimated Average Glucose Result: 9.4 % (10.07.22 @ 07:16)  - monitor FS  - Endocrine eval: REcommend to switch lantus to 32 units Twice a day , can increase lispro to 20 units three times a day with sliding scale of 2 units for every 50 mg/dl above 150 mg/dl   - Home regimen to be decided based on requirements in the hospital , would avoid GLp1 as patient has pancreatitis, will need to continue on insulin on DC    # Hypertension  -c/w home meds    # Dyslipidemia  - c/w statin    # Glaucoma  - c/w eye droops    # Morbid obesity  - BMI : 56.4.

## 2022-10-10 NOTE — PROGRESS NOTE ADULT - SUBJECTIVE AND OBJECTIVE BOX
FATOUMATALÓPEZJOSHUA  67y  [facility] [id number] A      Patient is a 67y old  Male who presents with a chief complaint of PANCREATITIS; SEPSIS     (10 Oct 2022 11:55)      INTERVAL HPI/OVERNIGHT EVENTS:    no events overnight     REVIEW OF SYSTEMS:  CONSTITUTIONAL: No fever, weight loss, or fatigue  EYES: No eye pain, visual disturbances, or discharge  ENMT:  No difficulty hearing, tinnitus, vertigo; No sinus or throat pain  NECK: No pain or stiffness  BREASTS: No pain, masses, or nipple discharge  RESPIRATORY: No cough, wheezing, chills or hemoptysis; No shortness of breath  CARDIOVASCULAR: No chest pain, palpitations, dizziness, or leg swelling  GASTROINTESTINAL: No abdominal or epigastric pain. No nausea, vomiting, or hematemesis; No diarrhea or constipation. No melena or hematochezia.  GENITOURINARY: No dysuria, frequency, hematuria, or incontinence  NEUROLOGICAL: No headaches, memory loss, loss of strength, numbness, or tremors  SKIN: No itching, burning, rashes, or lesions   LYMPH NODES: No enlarged glands  ENDOCRINE: No heat or cold intolerance; No hair loss  MUSCULOSKELETAL: No joint pain or swelling; No muscle, back, or extremity pain  PSYCHIATRIC: No depression, anxiety, mood swings, or difficulty sleeping  HEME/LYMPH: No easy bruising, or bleeding gums  ALLERY AND IMMUNOLOGIC: No hives or eczema  FAMILY HISTORY:  FH: lung cancer  Father      T(C): 36.4 (10-10-22 @ 04:00), Max: 37.1 (10-09-22 @ 14:19)  HR: 77 (10-10-22 @ 04:00) (74 - 77)  BP: 167/76 (10-10-22 @ 04:00) (138/72 - 167/76)  RR: 18 (10-10-22 @ 04:00) (18 - 18)  SpO2: 96% (10-09-22 @ 21:00) (96% - 96%)  Wt(kg): --Vital Signs Last 24 Hrs  T(C): 36.4 (10 Oct 2022 04:00), Max: 37.1 (09 Oct 2022 14:19)  T(F): 97.5 (10 Oct 2022 04:00), Max: 98.7 (09 Oct 2022 14:19)  HR: 77 (10 Oct 2022 04:00) (74 - 77)  BP: 167/76 (10 Oct 2022 04:00) (138/72 - 167/76)  BP(mean): --  RR: 18 (10 Oct 2022 04:00) (18 - 18)  SpO2: 96% (09 Oct 2022 21:00) (96% - 96%)    Parameters below as of 09 Oct 2022 21:00  Patient On (Oxygen Delivery Method): room air        PHYSICAL EXAM:  GENERAL: NAD, well-groomed, well-developed  HEAD:  Atraumatic, Normocephalic  EYES: EOMI, PERRLA, conjunctiva and sclera clear  ENMT: No tonsillar erythema, exudates, or enlargement; Moist mucous membranes, Good dentition, No lesions  NECK: Supple, No JVD, Normal thyroid  NERVOUS SYSTEM:  Alert & Oriented X3, Good concentration; Motor Strength 5/5 B/L upper and lower extremities; DTRs 2+ intact and symmetric  PULM: Clear to auscultation bilaterally  CARDIAC: Regular rate and rhythm; No murmurs, rubs, or gallops  GI: Soft, Nontender, Nondistended; Bowel sounds present obese   EXTREMITIES:  2+ Peripheral Pulses, No clubbing, cyanosis, or edema  LYMPH: No lymphadenopathy noted  SKIN: No rashes or lesions    Consultant(s) Notes Reviewed:  [x ] YES  [ ] NO  Care Discussed with Consultants/Other Providers [ x] YES  [ ] NO    LABS:                            11.5   8.15  )-----------( 176      ( 10 Oct 2022 08:20 )             34.0   10-10    132<L>  |  99  |  31<H>  ----------------------------<  212<H>  4.8   |  21  |  1.6<H>    Ca    8.3<L>      10 Oct 2022 08:20  Mg     2.0     10-10    TPro  6.2  /  Alb  3.3<L>  /  TBili  0.8  /  DBili  x   /  AST  42<H>  /  ALT  58<H>  /  AlkPhos  102  10-10            acetaminophen     Tablet .. 650 milliGRAM(s) Oral every 6 hours PRN  aluminum hydroxide/magnesium hydroxide/simethicone Suspension 30 milliLiter(s) Oral every 4 hours PRN  atorvastatin 20 milliGRAM(s) Oral at bedtime  dextrose 5%. 1000 milliLiter(s) IV Continuous <Continuous>  dextrose 50% Injectable 25 Gram(s) IV Push once  dextrose 50% Injectable 12.5 Gram(s) IV Push once  dextrose 50% Injectable 25 Gram(s) IV Push once  dextrose Oral Gel 15 Gram(s) Oral once PRN  dorzolamide 2% Ophthalmic Solution 1 Drop(s) Both EYES <User Schedule>  glucagon  Injectable 1 milliGRAM(s) IntraMuscular once  heparin   Injectable 5000 Unit(s) SubCutaneous every 8 hours  insulin glargine Injectable (LANTUS) 32 Unit(s) SubCutaneous every morning  insulin glargine Injectable (LANTUS) 32 Unit(s) SubCutaneous at bedtime  insulin lispro (ADMELOG) corrective regimen sliding scale   SubCutaneous three times a day before meals  insulin lispro Injectable (ADMELOG) 20 Unit(s) SubCutaneous three times a day before meals  lactated ringers. 1000 milliLiter(s) IV Continuous <Continuous>  latanoprost 0.005% Ophthalmic Solution 1 Drop(s) Both EYES at bedtime  melatonin 3 milliGRAM(s) Oral at bedtime PRN  ondansetron Injectable 4 milliGRAM(s) IV Push every 8 hours PRN  pantoprazole    Tablet 40 milliGRAM(s) Oral before breakfast  sodium bicarbonate 650 milliGRAM(s) Oral every 8 hours  timolol 0.5% Solution 1 Drop(s) Both EYES two times a day      HEALTH ISSUES - PROBLEM Dx:          Case Discussed with House Staff      Spectra [phone]

## 2022-10-10 NOTE — DISCHARGE NOTE PROVIDER - NSDCCPCAREPLAN_GEN_ALL_CORE_FT
PRINCIPAL DISCHARGE DIAGNOSIS  Diagnosis: Pancreatitis  Assessment and Plan of Treatment: You were admitted to the hospital because you were having       PRINCIPAL DISCHARGE DIAGNOSIS  Diagnosis: Pancreatitis  Assessment and Plan of Treatment: You were admitted to the hospital because you were having severe abdominal pain. You were found to have acute pancreatitis (inflammation of your pancreas). You were also found to have a gallstone on ultrasound. You were seen by Surgery, as well as cardiology, gastroenterology and pulmonology. You were medically managed as necessary. The cardiologist examnined you to see if you were fit for surgery; based on this examination, the surgeons decided that the risks of surgery would be too great for you, and recommended outpatient follow-up after discharge with lifestyle changes, such as a low-fat diet and weight loss. You agreed and decided not to have surgery.  Please follow up closely with your primary care physician and surgeon after discharge. Please also make an effort to maintain a low-fat diet and a regular exercise routine, as able, to lose weight.

## 2022-10-10 NOTE — DISCHARGE NOTE PROVIDER - NSDCCAREPROVSEEN_GEN_ALL_CORE_FT
Dae Cast[facility] Medicine, gastroenterology, surgery Endocrine team

## 2022-10-10 NOTE — PROGRESS NOTE ADULT - ASSESSMENT
ASSESSMENT: SUMMARY: 68 y/o M with PMHx of DM, HTN, Obesity, Glaucoma, DVT, CKD, prior Nephrectomy, HLD, hx of gastric band insertion many years ago, currently admitted for gallstone pancreatitis, currently without any signs of acute abdomen. Per surgery, surgical intervention is not indicated at this time due to high risk. Patient is to follow up as an outpatient and pursue lifestyle modifications.    # Gallstone pancreatitis  # Abdominal pain  - RUQ u/s indicates cholelithiasis.  - Per surgery, surgical intervention is not indicated at this time due to high risk. Patient is to follow up as an outpatient and pursue lifestyle modifications (low-fat diet, weight loss).  - Continue IV lactated ringers.    # BATOOL on CKD stage III   - Serum creatinine stable at 2.3-2.4.  - Continue IV lactated ringers, as noted above.  - Renal u/s shows left nephrolithiasis and left renal 5.4 cm cyst.  - s/p right nephrectomy.    # T2DM  - Per endocrinology, lantus 32U QAM and QPM +  Lispro 20U with meals    # HTN  - Controlled.    # HLD  - Continue home atorvastatin.    # Glaucoma  - Continue ophthalmic drops.    # Morbid obesity  - BMI on 10/7 noted as 56.4.  - Patient counseled to pursue lifestyle modifications, such as weight loss and low-fat diet.  - f/u outpatient for bariatric and dietitian evaluations.    # Diet  - Clear liquid.    # Dispo  - Home. ASSESSMENT: 66 y/o M with PMHx of DM, HTN, Obesity, Glaucoma, DVT, CKD, prior Nephrectomy, HLD, hx of gastric band insertion many years ago, currently admitted for gallstone pancreatitis, currently without any signs of acute abdomen. Per surgery, surgical intervention is not indicated at this time due to high risk. Patient is to follow up as an outpatient and pursue lifestyle modifications.    # Gallstone pancreatitis  # Abdominal pain  - RUQ u/s indicates cholelithiasis.  - Per surgery, surgical intervention is not indicated at this time due to high risk. Patient is to follow up as an outpatient and pursue lifestyle modifications (low-fat diet, weight loss).  - Continue IV lactated ringers.    # BATOOL on CKD stage III   - Serum creatinine stable at 2.3-2.4.  - Continue IV lactated ringers, as noted above.  - Renal u/s shows left nephrolithiasis and left renal 5.4 cm cyst.  - s/p right nephrectomy.    # T2DM  - Per endocrinology, lantus 32U QAM and QPM +  Lispro 20U with meals    # HTN  - Controlled.    # HLD  - Continue home atorvastatin.    # Glaucoma  - Continue ophthalmic drops.    # Morbid obesity  - BMI on 10/7 noted as 56.4.  - Patient counseled to pursue lifestyle modifications, such as weight loss and low-fat diet.  - f/u outpatient for bariatric and dietitian evaluations.    # Diet  - Clear liquid.    # Dispo  - Home.

## 2022-10-10 NOTE — PROGRESS NOTE ADULT - ASSESSMENT
68 y/o with PMHx of DM, HTN, Obesity, Glaucoma, DVT, CKD, prior Nephrectomy, HLD, hx of gastric band insertion many years ago presenting for epigastric pain -> found ot have acute pancreatitis.      #Abdominal pain secondary to acute pancreatitis suspicious for gallstone pancreatitis   continue with lactated ringers   Surgery _ > plan for lap cholecystectomy - appreciate cardiology clearance , given risk patient to decide whether to undergo surgery now given elevated risk or attempt to purusing lowering weight in order to improve outcome     #BATOOL on CKD III   Creatinine Trend: 1.6<--, 1.9<--, 2.3<--, 2.3<--, 2.3<--, 2.3<--  at baseline     #Hyponatremia no intervention     #DM 2  POCT Blood Glucose.: 211 mg/dL (10 Oct 2022 10:57)  POCT Blood Glucose.: 211 mg/dL (10 Oct 2022 10:09)  POCT Blood Glucose.: 208 mg/dL (10 Oct 2022 07:20)  POCT Blood Glucose.: 164 mg/dL (09 Oct 2022 20:38)  POCT Blood Glucose.: 137 mg/dL (09 Oct 2022 16:33)  controlled      #HTN  : 167/76 (10 Oct 2022 04:00) (138/72 - 167/76)  controlled     # HLD   -c/w atorvastatin    # Glaucoma  - c/w eye drops     # Morbid obesity  BMI (kg/m2): 56.4 (07 Oct 2022 20:45)  recommend outpatient bariatric eval  as well as dietitian eval     # left staghorn calculus,  measuring approximately 3 x 2.5 x 1 cm no intervention for now     #Left lower pole cyst.    # subsegmental  atelectasis.    #Anemia no indication for transfusion     PROGRESS NOTE HANDOFF    Pending: patient to decide whether to pursue surgery now despite higher risk     Family discussion: patient verbalized understanding and agreeable to plan of care     Disposition: Home

## 2022-10-10 NOTE — DISCHARGE NOTE PROVIDER - ATTENDING DISCHARGE PHYSICAL EXAMINATION:
T(C): 35.6 (10-13-22 @ 04:59), Max: 36.7 (10-12-22 @ 21:00)  HR: 79 (10-13-22 @ 04:59) (7 - 79)  BP: 159/66 (10-13-22 @ 04:59) (159/66 - 185/84)  RR: 18 (10-13-22 @ 04:59) (18 - 18)  SpO2: --  O/E:  Awake, alert, not in distress.  HEENT: atraumatic, EOMI.  Chest: clear.  CVS: SIS2 +, no murmur.  P/A: obese, BS+  CNS: awake, alert  Ext: no edema feet.  Skin: no rash, no ulcers.  All systems reviewed positive findings as above.

## 2022-10-10 NOTE — DIETITIAN INITIAL EVALUATION ADULT - PERTINENT MEDS FT
MEDICATIONS  (STANDING):  atorvastatin 20 milliGRAM(s) Oral at bedtime  dextrose 5%. 1000 milliLiter(s) (100 mL/Hr) IV Continuous <Continuous>  dextrose 50% Injectable 25 Gram(s) IV Push once  dextrose 50% Injectable 12.5 Gram(s) IV Push once  dextrose 50% Injectable 25 Gram(s) IV Push once  glucagon  Injectable 1 milliGRAM(s) IntraMuscular once  heparin   Injectable 5000 Unit(s) SubCutaneous every 8 hours  insulin glargine Injectable (LANTUS) 32 Unit(s) SubCutaneous every morning  insulin glargine Injectable (LANTUS) 32 Unit(s) SubCutaneous at bedtime  insulin lispro (ADMELOG) corrective regimen sliding scale   SubCutaneous three times a day before meals  insulin lispro Injectable (ADMELOG) 20 Unit(s) SubCutaneous three times a day before meals  lactated ringers. 1000 milliLiter(s) (100 mL/Hr) IV Continuous <Continuous>  latanoprost 0.005% Ophthalmic Solution 1 Drop(s) Both EYES at bedtime  pantoprazole    Tablet 40 milliGRAM(s) Oral before breakfast  sodium bicarbonate 650 milliGRAM(s) Oral every 8 hours  timolol 0.5% Solution 1 Drop(s) Both EYES two times a day    MEDICATIONS  (PRN):  aluminum hydroxide/magnesium hydroxide/simethicone Suspension 30 milliLiter(s) Oral every 4 hours PRN Dyspepsia  dextrose Oral Gel 15 Gram(s) Oral once PRN Blood Glucose LESS THAN 70 milliGRAM(s)/deciliter  ondansetron Injectable 4 milliGRAM(s) IV Push every 8 hours PRN Nausea and/or Vomiting

## 2022-10-10 NOTE — DIETITIAN INITIAL EVALUATION ADULT - ETIOLOGY
related to nutritionally limited diet order 2/2 Gallstone pancreatitis related to non-compliance and lack of knowledge related to healthy diet & lifestyle

## 2022-10-10 NOTE — DIETITIAN INITIAL EVALUATION ADULT - ENERGY INTAKE
At admit, pt is currently on a clears liquid diet and tolerating it without any discomfort. Discussed addition of nutrition supplements due to nutritionally limited diet order -- agreeable to add.

## 2022-10-10 NOTE — DIETITIAN INITIAL EVALUATION ADULT - PERTINENT LABORATORY DATA
10-10    132<L>  |  99  |  31<H>  ----------------------------<  212<H>  4.8   |  21  |  1.6<H>    Ca    8.3<L>      10 Oct 2022 08:20  Mg     2.0     10-10    TPro  6.2  /  Alb  3.3<L>  /  TBili  0.8  /  DBili  x   /  AST  42<H>  /  ALT  58<H>  /  AlkPhos  102  10-10  POCT Blood Glucose.: 211 mg/dL (10-10-22 @ 10:57)  A1C with Estimated Average Glucose Result: 9.4 % (10-07-22 @ 07:16)

## 2022-10-10 NOTE — DISCHARGE NOTE PROVIDER - PROVIDER TOKENS
PROVIDER:[TOKEN:[[id number]],FOLLOWUP:[1-3 days],ESTABLISHEDPATIENT:[T]],PROVIDER:[TOKEN:[[id number]],FOLLOWUP:[1-3 days],ESTABLISHEDPATIENT:[T]] PROVIDER:[TOKEN:[[id number]],FOLLOWUP:[1 week],ESTABLISHEDPATIENT:[T]],PROVIDER:[TOKEN:[[id number]],FOLLOWUP:[2 weeks],ESTABLISHEDPATIENT:[T]]

## 2022-10-10 NOTE — DIETITIAN INITIAL EVALUATION ADULT - ADD RECOMMEND
1. Add Ensure Clear TID (720kcal/24g PRO)   2. advance diet per sx reccs   3. encourage po intake  1. Add Ensure Clear TID (720kcal/24g PRO) -- low fat formula suitable for current dx   2. advance diet per sx reccs   3. encourage po intake

## 2022-10-10 NOTE — DIETITIAN INITIAL EVALUATION ADULT - ORAL INTAKE PTA/DIET HISTORY
Pt reports good po/appetite PTA, no concerns with appetite loss, but does not follow any specific diet. Pt says he eats everything, but wants to make positive healthy changes. UBW reported to be 170.4kg vs. wt at admit: 178.kg -- no wt loss noted or reported. NKFA. No MVI.

## 2022-10-10 NOTE — PROGRESS NOTE ADULT - SUBJECTIVE AND OBJECTIVE BOX
Patient is a 67y old  Male who presents with a chief complaint of Gallstone pancreatitis (10 Oct 2022 01:24)        Over Night Events:    No events   On room air   Intermittent abdominal pain   No fevers         ROS:  See HPI    PHYSICAL EXAM    ICU Vital Signs Last 24 Hrs  T(C): 36.4 (10 Oct 2022 04:00), Max: 37.1 (09 Oct 2022 14:19)  T(F): 97.5 (10 Oct 2022 04:00), Max: 98.7 (09 Oct 2022 14:19)  HR: 77 (10 Oct 2022 04:00) (74 - 77)  BP: 167/76 (10 Oct 2022 04:00) (138/72 - 167/76)  BP(mean): --  ABP: --  ABP(mean): --  RR: 18 (10 Oct 2022 04:00) (18 - 18)  SpO2: 96% (09 Oct 2022 21:00) (96% - 96%)    O2 Parameters below as of 09 Oct 2022 21:00  Patient On (Oxygen Delivery Method): room air            General: NAD   HEENT: LANIE             Lymphatic system: No cervical LN   Lungs: Bilateral BS  Cardiovascular: Regular   Gastrointestinal: Soft, Positive BS  Extremities: No clubbing.  Moves extremities.  Full Range of motion   Skin: Warm, intact  Neurological: No motor or sensory deficit       10-09-22 @ 07:01  -  10-10-22 @ 07:00  --------------------------------------------------------  IN:    Oral Fluid: 705 mL  Total IN: 705 mL    OUT:    Voided (mL): 500 mL  Total OUT: 500 mL    Total NET: 205 mL          LABS:                            11.5   8.15  )-----------( 176      ( 10 Oct 2022 08:20 )             34.0                                               10-10    132<L>  |  99  |  31<H>  ----------------------------<  212<H>  4.8   |  21  |  1.6<H>    Ca    8.3<L>      10 Oct 2022 08:20  Mg     2.0     10-10    TPro  6.2  /  Alb  3.3<L>  /  TBili  0.8  /  DBili  x   /  AST  42<H>  /  ALT  58<H>  /  AlkPhos  102  10-10      PT/INR - ( 10 Oct 2022 08:20 )   PT: 11.70 sec;   INR: 1.03 ratio         PTT - ( 10 Oct 2022 08:20 )  PTT:25.2 sec                                                                                     LIVER FUNCTIONS - ( 10 Oct 2022 08:20 )  Alb: 3.3 g/dL / Pro: 6.2 g/dL / ALK PHOS: 102 U/L / ALT: 58 U/L / AST: 42 U/L / GGT: x                                                                                                                                       MEDICATIONS  (STANDING):  atorvastatin 20 milliGRAM(s) Oral at bedtime  dextrose 5%. 1000 milliLiter(s) (100 mL/Hr) IV Continuous <Continuous>  dextrose 50% Injectable 25 Gram(s) IV Push once  dextrose 50% Injectable 12.5 Gram(s) IV Push once  dextrose 50% Injectable 25 Gram(s) IV Push once  dorzolamide 2% Ophthalmic Solution 1 Drop(s) Both EYES <User Schedule>  glucagon  Injectable 1 milliGRAM(s) IntraMuscular once  heparin   Injectable 5000 Unit(s) SubCutaneous every 8 hours  insulin glargine Injectable (LANTUS) 32 Unit(s) SubCutaneous every morning  insulin glargine Injectable (LANTUS) 32 Unit(s) SubCutaneous at bedtime  insulin lispro (ADMELOG) corrective regimen sliding scale   SubCutaneous three times a day before meals  insulin lispro Injectable (ADMELOG) 20 Unit(s) SubCutaneous three times a day before meals  lactated ringers. 1000 milliLiter(s) (100 mL/Hr) IV Continuous <Continuous>  latanoprost 0.005% Ophthalmic Solution 1 Drop(s) Both EYES at bedtime  pantoprazole    Tablet 40 milliGRAM(s) Oral before breakfast  sodium bicarbonate 650 milliGRAM(s) Oral every 8 hours  timolol 0.5% Solution 1 Drop(s) Both EYES two times a day    MEDICATIONS  (PRN):  acetaminophen     Tablet .. 650 milliGRAM(s) Oral every 6 hours PRN Temp greater or equal to 38C (100.4F), Mild Pain (1 - 3)  aluminum hydroxide/magnesium hydroxide/simethicone Suspension 30 milliLiter(s) Oral every 4 hours PRN Dyspepsia  dextrose Oral Gel 15 Gram(s) Oral once PRN Blood Glucose LESS THAN 70 milliGRAM(s)/deciliter  melatonin 3 milliGRAM(s) Oral at bedtime PRN Insomnia  ondansetron Injectable 4 milliGRAM(s) IV Push every 8 hours PRN Nausea and/or Vomiting

## 2022-10-10 NOTE — DISCHARGE NOTE PROVIDER - NSDCMRMEDTOKEN_GEN_ALL_CORE_FT
atorvastatin 20 mg oral tablet: 1 tab(s) orally once a day  Cosopt 2.23%-0.68% ophthalmic solution: 1 drop(s) to each affected eye 2 times a day  HumaLOG 100 units/mL subcutaneous solution: 30 unit(s) subcutaneous 3 times a day (before meals)  insulin glargine 100 units/mL subcutaneous solution: 2 times a day: 70 units in morning and 40 units at night  Lumigan 0.01% ophthalmic solution: 1 drop(s) to each affected eye once a day (in the evening)  OLMESARTAN MEDOX/HCTZ 40-12.5MG TAB: TAKE 1 TABLET BY MOUTH EVERY DAY   atorvastatin 20 mg oral tablet: 1 tab(s) orally once a day  bisacodyl 5 mg oral delayed release tablet: 1 tab(s) orally once a day, As Needed -Constipation - for constipation   Cosopt 2.23%-0.68% ophthalmic solution: 1 drop(s) to each affected eye 2 times a day  HumaLOG 100 units/mL subcutaneous solution: 30 unit(s) subcutaneous 3 times a day (before meals)  insulin glargine 100 units/mL subcutaneous solution: 2 times a day: 40 units in morning and 40 units at night  Lumigan 0.01% ophthalmic solution: 1 drop(s) to each affected eye once a day (in the evening)  OLMESARTAN MEDOX/HCTZ 40-12.5MG TAB: TAKE 1 TABLET BY MOUTH EVERY DAY  polyethylene glycol 3350 oral powder for reconstitution: 17 gram(s) orally once a day . hold for diarrhea

## 2022-10-10 NOTE — DIETITIAN INITIAL EVALUATION ADULT - OTHER CALCULATIONS
Energy: 2557kcal/day (MSJ 1.0AF due to obese BMI)   Protein: 113-135g/day (1.5-1.8g/kg -- IBW due to obese BMI)   Fluid: 2625mL/day (35mL/kg -- per DRI for age vs. BMI)

## 2022-10-10 NOTE — PROGRESS NOTE ADULT - ASSESSMENT
IMPRESSION:    Acute on chronic gallstone pancreatitis  Sepsis POA  BATOOL on CKD stage III  Lactic acidosis  NSTEMI, likely type 2 demand ischemia. No ECG changes  Transaminitis  HO DM, HTN, HLD, glaucoma, CKD III, nephrolithiasis, renal mass s/p R nephrectomy, obesity, MSSA bacteremia, L kidney with staghorn calculus and multiple calculi    PLAN:    CNS: Avoid sedatives. Mental status is at baseline.    HEENT: Oral care    PULMONARY: HOB @ 45 degrees. Aspiration precautions. CXR is clear of infiltrates. On room air. Outpatient sleep study.     CARDIOVASCULAR: Goal-directed fluid resuscitation: Keep fluid balance equal, avoid overload. No benefit to a positive fluid balance. Reduce LR to 50cc/hr. Encourage oral intake.     GI: GI prophylaxis. Feeding: advance diet as tolerated. Bowel regimen. Likely had gallstone pancreatitis. LFTs stable. GI and gen Surg following.     RENAL: BUN and creat have been stable. On PO bicarbonate to keep bic more than 20.     INFECTIOUS DISEASE: Monitor vital signs. Follow up cultures. No indications for abx.     HEMATOLOGICAL: DVT prophylaxis: subcutaneous heparin for now.     ENDOCRINE: Follow up fasting sugar. Insulin protocol if needed. TG level noted and normal.     MUSCULOSKELETAL: out of bed as tolerated.     Will follow as needed.

## 2022-10-10 NOTE — PROGRESS NOTE ADULT - SUBJECTIVE AND OBJECTIVE BOX
SUMMARY: 68 y/o M with PMHx of DM, HTN, Obesity, Glaucoma, DVT, CKD, prior Nephrectomy, HLD, hx of gastric band insertion many years ago, currently admitted for gallstone pancreatitis.    SUBJECTIVE:  24HR: No acute overnight events.     Surgery attending Dr. Antoine reviewed cardiology recs with patient at bedside this morning. Per surgery, cholecystectomy is not indicated at this time due to high risk and patient also declined surgical intervention with full understanding of the risk of recurrence. Patient is to follow up as an outpatient and pursue lifestyle modifications including weight loss and low-fat diet.    REVIEW OF SYSTEMS  General: No fever, chills, change in appetite, change in weight, fatigue or night sweats.  Skin/Breast: No changes in rashes, lesions or moles.  Ophthalmologic: No vision changes, vision loss or double vision.  HEENT: No dysphagia, odynophagia, sore throat or hoarseness.  Respiratory: No coughing, wheezing, hemoptysis, dyspnea on exertion or dyspnea at rest.  Cardiovascular:	No palpitations, chest pain, paroxysmal nocturnal dyspnea, orthopnea or edema.  Gastrointestinal:	+Abdominal discomfort. No nausea, vomiting, diarrhea, constipation, hematochezia, melena or change in abdominal girth.  Genitourinary: No dysuria, hematuria or visible pyuria.  Musculoskeletal:	 No muscle aches, joint pain, recent trauma/injury or limited movement.  Neurological: No difficulty walking, difficulty balancing, lightheadedness vertigo, memory problems or seizures.  Psychiatric: No insomnia, depression, suicidal ideation or homicidal ideation.  Hematology/Lymphatics:	 No easy bruising, easy bruising or swollen glands.  Endocrine: No polyphagia, polyuria or polydipsia.  Allergic/Immunologic: No allergies or recent travel.    OBJECTIVE:  Vital Signs Last 24 Hrs  T(C): 36.4 (10 Oct 2022 04:00), Max: 37.1 (09 Oct 2022 14:19)  T(F): 97.5 (10 Oct 2022 04:00), Max: 98.7 (09 Oct 2022 14:19)  HR: 77 (10 Oct 2022 04:00) (74 - 77)  BP: 167/76 (10 Oct 2022 04:00) (138/72 - 167/76)  BP(mean): --  RR: 18 (10 Oct 2022 04:00) (18 - 18)  SpO2: 96% (09 Oct 2022 21:00) (96% - 96%)    Parameters below as of 09 Oct 2022 21:00  Patient On (Oxygen Delivery Method): room air    PHYSICAL EXAM:  CONSTITUTIONAL: Well-appearing and in no apparent distress.    EYES: PERRLA and symmetric, EOMI, No conjunctival injection or pallor. Sclera anicteric.    ENMT: Moist mucous membranes. No external nasal lesions. No gross hearing impairment noted.  	NECK: Supple, symmetric and without tracheal deviation.     RESPIRATORY: No respiratory distress. No obvious use of accessory muscles. Lungs CTAB with no crackling, wheezing, rhonchi or rubs.    CARDIOVASCULAR: Regular rate and rhythm. Normal S1 and S2. No murmurs, rubs or gallops.    GASTROINTESTINAL: Soft, non-tender to palpation in all quadrants. No palpable masses. No appreciable hernias.    MUSCULOSKELETAL: Examination of all four extremities without obvious misalignment.    SKIN: No obvious rashes or ulcers.    NEUROLOGIC: CN II-XII intact; sensation intact in upper and lower extremities b/l to light touch.    PSYCHIATRIC: Appropriate insight/judgment; A+O x 3. Mood and affect appropriate. Recent/remote memory intact.    .  LABS:                         11.5   8.15  )-----------( 176      ( 10 Oct 2022 08:20 )             34.0     10-10    132<L>  |  99  |  31<H>  ----------------------------<  212<H>  4.8   |  21  |  1.6<H>    Ca    8.3<L>      10 Oct 2022 08:20  Mg     2.0     10-10    TPro  6.2  /  Alb  3.3<L>  /  TBili  0.8  /  DBili  x   /  AST  42<H>  /  ALT  58<H>  /  AlkPhos  102  10-10    PT/INR - ( 10 Oct 2022 08:20 )   PT: 11.70 sec;   INR: 1.03 ratio         PTT - ( 10 Oct 2022 08:20 )  PTT:25.2 sec          RADIOLOGY, EKG & ADDITIONAL TESTS: Reviewed.     MEDICATIONS  (STANDING):  atorvastatin 20 milliGRAM(s) Oral at bedtime  dextrose 5%. 1000 milliLiter(s) (100 mL/Hr) IV Continuous <Continuous>  dextrose 50% Injectable 25 Gram(s) IV Push once  dextrose 50% Injectable 12.5 Gram(s) IV Push once  dextrose 50% Injectable 25 Gram(s) IV Push once  dorzolamide 2% Ophthalmic Solution 1 Drop(s) Both EYES <User Schedule>  glucagon  Injectable 1 milliGRAM(s) IntraMuscular once  heparin   Injectable 5000 Unit(s) SubCutaneous every 8 hours  insulin glargine Injectable (LANTUS) 32 Unit(s) SubCutaneous every morning  insulin glargine Injectable (LANTUS) 32 Unit(s) SubCutaneous at bedtime  insulin lispro (ADMELOG) corrective regimen sliding scale   SubCutaneous three times a day before meals  insulin lispro Injectable (ADMELOG) 20 Unit(s) SubCutaneous three times a day before meals  lactated ringers. 1000 milliLiter(s) (100 mL/Hr) IV Continuous <Continuous>  latanoprost 0.005% Ophthalmic Solution 1 Drop(s) Both EYES at bedtime  pantoprazole    Tablet 40 milliGRAM(s) Oral before breakfast  sodium bicarbonate 650 milliGRAM(s) Oral every 8 hours  timolol 0.5% Solution 1 Drop(s) Both EYES two times a day    MEDICATIONS  (PRN):  acetaminophen     Tablet .. 650 milliGRAM(s) Oral every 6 hours PRN Temp greater or equal to 38C (100.4F), Mild Pain (1 - 3)  aluminum hydroxide/magnesium hydroxide/simethicone Suspension 30 milliLiter(s) Oral every 4 hours PRN Dyspepsia  dextrose Oral Gel 15 Gram(s) Oral once PRN Blood Glucose LESS THAN 70 milliGRAM(s)/deciliter  melatonin 3 milliGRAM(s) Oral at bedtime PRN Insomnia  ondansetron Injectable 4 milliGRAM(s) IV Push every 8 hours PRN Nausea and/or Vomiting

## 2022-10-10 NOTE — PROGRESS NOTE ADULT - SUBJECTIVE AND OBJECTIVE BOX
GENERAL SURGERY PROGRESS NOTE    Patient: JOSHUA HAM , 67y ([birth year])Male   MRN: [id number]  Location: [address]  Visit: 10-06-22 Inpatient  Date: 10-10-22 @ 01:25    Hospital Day #: 4    Procedure/Dx/Injuries: Gallstone pancreatitis    Events of past 24 hours: Patient stated he had minor nausea but no vomiting. He states he feels bloated. He endorses ambulating, voiding, flatus, and bowel movements. He reports having a constant 8/10 dull pain that is around his RUQ and epigastric region.l    PAST MEDICAL & SURGICAL HISTORY:  Diabetes mellitus      HTN (hypertension)      Glaucoma      DVT (deep venous thrombosis)      CKD (chronic kidney disease)      HLD (hyperlipidemia)      H/O right nephrectomy          Vitals:   T(F): 97.8 (10-09-22 @ 21:00), Max: 99.1 (10-09-22 @ 04:00)  HR: 77 (10-09-22 @ 21:00)  BP: 146/71 (10-09-22 @ 21:00)  RR: 18 (10-09-22 @ 21:00)  SpO2: 96% (10-09-22 @ 21:00)      Diet, Clear Liquid      Fluids:     I & O's:    Bowel Movement: : [x] YES [] NO  Flatus: : [x] YES [] NO    PHYSICAL EXAM:  General: NAD, AAOx3, calm and cooperative  HEENT: Normocephalic, trachea ML  Cardiac: S1, S2  Respiratory: Normal respiratory effort  Abdomen: Distension. Soft, non-tender, no rebound, no guarding.  Neuro: No focal deficits  Skin: Warm/dry, normal color, no jaundice    MEDICATIONS  (STANDING):  atorvastatin 20 milliGRAM(s) Oral at bedtime  dextrose 5%. 1000 milliLiter(s) (100 mL/Hr) IV Continuous <Continuous>  dextrose 50% Injectable 25 Gram(s) IV Push once  dextrose 50% Injectable 12.5 Gram(s) IV Push once  dextrose 50% Injectable 25 Gram(s) IV Push once  dorzolamide 2% Ophthalmic Solution 1 Drop(s) Both EYES <User Schedule>  glucagon  Injectable 1 milliGRAM(s) IntraMuscular once  heparin   Injectable 5000 Unit(s) SubCutaneous every 8 hours  insulin glargine Injectable (LANTUS) 32 Unit(s) SubCutaneous every morning  insulin glargine Injectable (LANTUS) 32 Unit(s) SubCutaneous at bedtime  insulin lispro (ADMELOG) corrective regimen sliding scale   SubCutaneous three times a day before meals  insulin lispro Injectable (ADMELOG) 20 Unit(s) SubCutaneous three times a day before meals  lactated ringers. 1000 milliLiter(s) (100 mL/Hr) IV Continuous <Continuous>  latanoprost 0.005% Ophthalmic Solution 1 Drop(s) Both EYES at bedtime  pantoprazole    Tablet 40 milliGRAM(s) Oral before breakfast  sodium bicarbonate 650 milliGRAM(s) Oral every 8 hours  timolol 0.5% Solution 1 Drop(s) Both EYES two times a day    MEDICATIONS  (PRN):  acetaminophen     Tablet .. 650 milliGRAM(s) Oral every 6 hours PRN Temp greater or equal to 38C (100.4F), Mild Pain (1 - 3)  aluminum hydroxide/magnesium hydroxide/simethicone Suspension 30 milliLiter(s) Oral every 4 hours PRN Dyspepsia  dextrose Oral Gel 15 Gram(s) Oral once PRN Blood Glucose LESS THAN 70 milliGRAM(s)/deciliter  melatonin 3 milliGRAM(s) Oral at bedtime PRN Insomnia  ondansetron Injectable 4 milliGRAM(s) IV Push every 8 hours PRN Nausea and/or Vomiting      DVT PROPHYLAXIS: heparin   Injectable 5000 Unit(s) SubCutaneous every 8 hours    GI PROPHYLAXIS: pantoprazole    Tablet 40 milliGRAM(s) Oral before breakfast    ANTICOAGULATION:   ANTIBIOTICS:        LAB/STUDIES:  Labs:  CAPILLARY BLOOD GLUCOSE      POCT Blood Glucose.: 164 mg/dL (09 Oct 2022 20:38)  POCT Blood Glucose.: 137 mg/dL (09 Oct 2022 16:33)  POCT Blood Glucose.: 171 mg/dL (09 Oct 2022 10:59)  POCT Blood Glucose.: 221 mg/dL (09 Oct 2022 07:58)                          11.6   7.62  )-----------( 140      ( 09 Oct 2022 10:30 )             34.8       Auto Neutrophil %: 72.3 % (10-09-22 @ 10:30)  Auto Immature Granulocyte %: 0.5 % (10-09-22 @ 10:30)    10-09    132<L>  |  101  |  40<H>  ----------------------------<  185<H>  4.6   |  20  |  1.9<H>      Calcium, Total Serum: 8.5 mg/dL (10-09-22 @ 10:30)      LFTs:             6.2  | 0.6  | 44       ------------------[100     ( 09 Oct 2022 10:30 )  3.3  | x    | 64          Lipase:x      Amylase:x        IMAGING:  Nothing in past 24 hours.    · Assessment	  S/p episode of choledocholithiasis one year ago, who presented with clinical evidence of gallstone pancreatitis, currently admitted to medical service for resuscitation and symptom control. Surgery consulted for laparoscopic cholecystectomy this admission.    Plan:  - Plan for laparoscopic cholecystectomy this admission: Preop labs 10/10 am   - Card: Revised cardiac index 3, class 4 with 15% 30 day risk of death, MI, or cardiac arrest  - IVF, serial abdominal exams  - Daily Hepatic function panels  - Monitor vitals & labs  - Pain control  - DVT/GI PPX  - Surgery to follow

## 2022-10-10 NOTE — DIETITIAN INITIAL EVALUATION ADULT - OTHER INFO
68 yo male s/p episode of choledocholithiasis one year ago, who presented with clinical evidence of gallstone pancreatitis, currently admitted to medical service for resuscitation and symptom control. Surgery consulted for laparoscopic cholecystectomy this admission.  - Plan for laparoscopic cholecystectomy this admission: Preop labs 10/10 am   --Pt's revised cardiac risk index is 3 ponts , class4 with a 15% 30 day risk of death , mi, or cardiac arrest.      Since patient is high risk, cholecystectomy is not necessary at this time.  Patient declines surgical intervention and fully understands the risk of recurrence. Patient may follow up outpatient and was advised lifestyle modifications, including weight loss and low fat diet.

## 2022-10-10 NOTE — DIETITIAN INITIAL EVALUATION ADULT - PERSON TAUGHT/METHOD
--Discussed and encouraged wt loss by modifying diet and lifestyle   --Discussed importance of physical activity and initiating at least 10 minutes of walking ASAP   --Discussed heart healthy + CHO consistent nutrition therapy   -- Discussed wt management + healthy cooking tips/verbal instruction/written material/patient instructed

## 2022-10-10 NOTE — DIETITIAN INITIAL EVALUATION ADULT - NAME AND PHONE
Nutrition Intervention:meals and snacks,medical food supplements, nutrition education   Nutrition Monitoring:diet order,energy intake,body composition,NFPF, glucose/electrolyte profile

## 2022-10-11 LAB
ALBUMIN SERPL ELPH-MCNC: 3.3 G/DL — LOW (ref 3.5–5.2)
ALP SERPL-CCNC: 111 U/L — SIGNIFICANT CHANGE UP (ref 30–115)
ALT FLD-CCNC: 62 U/L — HIGH (ref 0–41)
ANION GAP SERPL CALC-SCNC: 14 MMOL/L — SIGNIFICANT CHANGE UP (ref 7–14)
AST SERPL-CCNC: 50 U/L — HIGH (ref 0–41)
BASOPHILS # BLD AUTO: 0.04 K/UL — SIGNIFICANT CHANGE UP (ref 0–0.2)
BASOPHILS NFR BLD AUTO: 0.4 % — SIGNIFICANT CHANGE UP (ref 0–1)
BILIRUB SERPL-MCNC: 0.8 MG/DL — SIGNIFICANT CHANGE UP (ref 0.2–1.2)
BUN SERPL-MCNC: 26 MG/DL — HIGH (ref 10–20)
CALCIUM SERPL-MCNC: 8.3 MG/DL — LOW (ref 8.4–10.4)
CHLORIDE SERPL-SCNC: 99 MMOL/L — SIGNIFICANT CHANGE UP (ref 98–110)
CO2 SERPL-SCNC: 21 MMOL/L — SIGNIFICANT CHANGE UP (ref 17–32)
CREAT SERPL-MCNC: 1.6 MG/DL — HIGH (ref 0.7–1.5)
EGFR: 46 ML/MIN/1.73M2 — LOW
EOSINOPHIL # BLD AUTO: 0.17 K/UL — SIGNIFICANT CHANGE UP (ref 0–0.7)
EOSINOPHIL NFR BLD AUTO: 1.9 % — SIGNIFICANT CHANGE UP (ref 0–8)
GLUCOSE BLDC GLUCOMTR-MCNC: 110 MG/DL — HIGH (ref 70–99)
GLUCOSE BLDC GLUCOMTR-MCNC: 113 MG/DL — HIGH (ref 70–99)
GLUCOSE BLDC GLUCOMTR-MCNC: 187 MG/DL — HIGH (ref 70–99)
GLUCOSE BLDC GLUCOMTR-MCNC: 86 MG/DL — SIGNIFICANT CHANGE UP (ref 70–99)
GLUCOSE SERPL-MCNC: 206 MG/DL — HIGH (ref 70–99)
HCT VFR BLD CALC: 36 % — LOW (ref 42–52)
HGB BLD-MCNC: 12.1 G/DL — LOW (ref 14–18)
IMM GRANULOCYTES NFR BLD AUTO: 1 % — HIGH (ref 0.1–0.3)
LYMPHOCYTES # BLD AUTO: 1.3 K/UL — SIGNIFICANT CHANGE UP (ref 1.2–3.4)
LYMPHOCYTES # BLD AUTO: 14.3 % — LOW (ref 20.5–51.1)
MAGNESIUM SERPL-MCNC: 1.9 MG/DL — SIGNIFICANT CHANGE UP (ref 1.8–2.4)
MCHC RBC-ENTMCNC: 29.8 PG — SIGNIFICANT CHANGE UP (ref 27–31)
MCHC RBC-ENTMCNC: 33.6 G/DL — SIGNIFICANT CHANGE UP (ref 32–37)
MCV RBC AUTO: 88.7 FL — SIGNIFICANT CHANGE UP (ref 80–94)
MONOCYTES # BLD AUTO: 0.97 K/UL — HIGH (ref 0.1–0.6)
MONOCYTES NFR BLD AUTO: 10.6 % — HIGH (ref 1.7–9.3)
NEUTROPHILS # BLD AUTO: 6.54 K/UL — HIGH (ref 1.4–6.5)
NEUTROPHILS NFR BLD AUTO: 71.8 % — SIGNIFICANT CHANGE UP (ref 42.2–75.2)
NRBC # BLD: 0 /100 WBCS — SIGNIFICANT CHANGE UP (ref 0–0)
PLATELET # BLD AUTO: 221 K/UL — SIGNIFICANT CHANGE UP (ref 130–400)
POTASSIUM SERPL-MCNC: 4.4 MMOL/L — SIGNIFICANT CHANGE UP (ref 3.5–5)
POTASSIUM SERPL-SCNC: 4.4 MMOL/L — SIGNIFICANT CHANGE UP (ref 3.5–5)
PROT SERPL-MCNC: 6.5 G/DL — SIGNIFICANT CHANGE UP (ref 6–8)
RBC # BLD: 4.06 M/UL — LOW (ref 4.7–6.1)
RBC # FLD: 13.1 % — SIGNIFICANT CHANGE UP (ref 11.5–14.5)
SODIUM SERPL-SCNC: 134 MMOL/L — LOW (ref 135–146)
WBC # BLD: 9.11 K/UL — SIGNIFICANT CHANGE UP (ref 4.8–10.8)
WBC # FLD AUTO: 9.11 K/UL — SIGNIFICANT CHANGE UP (ref 4.8–10.8)

## 2022-10-11 PROCEDURE — 99233 SBSQ HOSP IP/OBS HIGH 50: CPT

## 2022-10-11 RX ORDER — OXYCODONE AND ACETAMINOPHEN 5; 325 MG/1; MG/1
1 TABLET ORAL ONCE
Refills: 0 | Status: DISCONTINUED | OUTPATIENT
Start: 2022-10-11 | End: 2022-10-11

## 2022-10-11 RX ADMIN — DORZOLAMIDE HYDROCHLORIDE 1 DROP(S): 20 SOLUTION/ DROPS OPHTHALMIC at 22:06

## 2022-10-11 RX ADMIN — ATORVASTATIN CALCIUM 20 MILLIGRAM(S): 80 TABLET, FILM COATED ORAL at 22:06

## 2022-10-11 RX ADMIN — Medication 2: at 07:50

## 2022-10-11 RX ADMIN — HEPARIN SODIUM 5000 UNIT(S): 5000 INJECTION INTRAVENOUS; SUBCUTANEOUS at 22:06

## 2022-10-11 RX ADMIN — Medication 20 UNIT(S): at 11:48

## 2022-10-11 RX ADMIN — LATANOPROST 1 DROP(S): 0.05 SOLUTION/ DROPS OPHTHALMIC; TOPICAL at 22:06

## 2022-10-11 RX ADMIN — HEPARIN SODIUM 5000 UNIT(S): 5000 INJECTION INTRAVENOUS; SUBCUTANEOUS at 05:21

## 2022-10-11 RX ADMIN — Medication 650 MILLIGRAM(S): at 14:57

## 2022-10-11 RX ADMIN — Medication 20 UNIT(S): at 16:32

## 2022-10-11 RX ADMIN — INSULIN GLARGINE 32 UNIT(S): 100 INJECTION, SOLUTION SUBCUTANEOUS at 07:51

## 2022-10-11 RX ADMIN — Medication 1 DROP(S): at 16:33

## 2022-10-11 RX ADMIN — HEPARIN SODIUM 5000 UNIT(S): 5000 INJECTION INTRAVENOUS; SUBCUTANEOUS at 14:57

## 2022-10-11 RX ADMIN — Medication 1 DROP(S): at 05:21

## 2022-10-11 RX ADMIN — Medication 20 UNIT(S): at 07:49

## 2022-10-11 RX ADMIN — OXYCODONE AND ACETAMINOPHEN 1 TABLET(S): 5; 325 TABLET ORAL at 02:49

## 2022-10-11 RX ADMIN — PANTOPRAZOLE SODIUM 40 MILLIGRAM(S): 20 TABLET, DELAYED RELEASE ORAL at 06:18

## 2022-10-11 RX ADMIN — Medication 650 MILLIGRAM(S): at 22:05

## 2022-10-11 RX ADMIN — DORZOLAMIDE HYDROCHLORIDE 1 DROP(S): 20 SOLUTION/ DROPS OPHTHALMIC at 07:51

## 2022-10-11 NOTE — PROGRESS NOTE ADULT - ASSESSMENT
ASSESSMENT: 66 y/o M with PMHx of DM, HTN, Obesity, Glaucoma, DVT, CKD, prior Nephrectomy, HLD, hx of gastric band insertion many years ago, currently admitted for gallstone pancreatitis, currently without any signs of acute abdomen. Per surgery, surgical intervention is not indicated at this time due to high risk. Patient is to follow up as an outpatient and pursue lifestyle modifications.    # Gallstone pancreatitis  # Abdominal pain  - RUQ u/s indicates cholelithiasis.  - Per surgery, surgical intervention is not indicated at this time due to high risk. Patient is to follow up as an outpatient and pursue lifestyle modifications (low-fat diet, weight loss).  - Continue IV lactated ringers.    # BATOOL on CKD stage III   - Serum creatinine stable at 2.3-2.4.  - Continue IV lactated ringers, as noted above.  - Renal u/s shows left nephrolithiasis and left renal 5.4 cm cyst.  - s/p right nephrectomy.    # T2DM  - Per endocrinology, lantus 32U QAM and QPM +  Lispro 20U with meals    # HTN  - Controlled.    # HLD  - Continue home atorvastatin.    # Glaucoma  - Continue ophthalmic drops.    # Morbid obesity  - BMI on 10/7 noted as 56.4.  - Patient counseled to pursue lifestyle modifications, such as weight loss and low-fat diet.  - f/u outpatient for bariatric and dietitian evaluations.    # Diet  - Clear liquid.    # Dispo  - Home.

## 2022-10-11 NOTE — CHART NOTE - NSCHARTNOTEFT_GEN_A_CORE
Intern MD advised by RN ~10 minutes before afternoon rounds that patient needs new ultrasound IV. One RN had attempted ~twice but unsuccessful. Patient diffusely swollen on assessment by intern Dr. Samuel and senior resident Dr. Winkler. Discussed with attending hospitalist Dr. Castanon during afternoon rounds, and a decision was made to discontinue the patient's IV LR infusion, the only item patient was receiving through the IV. Therefore, it was decided that no new IV is needed at this time.
patient complained of pain last night requiring pain medication, would like to speak to surgery regarding possible surgical intervention - spoke with Flori Peters who will speak to patient , if no intervention is planned , patient is anticipated for discharge in am
Discussed MRI w radiology and staff - it determined that MRI cannot be completed given patient's current body habitus (patient was measured by the radiology team), despite having an MRI last july of 2021; patient reportedly gained a significant amt of weight (20+ lbs) since then.
Dr. Antoine spoke with patient in length regarding possible cholecystectomy on this admission.     Reviewed cardiology recommendations with patient:   Pt's revised cardiac risk index is 3 ponts , class4 with a 15% 30 day risk of death , mi, or cardiac arrest.      Since patient is high risk, cholecystectomy is not necessary at this time.  Patient declines surgical intervention and fully understands the risk of recurrence. Patient may follow up outpatient and was advised lifestyle modifications, including weight loss and low fat diet.     Thank you for this consult. Surgery will sign off. Please recall as needed. [phone]
WDL

## 2022-10-11 NOTE — ED POST DISCHARGE NOTE - DETAILS
UPDATE: 10:08 AM ON 10-: DR MCPHERSON CALLED BACK AND SAID PATIENT HAD CT SCAN THAT CONFIRMED THE RENAL CYSTS NOTED. NO NEED FOR MRI. DR. PENALOZA SPECTRA [id number] WAS UPDATED THAT NO NEED FOR MRI, AS RENAL CYSTS CONFIRMED WITH MRI.

## 2022-10-11 NOTE — ED POST DISCHARGE NOTE - RESULT SUMMARY
DR. MCPHERSON CALLED: ADDENDUM: RENAL CYSTS NOTED AND MRI IS ADVISED TO R/O NEOPLASM. SPOKE WITH MEDICAL RESIDENT DR. PENALOZA. LLYLTHI- [id number] ON THIS ADMITTED PATIENT. HE WILL UPDATE CHART AND PATIENT THAT HE NEEDS RENAL MRI

## 2022-10-11 NOTE — PROGRESS NOTE ADULT - ASSESSMENT
66 y/o with PMHx of DM, HTN, Obesity, Glaucoma, DVT, CKD, prior Nephrectomy, HLD, hx of gastric band insertion many years ago presenting for epigastric pain -> found ot have acute pancreatitis.      #Abdominal pain secondary to acute pancreatitis suspicious for gallstone pancreatitis   continue with lactated ringers   Surgery _ > plan for lap cholecystectomy - appreciate cardiology clearance ,  patient had pain overnight , considering whether to pursue inpatient or outpatient ,   surgery will follow up     #BATOOL on CKD III   Creatinine Trend: 1.6<--, 1.6<--, 1.9<--, 2.3<--, 2.3<--, 2.3<--  at baseline         #DM 2  POCT Blood Glucose.: 187 mg/dL (11 Oct 2022 07:37)  POCT Blood Glucose.: 246 mg/dL (10 Oct 2022 21:01)  POCT Blood Glucose.: 89 mg/dL (10 Oct 2022 16:24)  controlled      #HTN  BP: 157/72 (11 Oct 2022 04:45) (157/72 - 169/74)  controlled     # HLD   -c/w atorvastatin    # Glaucoma  - c/w eye drops     # Morbid obesity  BMI (kg/m2): 56.4 (07 Oct 2022 20:45)  recommend outpatient bariatric eval  as well as dietitian eval     # left staghorn calculus,  measuring approximately 3 x 2.5 x 1 cm no intervention for now     #Left lower pole cyst.    # subsegmental  atelectasis.    PROGRESS NOTE HANDOFF    Pending: surgery follow up     Family discussion: patient verbalized understanding and agreeable to plan of care     Disposition: Home

## 2022-10-11 NOTE — PROGRESS NOTE ADULT - SUBJECTIVE AND OBJECTIVE BOX
JOSHUA HAM  67y  [facility] [id number] A      Patient is a 67y old  Male who presents with a chief complaint of gallstone pancreatitis (11 Oct 2022 09:20)      INTERVAL HPI/OVERNIGHT EVENTS:  no acute events overnight       REVIEW OF SYSTEMS:  CONSTITUTIONAL: No fever, weight loss, or fatigue  EYES: No eye pain, visual disturbances, or discharge  ENMT:  No difficulty hearing, tinnitus, vertigo; No sinus or throat pain  NECK: No pain or stiffness  BREASTS: No pain, masses, or nipple discharge  RESPIRATORY: No cough, wheezing, chills or hemoptysis; No shortness of breath  CARDIOVASCULAR: No chest pain, palpitations, dizziness, or leg swelling  GASTROINTESTINAL: abdominal pain overnight   GENITOURINARY: No dysuria, frequency, hematuria, or incontinence  NEUROLOGICAL: No headaches, memory loss, loss of strength, numbness, or tremors  SKIN: No itching, burning, rashes, or lesions   LYMPH NODES: No enlarged glands  ENDOCRINE: No heat or cold intolerance; No hair loss  MUSCULOSKELETAL: No joint pain or swelling; No muscle, back, or extremity pain  PSYCHIATRIC: No depression, anxiety, mood swings, or difficulty sleeping  HEME/LYMPH: No easy bruising, or bleeding gums  ALLERY AND IMMUNOLOGIC: No hives or eczema  FAMILY HISTORY:  FH: lung cancer  Father      T(C): 35.8 (10-11-22 @ 04:45), Max: 37 (10-10-22 @ 21:27)  HR: 82 (10-11-22 @ 04:45) (67 - 82)  BP: 157/72 (10-11-22 @ 04:45) (157/72 - 169/74)  RR: 18 (10-11-22 @ 04:45) (18 - 18)  SpO2: --  Wt(kg): --Vital Signs Last 24 Hrs  T(C): 35.8 (11 Oct 2022 04:45), Max: 37 (10 Oct 2022 21:27)  T(F): 96.5 (11 Oct 2022 04:45), Max: 98.6 (10 Oct 2022 21:27)  HR: 82 (11 Oct 2022 04:45) (67 - 82)  BP: 157/72 (11 Oct 2022 04:45) (157/72 - 169/74)  BP(mean): --  RR: 18 (11 Oct 2022 04:45) (18 - 18)  SpO2: --        PHYSICAL EXAM:  GENERAL: NAD, well-groomed, well-developed  HEAD:  Atraumatic, Normocephalic  EYES: EOMI, PERRLA, conjunctiva and sclera clear  ENMT: No tonsillar erythema, exudates, or enlargement; Moist mucous membranes, Good dentition, No lesions  NECK: Supple, No JVD, Normal thyroid  NERVOUS SYSTEM:  Alert & Oriented X3, Good concentration; Motor Strength 5/5 B/L upper and lower extremities; DTRs 2+ intact and symmetric  PULM: Clear to auscultation bilaterally  CARDIAC: Regular rate and rhythm; No murmurs, rubs, or gallops  GI: Soft, Nontender, Nondistended; Bowel sounds present obese   EXTREMITIES:  2+ Peripheral Pulses, No clubbing, cyanosis, or edema  LYMPH: No lymphadenopathy noted  SKIN: No rashes or lesions    Consultant(s) Notes Reviewed:  [x ] YES  [ ] NO  Care Discussed with Consultants/Other Providers [ x] YES  [ ] NO    LABS:                            12.1   9.11  )-----------( 221      ( 11 Oct 2022 07:52 )             36.0   10-11    134<L>  |  99  |  26<H>  ----------------------------<  206<H>  4.4   |  21  |  1.6<H>    Ca    8.3<L>      11 Oct 2022 07:52  Mg     1.9     10-11    TPro  6.5  /  Alb  3.3<L>  /  TBili  0.8  /  DBili  x   /  AST  50<H>  /  ALT  62<H>  /  AlkPhos  111  10-11            acetaminophen     Tablet .. 650 milliGRAM(s) Oral every 6 hours PRN  aluminum hydroxide/magnesium hydroxide/simethicone Suspension 30 milliLiter(s) Oral every 4 hours PRN  atorvastatin 20 milliGRAM(s) Oral at bedtime  dextrose 5%. 1000 milliLiter(s) IV Continuous <Continuous>  dextrose 50% Injectable 25 Gram(s) IV Push once  dextrose 50% Injectable 12.5 Gram(s) IV Push once  dextrose 50% Injectable 25 Gram(s) IV Push once  dextrose Oral Gel 15 Gram(s) Oral once PRN  dorzolamide 2% Ophthalmic Solution 1 Drop(s) Both EYES <User Schedule>  glucagon  Injectable 1 milliGRAM(s) IntraMuscular once  heparin   Injectable 5000 Unit(s) SubCutaneous every 8 hours  insulin glargine Injectable (LANTUS) 32 Unit(s) SubCutaneous every morning  insulin glargine Injectable (LANTUS) 32 Unit(s) SubCutaneous at bedtime  insulin lispro (ADMELOG) corrective regimen sliding scale   SubCutaneous three times a day before meals  insulin lispro Injectable (ADMELOG) 20 Unit(s) SubCutaneous three times a day before meals  lactated ringers. 1000 milliLiter(s) IV Continuous <Continuous>  latanoprost 0.005% Ophthalmic Solution 1 Drop(s) Both EYES at bedtime  melatonin 3 milliGRAM(s) Oral at bedtime PRN  ondansetron Injectable 4 milliGRAM(s) IV Push every 8 hours PRN  pantoprazole    Tablet 40 milliGRAM(s) Oral before breakfast  sodium bicarbonate 650 milliGRAM(s) Oral every 8 hours  timolol 0.5% Solution 1 Drop(s) Both EYES two times a day      HEALTH ISSUES - PROBLEM Dx:          Case Discussed with House Staff    .   Spectra [phone]

## 2022-10-11 NOTE — PROGRESS NOTE ADULT - SUBJECTIVE AND OBJECTIVE BOX
SUMMARY: 68 y/o M with PMHx of DM, HTN, Obesity, Glaucoma, DVT, CKD, prior Nephrectomy, HLD, hx of gastric band insertion many years ago, currently admitted for gallstone pancreatitis.    SUBJECTIVE:  24HR: No acute overnight events.     REVIEW OF SYSTEMS  General: No fever, chills, change in appetite, change in weight, fatigue or night sweats.  Skin/Breast: No changes in rashes, lesions or moles.  Ophthalmologic: No vision changes, vision loss or double vision.  HEENT: No dysphagia, odynophagia, sore throat or hoarseness.  Respiratory: No coughing, wheezing, hemoptysis, dyspnea on exertion or dyspnea at rest.  Cardiovascular:	No palpitations, chest pain, paroxysmal nocturnal dyspnea, orthopnea or edema.  Gastrointestinal:	No nausea, vomiting, abdominal pain, diarrhea, constipation, hematochezia, melena or change in abdominal girth.  Genitourinary: No dysuria, hematuria or visible pyuria.  Musculoskeletal:	 No muscle aches, joint pain, recent trauma/injury or limited movement.  Neurological: No difficulty walking, difficulty balancing, lightheadedness vertigo, memory problems or seizures.  Psychiatric: No insomnia, depression, suicidal ideation or homicidal ideation.  Hematology/Lymphatics: No easy bruising, easy bruising or swollen glands.  Endocrine: No polyphagia, polyuria or polydipsia.  Allergic/Immunologic: No allergies or recent travel.    OBJECTIVE:  Vital Signs Last 24 Hrs  T(C): 35.8 (11 Oct 2022 04:45), Max: 37 (10 Oct 2022 21:27)  T(F): 96.5 (11 Oct 2022 04:45), Max: 98.6 (10 Oct 2022 21:27)  HR: 82 (11 Oct 2022 04:45) (67 - 82)  BP: 157/72 (11 Oct 2022 04:45) (157/72 - 169/74)  BP(mean): --  RR: 18 (11 Oct 2022 04:45) (18 - 18)  SpO2: --    PHYSICAL EXAM:  CONSTITUTIONAL: Well-appearing and in no apparent distress.    EYES: PERRLA and symmetric, EOMI, No conjunctival injection or pallor. Sclera anicteric.    ENMT: Moist mucous membranes. No external nasal lesions. No gross hearing impairment noted.  	NECK: Supple, symmetric and without tracheal deviation.     RESPIRATORY: No respiratory distress. No obvious use of accessory muscles. Lungs CTAB with no crackling, wheezing, rhonchi or rubs.    CARDIOVASCULAR: Regular rate and rhythm. Normal S1 and S2. No murmurs, rubs or gallops.    GASTROINTESTINAL: Soft, non-tender to palpation in all quadrants. No palpable masses. No appreciable hernias.    MUSCULOSKELETAL: Examination of all four extremities without obvious misalignment.    SKIN: No obvious rashes or ulcers.    NEUROLOGIC: CN II-XII intact; sensation intact in upper and lower extremities b/l to light touch.    PSYCHIATRIC: Appropriate insight/judgment; A+O x 3. Mood and affect appropriate. Recent/remote memory intact.    .  LABS:                         12.1   9.11  )-----------( 221      ( 11 Oct 2022 07:52 )             36.0     10-10    132<L>  |  99  |  31<H>  ----------------------------<  212<H>  4.8   |  21  |  1.6<H>    Ca    8.3<L>      10 Oct 2022 08:20  Mg     2.0     10-10    TPro  6.2  /  Alb  3.3<L>  /  TBili  0.8  /  DBili  x   /  AST  42<H>  /  ALT  58<H>  /  AlkPhos  102  10-10    PT/INR - ( 10 Oct 2022 08:20 )   PT: 11.70 sec;   INR: 1.03 ratio         PTT - ( 10 Oct 2022 08:20 )  PTT:25.2 sec          RADIOLOGY, EKG & ADDITIONAL TESTS: Reviewed.       MEDICATIONS  (STANDING):  atorvastatin 20 milliGRAM(s) Oral at bedtime  dextrose 5%. 1000 milliLiter(s) (100 mL/Hr) IV Continuous <Continuous>  dextrose 50% Injectable 25 Gram(s) IV Push once  dextrose 50% Injectable 12.5 Gram(s) IV Push once  dextrose 50% Injectable 25 Gram(s) IV Push once  dorzolamide 2% Ophthalmic Solution 1 Drop(s) Both EYES <User Schedule>  glucagon  Injectable 1 milliGRAM(s) IntraMuscular once  heparin   Injectable 5000 Unit(s) SubCutaneous every 8 hours  insulin glargine Injectable (LANTUS) 32 Unit(s) SubCutaneous every morning  insulin glargine Injectable (LANTUS) 32 Unit(s) SubCutaneous at bedtime  insulin lispro (ADMELOG) corrective regimen sliding scale   SubCutaneous three times a day before meals  insulin lispro Injectable (ADMELOG) 20 Unit(s) SubCutaneous three times a day before meals  lactated ringers. 1000 milliLiter(s) (100 mL/Hr) IV Continuous <Continuous>  latanoprost 0.005% Ophthalmic Solution 1 Drop(s) Both EYES at bedtime  pantoprazole    Tablet 40 milliGRAM(s) Oral before breakfast  sodium bicarbonate 650 milliGRAM(s) Oral every 8 hours  timolol 0.5% Solution 1 Drop(s) Both EYES two times a day    MEDICATIONS  (PRN):  acetaminophen     Tablet .. 650 milliGRAM(s) Oral every 6 hours PRN Temp greater or equal to 38C (100.4F), Mild Pain (1 - 3)  aluminum hydroxide/magnesium hydroxide/simethicone Suspension 30 milliLiter(s) Oral every 4 hours PRN Dyspepsia  dextrose Oral Gel 15 Gram(s) Oral once PRN Blood Glucose LESS THAN 70 milliGRAM(s)/deciliter  melatonin 3 milliGRAM(s) Oral at bedtime PRN Insomnia  ondansetron Injectable 4 milliGRAM(s) IV Push every 8 hours PRN Nausea and/or Vomiting     SUMMARY: 66 y/o M with PMHx of DM, HTN, Obesity, Glaucoma, DVT, CKD, prior Nephrectomy, HLD, hx of gastric band insertion many years ago, currently admitted for gallstone pancreatitis.    SUBJECTIVE:  24HR: No acute overnight events.     REVIEW OF SYSTEMS  General: No fever, chills, change in appetite, change in weight, fatigue or night sweats.  Skin/Breast: No changes in rashes, lesions or moles.  Ophthalmologic: No vision changes, vision loss or double vision.  HEENT: No dysphagia, odynophagia, sore throat or hoarseness.  Respiratory: No coughing, wheezing, hemoptysis, dyspnea on exertion or dyspnea at rest.  Cardiovascular:	No palpitations, chest pain, paroxysmal nocturnal dyspnea, orthopnea or edema.  Gastrointestinal:	No nausea, vomiting, abdominal pain, diarrhea, constipation, hematochezia, melena or change in abdominal girth.  Genitourinary: No dysuria, hematuria or visible pyuria.  Musculoskeletal: No muscle aches, joint pain, recent trauma/injury or limited movement.  Neurological: No difficulty walking, difficulty balancing, lightheadedness vertigo, memory problems or seizures.  Psychiatric: No insomnia, depression, suicidal ideation or homicidal ideation.  Hematology/Lymphatics: No easy bruising, easy bruising or swollen glands.  Endocrine: No polyphagia, polyuria or polydipsia.  Allergic/Immunologic: No allergies or recent travel.    OBJECTIVE:  Vital Signs Last 24 Hrs  T(C): 35.8 (11 Oct 2022 04:45), Max: 37 (10 Oct 2022 21:27)  T(F): 96.5 (11 Oct 2022 04:45), Max: 98.6 (10 Oct 2022 21:27)  HR: 82 (11 Oct 2022 04:45) (67 - 82)  BP: 157/72 (11 Oct 2022 04:45) (157/72 - 169/74)  BP(mean): --  RR: 18 (11 Oct 2022 04:45) (18 - 18)  SpO2: --    PHYSICAL EXAM:  CONSTITUTIONAL: Well-appearing and in no apparent distress.    EYES: PERRLA and symmetric, EOMI, No conjunctival injection or pallor. Sclera anicteric.    ENMT: Moist mucous membranes. No external nasal lesions. No gross hearing impairment noted.  	NECK: Supple, symmetric and without tracheal deviation.     RESPIRATORY: No respiratory distress. No obvious use of accessory muscles. Lungs CTAB with no crackling, wheezing, rhonchi or rubs.    CARDIOVASCULAR: Regular rate and rhythm. Normal S1 and S2. No murmurs, rubs or gallops.    GASTROINTESTINAL: Soft, non-tender to palpation in all quadrants. No palpable masses. No appreciable hernias.    MUSCULOSKELETAL: Examination of all four extremities without obvious misalignment.    SKIN: No obvious rashes or ulcers.    NEUROLOGIC: CN II-XII intact; sensation intact in upper and lower extremities b/l to light touch.    PSYCHIATRIC: Appropriate insight/judgment; A+O x 3. Mood and affect appropriate. Recent/remote memory intact.    .  LABS:                         12.1   9.11  )-----------( 221      ( 11 Oct 2022 07:52 )             36.0     10-10    132<L>  |  99  |  31<H>  ----------------------------<  212<H>  4.8   |  21  |  1.6<H>    Ca    8.3<L>      10 Oct 2022 08:20  Mg     2.0     10-10    TPro  6.2  /  Alb  3.3<L>  /  TBili  0.8  /  DBili  x   /  AST  42<H>  /  ALT  58<H>  /  AlkPhos  102  10-10    PT/INR - ( 10 Oct 2022 08:20 )   PT: 11.70 sec;   INR: 1.03 ratio         PTT - ( 10 Oct 2022 08:20 )  PTT:25.2 sec          RADIOLOGY, EKG & ADDITIONAL TESTS: Reviewed.       MEDICATIONS  (STANDING):  atorvastatin 20 milliGRAM(s) Oral at bedtime  dextrose 5%. 1000 milliLiter(s) (100 mL/Hr) IV Continuous <Continuous>  dextrose 50% Injectable 25 Gram(s) IV Push once  dextrose 50% Injectable 12.5 Gram(s) IV Push once  dextrose 50% Injectable 25 Gram(s) IV Push once  dorzolamide 2% Ophthalmic Solution 1 Drop(s) Both EYES <User Schedule>  glucagon  Injectable 1 milliGRAM(s) IntraMuscular once  heparin   Injectable 5000 Unit(s) SubCutaneous every 8 hours  insulin glargine Injectable (LANTUS) 32 Unit(s) SubCutaneous every morning  insulin glargine Injectable (LANTUS) 32 Unit(s) SubCutaneous at bedtime  insulin lispro (ADMELOG) corrective regimen sliding scale   SubCutaneous three times a day before meals  insulin lispro Injectable (ADMELOG) 20 Unit(s) SubCutaneous three times a day before meals  lactated ringers. 1000 milliLiter(s) (100 mL/Hr) IV Continuous <Continuous>  latanoprost 0.005% Ophthalmic Solution 1 Drop(s) Both EYES at bedtime  pantoprazole    Tablet 40 milliGRAM(s) Oral before breakfast  sodium bicarbonate 650 milliGRAM(s) Oral every 8 hours  timolol 0.5% Solution 1 Drop(s) Both EYES two times a day    MEDICATIONS  (PRN):  acetaminophen     Tablet .. 650 milliGRAM(s) Oral every 6 hours PRN Temp greater or equal to 38C (100.4F), Mild Pain (1 - 3)  aluminum hydroxide/magnesium hydroxide/simethicone Suspension 30 milliLiter(s) Oral every 4 hours PRN Dyspepsia  dextrose Oral Gel 15 Gram(s) Oral once PRN Blood Glucose LESS THAN 70 milliGRAM(s)/deciliter  melatonin 3 milliGRAM(s) Oral at bedtime PRN Insomnia  ondansetron Injectable 4 milliGRAM(s) IV Push every 8 hours PRN Nausea and/or Vomiting

## 2022-10-12 LAB
ALBUMIN SERPL ELPH-MCNC: 3.3 G/DL — LOW (ref 3.5–5.2)
ALP SERPL-CCNC: 101 U/L — SIGNIFICANT CHANGE UP (ref 30–115)
ALT FLD-CCNC: 71 U/L — HIGH (ref 0–41)
ANION GAP SERPL CALC-SCNC: 12 MMOL/L — SIGNIFICANT CHANGE UP (ref 7–14)
AST SERPL-CCNC: 70 U/L — HIGH (ref 0–41)
BASOPHILS # BLD AUTO: 0.05 K/UL — SIGNIFICANT CHANGE UP (ref 0–0.2)
BASOPHILS NFR BLD AUTO: 0.5 % — SIGNIFICANT CHANGE UP (ref 0–1)
BILIRUB SERPL-MCNC: 0.6 MG/DL — SIGNIFICANT CHANGE UP (ref 0.2–1.2)
BUN SERPL-MCNC: 21 MG/DL — HIGH (ref 10–20)
CALCIUM SERPL-MCNC: 8.1 MG/DL — LOW (ref 8.4–10.5)
CHLORIDE SERPL-SCNC: 100 MMOL/L — SIGNIFICANT CHANGE UP (ref 98–110)
CO2 SERPL-SCNC: 22 MMOL/L — SIGNIFICANT CHANGE UP (ref 17–32)
CREAT SERPL-MCNC: 1.5 MG/DL — SIGNIFICANT CHANGE UP (ref 0.7–1.5)
CULTURE RESULTS: SIGNIFICANT CHANGE UP
CULTURE RESULTS: SIGNIFICANT CHANGE UP
EGFR: 50 ML/MIN/1.73M2 — LOW
EOSINOPHIL # BLD AUTO: 0.11 K/UL — SIGNIFICANT CHANGE UP (ref 0–0.7)
EOSINOPHIL NFR BLD AUTO: 1.2 % — SIGNIFICANT CHANGE UP (ref 0–8)
GLUCOSE BLDC GLUCOMTR-MCNC: 175 MG/DL — HIGH (ref 70–99)
GLUCOSE BLDC GLUCOMTR-MCNC: 176 MG/DL — HIGH (ref 70–99)
GLUCOSE BLDC GLUCOMTR-MCNC: 187 MG/DL — HIGH (ref 70–99)
GLUCOSE BLDC GLUCOMTR-MCNC: 188 MG/DL — HIGH (ref 70–99)
GLUCOSE BLDC GLUCOMTR-MCNC: 211 MG/DL — HIGH (ref 70–99)
GLUCOSE SERPL-MCNC: 245 MG/DL — HIGH (ref 70–99)
HCT VFR BLD CALC: 34.4 % — LOW (ref 42–52)
HGB BLD-MCNC: 11.5 G/DL — LOW (ref 14–18)
IMM GRANULOCYTES NFR BLD AUTO: 1.6 % — HIGH (ref 0.1–0.3)
LYMPHOCYTES # BLD AUTO: 1.29 K/UL — SIGNIFICANT CHANGE UP (ref 1.2–3.4)
LYMPHOCYTES # BLD AUTO: 14 % — LOW (ref 20.5–51.1)
MAGNESIUM SERPL-MCNC: 1.8 MG/DL — SIGNIFICANT CHANGE UP (ref 1.8–2.4)
MCHC RBC-ENTMCNC: 30.2 PG — SIGNIFICANT CHANGE UP (ref 27–31)
MCHC RBC-ENTMCNC: 33.4 G/DL — SIGNIFICANT CHANGE UP (ref 32–37)
MCV RBC AUTO: 90.3 FL — SIGNIFICANT CHANGE UP (ref 80–94)
MONOCYTES # BLD AUTO: 0.8 K/UL — HIGH (ref 0.1–0.6)
MONOCYTES NFR BLD AUTO: 8.7 % — SIGNIFICANT CHANGE UP (ref 1.7–9.3)
NEUTROPHILS # BLD AUTO: 6.8 K/UL — HIGH (ref 1.4–6.5)
NEUTROPHILS NFR BLD AUTO: 74 % — SIGNIFICANT CHANGE UP (ref 42.2–75.2)
NRBC # BLD: 0 /100 WBCS — SIGNIFICANT CHANGE UP (ref 0–0)
PLATELET # BLD AUTO: 232 K/UL — SIGNIFICANT CHANGE UP (ref 130–400)
POTASSIUM SERPL-MCNC: 5 MMOL/L — SIGNIFICANT CHANGE UP (ref 3.5–5)
POTASSIUM SERPL-SCNC: 5 MMOL/L — SIGNIFICANT CHANGE UP (ref 3.5–5)
PROT SERPL-MCNC: 6 G/DL — SIGNIFICANT CHANGE UP (ref 6–8)
RBC # BLD: 3.81 M/UL — LOW (ref 4.7–6.1)
RBC # FLD: 13.2 % — SIGNIFICANT CHANGE UP (ref 11.5–14.5)
SODIUM SERPL-SCNC: 134 MMOL/L — LOW (ref 135–146)
SPECIMEN SOURCE: SIGNIFICANT CHANGE UP
SPECIMEN SOURCE: SIGNIFICANT CHANGE UP
WBC # BLD: 9.2 K/UL — SIGNIFICANT CHANGE UP (ref 4.8–10.8)
WBC # FLD AUTO: 9.2 K/UL — SIGNIFICANT CHANGE UP (ref 4.8–10.8)

## 2022-10-12 PROCEDURE — 99233 SBSQ HOSP IP/OBS HIGH 50: CPT

## 2022-10-12 RX ORDER — SIMETHICONE 80 MG/1
80 TABLET, CHEWABLE ORAL AT BEDTIME
Refills: 0 | Status: DISCONTINUED | OUTPATIENT
Start: 2022-10-12 | End: 2022-10-13

## 2022-10-12 RX ORDER — HYDROCHLOROTHIAZIDE 25 MG
12.5 TABLET ORAL DAILY
Refills: 0 | Status: DISCONTINUED | OUTPATIENT
Start: 2022-10-12 | End: 2022-10-13

## 2022-10-12 RX ORDER — VALSARTAN 80 MG/1
40 TABLET ORAL DAILY
Refills: 0 | Status: DISCONTINUED | OUTPATIENT
Start: 2022-10-12 | End: 2022-10-13

## 2022-10-12 RX ADMIN — Medication 12.5 MILLIGRAM(S): at 18:41

## 2022-10-12 RX ADMIN — SIMETHICONE 80 MILLIGRAM(S): 80 TABLET, CHEWABLE ORAL at 21:23

## 2022-10-12 RX ADMIN — DORZOLAMIDE HYDROCHLORIDE 1 DROP(S): 20 SOLUTION/ DROPS OPHTHALMIC at 21:22

## 2022-10-12 RX ADMIN — Medication 1 DROP(S): at 06:05

## 2022-10-12 RX ADMIN — LATANOPROST 1 DROP(S): 0.05 SOLUTION/ DROPS OPHTHALMIC; TOPICAL at 21:23

## 2022-10-12 RX ADMIN — HEPARIN SODIUM 5000 UNIT(S): 5000 INJECTION INTRAVENOUS; SUBCUTANEOUS at 21:22

## 2022-10-12 RX ADMIN — Medication 20 UNIT(S): at 17:38

## 2022-10-12 RX ADMIN — HEPARIN SODIUM 5000 UNIT(S): 5000 INJECTION INTRAVENOUS; SUBCUTANEOUS at 06:05

## 2022-10-12 RX ADMIN — HEPARIN SODIUM 5000 UNIT(S): 5000 INJECTION INTRAVENOUS; SUBCUTANEOUS at 14:08

## 2022-10-12 RX ADMIN — DORZOLAMIDE HYDROCHLORIDE 1 DROP(S): 20 SOLUTION/ DROPS OPHTHALMIC at 08:37

## 2022-10-12 RX ADMIN — ATORVASTATIN CALCIUM 20 MILLIGRAM(S): 80 TABLET, FILM COATED ORAL at 21:23

## 2022-10-12 RX ADMIN — INSULIN GLARGINE 32 UNIT(S): 100 INJECTION, SOLUTION SUBCUTANEOUS at 21:25

## 2022-10-12 RX ADMIN — Medication 2: at 08:31

## 2022-10-12 RX ADMIN — Medication 1 DROP(S): at 17:41

## 2022-10-12 RX ADMIN — Medication 20 UNIT(S): at 08:33

## 2022-10-12 RX ADMIN — Medication 4: at 17:38

## 2022-10-12 RX ADMIN — VALSARTAN 40 MILLIGRAM(S): 80 TABLET ORAL at 14:08

## 2022-10-12 NOTE — PROGRESS NOTE ADULT - ASSESSMENT
68 y/o M with PMHx of DM, HTN, Obesity, Glaucoma, Covid PNA c/b DVT, CKD, prior Nephrectomy, HLD, hx of gastric band insertion many years ago presenting for sudden onset epigastric pain, dull in nature, 10/10 in severity, radiating to RUQ and LUQ, constant, associated with nausea and 3 episodes of nonbloody nonbilious vomiting.    # Gallstone pancreatitis  -  CT Abdomen and Pelvis w/ Oral Cont (10.06.22 @ 10:14) >No evidence of acute intra-abdominal pathology.Status post right nephrectomy. Unchanged left staghorn calculus, measuring approximately 3 x 2.5 x 1 cm.  - US Abdomen Upper Quadrant Right (10.08.22 @ 09:53) >Hepatic steatosis, unchanged. Hepatomegaly. Cholelithiasis   - Lipase, Serum: 113 U/L (10.08.22 @ 10:36)  - Triglycerides, Serum: 208 mg/dL (10.08.22 @ 07:18)  - GI eval: outpatient EUS and ERCP, no evidence of biliary obstruction. His Body weigh is a limitation to ERCP  -Surgery eval:  surgical intervention is not indicated at this time due to high risk. Patient is to follow up as an outpatient and pursue lifestyle modifications (low-fat diet, weight loss).   - Pt c/o bloating after diet was advanced--> wants to talk to surgery team  - monitor LFT    # Acute kidney injury, prerenal-resolved  # CKD stage3-stable  # Nephrolithiasis  # H/o right nephrectomy  -  US Renal (10.08.22 @ 09:59) >Left nephrolithiasis.. Left renal 5.4 cm cyst . Status post right nephrectomy, unremarkable.  - outpt F/u with urology for nephrolithiasis    # DM type2  - A1C with Estimated Average Glucose Result: 9.4 % (10.07.22 @ 07:16)  - monitor FS  - Endocrine eval: REcommend to switch lantus to 32 units Twice a day , can increase lispro to 20 units three times a day with sliding scale of 2 units for every 50 mg/dl above 150 mg/dl   - Home regimen to be decided based on requirements in the hospital , would avoid GLp1 as patient has pancreatitis, will need to continue on insulin on DC    # Hypertension  - start valsartan, hctz  - monitor BP    # Dyslipidemia  - c/w statin    # Glaucoma  - c/w eye droops    # Morbid obesity  - BMI : 56.4.    # Full code    #Pending: surgery F/u, monitor bp, monitor LFT  # Discussed plan of care with patient  # Disposition: Home when stable

## 2022-10-12 NOTE — PROGRESS NOTE ADULT - SUBJECTIVE AND OBJECTIVE BOX
Patient is a 67y old  Male who presents with a chief complaint of gallstone pancreatitis (11 Oct 2022 09:20)    Patient was seen and examined.  C/o bloating , dull abd pain after diet was advanced. Patients wants to talk to surgery team  Denies any other complaints.  All systems reviewed positive history as mentioned above.    PAST MEDICAL & SURGICAL HISTORY:  Diabetes mellitus  HTN (hypertension)  Glaucoma  DVT (deep venous thrombosis)  CKD (chronic kidney disease)  HLD (hyperlipidemia)  H/O right nephrectomy    Allergies  No Known Allergies    MEDICATIONS  (STANDING):  atorvastatin 20 milliGRAM(s) Oral at bedtime  dorzolamide 2% Ophthalmic Solution 1 Drop(s) Both EYES <User Schedule>  glucagon  Injectable 1 milliGRAM(s) IntraMuscular once  heparin   Injectable 5000 Unit(s) SubCutaneous every 8 hours  insulin glargine Injectable (LANTUS) 32 Unit(s) SubCutaneous every morning  insulin glargine Injectable (LANTUS) 32 Unit(s) SubCutaneous at bedtime  insulin lispro (ADMELOG) corrective regimen sliding scale   SubCutaneous three times a day before meals  insulin lispro Injectable (ADMELOG) 20 Unit(s) SubCutaneous three times a day before meals  latanoprost 0.005% Ophthalmic Solution 1 Drop(s) Both EYES at bedtime  pantoprazole    Tablet 40 milliGRAM(s) Oral before breakfast  simethicone 80 milliGRAM(s) Chew at bedtime  sodium bicarbonate 650 milliGRAM(s) Oral every 8 hours  timolol 0.5% Solution 1 Drop(s) Both EYES two times a day  valsartan 40 milliGRAM(s) Oral daily    MEDICATIONS  (PRN):  acetaminophen     Tablet .. 650 milliGRAM(s) Oral every 6 hours PRN Temp greater or equal to 38C (100.4F), Mild Pain (1 - 3)  aluminum hydroxide/magnesium hydroxide/simethicone Suspension 30 milliLiter(s) Oral every 4 hours PRN Dyspepsia  dextrose Oral Gel 15 Gram(s) Oral once PRN Blood Glucose LESS THAN 70 milliGRAM(s)/deciliter  melatonin 3 milliGRAM(s) Oral at bedtime PRN Insomnia  ondansetron Injectable 4 milliGRAM(s) IV Push every 8 hours PRN Nausea and/or Vomiting    Vital Signs Last 24 Hrs  T(C): 36.1  T(F): 97  HR: 7  BP: 159/70  BP(mean): --  RR: 18  SpO2: --    O/E:  Awake, alert, not in distress.  HEENT: atraumatic, EOMI.  Chest: clear.  CVS: SIS2 +, no murmur.  P/A: obese, BS+  CNS: awake, alert  Ext: no edema feet.  Skin: no rash, no ulcers.  All systems reviewed positive findings as above.    POCT Blood Glucose.: 211 mg/dL (12 Oct 2022 16:56)  POCT Blood Glucose.: 175 mg/dL (12 Oct 2022 13:01)  POCT Blood Glucose.: 188 mg/dL (12 Oct 2022 11:11)  POCT Blood Glucose.: 187 mg/dL (12 Oct 2022 07:45)  POCT Blood Glucose.: 86 mg/dL (11 Oct 2022 20:48)                        12.1<L>  9.11  )-----------( 221      ( 11 Oct 2022 07:52 )             36.0<L>    10-11    134<L>  |  99  |  26<H>  ----------------------------<  206<H>  4.4   |  21  |  1.6<H>    Ca    8.3<L>      11 Oct 2022 07:52  Mg     1.9     10-11    TPro  6.5  /  Alb  3.3<L>  /  TBili  0.8  /  DBili  x   /  AST  50<H>  /  ALT  62<H>  /  AlkPhos  111  10-11

## 2022-10-12 NOTE — PROGRESS NOTE ADULT - SUBJECTIVE AND OBJECTIVE BOX
SUMMARY: 68 y/o M with PMHx of DM, HTN, Obesity, Glaucoma, DVT, CKD, prior Nephrectomy, HLD, hx of gastric band insertion many years ago, currently admitted for gallstone pancreatitis.    SUBJECTIVE:  24HR:   Per attending hospitalist event note from 20:16 last night (10/12): patient complained of pain last night requiring pain medication, would like to speak to surgery regarding possible surgical intervention - spoke with Flori [address]Main who will speak to patient, if no intervention is planned, patient is anticipated for discharge in am.    No other acute overnight events.    REVIEW OF SYSTEMS  General: No fever, chills, change in appetite, change in weight, fatigue or night sweats.  Skin/Breast: No changes in rashes, lesions or moles.  Ophthalmologic: No vision changes, vision loss or double vision.  HEENT: No dysphagia, odynophagia, sore throat or hoarseness.  Respiratory: No coughing, wheezing, hemoptysis, dyspnea on exertion or dyspnea at rest.  Cardiovascular:	No palpitations, chest pain, paroxysmal nocturnal dyspnea, orthopnea or edema.  Gastrointestinal:	No nausea, vomiting, abdominal pain, diarrhea, constipation, hematochezia, melena or change in abdominal girth.  Genitourinary: No dysuria, hematuria or visible pyuria.  Musculoskeletal: No muscle aches, joint pain, recent trauma/injury or limited movement.  Neurological: No difficulty walking, difficulty balancing, lightheadedness vertigo, memory problems or seizures.  Psychiatric: No insomnia, depression, suicidal ideation or homicidal ideation.  Hematology/Lymphatics: No easy bruising, easy bruising or swollen glands.  Endocrine: No polyphagia, polyuria or polydipsia.  Allergic/Immunologic: No allergies or recent travel.      OBJECTIVE:  Vital Signs Last 24 Hrs  T(C): 37.1 (12 Oct 2022 05:09), Max: 37.1 (12 Oct 2022 04:48)  T(F): 98.8 (12 Oct 2022 05:09), Max: 98.8 (12 Oct 2022 04:48)  HR: 83 (12 Oct 2022 06:00) (73 - 83)  BP: 174/84 (12 Oct 2022 06:00) (145/66 - 174/84)  BP(mean): --  RR: 18 (12 Oct 2022 06:00) (18 - 18)  SpO2: --    PHYSICAL EXAM:  CONSTITUTIONAL: Well-appearing and in no apparent distress.    EYES: PERRLA and symmetric, EOMI, No conjunctival injection or pallor. Sclera anicteric.    ENMT: Moist mucous membranes. No external nasal lesions. No gross hearing impairment noted.  	NECK: Supple, symmetric and without tracheal deviation.     RESPIRATORY: No respiratory distress. No obvious use of accessory muscles. Lungs CTAB with no crackling, wheezing, rhonchi or rubs.    CARDIOVASCULAR: Regular rate and rhythm. Normal S1 and S2. No murmurs, rubs or gallops.    GASTROINTESTINAL: Soft, non-tender to palpation in all quadrants. No palpable masses. No appreciable hernias.    MUSCULOSKELETAL: Examination of all four extremities without obvious misalignment.    SKIN: No obvious rashes or ulcers.    NEUROLOGIC: CN II-XII intact; sensation intact in upper and lower extremities b/l to light touch.    PSYCHIATRIC: Appropriate insight/judgment; A+O x 3. Mood and affect appropriate. Recent/remote memory intact.    .  LABS:                         12.1   9.11  )-----------( 221      ( 11 Oct 2022 07:52 )             36.0     10-11    134<L>  |  99  |  26<H>  ----------------------------<  206<H>  4.4   |  21  |  1.6<H>    Ca    8.3<L>      11 Oct 2022 07:52  Mg     1.9     10-11    TPro  6.5  /  Alb  3.3<L>  /  TBili  0.8  /  DBili  x   /  AST  50<H>  /  ALT  62<H>  /  AlkPhos  111  10-11      RADIOLOGY, EKG & ADDITIONAL TESTS: Reviewed.       MEDICATIONS  (STANDING):  atorvastatin 20 milliGRAM(s) Oral at bedtime  dextrose 5%. 1000 milliLiter(s) (100 mL/Hr) IV Continuous <Continuous>  dextrose 50% Injectable 25 Gram(s) IV Push once  dextrose 50% Injectable 12.5 Gram(s) IV Push once  dextrose 50% Injectable 25 Gram(s) IV Push once  dorzolamide 2% Ophthalmic Solution 1 Drop(s) Both EYES <User Schedule>  glucagon  Injectable 1 milliGRAM(s) IntraMuscular once  heparin   Injectable 5000 Unit(s) SubCutaneous every 8 hours  insulin glargine Injectable (LANTUS) 32 Unit(s) SubCutaneous every morning  insulin glargine Injectable (LANTUS) 32 Unit(s) SubCutaneous at bedtime  insulin lispro (ADMELOG) corrective regimen sliding scale   SubCutaneous three times a day before meals  insulin lispro Injectable (ADMELOG) 20 Unit(s) SubCutaneous three times a day before meals  latanoprost 0.005% Ophthalmic Solution 1 Drop(s) Both EYES at bedtime  pantoprazole    Tablet 40 milliGRAM(s) Oral before breakfast  sodium bicarbonate 650 milliGRAM(s) Oral every 8 hours  timolol 0.5% Solution 1 Drop(s) Both EYES two times a day  valsartan 40 milliGRAM(s) Oral daily    MEDICATIONS  (PRN):  acetaminophen     Tablet .. 650 milliGRAM(s) Oral every 6 hours PRN Temp greater or equal to 38C (100.4F), Mild Pain (1 - 3)  aluminum hydroxide/magnesium hydroxide/simethicone Suspension 30 milliLiter(s) Oral every 4 hours PRN Dyspepsia  dextrose Oral Gel 15 Gram(s) Oral once PRN Blood Glucose LESS THAN 70 milliGRAM(s)/deciliter  melatonin 3 milliGRAM(s) Oral at bedtime PRN Insomnia  ondansetron Injectable 4 milliGRAM(s) IV Push every 8 hours PRN Nausea and/or Vomiting

## 2022-10-12 NOTE — PROGRESS NOTE ADULT - TIME BILLING
time spent on review of labs, imaging studies, old records, obtaining history, personally examining patient, counselling and communicating with patient, entering orders for medications/tests/etc, discussions with other health care providers, documentation in electronic health records, independent interpretation of labs, imaging/procedure results and care coordination.
Direct patient care. Discussed on rounds with Housestaff

## 2022-10-12 NOTE — PROGRESS NOTE ADULT - ASSESSMENT
ASSESSMENT: 66 y/o M with PMHx of DM, HTN, Obesity, Glaucoma, DVT, CKD, prior Nephrectomy, HLD, hx of gastric band insertion many years ago, currently admitted for gallstone pancreatitis, currently without any signs of acute abdomen. Per surgery, surgical intervention is not indicated at this time due to high risk. Patient is to follow up as an outpatient and pursue lifestyle modifications.    # Gallstone pancreatitis  # Abdominal pain  - RUQ u/s indicates cholelithiasis.  - Per surgery, surgical intervention is not indicated at this time due to high risk. Patient is to follow up as an outpatient and pursue lifestyle modifications (low-fat diet, weight loss). f/u regarding today (10/12) conversation with patient.  - Continue IV lactated ringers.    # BATOOL on CKD stage III   - Serum creatinine stable at 2.3-2.4.  - Continue IV lactated ringers, as noted above.  - Renal u/s shows left nephrolithiasis and left renal 5.4 cm cyst.  - s/p right nephrectomy.    # T2DM  - Per endocrinology, lantus 32U QAM and QPM +  Lispro 20U with meals    # HTN  - Controlled.    # HLD  - Continue home atorvastatin.    # Glaucoma  - Continue ophthalmic drops.    # Morbid obesity  - BMI on 10/7 noted as 56.4.  - Patient counseled to pursue lifestyle modifications, such as weight loss and low-fat diet.  - f/u outpatient for bariatric and dietitian evaluations.    # Diet  - Clear liquid.    # Dispo  - Home.

## 2022-10-13 ENCOUNTER — TRANSCRIPTION ENCOUNTER (OUTPATIENT)
Age: 67
End: 2022-10-13

## 2022-10-13 VITALS
HEART RATE: 66 BPM | TEMPERATURE: 98 F | DIASTOLIC BLOOD PRESSURE: 64 MMHG | RESPIRATION RATE: 18 BRPM | SYSTOLIC BLOOD PRESSURE: 139 MMHG

## 2022-10-13 LAB
ALBUMIN SERPL ELPH-MCNC: 3.5 G/DL — SIGNIFICANT CHANGE UP (ref 3.5–5.2)
ALP SERPL-CCNC: 116 U/L — HIGH (ref 30–115)
ALT FLD-CCNC: 78 U/L — HIGH (ref 0–41)
ANION GAP SERPL CALC-SCNC: 14 MMOL/L — SIGNIFICANT CHANGE UP (ref 7–14)
AST SERPL-CCNC: 74 U/L — HIGH (ref 0–41)
BASOPHILS # BLD AUTO: 0.09 K/UL — SIGNIFICANT CHANGE UP (ref 0–0.2)
BASOPHILS NFR BLD AUTO: 0.9 % — SIGNIFICANT CHANGE UP (ref 0–1)
BILIRUB SERPL-MCNC: 0.7 MG/DL — SIGNIFICANT CHANGE UP (ref 0.2–1.2)
BUN SERPL-MCNC: 21 MG/DL — HIGH (ref 10–20)
CALCIUM SERPL-MCNC: 8.8 MG/DL — SIGNIFICANT CHANGE UP (ref 8.4–10.5)
CHLORIDE SERPL-SCNC: 96 MMOL/L — LOW (ref 98–110)
CO2 SERPL-SCNC: 20 MMOL/L — SIGNIFICANT CHANGE UP (ref 17–32)
CREAT SERPL-MCNC: 1.7 MG/DL — HIGH (ref 0.7–1.5)
EGFR: 43 ML/MIN/1.73M2 — LOW
EOSINOPHIL # BLD AUTO: 0.14 K/UL — SIGNIFICANT CHANGE UP (ref 0–0.7)
EOSINOPHIL NFR BLD AUTO: 1.3 % — SIGNIFICANT CHANGE UP (ref 0–8)
GLUCOSE BLDC GLUCOMTR-MCNC: 198 MG/DL — HIGH (ref 70–99)
GLUCOSE BLDC GLUCOMTR-MCNC: 208 MG/DL — HIGH (ref 70–99)
GLUCOSE SERPL-MCNC: 221 MG/DL — HIGH (ref 70–99)
HCT VFR BLD CALC: 37.1 % — LOW (ref 42–52)
HGB BLD-MCNC: 12.1 G/DL — LOW (ref 14–18)
IMM GRANULOCYTES NFR BLD AUTO: 2.3 % — HIGH (ref 0.1–0.3)
LYMPHOCYTES # BLD AUTO: 1.48 K/UL — SIGNIFICANT CHANGE UP (ref 1.2–3.4)
LYMPHOCYTES # BLD AUTO: 14.2 % — LOW (ref 20.5–51.1)
MAGNESIUM SERPL-MCNC: 1.9 MG/DL — SIGNIFICANT CHANGE UP (ref 1.8–2.4)
MCHC RBC-ENTMCNC: 29.5 PG — SIGNIFICANT CHANGE UP (ref 27–31)
MCHC RBC-ENTMCNC: 32.6 G/DL — SIGNIFICANT CHANGE UP (ref 32–37)
MCV RBC AUTO: 90.5 FL — SIGNIFICANT CHANGE UP (ref 80–94)
MONOCYTES # BLD AUTO: 0.86 K/UL — HIGH (ref 0.1–0.6)
MONOCYTES NFR BLD AUTO: 8.2 % — SIGNIFICANT CHANGE UP (ref 1.7–9.3)
NEUTROPHILS # BLD AUTO: 7.64 K/UL — HIGH (ref 1.4–6.5)
NEUTROPHILS NFR BLD AUTO: 73.1 % — SIGNIFICANT CHANGE UP (ref 42.2–75.2)
NRBC # BLD: 0 /100 WBCS — SIGNIFICANT CHANGE UP (ref 0–0)
PLATELET # BLD AUTO: 282 K/UL — SIGNIFICANT CHANGE UP (ref 130–400)
POTASSIUM SERPL-MCNC: 4.9 MMOL/L — SIGNIFICANT CHANGE UP (ref 3.5–5)
POTASSIUM SERPL-SCNC: 4.9 MMOL/L — SIGNIFICANT CHANGE UP (ref 3.5–5)
PROT SERPL-MCNC: 6.9 G/DL — SIGNIFICANT CHANGE UP (ref 6–8)
RBC # BLD: 4.1 M/UL — LOW (ref 4.7–6.1)
RBC # FLD: 13.2 % — SIGNIFICANT CHANGE UP (ref 11.5–14.5)
SODIUM SERPL-SCNC: 130 MMOL/L — LOW (ref 135–146)
WBC # BLD: 10.45 K/UL — SIGNIFICANT CHANGE UP (ref 4.8–10.8)
WBC # FLD AUTO: 10.45 K/UL — SIGNIFICANT CHANGE UP (ref 4.8–10.8)

## 2022-10-13 PROCEDURE — 99238 HOSP IP/OBS DSCHRG MGMT 30/<: CPT

## 2022-10-13 RX ORDER — INSULIN GLARGINE 100 [IU]/ML
0 INJECTION, SOLUTION SUBCUTANEOUS
Qty: 0 | Refills: 0 | DISCHARGE

## 2022-10-13 RX ORDER — POLYETHYLENE GLYCOL 3350 17 G/17G
17 POWDER, FOR SOLUTION ORAL
Qty: 15 | Refills: 0
Start: 2022-10-13 | End: 2022-10-27

## 2022-10-13 RX ORDER — POLYETHYLENE GLYCOL 3350 17 G/17G
17 POWDER, FOR SOLUTION ORAL
Refills: 0 | Status: DISCONTINUED | OUTPATIENT
Start: 2022-10-13 | End: 2022-10-13

## 2022-10-13 RX ADMIN — PANTOPRAZOLE SODIUM 40 MILLIGRAM(S): 20 TABLET, DELAYED RELEASE ORAL at 06:00

## 2022-10-13 RX ADMIN — Medication 12.5 MILLIGRAM(S): at 05:59

## 2022-10-13 RX ADMIN — DORZOLAMIDE HYDROCHLORIDE 1 DROP(S): 20 SOLUTION/ DROPS OPHTHALMIC at 08:10

## 2022-10-13 RX ADMIN — Medication 650 MILLIGRAM(S): at 14:45

## 2022-10-13 RX ADMIN — Medication 2: at 13:26

## 2022-10-13 RX ADMIN — HEPARIN SODIUM 5000 UNIT(S): 5000 INJECTION INTRAVENOUS; SUBCUTANEOUS at 05:59

## 2022-10-13 RX ADMIN — HEPARIN SODIUM 5000 UNIT(S): 5000 INJECTION INTRAVENOUS; SUBCUTANEOUS at 14:45

## 2022-10-13 RX ADMIN — POLYETHYLENE GLYCOL 3350 17 GRAM(S): 17 POWDER, FOR SOLUTION ORAL at 06:01

## 2022-10-13 RX ADMIN — INSULIN GLARGINE 32 UNIT(S): 100 INJECTION, SOLUTION SUBCUTANEOUS at 08:09

## 2022-10-13 RX ADMIN — VALSARTAN 40 MILLIGRAM(S): 80 TABLET ORAL at 05:59

## 2022-10-13 RX ADMIN — Medication 20 UNIT(S): at 08:07

## 2022-10-13 RX ADMIN — Medication 3 MILLIGRAM(S): at 01:23

## 2022-10-13 RX ADMIN — Medication 4: at 08:07

## 2022-10-13 RX ADMIN — Medication 20 UNIT(S): at 13:27

## 2022-10-13 NOTE — PROGRESS NOTE ADULT - ASSESSMENT
ASSESSMENT: 68 y/o M with PMHx of DM, HTN, Obesity, Glaucoma, DVT, CKD, prior Nephrectomy, HLD, hx of gastric band insertion many years ago, currently admitted for gallstone pancreatitis, currently without any signs of acute abdomen. Per surgery, surgical intervention is not indicated at this time due to high risk. Patient is to follow up as an outpatient and pursue lifestyle modifications.    # Gallstone pancreatitis  # Abdominal pain  - RUQ u/s indicates cholelithiasis.  - Per surgery, surgical intervention is not indicated at this time due to high risk. Patient is to follow up as an outpatient and pursue lifestyle modifications (low-fat diet, weight loss).  - Continue IV lactated ringers.    # BATOOL on CKD stage III   - Serum creatinine stable at 2.3-2.4.  - Continue IV lactated ringers, as noted above.  - Renal u/s shows left nephrolithiasis and left renal 5.4 cm cyst.  - s/p right nephrectomy.    # T2DM  - Per endocrinology, lantus 32U QAM and QPM +  Lispro 20U with meals    # HTN  - Controlled.    # HLD  - Continue home atorvastatin.    # Glaucoma  - Continue ophthalmic drops.    # Morbid obesity  - BMI on 10/7 noted as 56.4.  - Patient counseled to pursue lifestyle modifications, such as weight loss and low-fat diet.  - f/u outpatient for bariatric and dietitian evaluations.    # Diet  - Clear liquid.    # Dispo  - Home.

## 2022-10-13 NOTE — PROGRESS NOTE ADULT - SUBJECTIVE AND OBJECTIVE BOX
SUMMARY: 68 y/o M with PMHx of DM, HTN, Obesity, Glaucoma, DVT, CKD, prior Nephrectomy, HLD, hx of gastric band insertion many years ago, currently admitted for gallstone pancreatitis.    SUBJECTIVE:  24HR: No acute overnight events.    REVIEW OF SYSTEMS  General: No fever, chills, change in appetite, change in weight, fatigue or night sweats.  Skin/Breast: No changes in rashes, lesions or moles.  Ophthalmologic: No vision changes, vision loss or double vision.  HEENT: No dysphagia, odynophagia, sore throat or hoarseness.  Respiratory: No coughing, wheezing, hemoptysis, dyspnea on exertion or dyspnea at rest.  Cardiovascular:	No palpitations, chest pain, paroxysmal nocturnal dyspnea, orthopnea or edema.  Gastrointestinal:	No nausea, vomiting, abdominal pain, diarrhea, constipation, hematochezia, melena or change in abdominal girth.  Genitourinary: No dysuria, hematuria or visible pyuria.  Musculoskeletal: No muscle aches, joint pain, recent trauma/injury or limited movement.  Neurological: No difficulty walking, difficulty balancing, lightheadedness vertigo, memory problems or seizures.  Psychiatric: No insomnia, depression, suicidal ideation or homicidal ideation.  Hematology/Lymphatics: No easy bruising, easy bruising or swollen glands.  Endocrine: No polyphagia, polyuria or polydipsia.  Allergic/Immunologic: No allergies or recent travel.    OBJECTIVE:  Vital Signs Last 24 Hrs  T(C): 35.6 (13 Oct 2022 04:59), Max: 36.7 (12 Oct 2022 21:00)  T(F): 96.1 (13 Oct 2022 04:59), Max: 98.1 (12 Oct 2022 21:00)  HR: 79 (13 Oct 2022 04:59) (7 - 79)  BP: 159/66 (13 Oct 2022 04:59) (159/66 - 185/84)  BP(mean): --  RR: 18 (13 Oct 2022 04:59) (18 - 18)  SpO2: --    PHYSICAL EXAM:  CONSTITUTIONAL: Well-appearing and in no apparent distress.    EYES: PERRLA and symmetric, EOMI, No conjunctival injection or pallor. Sclera anicteric.    ENMT: Moist mucous membranes. No external nasal lesions. No gross hearing impairment noted.  	NECK: Supple, symmetric and without tracheal deviation.     RESPIRATORY: No respiratory distress. No obvious use of accessory muscles. Lungs CTAB with no crackling, wheezing, rhonchi or rubs.    CARDIOVASCULAR: Regular rate and rhythm. Normal S1 and S2. No murmurs, rubs or gallops.    GASTROINTESTINAL: Soft, non-tender to palpation in all quadrants. No palpable masses. No appreciable hernias.    MUSCULOSKELETAL: Examination of all four extremities without obvious misalignment.    SKIN: No obvious rashes or ulcers.    NEUROLOGIC: CN II-XII intact; sensation intact in upper and lower extremities b/l to light touch.    PSYCHIATRIC: Appropriate insight/judgment; A+O x 3. Mood and affect appropriate. Recent/remote memory intact.    .  LABS:                         11.5   9.20  )-----------( 232      ( 12 Oct 2022 17:43 )             34.4     10-12    134<L>  |  100  |  21<H>  ----------------------------<  245<H>  5.0   |  22  |  1.5    Ca    8.1<L>      12 Oct 2022 17:43  Mg     1.8     10-12    TPro  6.0  /  Alb  3.3<L>  /  TBili  0.6  /  DBili  x   /  AST  70<H>  /  ALT  71<H>  /  AlkPhos  101  10-12        RADIOLOGY, EKG & ADDITIONAL TESTS: Reviewed.     MEDICATIONS  (STANDING):  atorvastatin 20 milliGRAM(s) Oral at bedtime  dextrose 5%. 1000 milliLiter(s) (100 mL/Hr) IV Continuous <Continuous>  dextrose 50% Injectable 25 Gram(s) IV Push once  dextrose 50% Injectable 12.5 Gram(s) IV Push once  dextrose 50% Injectable 25 Gram(s) IV Push once  dorzolamide 2% Ophthalmic Solution 1 Drop(s) Both EYES <User Schedule>  glucagon  Injectable 1 milliGRAM(s) IntraMuscular once  heparin   Injectable 5000 Unit(s) SubCutaneous every 8 hours  hydrochlorothiazide 12.5 milliGRAM(s) Oral daily  insulin glargine Injectable (LANTUS) 32 Unit(s) SubCutaneous at bedtime  insulin glargine Injectable (LANTUS) 32 Unit(s) SubCutaneous every morning  insulin lispro (ADMELOG) corrective regimen sliding scale   SubCutaneous three times a day before meals  insulin lispro Injectable (ADMELOG) 20 Unit(s) SubCutaneous three times a day before meals  latanoprost 0.005% Ophthalmic Solution 1 Drop(s) Both EYES at bedtime  pantoprazole    Tablet 40 milliGRAM(s) Oral before breakfast  polyethylene glycol 3350 17 Gram(s) Oral two times a day  simethicone 80 milliGRAM(s) Chew at bedtime  sodium bicarbonate 650 milliGRAM(s) Oral every 8 hours  timolol 0.5% Solution 1 Drop(s) Both EYES two times a day  valsartan 40 milliGRAM(s) Oral daily    MEDICATIONS  (PRN):  acetaminophen     Tablet .. 650 milliGRAM(s) Oral every 6 hours PRN Temp greater or equal to 38C (100.4F), Mild Pain (1 - 3)  aluminum hydroxide/magnesium hydroxide/simethicone Suspension 30 milliLiter(s) Oral every 4 hours PRN Dyspepsia  dextrose Oral Gel 15 Gram(s) Oral once PRN Blood Glucose LESS THAN 70 milliGRAM(s)/deciliter  melatonin 3 milliGRAM(s) Oral at bedtime PRN Insomnia  ondansetron Injectable 4 milliGRAM(s) IV Push every 8 hours PRN Nausea and/or Vomiting

## 2022-10-13 NOTE — DISCHARGE NOTE NURSING/CASE MANAGEMENT/SOCIAL WORK - NSDCPEFALRISK_GEN_ALL_CORE
For information on Fall & Injury Prevention, visit: [email or web address] OR  [email or web address] OR  https://www.cdc.gov/steadi/patient.html

## 2022-10-13 NOTE — PROGRESS NOTE ADULT - PROVIDER SPECIALTY LIST ADULT
Critical Care
Hospitalist
Hospitalist
Internal Medicine
Hospitalist
Hospitalist
Internal Medicine
Internal Medicine
Nephrology
Pulmonology
Surgery
Surgery
Gastroenterology
Hospitalist
Internal Medicine
Internal Medicine
Surgery
Hospitalist

## 2022-10-13 NOTE — PROGRESS NOTE ADULT - REASON FOR ADMISSION
Gallstone pancreatitis
epigastric pain
gallstone pancreatitis

## 2022-10-13 NOTE — DISCHARGE NOTE NURSING/CASE MANAGEMENT/SOCIAL WORK - PATIENT PORTAL LINK FT
You can access the FollowMyHealth Patient Portal offered by [facility] by registering at the following website: [email or web address]. By joining [facility]’s FollowMyHealth portal, you will also be able to view your health information using other applications (apps) compatible with our system.

## 2022-10-21 DIAGNOSIS — E87.1 HYPO-OSMOLALITY AND HYPONATREMIA: ICD-10-CM

## 2022-10-21 DIAGNOSIS — H40.9 UNSPECIFIED GLAUCOMA: ICD-10-CM

## 2022-10-21 DIAGNOSIS — Z98.84 BARIATRIC SURGERY STATUS: ICD-10-CM

## 2022-10-21 DIAGNOSIS — R10.9 UNSPECIFIED ABDOMINAL PAIN: ICD-10-CM

## 2022-10-21 DIAGNOSIS — N20.0 CALCULUS OF KIDNEY: ICD-10-CM

## 2022-10-21 DIAGNOSIS — N18.30 CHRONIC KIDNEY DISEASE, STAGE 3 UNSPECIFIED: ICD-10-CM

## 2022-10-21 DIAGNOSIS — E11.22 TYPE 2 DIABETES MELLITUS WITH DIABETIC CHRONIC KIDNEY DISEASE: ICD-10-CM

## 2022-10-21 DIAGNOSIS — E66.01 MORBID (SEVERE) OBESITY DUE TO EXCESS CALORIES: ICD-10-CM

## 2022-10-21 DIAGNOSIS — E11.65 TYPE 2 DIABETES MELLITUS WITH HYPERGLYCEMIA: ICD-10-CM

## 2022-10-21 DIAGNOSIS — I12.9 HYPERTENSIVE CHRONIC KIDNEY DISEASE WITH STAGE 1 THROUGH STAGE 4 CHRONIC KIDNEY DISEASE, OR UNSPECIFIED CHRONIC KIDNEY DISEASE: ICD-10-CM

## 2022-10-21 DIAGNOSIS — E78.5 HYPERLIPIDEMIA, UNSPECIFIED: ICD-10-CM

## 2022-10-21 DIAGNOSIS — K85.10 BILIARY ACUTE PANCREATITIS WITHOUT NECROSIS OR INFECTION: ICD-10-CM

## 2022-10-21 DIAGNOSIS — Z87.442 PERSONAL HISTORY OF URINARY CALCULI: ICD-10-CM

## 2022-10-21 DIAGNOSIS — Z86.16 PERSONAL HISTORY OF COVID-19: ICD-10-CM

## 2022-10-21 DIAGNOSIS — Z79.4 LONG TERM (CURRENT) USE OF INSULIN: ICD-10-CM

## 2022-10-21 DIAGNOSIS — E87.20 ACIDOSIS, UNSPECIFIED: ICD-10-CM

## 2022-10-21 DIAGNOSIS — Z90.5 ACQUIRED ABSENCE OF KIDNEY: ICD-10-CM

## 2022-10-21 DIAGNOSIS — K80.20 CALCULUS OF GALLBLADDER WITHOUT CHOLECYSTITIS WITHOUT OBSTRUCTION: ICD-10-CM

## 2022-10-21 DIAGNOSIS — Z86.718 PERSONAL HISTORY OF OTHER VENOUS THROMBOSIS AND EMBOLISM: ICD-10-CM

## 2022-10-21 DIAGNOSIS — J98.11 ATELECTASIS: ICD-10-CM

## 2022-10-21 DIAGNOSIS — R65.11 SYSTEMIC INFLAMMATORY RESPONSE SYNDROME (SIRS) OF NON-INFECTIOUS ORIGIN WITH ACUTE ORGAN DYSFUNCTION: ICD-10-CM

## 2022-10-21 DIAGNOSIS — R74.01 ELEVATION OF LEVELS OF LIVER TRANSAMINASE LEVELS: ICD-10-CM

## 2022-10-21 DIAGNOSIS — D63.1 ANEMIA IN CHRONIC KIDNEY DISEASE: ICD-10-CM

## 2022-10-21 DIAGNOSIS — I21.A1 MYOCARDIAL INFARCTION TYPE 2: ICD-10-CM

## 2022-10-21 DIAGNOSIS — Z80.1 FAMILY HISTORY OF MALIGNANT NEOPLASM OF TRACHEA, BRONCHUS AND LUNG: ICD-10-CM

## 2022-10-21 DIAGNOSIS — N17.9 ACUTE KIDNEY FAILURE, UNSPECIFIED: ICD-10-CM

## 2022-10-21 NOTE — CDI QUERY NOTE - NSCDIOTHERTXTBX_GEN_ALL_CORE_HH
DOCUMENTATION CLARIFICATION FORM     Encounter #: [id number]                           Patient’s Name: Axel Vega  Medical Record #: [id number]                 Admit Date: 10-6-2022  : 1955                                      Discharged: 10-  CDI Specialist/: Dakotah                 Contact #: [phone]    Dear Dr. Castanon,                         Date: 10-               The Physician’s or Provider’s documentation of the patient’s presentation, evaluation and  medical management, as identified below, may support a diagnosis that is not documented in the medical record.  In order to accurately capture all diagnoses to the greatest degree of specificity reflecting the patient’s actual severity of illness, the documentation in this patient’s medical record requires additional clarification.  Please include more specific documentation, either known or suspected, of a corresponding diagnosis associated with the clinical information described below in your Progress Note and/or Discharge Summary.    CLINICAL INDICATORS  Documentation  •	10-6 ICU Consult::.... Acute on chronic gallstone pancreatitis...Sepsis POA...BATOOL on CKD stage III...Lactic acidosis...NSTEMI, likely type 2 demand ischemia. No ECG changes  •	10-6 H&P:... Troponemia likely secondary to CKD - 0.05 on admission, chronically elevated up to 0.09 on previous admission- will Repeat troponin- Denies chest pain - No new changes on EKG..... NSTEMI, likely type 2 demand ischemia. No ECG changes  •	10-7 ICU:... NSTEMI, likely type 2 demand ischemia. No ECG changes  •	10-7 Medicine:...Troponemia likely secondary to CKD - 0.05 on admission, chronically elevated up to 0.09 on previous admission-->0.06- will Repeat troponin- Denies chest pain - No new changes on EKG -  check echo  •	10-9 Cardiology Consult:... able to do > 4 mets and has no active angina nor chf sxs... revised cardiac risk index is 3 ponts , class4 with a 15% 30 day risk of death , mi, or cardiac arrest.  ...pt is clinically euvolemic...pt to have post ekg and cardiac enzymes    Labs/Imaging  •	10-6 Troponin  0.04 > 0.04 > 0.05  •	10-6 ECG: Diagnosis Line Sinus tachycardia with 1st degree A-V block Left axis deviation Right bundle branch block Minimal voltage criteria for LVH, may be normal variant Inferior infarct , age undetermined Abnormal ECG  •	10- 9 ECHO Summary: LV Ejection Fraction by Gutierrez's Method with a biplane EF of 58 %... Normal global left ventricular systolic function...Moderately increased LV wall thickness.... Trivial pericardial effusion. Dilatation of the ascending aorta.    QUERY  Based on your professional judgment and the clinical indicators, please clarify if Type 2 NSTEMI can be further specified as:  •	After evaluation, Type 2 NSTEMI was ruled out based on serial troponin results  •	After evaluation, Type 2 NSTEMI could not be ruled out based on serial troponin results  •	Other (please specify):  •	Clinically unable to determine    Documentation clarification is required for compliance, accuracy in coding and billing, and reporting severity of illness, quality data   and risk of mortality.  --------------------------------------------------------------------------------------------------------------------------------------------  DO NOT REMOVE THIS RECORD WITHOUT FIRST NOTIFYING THE CDI SPECIALIST  This form is NOT a part of the permanent Medical Record.

## 2022-10-21 NOTE — CDI QUERY NOTE - NSCDIOTHERTXTBX2_GEN_ALL_CORE_FT
DOCUMENTATION CLARIFICATION FORM   Encounter #: [id number]                           Patient’s Name: Axel Vega  Medical Record #: [id number]                 Admit Date: 10-6-2022  : 1955                                      Discharged: 10-  CDI Specialist/: Dakotah                 Contact #: [phone]    Dear Dr. Castanon,                         Date: 10-    The Physician’s or Provider’s documentation of the patient’s presentation, evaluation and  medical management, as identified below, may support a diagnosis that is not documented in the medical record.  In order to accurately capture all diagnoses to the greatest degree of specificity reflecting the patient’s actual severity of illness, the documentation in this patient’s medical record requires additional clarification.  Please include more specific documentation, either known or suspected, of a corresponding diagnosis associated with the clinical information described below in your Progress Note and/or Discharge Summary.    Clinical Indicators  Documentation   •	10-6 H&P:... Acute on chronic gallstone pancreatitis... Sepsis On Admission... Lactic acidosis  •	10-7 ICU Consult:... Acute on chronic gallstone pancreatitis...Sepsis POA...BATOOL on CKD stage III...Lactic acidosis...Monitor vital signs. Follow up cultures. No indications for abx.   •	10-7 thorough 10-13 Medicine:.. Abdominal pain secondary to acute pancreatitis suspicious for gallstone pancreatitis... BATOOL on CKD III  •	10-13 DC Summary:... Gallstone pancreatitis....Acute kidney injury, prerenal-resolved....CKD stage3-stable... Diagnosis: Pancreatitis    Labs  •	10- WBC 17.44 > 10.28 > 8.21 > 7.62  •	10-6 Lactate Level 3.5 > 2.3 > 1.7  •	10-6 Creatinine 2.4 > 2.3 > 2.3 > 1.9 > 1.6 >1.5 >1.7  •	10-6 VS T 98.2  > T 99.7 HR 93    Treatment  •	10-6 cefepime 2000mg IVPB x 1 dose indicated for sepsis  •	10-6 Flagyl 500mg IVPB x 1 dose indicated for sepsis  •	10-6 2250 ml Lactated Ringers IVF boluses total    Query  Based on your professional judgment and the clinical indicators, please clarify if sepsis can be further specified as:  •	After evaluation, tachycardia, elevated WBC, and lactic acidosis were most likely associated with noninfectious SIRS with BATOOL; suspected sepsis was ruled out and antibiotics were discontinued after 1 dose.  •	Other (please specify):_________________________  •	Clinically unable to determine    Documentation clarification is required for compliance, accuracy in coding and billing, and reporting severity of illness, quality data   and risk of mortality.  --------------------------------------------------------------------------------------------------------------------------------------------  DO NOT REMOVE THIS RECORD WITHOUT FIRST NOTIFYING THE CDI SPECIALIST  This form is NOT a part of the permanent Medical Record.

## 2022-10-24 ENCOUNTER — EMERGENCY (EMERGENCY)
Facility: HOSPITAL | Age: 67
LOS: 0 days | Discharge: HOME | End: 2022-10-24
Attending: EMERGENCY MEDICINE | Admitting: EMERGENCY MEDICINE

## 2022-10-24 VITALS
HEART RATE: 94 BPM | TEMPERATURE: 97 F | RESPIRATION RATE: 20 BRPM | OXYGEN SATURATION: 97 % | DIASTOLIC BLOOD PRESSURE: 72 MMHG | SYSTOLIC BLOOD PRESSURE: 109 MMHG | HEIGHT: 70 IN

## 2022-10-24 DIAGNOSIS — Z90.5 ACQUIRED ABSENCE OF KIDNEY: Chronic | ICD-10-CM

## 2022-10-24 DIAGNOSIS — M79.672 PAIN IN LEFT FOOT: ICD-10-CM

## 2022-10-24 DIAGNOSIS — H40.9 UNSPECIFIED GLAUCOMA: ICD-10-CM

## 2022-10-24 DIAGNOSIS — Z90.5 ACQUIRED ABSENCE OF KIDNEY: ICD-10-CM

## 2022-10-24 DIAGNOSIS — I12.9 HYPERTENSIVE CHRONIC KIDNEY DISEASE WITH STAGE 1 THROUGH STAGE 4 CHRONIC KIDNEY DISEASE, OR UNSPECIFIED CHRONIC KIDNEY DISEASE: ICD-10-CM

## 2022-10-24 DIAGNOSIS — E11.22 TYPE 2 DIABETES MELLITUS WITH DIABETIC CHRONIC KIDNEY DISEASE: ICD-10-CM

## 2022-10-24 DIAGNOSIS — Z79.4 LONG TERM (CURRENT) USE OF INSULIN: ICD-10-CM

## 2022-10-24 DIAGNOSIS — Z86.718 PERSONAL HISTORY OF OTHER VENOUS THROMBOSIS AND EMBOLISM: ICD-10-CM

## 2022-10-24 DIAGNOSIS — N18.9 CHRONIC KIDNEY DISEASE, UNSPECIFIED: ICD-10-CM

## 2022-10-24 DIAGNOSIS — E78.5 HYPERLIPIDEMIA, UNSPECIFIED: ICD-10-CM

## 2022-10-24 PROCEDURE — 99283 EMERGENCY DEPT VISIT LOW MDM: CPT

## 2022-10-24 PROCEDURE — 73620 X-RAY EXAM OF FOOT: CPT | Mod: 26,LT

## 2022-10-24 NOTE — ED PROVIDER NOTE - PHYSICAL EXAMINATION
EXAM:  CONSTITUTIONAL: WA / WN / NAD  HEAD: NCAT  EYES: PERRL; EOMI; anicteric.  ENT: Normal pharynx; mucous membranes pink/moist, no erythema.  NECK: Supple; no meningeal signs  ABD: Soft, NT ND nl bowel sounds; no masses; no rebound  MSK/EXT: No gross deformities; full range of motion.   SKIN: Warm and dry;   NEURO: AAOx3, Motor 5/5 x 4 extremities,

## 2022-10-24 NOTE — ED PROVIDER NOTE - NS ED ROS FT
ROS  Constitutional:  See HPI.  Eyes:  No visual changes, eye pain or discharge.  ENMT:  No hearing changes, pain, discharge or infections. No neck pain or stiffness.        MS:  No myalgia, muscle weakness, + foot joint pain or back pain.  Neuro:  No focal neurological complaints.

## 2022-10-24 NOTE — ED PROVIDER NOTE - OBJECTIVE STATEMENT
67-year-old male to ED with foot pain.  Pain over left foot times few days.  Patient recently was seen podiatrist for his nails but since then pain has been worsening.  History of diabetes but no prior injury to the foot no cellulitis no LOC or abrasion or foot fracture.

## 2022-10-24 NOTE — ED ADULT TRIAGE NOTE - CHIEF COMPLAINT QUOTE
"I have this pain on my big toe and on my foot for 3-4 days now. I'm diabetic." No wounds/ulcer noted on left foot.

## 2022-10-24 NOTE — ED PROVIDER NOTE - PATIENT PORTAL LINK FT
You can access the FollowMyHealth Patient Portal offered by [facility] by registering at the following website: [email or web address]. By joining [facility]`’s FollowMyHealth portal, you will also be able to view your health information using other applications (apps) compatible with our system.

## 2022-10-24 NOTE — ED ADULT NURSE NOTE - CCCP TRG CHIEF CMPLNT
Alert-The patient is alert, awake and responds to voice. The patient is oriented to time, place, and person. The triage nurse is able to obtain subjective information.
foot pain/injury

## 2022-11-16 NOTE — PATIENT PROFILE ADULT - FUNCTIONAL SCREEN CURRENT LEVEL: SWALLOWING (IF SCORE 2 OR MORE FOR ANY ITEM, CONSULT REHAB SERVICES), MLM)
Preparation Of Recipient Site - Graft: The eschar was removed surgically with sharp dissection to facilitate appropriate survival of the following graft. 0 = swallows foods/liquids without difficulty

## 2023-01-08 NOTE — ED PROVIDER NOTE - FAMILY HISTORY
No pertinent family history in first degree relatives PAST MEDICAL HISTORY:  No pertinent past medical history

## 2023-03-07 NOTE — ED ADULT NURSE NOTE - NSFALLRSKUNASSIST_ED_ALL_ED
This was a shared visit with the RADHA. I reviewed and verified the documentation and independently performed the documented: no

## 2023-04-11 NOTE — PATIENT PROFILE ADULT - NSPROPOAURINARYCATHETER_GEN_A_NUR
Refill Authorization Note   Nakia Ngo  is requesting a refill authorization.  Brief Assessment and Rationale for Refill:  Approve     Medication Therapy Plan:  PER PATIENT SEND TO [facility]    Medication Reconciliation Completed: Yes   Comments:   --->Care Gap information included below if applicable.       Requested Prescriptions   Pending Prescriptions Disp Refills    montelukast (SINGULAIR) 10 mg tablet 90 tablet 1     Sig: Take 1 tablet (10 mg total) by mouth once daily.       Pulmonology:  Leukotriene Inhibitors Failed - 4/11/2023 11:43 AM        Failed - Matches previous order         Previous Authorizing Provider: Chante Hughes DO (montelukast (SINGULAIR) 10 mg tablet)  [id number] [facility]            Passed - Patient is at least 18 years old        Passed - Valid encounter within last 15 months     Recent Visits  Date Type Provider Dept   07/27/22 Office Visit Chante Hughes DO On Internal Medicine   08/19/21 Office Visit Chante Hughes DO [facility] Internal Medicine   Showing recent visits within past 720 days and meeting all other requirements  Future Appointments  No visits were found meeting these conditions.  Showing future appointments within next 150 days and meeting all other requirements        Future Appointments                In 2 months LABORATORY, [facility] [place] (Josiah Zhou) - Lab 1st Fl, [place]    In 3 months Hannah Carbajal - Diabetes Mgmt 2nd Fl, [facility]    In 3 months Chante Hughes DO [facility] - Internal Medicine,  [facility]             Passed - No ED/Admission Since Last PCP visit                   Appointments  past 12m or future 3m with PCP    Date Provider   Last Visit   7/27/2022 Chante Hughes DO   Next Visit   8/2/2023 Chante Hughes DO   ED visits in past 90 days: 0     Note composed:2:34 PM 04/11/2023            no

## 2023-05-15 ENCOUNTER — APPOINTMENT (OUTPATIENT)
Dept: PULMONOLOGY | Facility: CLINIC | Age: 68
End: 2023-05-15
Payer: MEDICARE

## 2023-05-15 VITALS
RESPIRATION RATE: 14 BRPM | WEIGHT: 315 LBS | BODY MASS INDEX: 45.1 KG/M2 | SYSTOLIC BLOOD PRESSURE: 120 MMHG | OXYGEN SATURATION: 96 % | HEART RATE: 86 BPM | HEIGHT: 70 IN | DIASTOLIC BLOOD PRESSURE: 80 MMHG

## 2023-05-15 DIAGNOSIS — J45.998 OTHER ASTHMA: ICD-10-CM

## 2023-05-15 DIAGNOSIS — J30.9 ALLERGIC RHINITIS, UNSPECIFIED: ICD-10-CM

## 2023-05-15 PROCEDURE — 71046 X-RAY EXAM CHEST 2 VIEWS: CPT

## 2023-05-15 PROCEDURE — 99203 OFFICE O/P NEW LOW 30 MIN: CPT | Mod: 25

## 2023-05-15 RX ORDER — FLUTICASONE FUROATE 200 UG/1
200 POWDER RESPIRATORY (INHALATION) DAILY
Qty: 3 | Refills: 3 | Status: ACTIVE | COMMUNITY
Start: 2023-05-15 | End: 1900-01-01

## 2023-05-15 RX ORDER — MONTELUKAST 10 MG/1
10 TABLET, FILM COATED ORAL
Qty: 30 | Refills: 3 | Status: ACTIVE | COMMUNITY
Start: 2023-05-15 | End: 1900-01-01

## 2023-05-15 NOTE — HISTORY OF PRESENT ILLNESS
[Doing Well] : doing well [Well Controlled] : Well controlled [Cough] : stable coughing [Checks Regularly] : The patient checks ~his/her~ peak flow regularly [Good Control] : peak flow has been good [Goals--Doing Well] : the patient is doing well with ~his/her~ asthma goals [PFTs] : pulmonary function tests [de-identified] : Post viral EAD  [Initial Evaluation] : an initial evaluation of [Nasal Congestion] : nasal congestion [Nasal Drainage] : nasal drainage [Cough] : cough [Currently Experiencing] : The patient is currently experiencing symptoms. [None] : No associated symptoms are reported

## 2023-05-15 NOTE — PROCEDURE
[FreeTextEntry1] : CXR PA and Lateral \par The costophrenic and cardiophrenic angles are sharp\par The derrell parenchyma shows no infiltrates, consolidations, or nodules \par The Mediastinum is within normal limits\par No pleural effusions\par Elevated right isabel

## 2023-07-05 NOTE — CONSULT NOTE ADULT - ATTENDING COMMENTS
pt was seen on last admission  MRI was negative for osteomyelitis  toe ulcer is stable.   local wound care for now  pt to follow up with private podiatrist for continued care Constitutional: Denies fevers & chills  HEENT: Denies Rhinorrhea & sore throat  Cardiac: Denies Chest pain & new LE edema  Pulmonary: Denies Shortness of breath & Cough  Abdomen: Denies blood in stools, diarrhea   : Denies dysuria & hematuria.  Skin: Denies Rash or itching  Neuro: Denies new visual changes, confusion, headaches, and one sided paralysis  Psych: Denies SI & HI & Depression

## 2023-07-07 NOTE — ED ADULT NURSE NOTE - DOES PATIENT HAVE ADVANCE DIRECTIVE
"Preventive Care Visit  [facility]  Aria Reif, ARMANDO CNP, Pediatrics  Jul 7, 2023    Assessment & Plan   12 month old, here for preventive care. Accompanied by Mom and Dad.     Last WCE as at 5 weeks of age. Family has not had health insurance so unable to complete well exams. No ED or urgent care visits since then either. Has been healthy.    Cough, runny nose and fever for past few weeks. Fever has resolved but cough and runny nose has persisted.     Yesterday around 1500. Mom was in bed with Rommel and he got a hold of cousin's vape pen and took a  \"whiff\" per mom. Parents are uncertain of what substance was in the vape pen but believe it was nicotine. He acted fine right after and since then.     (Z00.121) Encounter for routine child health examination with abnormal findings  (primary encounter diagnosis)  Comment: No concerns with growth. Parents are wanting to get Rommel UTD with immunizations so will do more frequent visits.   Plan: Hemoglobin, Lead Capillary, sodium fluoride         (VANISH) 5% white varnish 1 packet, CO         APPLICATION TOPICAL FLUORIDE VARNISH BY         [facility]/QHP, MMR (M-M-R II), PNEUMOCOCCAL CONJUGATE        PCV 13 (PREVNAR 13), VARICELLA LIVE (VARIVAX),         PRIMARY CARE FOLLOW-UP SCHEDULING,         DTAP/IPV/HIB/HEPB 6W-4Y (VAXELIS)    (H66.002) Left acute suppurative otitis media  Comment: Take antbx as prescribed. Return with no improvement in 3-4 days of medication or with worsening symptoms. Can take tylenol or ibuprofen for discomfort.   Plan: amoxicillin (AMOXIL) 400 MG/5ML suspension    (Z28.9) Delayed immunizations  Comment: Parents willing to do 4 today, will catch up as able. Aged out of Rota vaccine.     (Z72.0) Vapes nicotine containing substance  Comment: Found cousin's vape pen and took a hit per Mother. Believe it was nicotine but unsure. Called POISON CONTROL during visit and given that it has been >12 hours and exam looks well, no further " evaluation is required. Did mention to parents that constant supervision is necessary at this age. Drugs, alcohol, medications, , etc.,should be locked/stored out of reach. Call poison control right away if ingestion of any subtance occurs again.     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     (F80.1) Expressive speech delay  Comment: Read books and sing throughout the day. Encourage communication and repetition.    In addition to the preventive visit, 25 minutes of the appointment were spent evaluating and developing a treatment plan for his additional concern(s).      Patient has been advised of split billing requirements and indicates understanding: Yes     Growth      Normal OFC, length and weight    Immunizations   I provided face to face vaccine counseling, answered questions, and explained the benefits and risks of the vaccine components ordered today including:  VHuA-CAS-GHO-HepB (Vaxelis ), MMR, Pneumococcal 13-valent Conjugate (Prevnar ) and Varicella (Chicken Pox)  Child is due for additional immunizations, scheduled to return in 4-6 weeks for more immunizations  Immunizations Administered     Name Date Dose VIS Date Route    DTAP,IPV,HIB,HEPB (VAXELIS) 7/7/23 11:50 AM 0.5 mL 10/15/21 Intramuscular    MMR 7/7/23 11:50 AM 0.5 mL 08/06/2021, Given Today Subcutaneous    Pneumo Conj 13-V (2010&after) 7/7/23 11:50 AM 0.5 mL 08/06/2021, Given Today Intramuscular    Varicella 7/7/23 11:50 AM 0.5 mL 08/06/2021, Given Today Subcutaneous        Anticipatory Guidance    Reviewed age appropriate anticipatory guidance.   SOCIAL/ FAMILY:    Stranger/ separation anxiety    Distraction as discipline    Reading to child    Given a book from Re[employer] Out & Read    Bedtime /nap routine  NUTRITION:    Encourage self-feeding    Table foods    Whole milk introduction    Iron, calcium sources    Avoid foods conflicts    Choking prevention- no popcorn, nuts, gum, raisins, etc    Age-related  decrease in appetite    Limit juice to 4 ounces   HEALTH/ SAFETY:    Dental hygiene    Lead risk    Sleep issues    Sunscreen/ insect repellent    Child proof home    Never leave unattended    Car seat    Referrals/Ongoing Specialty Care  None  Verbal Dental Referral: Verbal dental referral was given  Dental Fluoride Varnish: Yes, fluoride varnish application risks and benefits were discussed, and verbal consent was received.    Subjective         7/7/2023    10:40 AM   Additional Questions   Accompanied by PARENTS   Questions for today's visit Yes   Questions Got a hold of vape, fevers, cough   Surgery, major illness, or injury since last physical No         7/7/2023    10:31 AM   Social   Lives with Parent(s) and 3 older siblings.    Who takes care of your child? Parent(s)   Recent potential stressors None   History of trauma No   Family Hx mental health challenges No   Lack of transportation has limited access to appts/meds No   Difficulty paying mortgage/rent on time Yes   Lack of steady place to sleep/has slept in a shelter No   (!) HOUSING CONCERN PRESENT      7/7/2023    10:31 AM   Health Risks/Safety   What type of car seat does your child use?  Infant car seat   Is your child's car seat forward or rear facing? Rear facing   Where does your child sit in the car?  Back seat   Do you use space heaters, wood stove, or a fireplace in your home? (!) YES   Are poisons/cleaning supplies and medications kept out of reach? Yes   Do you have guns/firearms in the home? (!) YES   Are the guns/firearms secured in a safe or with a trigger lock? Yes   Is ammunition stored separately from guns? Yes   Discussed convertible car seat transition.          7/7/2023    10:31 AM   TB Screening: Consider immunosuppression as a risk factor for TB   Recent TB infection or positive TB test in family/close contacts No   Recent travel outside [place] (child/family/close contacts) No   Recent residence in high-risk group setting  (correctional facility/health care facility/homeless shelter/refugee camp) No          7/7/2023    10:31 AM   Dental Screening   Has your child had cavities in the last 2 years? No   Have parents/caregivers/siblings had cavities in the last 2 years? (!) YES, IN THE LAST 7-23 MONTHS- MODERATE RISK   Has not started brushing teeth and has not seen a dentist. Discussed implementing teeth brushing 1-2x/day. Also encouraged family to stop offering formula/milk overnight.         7/7/2023    10:31 AM   Diet   Questions about feeding? No   How does your child eat?  (!) BOTTLE   What does your child regularly drink? (!) FORMULA   Vitamin or supplement use None   How often does your family eat meals together? Every day   How many snacks does your child eat per day 4   Are there types of foods your child won't eat? No   In past 12 months, concerned food might run out Sometimes true   In past 12 months, food has run out/couldn't afford more Sometimes true   Eating table foods 3x/day with family and snacking as well. Will eat fruits, veggies and proteins. Have not introduced eggs or PB so encouraged. Taking 32-36 ounces of formula per day. Discussed with parents that they can stop formula and switch to whole milk, no more than 20-24 ounces per day. Transition away from bottle and offer sippy, open cup or straw.     (!) FOOD SECURITY CONCERN PRESENT      7/7/2023    10:31 AM   Elimination   Bowel or bladder concerns? No concerns         7/7/2023    10:31 AM   Media Use   Hours per day of screen time (for entertainment) 0         7/7/2023    10:31 AM   Sleep   Do you have any concerns about your child's sleep? No concerns, regular bedtime routine and sleeps well through the night   Bedtime at 2200, wakes 1-2x overnight for a bottle. Naps twice daily.         7/7/2023    10:31 AM   Vision/Hearing   Vision or hearing concerns No concerns         7/7/2023    10:31 AM   Development/ Social-Emotional Screen   Developmental concerns No  "  Does your child receive any special services? No     Development     Screening tool used, reviewed with parent/guardian: No screening tool used  Milestones (by observation/ exam/ report) 75-90% ile   SOCIAL/EMOTIONAL:   Plays games with you, like pat[employer]  LANGUAGE/COMMUNICATION:   Waves \"bye-bye\"   Calls a parent \"mama\" or \"lenora\" or another special name   Understands \"no\" (pauses briefly or stops when you say it)  COGNITIVE (LEARNING, THINKING, PROBLEM-SOLVING):    Puts something in a container, like a block in a cup   Looks for things they see you hide, like a toy under a blanket  MOVEMENT/PHYSICAL DEVELOPMENT:   Pulls up to stand   Walks, holding on to furniture   Drinks from a cup without a lid, as you hold it   Picks things up between thumb and pointer finger, like small bits of food       Objective     Exam  Pulse 130   Temp 97.6  F (36.4  C) (Axillary)   Resp 32   Ht 2' 5\" (0.737 m)   Wt 18 lb 9 oz (8.42 kg)   HC 17.72\" (45 cm)   SpO2 98%   BMI 15.52 kg/m    20 %ile (Z= -0.85) based on WHO (Boys, 0-2 years) head circumference-for-age based on Head Circumference recorded on 7/7/2023.  10 %ile (Z= -1.26) based on WHO (Boys, 0-2 years) weight-for-age data using vitals from 7/7/2023.  18 %ile (Z= -0.92) based on WHO (Boys, 0-2 years) Length-for-age data based on Length recorded on 7/7/2023.  13 %ile (Z= -1.12) based on WHO (Boys, 0-2 years) weight-for-recumbent length data based on body measurements available as of 7/7/2023.    Physical Exam  GENERAL: Active, alert, in no acute distress.  SKIN: Bluish macules to back and sacrum.   HEAD: Normocephalic. Normal fontanels and sutures.  EYES: Conjunctivae and cornea normal. Red reflexes present bilaterally. Symmetric light reflex and no eye movement on cover/uncover test  EARS: Normal canals. Right TM gray and translucent. Left TM erythematous and bulging with purulent effusion.   NOSE: Clear nasal drainage.   MOUTH/THROAT: Clear. No oral lesions.  NECK: " Supple, no masses.  LYMPH NODES: No adenopathy  LUNGS: Clear. No rales, rhonchi, wheezing or retractions. Intermittent cough.   HEART: Regular rhythm. Normal S1/S2. No murmurs. Normal femoral pulses.  ABDOMEN: Soft, non-tender, not distended, no masses or hepatosplenomegaly. Normal umbilicus and bowel sounds.   GENITALIA: Normal male external genitalia. Faraz stage I,  Testes descended bilaterally, no hernia or hydrocele.    EXTREMITIES: Hips normal with full range of motion. Symmetric extremities, no deformities  NEUROLOGIC: Normal tone throughout. Normal reflexes for age    ARMANDO Conde CNP  [facility]   Yes

## 2023-11-10 ENCOUNTER — EMERGENCY (EMERGENCY)
Facility: HOSPITAL | Age: 68
LOS: 0 days | Discharge: HOME | End: 2023-11-10
Attending: EMERGENCY MEDICINE
Payer: MEDICARE

## 2023-11-10 VITALS
DIASTOLIC BLOOD PRESSURE: 74 MMHG | HEIGHT: 70 IN | HEART RATE: 99 BPM | OXYGEN SATURATION: 94 % | SYSTOLIC BLOOD PRESSURE: 161 MMHG | RESPIRATION RATE: 16 BRPM | WEIGHT: 315 LBS | TEMPERATURE: 98 F

## 2023-11-10 DIAGNOSIS — M79.671 PAIN IN RIGHT FOOT: ICD-10-CM

## 2023-11-10 DIAGNOSIS — Z79.4 LONG TERM (CURRENT) USE OF INSULIN: ICD-10-CM

## 2023-11-10 DIAGNOSIS — M79.89 OTHER SPECIFIED SOFT TISSUE DISORDERS: ICD-10-CM

## 2023-11-10 DIAGNOSIS — N18.9 CHRONIC KIDNEY DISEASE, UNSPECIFIED: ICD-10-CM

## 2023-11-10 DIAGNOSIS — Z90.5 ACQUIRED ABSENCE OF KIDNEY: Chronic | ICD-10-CM

## 2023-11-10 DIAGNOSIS — E11.22 TYPE 2 DIABETES MELLITUS WITH DIABETIC CHRONIC KIDNEY DISEASE: ICD-10-CM

## 2023-11-10 DIAGNOSIS — Z86.718 PERSONAL HISTORY OF OTHER VENOUS THROMBOSIS AND EMBOLISM: ICD-10-CM

## 2023-11-10 DIAGNOSIS — Z87.39 PERSONAL HISTORY OF OTHER DISEASES OF THE MUSCULOSKELETAL SYSTEM AND CONNECTIVE TISSUE: ICD-10-CM

## 2023-11-10 DIAGNOSIS — I12.9 HYPERTENSIVE CHRONIC KIDNEY DISEASE WITH STAGE 1 THROUGH STAGE 4 CHRONIC KIDNEY DISEASE, OR UNSPECIFIED CHRONIC KIDNEY DISEASE: ICD-10-CM

## 2023-11-10 PROCEDURE — 93970 EXTREMITY STUDY: CPT | Mod: 26

## 2023-11-10 PROCEDURE — 99284 EMERGENCY DEPT VISIT MOD MDM: CPT | Mod: 25

## 2023-11-10 PROCEDURE — 93970 EXTREMITY STUDY: CPT

## 2023-11-10 PROCEDURE — 99284 EMERGENCY DEPT VISIT MOD MDM: CPT

## 2023-11-10 PROCEDURE — 73630 X-RAY EXAM OF FOOT: CPT | Mod: 26,RT

## 2023-11-10 PROCEDURE — 73630 X-RAY EXAM OF FOOT: CPT | Mod: RT

## 2023-11-10 NOTE — ED PROVIDER NOTE - PATIENT PORTAL LINK FT
You can access the FollowMyHealth Patient Portal offered by [facility] by registering at the following website: [email or web address]. By joining [facility]’s FollowMyHealth portal, you will also be able to view your health information using other applications (apps) compatible with our system.

## 2023-11-10 NOTE — ED PROVIDER NOTE - OBJECTIVE STATEMENT
Shashi-year-old male with a history of gout, hypertension, diabetes CKD, distant ho DVT in 2017 presents with right foot pain x1 week.  Patient admits to unilateral mild right leg swelling and foot pain.  Pain feels different from normal gout flareups.  Has been able to ambulate without issue.  Denies fevers chills recent trauma

## 2023-11-10 NOTE — ED PROVIDER NOTE - PHYSICAL EXAMINATION
CONSTITUTIONAL: NAD  SKIN: Warm dry  HEAD: NCAT  EYES: NL inspection  ENT: MMM  NECK: Supple; non tender.  CARD: RRR  RESP: CTAB  ABD: S/NT no R/G  EXT: no pedal edema, BL DP intact, BL warmth, FROM, RT leg swelling >   NEURO: Grossly unremarkable  PSYCH: Cooperative, appropriate.

## 2023-11-10 NOTE — ED PROVIDER NOTE - CARE PROVIDER_API CALL
Della Brandon  Endocrinology/Metab/Diabetes  [address] [place] [address]  Phone: [phone]  Fax: [phone]  Follow Up Time: 1-3 Days

## 2023-11-10 NOTE — ED PROVIDER NOTE - CLINICAL SUMMARY MEDICAL DECISION MAKING FREE TEXT BOX
68-year-old man, history of hypertension, diabetes, CKD, gout, remote history of DVT complains of pain to the right foot over the last week.  Does not feel like his gout, no redness, no constitutional symptoms, but patient cannot explain pain so came to the ED.  Mild swelling of the foot and leg per patient.  Ambulatory with some discomfort.  On exam he is nontoxic-appearing, the foot is minimally swollen, tender along the dorsum of the metatarsals, no open wounds, good distal pulses, full range of motion of toes.  Does not look like gout or cellulitis, highly doubt DVT but patient concerned so duplex was done and negative.  X-ray with soft tissue swelling, no fracture.  Unclear etiology for symptoms, advised podiatry follow-up and close return precautions.  Patient comfortable with discharge.

## 2023-12-29 ENCOUNTER — APPOINTMENT (OUTPATIENT)
Dept: UROLOGY | Facility: CLINIC | Age: 68
End: 2023-12-29

## 2024-01-01 NOTE — PROGRESS NOTE ADULT - SUBJECTIVE AND OBJECTIVE BOX
[id number] Patient was outreached but did not answer. A voicemail was left for the patient to return our call. [phone] Patient was outreached but did not answer. A voicemail was left for the patient to return our call with  x2 JOSHUA HAM  65y, Male  Allergy: No Known Allergies      LOS  5d    CHIEF COMPLAINT: SOB (09 Dec 2020 10:29)      INTERVAL EVENTS/HPI  - No acute events overnight  - T(F): , Max: 101.4 (12-08-20 @ 17:33)  - Denies any worsening symptoms  - Tolerating medication  - WBC Count: 4.72 (12-09-20 @ 06:11)  WBC Count: 6.20 (12-07-20 @ 05:20)     - Creatinine, Serum: 2.0 (12-09-20 @ 06:11)       ROS  General: Denies rigors, nightsweats  HEENT: Denies headache, rhinorrhea, sore throat, eye pain  CV: Denies CP, palpitations  PULM: Denies wheezing, hemoptysis  GI: Denies hematemesis, hematochezia, melena  : Denies discharge, hematuria  MSK: Denies arthralgias, myalgias  SKIN: Denies rash, lesions  NEURO: Denies paresthesias, weakness  PSYCH: Denies depression, anxiety    VITALS:  T(F): 98, Max: 101.4 (12-08-20 @ 17:33)  HR: 91  BP: 148/81  RR: 18Vital Signs Last 24 Hrs  T(C): 36.7 (09 Dec 2020 05:29), Max: 38.6 (08 Dec 2020 17:33)  T(F): 98 (09 Dec 2020 05:29), Max: 101.4 (08 Dec 2020 17:33)  HR: 91 (09 Dec 2020 05:29) (76 - 95)  BP: 148/81 (09 Dec 2020 05:29) (148/81 - 196/86)  BP(mean): --  RR: 18 (09 Dec 2020 05:29) (18 - 20)  SpO2: --    PHYSICAL EXAM:  Gen: NAD, resting in bed  HEENT: Normocephalic, atraumatic  Neck: supple, no lymphadenopathy  CV: Regular rate & regular rhythm  Lungs: decreased BS at bases, no fremitus  Abdomen: Soft, BS present  Ext: Warm, well perfused  Neuro: non focal, awake  Skin: no rash, no erythema  Lines: no phlebitis    FH: Non-contributory  Social Hx: Non-contributory    TESTS & MEASUREMENTS:                        11.5   4.72  )-----------( 160      ( 09 Dec 2020 06:11 )             35.8     12-09    135  |  102  |  27<H>  ----------------------------<  171<H>  4.7   |  18  |  2.0<H>    Ca    8.3<L>      09 Dec 2020 06:11  Mg     1.8     12-09    TPro  6.2  /  Alb  3.5  /  TBili  0.5  /  DBili  x   /  AST  80<H>  /  ALT  49<H>  /  AlkPhos  67  12-09    eGFR if Non African American: 34 mL/min/1.73M2 (12-09-20 @ 06:11)  eGFR if : 39 mL/min/1.73M2 (12-09-20 @ 06:11)    LIVER FUNCTIONS - ( 09 Dec 2020 06:11 )  Alb: 3.5 g/dL / Pro: 6.2 g/dL / ALK PHOS: 67 U/L / ALT: 49 U/L / AST: 80 U/L / GGT: x               Culture - Blood (collected 12-06-20 @ 05:37)  Source: .Blood None  Preliminary Report (12-07-20 @ 18:01):    No growth to date.    Culture - Blood (collected 12-05-20 @ 12:50)  Source: .Blood None  Preliminary Report (12-06-20 @ 19:01):    No growth to date.    Culture - Blood (collected 12-04-20 @ 02:00)  Source: .Blood Blood-Peripheral  Gram Stain (12-05-20 @ 22:46):    Growth in anaerobic bottle:    Gram Positive Cocci in Clusters    Growth in aerobic bottle: Gram Positive Cocci in Clusters  Final Report (12-07-20 @ 07:26):    Growth in aerobic and anaerobic bottles: Staphylococcus aureus    "Due to technical problems, Proteus sp. will Not be reported as part of    the BCID panel until further notice"    ***Blood Panel PCR results on this specimen are available    approximately 3 hours after the Gram stain result.***    Gram stain, PCR, and/or culture results may not always    correspond due to difference in methodologies.    ************************************************************    This PCR assay was performed using [employer].    The following targets are tested for: Enterococcus,    vancomycin resistant enterococci, Listeria monocytogenes,    coagulase negative staphylococci, S. aureus,    methicillin resistant S. aureus, Streptococcus agalactiae    (Group B), S. pneumoniae, S. pyogenes (Group A),    Acinetobacter baumannii, Enterobacter cloacae, E. coli,    Klebsiella oxytoca, K. pneumoniae, Proteus sp.,    Serratia marcescens, Haemophilus influenzae,    Neisseria meningitidis, Pseudomonas aeruginosa, Candida    albicans, C. glabrata, C krusei, C parapsilosis,    C. tropicalis and the KPC resistance gene.  Organism: Blood Culture PCR  Staphylococcus aureus (12-07-20 @ 07:26)  Organism: Staphylococcus aureus (12-07-20 @ 07:26)      -  Ampicillin/Sulbactam: S <=8/4      -  Cefazolin: S <=4      -  Clindamycin: R <=0.25 This isolate is presumed to be clindamycin resistant based on detection of inducible resistance. Clindamycin may still be effective in some patients.      -  Erythromycin: R >4      -  Gentamicin: S <=1 Should not be used as monotherapy      -  Oxacillin: S <=0.25      -  Penicillin: R >8      -  RIF- Rifampin: S <=1 Should not be used as monotherapy      -  Tetra/Doxy: S <=1      -  Trimethoprim/Sulfamethoxazole: S <=0.5/9.5      -  Vancomycin: S 1      Method Type: RIYA  Organism: Blood Culture PCR (12-07-20 @ 07:26)      -  Staphylococcus aureus: Detec Any isolate of Staphylococcus aureus from a blood culture is NOT considered a contaminant.      Method Type: PCR    Culture - Blood (collected 12-04-20 @ 02:00)  Source: .Blood Blood-Peripheral  Gram Stain (12-07-20 @ 22:01):    Growth in aerobic bottle: Gram Positive Cocci in Clusters    Growth in anaerobic bottle: Gram Negative Rods  Preliminary Report (12-08-20 @ 11:50):    Growth in aerobic bottle: Staphylococcus aureus See previous culture    [id number]    Growth in anaerobic bottle: Gram Negative Rods    "Due to technical problems, Proteus sp. will Not be reported as part of    the BCID panel until further notice"    ***Blood Panel PCR results on this specimen are available    approximately 3 hours after the Gram stain result.***    Gram stain, PCR, and/or culture results may not always    correspond due to difference in methodologies.    ************************************************************    This PCR assay was performed using [employer].    The following targets are tested for: Enterococcus,    vancomycin resistant enterococci, Listeria monocytogenes,    coagulase negative staphylococci, S. aureus,    methicillinresistant S. aureus, Streptococcus agalactiae    (Group B), S. pneumoniae, S. pyogenes (Group A),    Acinetobacter baumannii, Enterobacter cloacae, E. coli,    Klebsiella oxytoca, K. pneumoniae, Proteus sp.,    Serratia marcescens, Haemophilus influenzae,    Neisseria meningitidis, Pseudomonas aeruginosa, Candida    albicans, C. glabrata, C krusei, C parapsilosis,    C. tropicalis and the KPC resistance gene.  Organism: Blood Culture PCR (12-08-20 @ 00:09)  Organism: Blood Culture PCR (12-08-20 @ 00:09)      -  Acinetobacter baumanii: Nondet      -  Candida albicans: Nondet      -  Candida glabrata: Nondet      -  Candida krusei: Nondet      -  Candida parapsilosis: Nondet      -  Candida tropicalis: Nondet      -  Coagulase negative Staphylococcus: Nondet      -  Enterobacter cloacae complex: Nondet      -  Enterococcus species: Nondet      -  Escherichia coli: Nondet      -  Haemophilus influenzae: Nondet      -  Klebsiella oxytoca: Nondet      -  Klebsiella pneumoniae: Nondet      -  Listeria monocytogenes: Nondet      -  Methicillin resistant Staphylococcus aureus (MRSA): Nondet      -  Multidrug (KPC pos) resistant organism: Nondet      -  Neisseria meningitidis: Nondet      -  Pseudomonas aeruginosa: Nondet      -  Serratia marcescens: Nondet      -  Staphylococcus aureus: Nondet      -  Streptococcus agalactiae (Group B): Nondet      -  Streptococcus pneumoniae: Nondet      -  Streptococcus pyogenes (Group A): Nondet      -  Streptococcus sp. (Not Grp A, B or S pneumoniae): Nondet      -  Vancomycin resistant Enterococcus sp.: Nondet      Method Type: PCR        Lactate, Blood: 1.7 mmol/L (12-04-20 @ 11:48)      INFECTIOUS DISEASES TESTING  Rapid RVP Result: NotDetec (12-04-20 @ 11:20)  Rapid RVP Result: NotDetec (12-04-20 @ 02:00)  Procalcitonin, Serum: 0.18 (12-04-20 @ 02:00)      INFLAMMATORY MARKERS  C-Reactive Protein, Serum: 0.61 mg/dL (12-04-20 @ 02:00)      RADIOLOGY & ADDITIONAL TESTS:  I have personally reviewed the last available Chest xray  CXR      CT      CARDIOLOGY TESTING  12 Lead ECG:   Ventricular Rate 80 BPM    Atrial Rate 80 BPM    P-R Interval 256 ms    QRS Duration 182 ms    Q-T Interval 426 ms    QTC Calculation(Bazett) 491 ms    P Axis 33 degrees    R Axis -37 degrees    T Axis 14 degrees    Diagnosis Line Sinus rhythm oocq2te degree A-V block  Indeterminate axis  Right bundle branch block  Abnormal ECG    Confirmed by VIBHA GUILLERMO MD ([id number]) on 12/6/2020 12:58:49 PM (12-06-20 @ 01:53)  12 Lead ECG:   Ventricular Rate 110 BPM    Atrial Rate 110 BPM    P-R Interval 216 ms    QRS Duration 174 ms    Q-T Interval 344 ms    QTC Calculation(Bazett) 465 ms    P Axis 11 degrees    R Axis -50 degrees    T Axis 2 degrees    Diagnosis Line Sinus tachycardia with 1st degree A-V block  Right bundle branch block  Left anterior fascicular block  *** Bifascicular block ***  Inferior infarct , age undetermined  Abnormal ECG    Confirmed by JANNETH MENON MD ([id number]) on 12/4/2020 7:45:29 AM (12-04-20 @ 04:36)      MEDICATIONS  amLODIPine   Tablet 5 Oral daily  atorvastatin 80 Oral at bedtime  cefepime   IVPB     cefepime   IVPB 1000 IV Intermittent every 8 hours  chlorhexidine 4% Liquid 1 Topical <User Schedule>  dextrose 40% Gel 15 Oral once  dextrose 5%. 1000 IV Continuous <Continuous>  dextrose 5%. 1000 IV Continuous <Continuous>  dextrose 50% Injectable 25 IV Push once  dextrose 50% Injectable 12.5 IV Push once  dextrose 50% Injectable 25 IV Push once  dorzolamide 2% Ophthalmic Solution 1 Right EYE <User Schedule>  glucagon  Injectable 1 IntraMuscular once  guaiFENesin ER 1200 Oral every 12 hours  heparin   Injectable 5000 SubCutaneous every 8 hours  insulin glargine Injectable (LANTUS) 40 SubCutaneous at bedtime  insulin lispro (ADMELOG) corrective regimen sliding scale  SubCutaneous three times a day before meals  insulin lispro Injectable (ADMELOG) 14 SubCutaneous three times a day before meals  latanoprost 0.005% Ophthalmic Solution 1 Right EYE at bedtime  losartan 100 Oral daily  losartan 50 Oral once  pantoprazole    Tablet 40 Oral before breakfast  timolol 0.5% Solution 1 Right EYE every 24 hours      WEIGHT  Weight (kg): 175 (12-04-20 @ 10:30)  Creatinine, Serum: 2.0 mg/dL (12-09-20 @ 06:11)      ANTIBIOTICS:  cefepime   IVPB      cefepime   IVPB 1000 milliGRAM(s) IV Intermittent every 8 hours      All available historical records have been reviewed

## 2024-02-26 NOTE — DISCHARGE NOTE PROVIDER - DISCHARGE DATE
Encounter addended by: Pepe Faye M.D. on: 2/26/2024 11:24 AM   Actions taken: Clinical Note Signed
23-Dec-2020

## 2024-06-27 ENCOUNTER — EMERGENCY (EMERGENCY)
Facility: HOSPITAL | Age: 69
LOS: 0 days | Discharge: HOME | End: 2024-06-27
Attending: EMERGENCY MEDICINE
Payer: MEDICARE

## 2024-06-27 VITALS — HEART RATE: 98 BPM

## 2024-06-27 VITALS
OXYGEN SATURATION: 95 % | TEMPERATURE: 98 F | SYSTOLIC BLOOD PRESSURE: 158 MMHG | WEIGHT: 315 LBS | HEART RATE: 111 BPM | DIASTOLIC BLOOD PRESSURE: 81 MMHG | RESPIRATION RATE: 17 BRPM

## 2024-06-27 DIAGNOSIS — E78.5 HYPERLIPIDEMIA, UNSPECIFIED: ICD-10-CM

## 2024-06-27 DIAGNOSIS — Z90.5 ACQUIRED ABSENCE OF KIDNEY: Chronic | ICD-10-CM

## 2024-06-27 DIAGNOSIS — L03.115 CELLULITIS OF RIGHT LOWER LIMB: ICD-10-CM

## 2024-06-27 DIAGNOSIS — N18.9 CHRONIC KIDNEY DISEASE, UNSPECIFIED: ICD-10-CM

## 2024-06-27 DIAGNOSIS — E11.22 TYPE 2 DIABETES MELLITUS WITH DIABETIC CHRONIC KIDNEY DISEASE: ICD-10-CM

## 2024-06-27 DIAGNOSIS — M10.9 GOUT, UNSPECIFIED: ICD-10-CM

## 2024-06-27 DIAGNOSIS — I13.10 HYPERTENSIVE HEART AND CHRONIC KIDNEY DISEASE WITHOUT HEART FAILURE, WITH STAGE 1 THROUGH STAGE 4 CHRONIC KIDNEY DISEASE, OR UNSPECIFIED CHRONIC KIDNEY DISEASE: ICD-10-CM

## 2024-06-27 LAB
ALBUMIN SERPL ELPH-MCNC: 4.2 G/DL — SIGNIFICANT CHANGE UP (ref 3.5–5.2)
ALP SERPL-CCNC: 74 U/L — SIGNIFICANT CHANGE UP (ref 30–115)
ALT FLD-CCNC: 20 U/L — SIGNIFICANT CHANGE UP (ref 0–41)
ANION GAP SERPL CALC-SCNC: 11 MMOL/L — SIGNIFICANT CHANGE UP (ref 7–14)
AST SERPL-CCNC: 31 U/L — SIGNIFICANT CHANGE UP (ref 0–41)
BASOPHILS # BLD AUTO: 0.07 K/UL — SIGNIFICANT CHANGE UP (ref 0–0.2)
BASOPHILS NFR BLD AUTO: 0.6 % — SIGNIFICANT CHANGE UP (ref 0–1)
BILIRUB SERPL-MCNC: 0.8 MG/DL — SIGNIFICANT CHANGE UP (ref 0.2–1.2)
BUN SERPL-MCNC: 39 MG/DL — HIGH (ref 10–20)
CALCIUM SERPL-MCNC: 9.6 MG/DL — SIGNIFICANT CHANGE UP (ref 8.4–10.5)
CHLORIDE SERPL-SCNC: 100 MMOL/L — SIGNIFICANT CHANGE UP (ref 98–110)
CO2 SERPL-SCNC: 24 MMOL/L — SIGNIFICANT CHANGE UP (ref 17–32)
CREAT SERPL-MCNC: 2 MG/DL — HIGH (ref 0.7–1.5)
EGFR: 35 ML/MIN/1.73M2 — LOW
EOSINOPHIL # BLD AUTO: 0.08 K/UL — SIGNIFICANT CHANGE UP (ref 0–0.7)
EOSINOPHIL NFR BLD AUTO: 0.7 % — SIGNIFICANT CHANGE UP (ref 0–8)
GLUCOSE SERPL-MCNC: 122 MG/DL — HIGH (ref 70–99)
HCT VFR BLD CALC: 41.6 % — LOW (ref 42–52)
HGB BLD-MCNC: 13.9 G/DL — LOW (ref 14–18)
IMM GRANULOCYTES NFR BLD AUTO: 0.8 % — HIGH (ref 0.1–0.3)
LYMPHOCYTES # BLD AUTO: 1.55 K/UL — SIGNIFICANT CHANGE UP (ref 1.2–3.4)
LYMPHOCYTES # BLD AUTO: 13 % — LOW (ref 20.5–51.1)
MCHC RBC-ENTMCNC: 30.9 PG — SIGNIFICANT CHANGE UP (ref 27–31)
MCHC RBC-ENTMCNC: 33.4 G/DL — SIGNIFICANT CHANGE UP (ref 32–37)
MCV RBC AUTO: 92.4 FL — SIGNIFICANT CHANGE UP (ref 80–94)
MONOCYTES # BLD AUTO: 1.32 K/UL — HIGH (ref 0.1–0.6)
MONOCYTES NFR BLD AUTO: 11.1 % — HIGH (ref 1.7–9.3)
NEUTROPHILS # BLD AUTO: 8.82 K/UL — HIGH (ref 1.4–6.5)
NEUTROPHILS NFR BLD AUTO: 73.8 % — SIGNIFICANT CHANGE UP (ref 42.2–75.2)
NRBC # BLD: 0 /100 WBCS — SIGNIFICANT CHANGE UP (ref 0–0)
PLATELET # BLD AUTO: 212 K/UL — SIGNIFICANT CHANGE UP (ref 130–400)
PMV BLD: 10.4 FL — SIGNIFICANT CHANGE UP (ref 7.4–10.4)
POTASSIUM SERPL-MCNC: SIGNIFICANT CHANGE UP MMOL/L (ref 3.5–5)
POTASSIUM SERPL-SCNC: SIGNIFICANT CHANGE UP MMOL/L (ref 3.5–5)
PROT SERPL-MCNC: 7.7 G/DL — SIGNIFICANT CHANGE UP (ref 6–8)
RBC # BLD: 4.5 M/UL — LOW (ref 4.7–6.1)
RBC # FLD: 14.5 % — SIGNIFICANT CHANGE UP (ref 11.5–14.5)
SODIUM SERPL-SCNC: 135 MMOL/L — SIGNIFICANT CHANGE UP (ref 135–146)
WBC # BLD: 11.93 K/UL — HIGH (ref 4.8–10.8)
WBC # FLD AUTO: 11.93 K/UL — HIGH (ref 4.8–10.8)

## 2024-06-27 PROCEDURE — 99284 EMERGENCY DEPT VISIT MOD MDM: CPT

## 2024-06-27 PROCEDURE — 93970 EXTREMITY STUDY: CPT

## 2024-06-27 PROCEDURE — 85025 COMPLETE CBC W/AUTO DIFF WBC: CPT

## 2024-06-27 PROCEDURE — 36000 PLACE NEEDLE IN VEIN: CPT

## 2024-06-27 PROCEDURE — 99284 EMERGENCY DEPT VISIT MOD MDM: CPT | Mod: 25

## 2024-06-27 PROCEDURE — 93970 EXTREMITY STUDY: CPT | Mod: 26

## 2024-06-27 PROCEDURE — 80053 COMPREHEN METABOLIC PANEL: CPT

## 2024-06-27 PROCEDURE — 36415 COLL VENOUS BLD VENIPUNCTURE: CPT

## 2024-06-27 RX ORDER — SACCHAROMYCES BOULARDII 250 MG
1 POWDER IN PACKET (EA) ORAL
Qty: 14 | Refills: 0
Start: 2024-06-27 | End: 2024-07-03

## 2024-06-27 NOTE — ED PROVIDER NOTE - OBJECTIVE STATEMENT
68-year-old male past medical history DM, gout, HTN, CKD, history DVT 2017 presents with complaint of redness and tenderness to anterior right lower leg.  Reports for the past 2 days he has had erythema to this area.  Denies fever/chills, chest pain, shortness of breath, abdominal pain, nausea/vomiting/diarrhea, change in bowel/bladder habits, lightheadedness, dizziness, focal numbness/weakness.

## 2024-06-27 NOTE — ED ADULT NURSE NOTE - NSFALLUNIVINTERV_ED_ALL_ED
Bed/Stretcher in lowest position, wheels locked, appropriate side rails in place/Call bell, personal items and telephone in reach/Instruct patient to call for assistance before getting out of bed/chair/stretcher/Non-slip footwear applied when patient is off stretcher/[place] to call system/Physically safe environment - no spills, clutter or unnecessary equipment/Purposeful proactive rounding/Room/bathroom lighting operational, light cord in reach

## 2024-06-27 NOTE — ED PROVIDER NOTE - CLINICAL SUMMARY MEDICAL DECISION MAKING FREE TEXT BOX
This patient presents with initial presentation of local erythema, warmth, swelling concerning for cellulitis. Sensitivity/pain to light touch around the erythematous area. No lymphangitic spread visible and no fluid pockets or fluctuance concerning for abscess noted. Low concern for osteomyelitis or DVT. No bullae, pain out of proportion, or rapid progression concerning for necrotizing fasciitis. Patient to be discharged home with ABX and with follow up with their PMD. Return precautions discussed.

## 2024-06-27 NOTE — ED PROVIDER NOTE - PATIENT PORTAL LINK FT
You can access the FollowMyHealth Patient Portal offered by [facility] by registering at the following website: [email or web address]. By joining [facility]’s FollowMyHealth portal, you will also be able to view your health information using other applications (apps) compatible with our system.

## 2024-06-27 NOTE — ED PROVIDER NOTE - NSFOLLOWUPINSTRUCTIONS_ED_ALL_ED_FT
Antibiotics were sent to your pharmacy - take as prescribed.  Follow-up with your PCP in 1-3 days.    Cellulitis    Cellulitis is a skin infection caused by bacteria. This condition occurs most often in the arms and lower legs but can occur anywhere over the body. Symptoms include redness, swelling, warm skin, tenderness, and chills/fever. If you were prescribed an antibiotic medicine, take it as told by your health care provider. Do not stop taking the antibiotic even if you start to feel better.    SEEK IMMEDIATE MEDICAL CARE IF YOU HAVE ANY OF THE FOLLOWING SYMPTOMS: worsening fever, red streaks coming from affected area, vomiting or diarrhea, or dizziness/lightheadedness.

## 2024-06-27 NOTE — ED PROVIDER NOTE - ATTENDING APP SHARED VISIT CONTRIBUTION OF CARE
I have reviewed and agree with the mid-level note, except as documented in my note below.    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 Pt denies recent heavy lifting or trauma. Old chart reviewed. I have reviewed and agree with the initial nursing note, except as documented in my note.    VSS, awake, alert, non-toxic appearing, oropharynx clear, mmm, chest CTAB, non-labored breathing, no w/r/r, +S1/S2, RRR, no m/r/g, abdomen soft, NT, ND, +BS, RLE: No pelvic, hip, knee, tib-fib, ankle or foot tenderness, appears symmetrical, no gross deformity, no lacerations or abrasions, no skin breakdown, no crepitus, point tenderness, + swelling, warmth, erythema of lateral tib-fib region, no induration, fluctuance, lymphangitis, purulence or lesions, active ROM and passive ROM intact, no joint laxity appreciated, motor and sensation intact distally, 5/5 strength, NV intact distally with 2+ DP pulses, compartments non-tense, pain not out of proportion to exam not appreciated, normal gait, clear speech and steady gait.

## 2024-06-27 NOTE — ED PROVIDER NOTE - PHYSICAL EXAMINATION
Vital Signs: I have reviewed the initial vital signs.  Constitutional: appears stated age, no acute distress  Eyes: Sclera clear, EOMI.  Cardiovascular: S1 and S2, regular rate, regular rhythm, well-perfused extremities, radial pulses equal and 2+, pedal pulses 2+ and equal  Respiratory: unlabored respiratory effort, clear to auscultation bilaterally no wheezing, rales, or rhonchi  Gastrointestinal:  abdomen soft, non-tender  Musculoskeletal: supple neck, no lower extremity edema, + right anterior lower leg erythema with mild overlying tenderness, no induration or fluctuance  Integumentary: warm, dry, no rash  Neurologic: awake, alert, oriented x3, extremities’ motor and sensory functions grossly intact

## 2024-07-01 ENCOUNTER — INPATIENT (INPATIENT)
Facility: HOSPITAL | Age: 69
LOS: 3 days | Discharge: HOME | DRG: 603 | End: 2024-07-05
Attending: STUDENT IN AN ORGANIZED HEALTH CARE EDUCATION/TRAINING PROGRAM | Admitting: STUDENT IN AN ORGANIZED HEALTH CARE EDUCATION/TRAINING PROGRAM
Payer: MEDICARE

## 2024-07-01 VITALS
OXYGEN SATURATION: 95 % | RESPIRATION RATE: 17 BRPM | TEMPERATURE: 98 F | WEIGHT: 315 LBS | DIASTOLIC BLOOD PRESSURE: 79 MMHG | HEART RATE: 97 BPM | SYSTOLIC BLOOD PRESSURE: 142 MMHG

## 2024-07-01 DIAGNOSIS — L03.90 CELLULITIS, UNSPECIFIED: ICD-10-CM

## 2024-07-01 DIAGNOSIS — Z90.5 ACQUIRED ABSENCE OF KIDNEY: Chronic | ICD-10-CM

## 2024-07-01 LAB
ALBUMIN SERPL ELPH-MCNC: 3.5 G/DL — SIGNIFICANT CHANGE UP (ref 3.5–5.2)
ALP SERPL-CCNC: 78 U/L — SIGNIFICANT CHANGE UP (ref 30–115)
ALT FLD-CCNC: 34 U/L — SIGNIFICANT CHANGE UP (ref 0–41)
ANION GAP SERPL CALC-SCNC: 14 MMOL/L — SIGNIFICANT CHANGE UP (ref 7–14)
AST SERPL-CCNC: 41 U/L — SIGNIFICANT CHANGE UP (ref 0–41)
BASOPHILS # BLD AUTO: 0.05 K/UL — SIGNIFICANT CHANGE UP (ref 0–0.2)
BASOPHILS NFR BLD AUTO: 0.5 % — SIGNIFICANT CHANGE UP (ref 0–1)
BILIRUB SERPL-MCNC: 1.1 MG/DL — SIGNIFICANT CHANGE UP (ref 0.2–1.2)
BUN SERPL-MCNC: 60 MG/DL — HIGH (ref 10–20)
CALCIUM SERPL-MCNC: 8.7 MG/DL — SIGNIFICANT CHANGE UP (ref 8.4–10.5)
CHLORIDE SERPL-SCNC: 94 MMOL/L — LOW (ref 98–110)
CO2 SERPL-SCNC: 23 MMOL/L — SIGNIFICANT CHANGE UP (ref 17–32)
CREAT SERPL-MCNC: 2.3 MG/DL — HIGH (ref 0.7–1.5)
EGFR: 30 ML/MIN/1.73M2 — LOW
EOSINOPHIL # BLD AUTO: 0.06 K/UL — SIGNIFICANT CHANGE UP (ref 0–0.7)
EOSINOPHIL NFR BLD AUTO: 0.6 % — SIGNIFICANT CHANGE UP (ref 0–8)
GLUCOSE BLDC GLUCOMTR-MCNC: 146 MG/DL — HIGH (ref 70–99)
GLUCOSE BLDC GLUCOMTR-MCNC: 213 MG/DL — HIGH (ref 70–99)
GLUCOSE SERPL-MCNC: 293 MG/DL — HIGH (ref 70–99)
HCT VFR BLD CALC: 36.8 % — LOW (ref 42–52)
HGB BLD-MCNC: 12.4 G/DL — LOW (ref 14–18)
IMM GRANULOCYTES NFR BLD AUTO: 0.6 % — HIGH (ref 0.1–0.3)
LACTATE SERPL-SCNC: 1.8 MMOL/L — SIGNIFICANT CHANGE UP (ref 0.7–2)
LYMPHOCYTES # BLD AUTO: 0.83 K/UL — LOW (ref 1.2–3.4)
LYMPHOCYTES # BLD AUTO: 8.9 % — LOW (ref 20.5–51.1)
MCHC RBC-ENTMCNC: 30.8 PG — SIGNIFICANT CHANGE UP (ref 27–31)
MCHC RBC-ENTMCNC: 33.7 G/DL — SIGNIFICANT CHANGE UP (ref 32–37)
MCV RBC AUTO: 91.3 FL — SIGNIFICANT CHANGE UP (ref 80–94)
MONOCYTES # BLD AUTO: 0.98 K/UL — HIGH (ref 0.1–0.6)
MONOCYTES NFR BLD AUTO: 10.5 % — HIGH (ref 1.7–9.3)
NEUTROPHILS # BLD AUTO: 7.37 K/UL — HIGH (ref 1.4–6.5)
NEUTROPHILS NFR BLD AUTO: 78.9 % — HIGH (ref 42.2–75.2)
NRBC # BLD: 0 /100 WBCS — SIGNIFICANT CHANGE UP (ref 0–0)
PLATELET # BLD AUTO: 201 K/UL — SIGNIFICANT CHANGE UP (ref 130–400)
PMV BLD: 11.2 FL — HIGH (ref 7.4–10.4)
POTASSIUM SERPL-MCNC: 5.1 MMOL/L — HIGH (ref 3.5–5)
POTASSIUM SERPL-SCNC: 5.1 MMOL/L — HIGH (ref 3.5–5)
PROT SERPL-MCNC: 7 G/DL — SIGNIFICANT CHANGE UP (ref 6–8)
RBC # BLD: 4.03 M/UL — LOW (ref 4.7–6.1)
RBC # FLD: 14.2 % — SIGNIFICANT CHANGE UP (ref 11.5–14.5)
SODIUM SERPL-SCNC: 131 MMOL/L — LOW (ref 135–146)
WBC # BLD: 9.35 K/UL — SIGNIFICANT CHANGE UP (ref 4.8–10.8)
WBC # FLD AUTO: 9.35 K/UL — SIGNIFICANT CHANGE UP (ref 4.8–10.8)

## 2024-07-01 PROCEDURE — 87640 STAPH A DNA AMP PROBE: CPT

## 2024-07-01 PROCEDURE — 83036 HEMOGLOBIN GLYCOSYLATED A1C: CPT

## 2024-07-01 PROCEDURE — 99222 1ST HOSP IP/OBS MODERATE 55: CPT

## 2024-07-01 PROCEDURE — 85025 COMPLETE CBC W/AUTO DIFF WBC: CPT

## 2024-07-01 PROCEDURE — 36415 COLL VENOUS BLD VENIPUNCTURE: CPT

## 2024-07-01 PROCEDURE — 73590 X-RAY EXAM OF LOWER LEG: CPT | Mod: RT

## 2024-07-01 PROCEDURE — 80053 COMPREHEN METABOLIC PANEL: CPT

## 2024-07-01 PROCEDURE — 83735 ASSAY OF MAGNESIUM: CPT

## 2024-07-01 PROCEDURE — 76770 US EXAM ABDO BACK WALL COMP: CPT

## 2024-07-01 PROCEDURE — 87641 MR-STAPH DNA AMP PROBE: CPT

## 2024-07-01 PROCEDURE — 82962 GLUCOSE BLOOD TEST: CPT

## 2024-07-01 PROCEDURE — 80048 BASIC METABOLIC PNL TOTAL CA: CPT

## 2024-07-01 PROCEDURE — 93010 ELECTROCARDIOGRAM REPORT: CPT

## 2024-07-01 PROCEDURE — 73590 X-RAY EXAM OF LOWER LEG: CPT | Mod: 26,RT

## 2024-07-01 PROCEDURE — 93005 ELECTROCARDIOGRAM TRACING: CPT

## 2024-07-01 PROCEDURE — 99285 EMERGENCY DEPT VISIT HI MDM: CPT

## 2024-07-01 PROCEDURE — 87040 BLOOD CULTURE FOR BACTERIA: CPT

## 2024-07-01 PROCEDURE — 82550 ASSAY OF CK (CPK): CPT

## 2024-07-01 RX ORDER — DEXTROSE 30 % IN WATER 30 %
15 VIAL (ML) INTRAVENOUS ONCE
Refills: 0 | Status: DISCONTINUED | OUTPATIENT
Start: 2024-07-01 | End: 2024-07-05

## 2024-07-01 RX ORDER — DEXTROSE MONOHYDRATE AND SODIUM CHLORIDE 5; .3 G/100ML; G/100ML
1000 INJECTION, SOLUTION INTRAVENOUS
Refills: 0 | Status: DISCONTINUED | OUTPATIENT
Start: 2024-07-01 | End: 2024-07-05

## 2024-07-01 RX ORDER — ATORVASTATIN CALCIUM 20 MG/1
20 TABLET, FILM COATED ORAL AT BEDTIME
Refills: 0 | Status: DISCONTINUED | OUTPATIENT
Start: 2024-07-01 | End: 2024-07-05

## 2024-07-01 RX ORDER — TIMOLOL MALEATE 3.4 MG/ML
1 SOLUTION/ DROPS OPHTHALMIC
Refills: 0 | Status: DISCONTINUED | OUTPATIENT
Start: 2024-07-01 | End: 2024-07-05

## 2024-07-01 RX ORDER — LOSARTAN POTASSIUM 100 MG/1
100 TABLET, FILM COATED ORAL DAILY
Refills: 0 | Status: DISCONTINUED | OUTPATIENT
Start: 2024-07-01 | End: 2024-07-02

## 2024-07-01 RX ORDER — INSULIN GLARGINE 100 [IU]/ML
20 INJECTION, SOLUTION SUBCUTANEOUS ONCE
Refills: 0 | Status: COMPLETED | OUTPATIENT
Start: 2024-07-01 | End: 2024-07-01

## 2024-07-01 RX ORDER — METRONIDAZOLE 500 MG/1
500 TABLET ORAL ONCE
Refills: 0 | Status: COMPLETED | OUTPATIENT
Start: 2024-07-01 | End: 2024-07-01

## 2024-07-01 RX ORDER — DORZOLAMIDE HCL 2 %
1 DROPS OPHTHALMIC (EYE)
Refills: 0 | Status: DISCONTINUED | OUTPATIENT
Start: 2024-07-01 | End: 2024-07-05

## 2024-07-01 RX ORDER — INSULIN GLARGINE 100 [IU]/ML
50 INJECTION, SOLUTION SUBCUTANEOUS AT BEDTIME
Refills: 0 | Status: DISCONTINUED | OUTPATIENT
Start: 2024-07-01 | End: 2024-07-01

## 2024-07-01 RX ORDER — INSULIN GLARGINE 100 [IU]/ML
70 INJECTION, SOLUTION SUBCUTANEOUS AT BEDTIME
Refills: 0 | Status: DISCONTINUED | OUTPATIENT
Start: 2024-07-01 | End: 2024-07-01

## 2024-07-01 RX ORDER — INSULIN LISPRO 100 [IU]/ML
INJECTION, SOLUTION SUBCUTANEOUS
Refills: 0 | Status: DISCONTINUED | OUTPATIENT
Start: 2024-07-01 | End: 2024-07-05

## 2024-07-01 RX ORDER — DORZOLAMIDE HCL/TIMOLOL MALEAT 22.3-6.8/1
1 DROPS OPHTHALMIC (EYE) EVERY 12 HOURS
Refills: 0 | Status: DISCONTINUED | OUTPATIENT
Start: 2024-07-01 | End: 2024-07-01

## 2024-07-01 RX ORDER — DEXTROSE 30 % IN WATER 30 %
25 VIAL (ML) INTRAVENOUS ONCE
Refills: 0 | Status: DISCONTINUED | OUTPATIENT
Start: 2024-07-01 | End: 2024-07-05

## 2024-07-01 RX ORDER — ACETAMINOPHEN 325 MG
650 TABLET ORAL EVERY 6 HOURS
Refills: 0 | Status: DISCONTINUED | OUTPATIENT
Start: 2024-07-01 | End: 2024-07-05

## 2024-07-01 RX ORDER — VANCOMYCIN HYDROCHLORIDE 50 MG/ML
1000 KIT ORAL ONCE
Refills: 0 | Status: COMPLETED | OUTPATIENT
Start: 2024-07-01 | End: 2024-07-01

## 2024-07-01 RX ORDER — INSULIN GLARGINE 100 [IU]/ML
0 INJECTION, SOLUTION SUBCUTANEOUS
Qty: 0 | Refills: 0 | DISCHARGE

## 2024-07-01 RX ORDER — HEPARIN SODIUM 50 [USP'U]/ML
5000 INJECTION, SOLUTION INTRAVENOUS EVERY 8 HOURS
Refills: 0 | Status: DISCONTINUED | OUTPATIENT
Start: 2024-07-01 | End: 2024-07-05

## 2024-07-01 RX ORDER — INSULIN GLARGINE 100 [IU]/ML
30 INJECTION, SOLUTION SUBCUTANEOUS EVERY MORNING
Refills: 0 | Status: DISCONTINUED | OUTPATIENT
Start: 2024-07-01 | End: 2024-07-03

## 2024-07-01 RX ORDER — INSULIN GLARGINE 100 [IU]/ML
50 INJECTION, SOLUTION SUBCUTANEOUS AT BEDTIME
Refills: 0 | Status: DISCONTINUED | OUTPATIENT
Start: 2024-07-02 | End: 2024-07-05

## 2024-07-01 RX ORDER — INSULIN GLARGINE 100 [IU]/ML
0 INJECTION, SOLUTION SUBCUTANEOUS
Refills: 0 | DISCHARGE

## 2024-07-01 RX ORDER — LINEZOLID 600 MG/1
600 TABLET, FILM COATED ORAL EVERY 12 HOURS
Refills: 0 | Status: DISCONTINUED | OUTPATIENT
Start: 2024-07-01 | End: 2024-07-02

## 2024-07-01 RX ORDER — SODIUM CHLORIDE 0.9 % (FLUSH) 0.9 %
1000 SYRINGE (ML) INJECTION
Refills: 0 | Status: DISCONTINUED | OUTPATIENT
Start: 2024-07-01 | End: 2024-07-02

## 2024-07-01 RX ORDER — LATANOPROST PF 0.05 MG/ML
1 SOLUTION/ DROPS OPHTHALMIC AT BEDTIME
Refills: 0 | Status: DISCONTINUED | OUTPATIENT
Start: 2024-07-01 | End: 2024-07-05

## 2024-07-01 RX ORDER — GLUCAGON HYDROCHLORIDE 1 MG/ML
1 INJECTION, POWDER, FOR SOLUTION INTRAMUSCULAR; INTRAVENOUS; SUBCUTANEOUS ONCE
Refills: 0 | Status: DISCONTINUED | OUTPATIENT
Start: 2024-07-01 | End: 2024-07-05

## 2024-07-01 RX ORDER — INSULIN LISPRO 100 [IU]/ML
30 INJECTION, SOLUTION SUBCUTANEOUS
Refills: 0 | Status: DISCONTINUED | OUTPATIENT
Start: 2024-07-01 | End: 2024-07-01

## 2024-07-01 RX ORDER — DEXTROSE 30 % IN WATER 30 %
12.5 VIAL (ML) INTRAVENOUS ONCE
Refills: 0 | Status: DISCONTINUED | OUTPATIENT
Start: 2024-07-01 | End: 2024-07-05

## 2024-07-01 RX ORDER — DEXTROSE MONOHYDRATE 100 MG/ML
125 INJECTION, SOLUTION INTRAVENOUS ONCE
Refills: 0 | Status: DISCONTINUED | OUTPATIENT
Start: 2024-07-01 | End: 2024-07-05

## 2024-07-01 RX ORDER — CEFEPIME 1 G/1
2000 INJECTION, POWDER, FOR SOLUTION INTRAMUSCULAR; INTRAVENOUS ONCE
Refills: 0 | Status: COMPLETED | OUTPATIENT
Start: 2024-07-01 | End: 2024-07-01

## 2024-07-01 RX ORDER — INSULIN LISPRO 100 [IU]/ML
20 INJECTION, SOLUTION SUBCUTANEOUS
Refills: 0 | Status: DISCONTINUED | OUTPATIENT
Start: 2024-07-01 | End: 2024-07-04

## 2024-07-01 RX ORDER — DEXTROSE MONOHYDRATE AND SODIUM CHLORIDE 5; .3 G/100ML; G/100ML
1000 INJECTION, SOLUTION INTRAVENOUS
Refills: 0 | Status: DISCONTINUED | OUTPATIENT
Start: 2024-07-01 | End: 2024-07-01

## 2024-07-01 RX ADMIN — CEFEPIME 100 MILLIGRAM(S): 1 INJECTION, POWDER, FOR SOLUTION INTRAMUSCULAR; INTRAVENOUS at 12:43

## 2024-07-01 RX ADMIN — ATORVASTATIN CALCIUM 20 MILLIGRAM(S): 20 TABLET, FILM COATED ORAL at 21:48

## 2024-07-01 RX ADMIN — INSULIN GLARGINE 20 UNIT(S): 100 INJECTION, SOLUTION SUBCUTANEOUS at 21:55

## 2024-07-01 RX ADMIN — HEPARIN SODIUM 5000 UNIT(S): 50 INJECTION, SOLUTION INTRAVENOUS at 21:48

## 2024-07-01 RX ADMIN — LINEZOLID 300 MILLIGRAM(S): 600 TABLET, FILM COATED ORAL at 23:00

## 2024-07-01 RX ADMIN — Medication 75 MILLILITER(S): at 23:00

## 2024-07-01 RX ADMIN — LATANOPROST PF 1 DROP(S): 0.05 SOLUTION/ DROPS OPHTHALMIC at 21:49

## 2024-07-01 RX ADMIN — METRONIDAZOLE 100 MILLIGRAM(S): 500 TABLET ORAL at 15:13

## 2024-07-01 RX ADMIN — Medication 75 MILLILITER(S): at 21:55

## 2024-07-01 RX ADMIN — VANCOMYCIN HYDROCHLORIDE 250 MILLIGRAM(S): KIT at 17:33

## 2024-07-02 LAB
A1C WITH ESTIMATED AVERAGE GLUCOSE RESULT: 7.5 % — HIGH (ref 4–5.6)
ANION GAP SERPL CALC-SCNC: 12 MMOL/L — SIGNIFICANT CHANGE UP (ref 7–14)
BASOPHILS # BLD AUTO: 0.07 K/UL — SIGNIFICANT CHANGE UP (ref 0–0.2)
BASOPHILS NFR BLD AUTO: 0.8 % — SIGNIFICANT CHANGE UP (ref 0–1)
BUN SERPL-MCNC: 50 MG/DL — HIGH (ref 10–20)
CALCIUM SERPL-MCNC: 9 MG/DL — SIGNIFICANT CHANGE UP (ref 8.4–10.5)
CHLORIDE SERPL-SCNC: 96 MMOL/L — LOW (ref 98–110)
CO2 SERPL-SCNC: 23 MMOL/L — SIGNIFICANT CHANGE UP (ref 17–32)
CREAT SERPL-MCNC: 2.2 MG/DL — HIGH (ref 0.7–1.5)
EGFR: 31 ML/MIN/1.73M2 — LOW
EOSINOPHIL # BLD AUTO: 0.11 K/UL — SIGNIFICANT CHANGE UP (ref 0–0.7)
EOSINOPHIL NFR BLD AUTO: 1.3 % — SIGNIFICANT CHANGE UP (ref 0–8)
ESTIMATED AVERAGE GLUCOSE: 169 MG/DL — HIGH (ref 68–114)
GLUCOSE BLDC GLUCOMTR-MCNC: 117 MG/DL — HIGH (ref 70–99)
GLUCOSE BLDC GLUCOMTR-MCNC: 133 MG/DL — HIGH (ref 70–99)
GLUCOSE BLDC GLUCOMTR-MCNC: 156 MG/DL — HIGH (ref 70–99)
GLUCOSE BLDC GLUCOMTR-MCNC: 175 MG/DL — HIGH (ref 70–99)
GLUCOSE BLDC GLUCOMTR-MCNC: 290 MG/DL — HIGH (ref 70–99)
GLUCOSE SERPL-MCNC: 186 MG/DL — HIGH (ref 70–99)
HCT VFR BLD CALC: 40.9 % — LOW (ref 42–52)
HGB BLD-MCNC: 13.1 G/DL — LOW (ref 14–18)
IMM GRANULOCYTES NFR BLD AUTO: 0.6 % — HIGH (ref 0.1–0.3)
LYMPHOCYTES # BLD AUTO: 0.92 K/UL — LOW (ref 1.2–3.4)
LYMPHOCYTES # BLD AUTO: 11.1 % — LOW (ref 20.5–51.1)
MAGNESIUM SERPL-MCNC: 1.8 MG/DL — SIGNIFICANT CHANGE UP (ref 1.8–2.4)
MCHC RBC-ENTMCNC: 30.1 PG — SIGNIFICANT CHANGE UP (ref 27–31)
MCHC RBC-ENTMCNC: 32 G/DL — SIGNIFICANT CHANGE UP (ref 32–37)
MCV RBC AUTO: 94 FL — SIGNIFICANT CHANGE UP (ref 80–94)
MONOCYTES # BLD AUTO: 0.81 K/UL — HIGH (ref 0.1–0.6)
MONOCYTES NFR BLD AUTO: 9.8 % — HIGH (ref 1.7–9.3)
MRSA PCR RESULT.: NEGATIVE — SIGNIFICANT CHANGE UP
NEUTROPHILS # BLD AUTO: 6.31 K/UL — SIGNIFICANT CHANGE UP (ref 1.4–6.5)
NEUTROPHILS NFR BLD AUTO: 76.4 % — HIGH (ref 42.2–75.2)
NRBC # BLD: 0 /100 WBCS — SIGNIFICANT CHANGE UP (ref 0–0)
PLATELET # BLD AUTO: 183 K/UL — SIGNIFICANT CHANGE UP (ref 130–400)
PMV BLD: 10.2 FL — SIGNIFICANT CHANGE UP (ref 7.4–10.4)
POTASSIUM SERPL-MCNC: 4.7 MMOL/L — SIGNIFICANT CHANGE UP (ref 3.5–5)
POTASSIUM SERPL-SCNC: 4.7 MMOL/L — SIGNIFICANT CHANGE UP (ref 3.5–5)
RBC # BLD: 4.35 M/UL — LOW (ref 4.7–6.1)
RBC # FLD: 14.4 % — SIGNIFICANT CHANGE UP (ref 11.5–14.5)
SODIUM SERPL-SCNC: 131 MMOL/L — LOW (ref 135–146)
WBC # BLD: 8.27 K/UL — SIGNIFICANT CHANGE UP (ref 4.8–10.8)
WBC # FLD AUTO: 8.27 K/UL — SIGNIFICANT CHANGE UP (ref 4.8–10.8)

## 2024-07-02 PROCEDURE — 99233 SBSQ HOSP IP/OBS HIGH 50: CPT

## 2024-07-02 RX ORDER — AMPICILLIN AND SULBACTAM 2; 1 G/20ML; G/20ML
3 INJECTION, POWDER, FOR SOLUTION INTRAMUSCULAR; INTRAVENOUS EVERY 12 HOURS
Refills: 0 | Status: DISCONTINUED | OUTPATIENT
Start: 2024-07-02 | End: 2024-07-04

## 2024-07-02 RX ORDER — FUROSEMIDE 10 MG/ML
40 INJECTION, SOLUTION INTRAMUSCULAR; INTRAVENOUS ONCE
Refills: 0 | Status: COMPLETED | OUTPATIENT
Start: 2024-07-02 | End: 2024-07-02

## 2024-07-02 RX ORDER — NYSTATIN 100000/G
1 POWDER (GRAM) TOPICAL
Refills: 0 | Status: DISCONTINUED | OUTPATIENT
Start: 2024-07-02 | End: 2024-07-05

## 2024-07-02 RX ORDER — TRAMADOL HYDROCHLORIDE 50 MG/1
50 TABLET, FILM COATED ORAL EVERY 12 HOURS
Refills: 0 | Status: DISCONTINUED | OUTPATIENT
Start: 2024-07-02 | End: 2024-07-05

## 2024-07-02 RX ORDER — PREDNISONE 10 MG/1
40 TABLET ORAL DAILY
Refills: 0 | Status: DISCONTINUED | OUTPATIENT
Start: 2024-07-02 | End: 2024-07-04

## 2024-07-02 RX ORDER — TIMOLOL MALEATE 3.4 MG/ML
1 SOLUTION/ DROPS OPHTHALMIC EVERY 12 HOURS
Refills: 0 | Status: DISCONTINUED | OUTPATIENT
Start: 2024-07-02 | End: 2024-07-02

## 2024-07-02 RX ADMIN — PREDNISONE 40 MILLIGRAM(S): 10 TABLET ORAL at 11:38

## 2024-07-02 RX ADMIN — AMPICILLIN AND SULBACTAM 200 GRAM(S): 2; 1 INJECTION, POWDER, FOR SOLUTION INTRAMUSCULAR; INTRAVENOUS at 16:26

## 2024-07-02 RX ADMIN — Medication 30 MILLIGRAM(S): at 11:37

## 2024-07-02 RX ADMIN — HEPARIN SODIUM 5000 UNIT(S): 50 INJECTION, SOLUTION INTRAVENOUS at 21:41

## 2024-07-02 RX ADMIN — ATORVASTATIN CALCIUM 20 MILLIGRAM(S): 20 TABLET, FILM COATED ORAL at 21:41

## 2024-07-02 RX ADMIN — HEPARIN SODIUM 5000 UNIT(S): 50 INJECTION, SOLUTION INTRAVENOUS at 05:44

## 2024-07-02 RX ADMIN — INSULIN LISPRO 0: 100 INJECTION, SOLUTION SUBCUTANEOUS at 16:36

## 2024-07-02 RX ADMIN — INSULIN LISPRO 20 UNIT(S): 100 INJECTION, SOLUTION SUBCUTANEOUS at 08:53

## 2024-07-02 RX ADMIN — INSULIN LISPRO 20 UNIT(S): 100 INJECTION, SOLUTION SUBCUTANEOUS at 12:14

## 2024-07-02 RX ADMIN — INSULIN LISPRO 2: 100 INJECTION, SOLUTION SUBCUTANEOUS at 08:52

## 2024-07-02 RX ADMIN — INSULIN LISPRO 2: 100 INJECTION, SOLUTION SUBCUTANEOUS at 12:13

## 2024-07-02 RX ADMIN — Medication 1 APPLICATION(S): at 19:53

## 2024-07-02 RX ADMIN — LOSARTAN POTASSIUM 100 MILLIGRAM(S): 100 TABLET, FILM COATED ORAL at 05:43

## 2024-07-02 RX ADMIN — INSULIN GLARGINE 30 UNIT(S): 100 INJECTION, SOLUTION SUBCUTANEOUS at 12:12

## 2024-07-02 RX ADMIN — FUROSEMIDE 40 MILLIGRAM(S): 10 INJECTION, SOLUTION INTRAMUSCULAR; INTRAVENOUS at 11:37

## 2024-07-02 RX ADMIN — TIMOLOL MALEATE 1 DROP(S): 3.4 SOLUTION/ DROPS OPHTHALMIC at 05:44

## 2024-07-02 RX ADMIN — HEPARIN SODIUM 5000 UNIT(S): 50 INJECTION, SOLUTION INTRAVENOUS at 16:12

## 2024-07-02 RX ADMIN — INSULIN GLARGINE 50 UNIT(S): 100 INJECTION, SOLUTION SUBCUTANEOUS at 21:42

## 2024-07-02 RX ADMIN — INSULIN LISPRO 20 UNIT(S): 100 INJECTION, SOLUTION SUBCUTANEOUS at 16:36

## 2024-07-02 RX ADMIN — LATANOPROST PF 1 DROP(S): 0.05 SOLUTION/ DROPS OPHTHALMIC at 22:46

## 2024-07-02 RX ADMIN — Medication 1 DROP(S): at 19:53

## 2024-07-02 RX ADMIN — TIMOLOL MALEATE 1 DROP(S): 3.4 SOLUTION/ DROPS OPHTHALMIC at 19:55

## 2024-07-03 LAB
ALBUMIN SERPL ELPH-MCNC: 3.3 G/DL — LOW (ref 3.5–5.2)
ALP SERPL-CCNC: 70 U/L — SIGNIFICANT CHANGE UP (ref 30–115)
ALT FLD-CCNC: 35 U/L — SIGNIFICANT CHANGE UP (ref 0–41)
ANION GAP SERPL CALC-SCNC: 18 MMOL/L — HIGH (ref 7–14)
ANION GAP SERPL CALC-SCNC: 18 MMOL/L — HIGH (ref 7–14)
AST SERPL-CCNC: 36 U/L — SIGNIFICANT CHANGE UP (ref 0–41)
BILIRUB SERPL-MCNC: 0.6 MG/DL — SIGNIFICANT CHANGE UP (ref 0.2–1.2)
BUN SERPL-MCNC: 51 MG/DL — HIGH (ref 10–20)
BUN SERPL-MCNC: 54 MG/DL — HIGH (ref 10–20)
CALCIUM SERPL-MCNC: 9 MG/DL — SIGNIFICANT CHANGE UP (ref 8.4–10.5)
CALCIUM SERPL-MCNC: 9 MG/DL — SIGNIFICANT CHANGE UP (ref 8.4–10.5)
CHLORIDE SERPL-SCNC: 98 MMOL/L — SIGNIFICANT CHANGE UP (ref 98–110)
CHLORIDE SERPL-SCNC: 99 MMOL/L — SIGNIFICANT CHANGE UP (ref 98–110)
CK SERPL-CCNC: 297 U/L — HIGH (ref 0–225)
CO2 SERPL-SCNC: 17 MMOL/L — SIGNIFICANT CHANGE UP (ref 17–32)
CO2 SERPL-SCNC: 21 MMOL/L — SIGNIFICANT CHANGE UP (ref 17–32)
CREAT SERPL-MCNC: 2.1 MG/DL — HIGH (ref 0.7–1.5)
CREAT SERPL-MCNC: 2.1 MG/DL — HIGH (ref 0.7–1.5)
EGFR: 33 ML/MIN/1.73M2 — LOW
EGFR: 33 ML/MIN/1.73M2 — LOW
GLUCOSE BLDC GLUCOMTR-MCNC: 246 MG/DL — HIGH (ref 70–99)
GLUCOSE BLDC GLUCOMTR-MCNC: 261 MG/DL — HIGH (ref 70–99)
GLUCOSE BLDC GLUCOMTR-MCNC: 263 MG/DL — HIGH (ref 70–99)
GLUCOSE BLDC GLUCOMTR-MCNC: 283 MG/DL — HIGH (ref 70–99)
GLUCOSE BLDC GLUCOMTR-MCNC: 341 MG/DL — HIGH (ref 70–99)
GLUCOSE SERPL-MCNC: 274 MG/DL — HIGH (ref 70–99)
GLUCOSE SERPL-MCNC: 275 MG/DL — HIGH (ref 70–99)
POTASSIUM SERPL-MCNC: 5.2 MMOL/L — HIGH (ref 3.5–5)
POTASSIUM SERPL-MCNC: 5.5 MMOL/L — HIGH (ref 3.5–5)
POTASSIUM SERPL-SCNC: 5.2 MMOL/L — HIGH (ref 3.5–5)
POTASSIUM SERPL-SCNC: 5.5 MMOL/L — HIGH (ref 3.5–5)
PROT SERPL-MCNC: 6.8 G/DL — SIGNIFICANT CHANGE UP (ref 6–8)
SODIUM SERPL-SCNC: 134 MMOL/L — LOW (ref 135–146)
SODIUM SERPL-SCNC: 137 MMOL/L — SIGNIFICANT CHANGE UP (ref 135–146)

## 2024-07-03 PROCEDURE — 99233 SBSQ HOSP IP/OBS HIGH 50: CPT

## 2024-07-03 PROCEDURE — 76770 US EXAM ABDO BACK WALL COMP: CPT | Mod: 26

## 2024-07-03 RX ORDER — INSULIN GLARGINE 100 [IU]/ML
20 INJECTION, SOLUTION SUBCUTANEOUS ONCE
Refills: 0 | Status: COMPLETED | OUTPATIENT
Start: 2024-07-03 | End: 2024-07-03

## 2024-07-03 RX ORDER — INSULIN GLARGINE 100 [IU]/ML
50 INJECTION, SOLUTION SUBCUTANEOUS EVERY MORNING
Refills: 0 | Status: DISCONTINUED | OUTPATIENT
Start: 2024-07-04 | End: 2024-07-05

## 2024-07-03 RX ADMIN — INSULIN GLARGINE 50 UNIT(S): 100 INJECTION, SOLUTION SUBCUTANEOUS at 21:40

## 2024-07-03 RX ADMIN — INSULIN LISPRO 20 UNIT(S): 100 INJECTION, SOLUTION SUBCUTANEOUS at 17:01

## 2024-07-03 RX ADMIN — AMPICILLIN AND SULBACTAM 200 GRAM(S): 2; 1 INJECTION, POWDER, FOR SOLUTION INTRAMUSCULAR; INTRAVENOUS at 05:45

## 2024-07-03 RX ADMIN — ATORVASTATIN CALCIUM 20 MILLIGRAM(S): 20 TABLET, FILM COATED ORAL at 21:36

## 2024-07-03 RX ADMIN — Medication 1 DROP(S): at 05:46

## 2024-07-03 RX ADMIN — Medication 1 DROP(S): at 17:02

## 2024-07-03 RX ADMIN — HEPARIN SODIUM 5000 UNIT(S): 50 INJECTION, SOLUTION INTRAVENOUS at 14:17

## 2024-07-03 RX ADMIN — TIMOLOL MALEATE 1 DROP(S): 3.4 SOLUTION/ DROPS OPHTHALMIC at 17:02

## 2024-07-03 RX ADMIN — INSULIN GLARGINE 20 UNIT(S): 100 INJECTION, SOLUTION SUBCUTANEOUS at 11:49

## 2024-07-03 RX ADMIN — HEPARIN SODIUM 5000 UNIT(S): 50 INJECTION, SOLUTION INTRAVENOUS at 21:36

## 2024-07-03 RX ADMIN — AMPICILLIN AND SULBACTAM 200 GRAM(S): 2; 1 INJECTION, POWDER, FOR SOLUTION INTRAMUSCULAR; INTRAVENOUS at 17:02

## 2024-07-03 RX ADMIN — Medication 1 APPLICATION(S): at 05:46

## 2024-07-03 RX ADMIN — Medication 30 MILLIGRAM(S): at 05:45

## 2024-07-03 RX ADMIN — INSULIN LISPRO 4: 100 INJECTION, SOLUTION SUBCUTANEOUS at 11:49

## 2024-07-03 RX ADMIN — INSULIN LISPRO 20 UNIT(S): 100 INJECTION, SOLUTION SUBCUTANEOUS at 11:50

## 2024-07-03 RX ADMIN — TIMOLOL MALEATE 1 DROP(S): 3.4 SOLUTION/ DROPS OPHTHALMIC at 05:46

## 2024-07-03 RX ADMIN — INSULIN LISPRO 20 UNIT(S): 100 INJECTION, SOLUTION SUBCUTANEOUS at 08:02

## 2024-07-03 RX ADMIN — Medication 1 APPLICATION(S): at 17:52

## 2024-07-03 RX ADMIN — LATANOPROST PF 1 DROP(S): 0.05 SOLUTION/ DROPS OPHTHALMIC at 22:20

## 2024-07-03 RX ADMIN — PREDNISONE 40 MILLIGRAM(S): 10 TABLET ORAL at 05:45

## 2024-07-03 RX ADMIN — INSULIN GLARGINE 30 UNIT(S): 100 INJECTION, SOLUTION SUBCUTANEOUS at 08:01

## 2024-07-03 RX ADMIN — INSULIN LISPRO 6: 100 INJECTION, SOLUTION SUBCUTANEOUS at 17:00

## 2024-07-03 RX ADMIN — HEPARIN SODIUM 5000 UNIT(S): 50 INJECTION, SOLUTION INTRAVENOUS at 05:45

## 2024-07-03 RX ADMIN — INSULIN LISPRO 6: 100 INJECTION, SOLUTION SUBCUTANEOUS at 08:02

## 2024-07-04 LAB
ANION GAP SERPL CALC-SCNC: 16 MMOL/L — HIGH (ref 7–14)
BUN SERPL-MCNC: 59 MG/DL — HIGH (ref 10–20)
CALCIUM SERPL-MCNC: 9 MG/DL — SIGNIFICANT CHANGE UP (ref 8.4–10.5)
CHLORIDE SERPL-SCNC: 100 MMOL/L — SIGNIFICANT CHANGE UP (ref 98–110)
CO2 SERPL-SCNC: 20 MMOL/L — SIGNIFICANT CHANGE UP (ref 17–32)
CREAT SERPL-MCNC: 2 MG/DL — HIGH (ref 0.7–1.5)
EGFR: 35 ML/MIN/1.73M2 — LOW
GLUCOSE BLDC GLUCOMTR-MCNC: 199 MG/DL — HIGH (ref 70–99)
GLUCOSE BLDC GLUCOMTR-MCNC: 221 MG/DL — HIGH (ref 70–99)
GLUCOSE BLDC GLUCOMTR-MCNC: 223 MG/DL — HIGH (ref 70–99)
GLUCOSE BLDC GLUCOMTR-MCNC: 235 MG/DL — HIGH (ref 70–99)
GLUCOSE BLDC GLUCOMTR-MCNC: 275 MG/DL — HIGH (ref 70–99)
GLUCOSE SERPL-MCNC: 190 MG/DL — HIGH (ref 70–99)
POTASSIUM SERPL-MCNC: 4.7 MMOL/L — SIGNIFICANT CHANGE UP (ref 3.5–5)
POTASSIUM SERPL-SCNC: 4.7 MMOL/L — SIGNIFICANT CHANGE UP (ref 3.5–5)
SODIUM SERPL-SCNC: 136 MMOL/L — SIGNIFICANT CHANGE UP (ref 135–146)

## 2024-07-04 PROCEDURE — 99232 SBSQ HOSP IP/OBS MODERATE 35: CPT

## 2024-07-04 RX ORDER — AMPICILLIN AND SULBACTAM 2; 1 G/20ML; G/20ML
INJECTION, POWDER, FOR SOLUTION INTRAMUSCULAR; INTRAVENOUS
Refills: 0 | Status: DISCONTINUED | OUTPATIENT
Start: 2024-07-04 | End: 2024-07-04

## 2024-07-04 RX ORDER — INSULIN LISPRO 100 [IU]/ML
30 INJECTION, SOLUTION SUBCUTANEOUS
Refills: 0 | Status: DISCONTINUED | OUTPATIENT
Start: 2024-07-04 | End: 2024-07-05

## 2024-07-04 RX ORDER — INSULIN LISPRO 100 [IU]/ML
8 INJECTION, SOLUTION SUBCUTANEOUS ONCE
Refills: 0 | Status: COMPLETED | OUTPATIENT
Start: 2024-07-04 | End: 2024-07-04

## 2024-07-04 RX ORDER — INSULIN LISPRO 100 [IU]/ML
20 INJECTION, SOLUTION SUBCUTANEOUS
Refills: 0 | Status: DISCONTINUED | OUTPATIENT
Start: 2024-07-04 | End: 2024-07-04

## 2024-07-04 RX ORDER — AMPICILLIN AND SULBACTAM 2; 1 G/20ML; G/20ML
3 INJECTION, POWDER, FOR SOLUTION INTRAMUSCULAR; INTRAVENOUS EVERY 12 HOURS
Refills: 0 | Status: DISCONTINUED | OUTPATIENT
Start: 2024-07-04 | End: 2024-07-05

## 2024-07-04 RX ADMIN — INSULIN LISPRO 4: 100 INJECTION, SOLUTION SUBCUTANEOUS at 11:46

## 2024-07-04 RX ADMIN — Medication 1 DROP(S): at 17:39

## 2024-07-04 RX ADMIN — LATANOPROST PF 1 DROP(S): 0.05 SOLUTION/ DROPS OPHTHALMIC at 21:32

## 2024-07-04 RX ADMIN — HEPARIN SODIUM 5000 UNIT(S): 50 INJECTION, SOLUTION INTRAVENOUS at 21:32

## 2024-07-04 RX ADMIN — TIMOLOL MALEATE 1 DROP(S): 3.4 SOLUTION/ DROPS OPHTHALMIC at 05:53

## 2024-07-04 RX ADMIN — Medication 1 APPLICATION(S): at 05:52

## 2024-07-04 RX ADMIN — HEPARIN SODIUM 5000 UNIT(S): 50 INJECTION, SOLUTION INTRAVENOUS at 16:17

## 2024-07-04 RX ADMIN — AMPICILLIN AND SULBACTAM 200 GRAM(S): 2; 1 INJECTION, POWDER, FOR SOLUTION INTRAMUSCULAR; INTRAVENOUS at 17:38

## 2024-07-04 RX ADMIN — INSULIN GLARGINE 50 UNIT(S): 100 INJECTION, SOLUTION SUBCUTANEOUS at 08:09

## 2024-07-04 RX ADMIN — INSULIN LISPRO 4: 100 INJECTION, SOLUTION SUBCUTANEOUS at 07:48

## 2024-07-04 RX ADMIN — INSULIN LISPRO 20 UNIT(S): 100 INJECTION, SOLUTION SUBCUTANEOUS at 07:47

## 2024-07-04 RX ADMIN — HEPARIN SODIUM 5000 UNIT(S): 50 INJECTION, SOLUTION INTRAVENOUS at 05:52

## 2024-07-04 RX ADMIN — Medication 1 APPLICATION(S): at 17:40

## 2024-07-04 RX ADMIN — INSULIN LISPRO 8 UNIT(S): 100 INJECTION, SOLUTION SUBCUTANEOUS at 07:48

## 2024-07-04 RX ADMIN — INSULIN LISPRO 20 UNIT(S): 100 INJECTION, SOLUTION SUBCUTANEOUS at 11:46

## 2024-07-04 RX ADMIN — ATORVASTATIN CALCIUM 20 MILLIGRAM(S): 20 TABLET, FILM COATED ORAL at 21:32

## 2024-07-04 RX ADMIN — INSULIN GLARGINE 50 UNIT(S): 100 INJECTION, SOLUTION SUBCUTANEOUS at 21:33

## 2024-07-04 RX ADMIN — TIMOLOL MALEATE 1 DROP(S): 3.4 SOLUTION/ DROPS OPHTHALMIC at 17:39

## 2024-07-04 RX ADMIN — INSULIN LISPRO 6: 100 INJECTION, SOLUTION SUBCUTANEOUS at 17:16

## 2024-07-04 RX ADMIN — INSULIN LISPRO 30 UNIT(S): 100 INJECTION, SOLUTION SUBCUTANEOUS at 17:18

## 2024-07-04 RX ADMIN — AMPICILLIN AND SULBACTAM 200 GRAM(S): 2; 1 INJECTION, POWDER, FOR SOLUTION INTRAMUSCULAR; INTRAVENOUS at 05:51

## 2024-07-04 RX ADMIN — Medication 30 MILLIGRAM(S): at 05:52

## 2024-07-04 RX ADMIN — Medication 1 DROP(S): at 05:53

## 2024-07-04 RX ADMIN — PREDNISONE 40 MILLIGRAM(S): 10 TABLET ORAL at 05:52

## 2024-07-05 ENCOUNTER — TRANSCRIPTION ENCOUNTER (OUTPATIENT)
Age: 69
End: 2024-07-05

## 2024-07-05 VITALS
DIASTOLIC BLOOD PRESSURE: 71 MMHG | RESPIRATION RATE: 18 BRPM | SYSTOLIC BLOOD PRESSURE: 117 MMHG | TEMPERATURE: 97 F | HEART RATE: 84 BPM

## 2024-07-05 LAB
GLUCOSE BLDC GLUCOMTR-MCNC: 124 MG/DL — HIGH (ref 70–99)
GLUCOSE BLDC GLUCOMTR-MCNC: 139 MG/DL — HIGH (ref 70–99)

## 2024-07-05 PROCEDURE — 99239 HOSP IP/OBS DSCHRG MGMT >30: CPT

## 2024-07-05 RX ORDER — CEFADROXIL 500 MG
1 CAPSULE ORAL
Qty: 14 | Refills: 0
Start: 2024-07-05 | End: 2024-07-11

## 2024-07-05 RX ORDER — HYDROCHLOROTHIAZIDE 25 MG
1 TABLET ORAL
Qty: 30 | Refills: 1
Start: 2024-07-05 | End: 2024-09-02

## 2024-07-05 RX ORDER — NYSTATIN 100000/G
1 POWDER (GRAM) TOPICAL
Qty: 1 | Refills: 0
Start: 2024-07-05

## 2024-07-05 RX ORDER — NYSTATIN 100000/G
1 POWDER (GRAM) TOPICAL
Qty: 0 | Refills: 0 | DISCHARGE
Start: 2024-07-05

## 2024-07-05 RX ADMIN — INSULIN LISPRO 30 UNIT(S): 100 INJECTION, SOLUTION SUBCUTANEOUS at 11:09

## 2024-07-05 RX ADMIN — INSULIN GLARGINE 50 UNIT(S): 100 INJECTION, SOLUTION SUBCUTANEOUS at 07:49

## 2024-07-05 RX ADMIN — AMPICILLIN AND SULBACTAM 200 GRAM(S): 2; 1 INJECTION, POWDER, FOR SOLUTION INTRAMUSCULAR; INTRAVENOUS at 05:28

## 2024-07-05 RX ADMIN — TIMOLOL MALEATE 1 DROP(S): 3.4 SOLUTION/ DROPS OPHTHALMIC at 05:27

## 2024-07-05 RX ADMIN — INSULIN LISPRO 30 UNIT(S): 100 INJECTION, SOLUTION SUBCUTANEOUS at 07:49

## 2024-07-05 RX ADMIN — Medication 1 DROP(S): at 05:27

## 2024-07-05 RX ADMIN — Medication 1 APPLICATION(S): at 05:28

## 2024-07-05 RX ADMIN — Medication 30 MILLIGRAM(S): at 05:28

## 2024-07-07 LAB
CULTURE RESULTS: SIGNIFICANT CHANGE UP
SPECIMEN SOURCE: SIGNIFICANT CHANGE UP

## 2024-07-12 DIAGNOSIS — Z86.718 PERSONAL HISTORY OF OTHER VENOUS THROMBOSIS AND EMBOLISM: ICD-10-CM

## 2024-07-12 DIAGNOSIS — N17.9 ACUTE KIDNEY FAILURE, UNSPECIFIED: ICD-10-CM

## 2024-07-12 DIAGNOSIS — L03.115 CELLULITIS OF RIGHT LOWER LIMB: ICD-10-CM

## 2024-07-12 DIAGNOSIS — Z90.5 ACQUIRED ABSENCE OF KIDNEY: ICD-10-CM

## 2024-07-12 DIAGNOSIS — Z79.4 LONG TERM (CURRENT) USE OF INSULIN: ICD-10-CM

## 2024-07-12 DIAGNOSIS — E11.65 TYPE 2 DIABETES MELLITUS WITH HYPERGLYCEMIA: ICD-10-CM

## 2024-07-12 DIAGNOSIS — E66.01 MORBID (SEVERE) OBESITY DUE TO EXCESS CALORIES: ICD-10-CM

## 2024-07-12 DIAGNOSIS — D84.81 IMMUNODEFICIENCY DUE TO CONDITIONS CLASSIFIED ELSEWHERE: ICD-10-CM

## 2024-07-12 DIAGNOSIS — E78.5 HYPERLIPIDEMIA, UNSPECIFIED: ICD-10-CM

## 2024-07-12 DIAGNOSIS — I12.9 HYPERTENSIVE CHRONIC KIDNEY DISEASE WITH STAGE 1 THROUGH STAGE 4 CHRONIC KIDNEY DISEASE, OR UNSPECIFIED CHRONIC KIDNEY DISEASE: ICD-10-CM

## 2024-07-12 DIAGNOSIS — E11.22 TYPE 2 DIABETES MELLITUS WITH DIABETIC CHRONIC KIDNEY DISEASE: ICD-10-CM

## 2024-07-12 DIAGNOSIS — N18.30 CHRONIC KIDNEY DISEASE, STAGE 3 UNSPECIFIED: ICD-10-CM

## 2024-07-22 ENCOUNTER — TRANSCRIPTION ENCOUNTER (OUTPATIENT)
Age: 69
End: 2024-07-22

## 2024-08-30 ENCOUNTER — NON-APPOINTMENT (OUTPATIENT)
Age: 69
End: 2024-08-30

## 2024-09-13 NOTE — H&P ADULT - NSICDXPASTMEDICALHX_GEN_ALL_CORE_FT
PAST MEDICAL HISTORY:  CKD (chronic kidney disease)     Diabetes mellitus     DVT (deep venous thrombosis)     Glaucoma     HLD (hyperlipidemia)     HTN (hypertension)      no

## 2024-10-23 NOTE — DISCHARGE NOTE PROVIDER - NSDCMRMEDTOKEN_GEN_ALL_CORE_FT
amlodipine-olmesartan 5 mg-40 mg oral tablet: 1 tab(s) orally once a day  atorvastatin 20 mg oral tablet: 1 tab(s) orally once a day  Cosopt 2.23%-0.68% ophthalmic solution: 1 drop(s) to each affected eye 2 times a day  HumaLOG 100 units/mL subcutaneous solution: 30 unit(s) subcutaneous 3 times a day (before meals)  insulin glargine 100 units/mL subcutaneous solution: 70 units in morning and 40 units at night  Keflex 500 mg oral capsule: 1 cap(s) orally 2 times a day 4 = No assist / stand by assistance

## 2024-12-05 NOTE — ED ADULT TRIAGE NOTE - NS ED NURSE BANDS TYPE
Krishan Poole discharged to home accompanied by wife, son, and daughter.   Patient provided with the following educational materials upon discharge:  AVS and diabetic education.   Valuables and belongings sent with patient.   The following : discharge summary, discharge instructions, medications, and follow up appointments reviewed with patient and wife, son, and daughter. Patient and wife, son, and daughter verbalized understanding. Patient sent with diabetic supplies and verbalized understanding of treatment of diabetes. Wound vac changed and running when discharged. Patient left floor at 1850.   Name band;

## 2025-01-15 NOTE — PATIENT PROFILE ADULT - NSPROMEDSHERBAL_GEN_A_NUR
OCCUPATIONAL THERAPY RE-EVALUATION & RE-CERTIFICATION    Today's Date: 1/15/2025  Patient Name: Mateus Mckeon  : 1985  MRN: [id number]  Referring Provider: Dania Sher DO  Dx: Cerebrovascular accident (CVA) due to other mechanism (HCC) [I63.89]      SKILLED ANALYSIS:  Pt presents to re-evaluation on 1/15/25 s/p 11 months of OT treatment with a history of CVA and ESRD. Pt has not been in for therapy since 2025 and has since had 2 hospitalizations, first for 3 days due to hypertensive emergency and influenza+, and second hospitalization for 4 days due to hypertensive emergency again requiring ICU admission. As per EMR, Pt saw his PCP yesterday and medications were adjusted to control HTN. He reports fatigue since these hospitalizations is his biggest limitation at this point. Pts UE function was evaluated thru FDT, 9 hole peg test, ANGEL,  and functional cognition was measured through trail making A and B and CMT.     Based on assessments, pt is demonstrating decline with TM A/B time and he verbalized difficulty with sustained attention. Immediate and delayed visual memory has improved through CMT. Dexterity/FMC has shown decline, along with difficulty maintaining tripod grasp on pencil during handwriting tasks. RUE endurance limited performance on FDT as he required 2-3 rest breaks. He has maintained his overall  strength, improved lateral pinch, but shows decline in tripod and tip pinch. Pt continues to function below normal limits in the following domains: R UE strength, endurance, R FMC, R dexterity, R  strength, attention, problem solving, and functional cognition. Pt would benefit from increased cognitive re-training and FMC/dexterity activities while improving RUE endurance and strength.  He has met his goal for lateral pinch strength; otherwise goals have been downgraded to reflect recent decline in function s/p multiple hospitalizations. Recommend continued participation in  "OP OT 2x/wk for an additional 8-12 weeks with continued focus on aforementioned deficits to maximize functional performance and QOL.  Discussed progress and recommendations with pt and he is in agreement.    Subjective:   1/16: \"I feel so weak since the hospital, they didn't let me get up. I am having a hard time focusing, and I think my dexterity has gotten a lot worse.... my balance and coordination is off\" Pts sugar alarm went of on his phone indicating low blood sugar, was provided a fruit bar during the session and this increased to WNL as per his report.    12/17: \"I went to the hospital because I feel like I have long COVID, Lottie been so fatigue and sick. They did not give me answers.\"    5/23: Pt states he feels like his endurance is not where it needs to be. He has been attempting to use his hand as much as he can, however does not see much progress - if any thing he states \"I feel like I'm losing ground\". Pt states he did start using the theraputty and continued using foam to work on hand strength. Pt states he is not able to keep up with her exercises on days that he has dialysis due to fatigue.     7/11: Reports inc endurance during daily activities, but still fatigues quicker than baseline.  Able to complete zippers and buttons with inc time, requires assist for tying shoes.  Now able to write his initials and complete basic handwriting with ~80% legibility, significantly inc time and inc sizing    8/21: Pt states he is progressing, he feels like the use of his R hand has come a long way. Pt states he is better able to use his R hand, and is now able to tie his shoes.     9/25: Pt reports he feels like he is continuing to improve, however his R hand is still not where it was. Pt states he has been wearing his night resting hand splint less as he feels like his R hand is not as tight.     11/12: Pt states on 11/5 he sent to the ED as his BP was high and he was having difficulty forming sentences. As per " "EMR review, there was no acute infarct.     PLAN OF CARE START: 1/16/2025  PLAN OF CARE END: 4/16/2025  FREQUENCY: 2x/wk  PRECAUTIONS: Renal failure, actively going through dialysis; type 1 diabetes; HTN; SAH; seizure (last one was 2019)    Subjective    Mechanism of Injury  1/9-1/13 hospitilization from EMR: \"patient who originally presented to the hospital on 1/9/2025 due to severe blood pressure elevation in the setting of nausea and headache... Patient initially required cardene drip to control severe range blood pressures, and was therefore briefly under the care of the ICU \"    1/4-1/7 hospitalization from EMR: \"patient who originally presented to the hospital on 1/4/2025 due to high blood pressure.  In the ED with elevated BP to 230s/100.  Complain of headache and episode of vomiting, no neurologic deficit.\", influenza +    12/17/2024: From EMR: \"...presents with overall fatigue. Differential diagnosis includes but is not limited to gastroenteritis, COVID-19, influenza, pneumonia, electrolyte derangement, less likely ACS, less likely pericarditis/myocarditis given current absence of chest pain. Obtain laboratory analysis without acute pertinent findings. EKG as interpreted by myself as below. Chest x-ray without acute cardiopulmonary disease. POCUS obtained showing no pericardial effusion, and overall global function within normal limits.\"    11/12/24: \"Hypertensive emergency: Patient presented with blood pressure up to 200s, with word finding difficulty, with his history of CVAs, was admitted to ICU with Cardene drip and admitted to rule out stroke. Was treated with loading dose of aspirin and Plavix and maintained on daily dosing. Subsequently underwent CT head CTA which were negative. Underwent MRI brain which was negative for acute infarct. Hence Plavix can be discontinued at this time. Suspect all his symptoms which are secondary to high blood pressure temporarily affecting cerebral perfusion. He was " "weaned off Cardene drip and maintained on oral home medications yesterday in ICU and then downgraded as blood pressures were better controlled. According to the mother he gets symptomatic with SBP is below 120. Has BP of 140 is a good medium for him. It is unclear what acutely changes blood pressure hence his blood pressure was well-controlled for long period of time with the home regimen of medications. Only thing new was calcium and zinc supplementation because he was recently diagnosed with COVID 3 weeks ago\"    Paulo is a 38-year-old male with history of end-stage renal disease on dialysis, hypertension, type 1 diabetes, and recent subarachnoid hemorrhage. Pt reports he went to the hospital and they found a brain bleed and reported he needed an angioplasty. Unfortunately, during the angioplasty he suffered a stroke that affected his R UE. Since his stroke he reports his R UE function has improved although he continues with R hand FMC/dexterity deficits. Overall, pt requires assistance with 75% of his daily activities due to complex medical presentation as well as increased fatigue post-stroke.   \"There's nothing more like purgatory than using your non dominant side.\"    From Hospital note on 1/14/24:   38-year-old man with prior history of cerebellar and pontine strokes, prothrombin gene mutation, MTHFR gene mutation, diabetes, diabetic nephropathy with ESRD on HD, who presented with headache on 1/5 and was found to have subarachnoid hemorrhage in the basilar cisterns.  Unclear etiology.  Patient family reports nausea and retching around the time of headache.     - Underwent conventional angiogram later that day which was unremarkable for any clear source.  - MRI brain on 1/8 demonstrated multiple punctate foci of ischemia in the right MCA, bilateral cerebellar hemispheres, and right velia which may represent sequela of angiograph versus embolic strokes of unclear source (ESUS)   -Repeat angiogram on 1/12 was " "again unremarkable for source of subarachnoid.     Neurology consulted for right upper extremity weakness noted after second angiogram.  - Repeat MRI demonstrated numerous new embolic appearing foci of ischemia with similar suspected etiology as those mentioned above.     Transcranial Dopplers have been unremarkable with Lindegaard ratios consistently less than 3.     Spontaneous basilar cistern subarachnoid hemorrhage of yet unclear etiology.      Embolic appearing strokes, suspect complication of angiogram however cannot exclude additional embolic source.    Occupational Profile  Pt lives in a home with his parents; has one flight to bedroom and has AD throughout the house (grab bars etc.). Bill reports he needs help with preparing meals (could get by making 1 meal for himself but not all day), laundry, and driving. He is independent with dressing and bathing, \"but it takes it out of me. I could do some things but I need help throughout the day.\"    It is of note that the pt is actively getting dialysis; has to sit still for 4 hours  Pt reports some numbness in fingers on left side (L sided forearm is where dialysis is put in) and notes difficulty with thinking and memory since stroke. No changes in vision     He enjoys spending time with nieces and nephews and would love to get a dog.    PATIENT GOAL: \"I want to be able to tie my shoes without having to ask my parents\"    HISTORY OF PRESENT ILLNESS:     PMH:   Past Medical History:   Diagnosis Date    Acute kidney injury (HCC)     Ambulates with cane     Anuria     Anxiety     Cellulitis of right elbow 03/31/2021    Chronic kidney disease     COVID-19 10/18/2024    Depression     Diabetes mellitus (HCC)     Diarrhea     Emesis 10/24/2020    End stage renal disease (HCC) 02/11/2018    Formatting of this note might be different from the original. Last Assessment & Plan:  Secondary to DM.  On nightly PD.  Followed by [facility].  Patient considering transplant for " kidney and pancreas through [facility] Formatting of this note might be different from the original. Last Assessment & Plan:  Formatting of this note might be different from the original. 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    Esophagitis 07/21/2015    Falls     Gastroparesis     GERD (gastroesophageal reflux disease)     History of shingles 2010    History of transfusion 02/2018    no adverse reaction    Hyperlipidemia     Hyperphosphatemia     Hypertension     Hypoglycemia 07/15/2022    Itching     Mastoiditis of right side 07/15/2022    Muscle weakness     general unsteadiness    Obesity (BMI 30.0-34.9) 09/09/2019    Orthostatic hypotension 10/25/2020    Peripheral polyneuropathy 11/20/2019    PONV (postoperative nausea and vomiting) 01/26/2018    Protein-calorie malnutrition (HCC) 11/23/2020    Recurrent peritonitis (HCC) due to peritoneal dialysis catheter 07/31/2020    Retinopathy     Seizures (HCC)     early 2020 - one time    Skin abnormality     some dime size areas where skin was scratched from itching    Sleep apnea     Spontaneous bacterial peritonitis (HCC) 10/19/2020    Squamous cell skin cancer     left temple    Stroke (HCC)     x2 - off balance/no driving/fatigue    Swelling of both lower extremities     Swelling of joint of upper arm, right 04/03/2024    Traumatic onycholysis 07/21/2022    Vomiting     Wears glasses     Word finding difficulty 11/05/2024       Past Surgical Hx:   Past Surgical History:   Procedure Laterality Date    CARDIAC ELECTROPHYSIOLOGY PROCEDURE N/A 9/21/2023    Procedure: Cardiac loop recorder explant;  Surgeon: Parish Morgan MD;  Location: [facility];  Service: Cardiology    CARDIAC LOOP RECORDER  05/2018    COLONOSCOPY      EGD      EYE SURGERY Right     HEMODIALYSIS ADULT  11/6/2024    IR AV FISTULAGRAM/GRAFTOGRAM  02/23/2021    IR CEREBRAL ANGIOGRAPHY  1/12/2024    IR CEREBRAL ANGIOGRAPHY / INTERVENTION  1/5/2024    IR  TUNNELED CENTRAL LINE PLACEMENT  02/16/2021    IR TUNNELED DIALYSIS CATHETER PLACEMENT  11/18/2020    IR TUNNELED DIALYSIS CATHETER REMOVAL  02/12/2021    IR TUNNELED DIALYSIS CATHETER REMOVAL  03/11/2021    MOHS SURGERY Left 12/14/2022    Left temple with Dr. Hassan    PERITONEAL CATHETER INSERTION N/A 08/27/2018    Procedure: UNROOF PD CATHETER;  Surgeon: Felipe Lindo DO;  Location: [facility];  Service: General    SC ARTERIOVENOUS ANASTOMOSIS OPEN DIRECT Left 11/09/2020    Procedure: CREATION FISTULA  ARTERIOVENOUS (AV) - LEFT WRIST;  Surgeon: Placido Altamirano MD;  Location: [facility];  Service: Vascular    SC ESOPHAGOGASTRODUODENOSCOPY TRANSORAL DIAGNOSTIC N/A 04/18/2019    Procedure: ESOPHAGOGASTRODUODENOSCOPY (EGD);  Surgeon: Ale Figueroa MD;  Location: [facility];  Service: Gastroenterology    SC LAPS INSERTION TUNNELED INTRAPERITONEAL CATHETER N/A 08/06/2018    Procedure: LAPAROSCOPIC PD CATHETER PLACEMENT;  Surgeon: Felipe Lindo DO;  Location[facility];  Service: General    SC REMOVAL TUNNELED INTRAPERITONEAL CATHETER N/A 11/18/2020    Procedure: REMOVAL CATHETER PERITONEAL DIALYSIS;  Surgeon: Abdifatah Ty MD;  Location[facility];  Service: General    TONSILLECTOMY      UPPER GASTROINTESTINAL ENDOSCOPY          Pain: FLACC 0     Objective    Upper Extremities:  Pt is right hand dominant    Range of Motion:  AROM:   R UE - not reassessed 1/16  - Shoulder flexion: 160* - WFL  -Shoulder abduction: 180* - WNL  - Shoulder extension: WNL  - Elbow flexion WNL  - Elbow extension: WNL  - Wrist flexion: WNL  - Wrist extension: WNL  - Pronation: WNL  - Supination: 95% AROM  - Finger extension: WNL  - Finger flexion: WNL    L UE : 100%     Manual Muscle Testing: NOT REASSESSED 1/16  R UE:  - Shoulder flexors: 5/5  - Shoulder extensors: 5/5  - Shoulder abductors: 4+/5   - Shoulder external rotators: 4+/5 IMPROVED   - Shoulder internal rotators: 5/5  - Elbow flexors: 5/5  - Elbow extensors: 4/5  - Wrist flexors:  4+/5  - Wrist extensors: 4+/5  L UE:  - Shoulder flexors: 5/5  - Shoulder extensors: 5/5  - Shoulder abductors: 5/5   - Shoulder external rotators: 5/5   - Shoulder internal rotators: 5/5  - Elbow flexors: 5/5  - Elbow extensors: 5/5  - Wrist flexors: 5/5  - Wrist extensors: 5/5                  ANGEL: RUE: 62lb (previously 62lb) LUE: 100lb MAINTAINED  The age norm is approximately R: 116.8;bs and L: 112.8 lbs, indicating decreased  strength..                 2 point pinch: RUE: 5lb , LUE: 10.5lb  (age norms are 18 lbs) DECLINE  3 point pinch: RUE: 11lb , LUE: 20lb  (age norms are 24 lbs) DECLINE  Lateral pinch: RUE: 20lb , LUE: 21lb  (age norms are 25 lbs) IMPROVED                  NINE-HOLE PEG TEST: assesses dexterity/fine motor coordination   R hand: 58 seconds DECLINE  L hand: 47 seconds   Pt demonstrates decreased FMC compared to norms for age/sex (L: 18.62 seconds and R: 17.71 seconds)     Functional Dexterity Test: assesses patient's ability to use the hand for daily tasks requiring a 3-jaw federico prehension between the fingers and the thumb. Completed in a zig-zag pattern with unaffected UE first. 1  5-second penalty (each):  -Supinating forearm to assist  -Touching the board for assist    10-second penalty:  -Dropping a peg    R UE: 2min 58 sec + 2 penalties + 2 drops = 198 sec (prior 2 min 38 sec + 1 drop + 1 penalty = 173 seconds) DECLINED   L UE:  44.4 seconds (previously: 58.39 seconds)     Norms based on age/sex: (Lucy et. al., 2013)    Age Group Sex Non-Dominant (50th percentile) Dominant (50th percentile)   20-49 Male 23.2 24.1     Pt performance on Functional Dexterity Test implies non functional hand compared to norms for age/sex.    Score by Functional Level Range (dominant hand) Range (non-dominant hand)   Functional  16-25 seconds 18-27 seconds   Moderately Functional  26-33 seconds 28-45 seconds   Minimally Functional 34-50 seconds 46-55 seconds   Nonfunctional >50 seconds >55 seconds      Subluxation: NONE    Scapular winging: MINIMAL    FUGYL JOHNS ASSESSMENT OF MOTOR RECOVERY AFTER STROKE:  NOT REASSESSED  R UE:  UPPER EXTREMITY SEATED: 35/36   WRIST: 10   HAND: 1414   COORDINATION/SPEED: 5/6   OVERALL SCORE: 61/66   (Clinical change= 5 points, severe impairment <19, and mild impairment is >50)         Functional Cognition:  Highest level of education: some college     Song Cognitive Assessment Version 8.1 (MoCA V8.1)  NOT REASSESSED  Visuospatial/executive functionin/5  Namin/3  Memory: 1st trial:  , 2nd trial:    Attention/concentration: 2/2  List of letters:   Serial Seven Subtraction:  3/3 w/ 0 errors  Language/sentence repetition:   Language Fluency: 0/1 (previously: 0  Abstract/Correlational Thinkin/2   Delayed Recall:   Orientation:  6              Memory Index Score: 14/15  MoCA V1 8.3 Raw Score: 25/30 (previously: 30), MIS:  13/15, indicative of no neurocognitive impairments.     MoCA Scoring              Normal: 26+              Mild Cognitive Impairment: 18-25               Moderate Cognitive Impairment: 10-17              Severe Cognitive Impairment: <10     Trail making Part A and Part B:   Part A: 57 seconds (prior 50 seconds) (previously 70 seconds) DECLINED  Part B: 2min 12 seconds (prior 1:48 with no errors) DECLINED  Indicating deficits: Part A > 24.40 seconds and Part B > 50.68 seconds       Contextual Memory Test:   Immediate:  (previously ) IMPROVED  Delayed:   (previously ) IMPROVED        GOALS:     Short Term Goals:  Pt will improve  strength by 5 lbs (up to 67 lbs) in the R hand for improved performance during ADLs and IADLs.   Pt will improve time on TM parts B to within 120 seconds demonstrating improved divided attention required for IADLs.   Pt will demonstrate improved lateral pinch strength as evidenced by increase to 20 lbs for improved performance in ADLs/IADLs GOAL MET   Pt will  demonstrate improved three point pinch strength as evidenced by increase to 13 lbs for improved performance in ADLs/IADLs   Pt will demonstrate improved tip pinch strength as evidenced by increase to 7 lbs for improved performance in ADLs/IADLs No change  Pt will demonstrate improved dexterity required for ADLs/IADLs as demonstrated by completing FDT within 175 seconds, including penalties  Pt will demonstrate improved FMC required for ADLs/IADLs as demonstrated by completing 9-hole peg test within 50 seconds    Long Term Goals (8-12 weeks)  Pt will improve  strength by 5 lbs (up to 72 lbs) in the R hand for improved performance during ADLs and IADLs.   Pt will improve time on TM parts B to within 110 seconds demonstrating improved divided attention required for IADLs.   Pt will demonstrate improved lateral pinch strength as evidenced by increase to 22 lbs for improved performance in ADLs/IADLs   Pt will demonstrate improved three point pinch strength as evidenced by increase to 15 lbs for improved performance in ADLs/IADLs   Pt will demonstrate improved tip pinch strength as evidenced by increase to 9 lbs for improved performance in ADLs/IADLs   Pt will demonstrate improved dexterity required for ADLs/IADLs as demonstrated by completing FDT within 165 seconds, including penalties  Pt will demonstrate improved FMC required for ADLs/IADLs as demonstrated by completing 9-hole peg test within 45 seconds  Pt will improve R UE strength to 5/5 in all planes as measured via MMT thru use of strengthening program and HEP.     OTHER PLANNED THERAPY INTERVENTIONS:   Supine, seated, and in stance neuro re-ed  Tricep AG  NMES/FES  FMC/prehension  Timed Trials  Manual tx  Hand to target  Sensory re-ed  Seated functional reach: crossing midline  Supine place and hold  WBearing strategies   Closed chain activities  Open chain activities  Internal and external memory aides         no

## 2025-02-05 NOTE — ED PROVIDER NOTE - MUSCULOSKELETAL NEGATIVE STATEMENT, MLM
Time reflects when diagnosis was documented in both MDM as applicable and the Disposition within this note       Time User Action Codes Description Comment    2/4/2025 10:07 PM Daniel Herrera [B34.9] Viral illness     2/4/2025 10:07 PM Daniel Herrera Add [R50.9] Fever     2/4/2025 10:07 PM Daniel Herrera Add [R11.2] Nausea & vomiting           ED Disposition       ED Disposition   Discharge    Condition   Stable    Date/Time   Tue Feb 4, 2025 10:07 PM    Comment   Tere Coronado Rehrig discharge to home/self care.                   Assessment & Plan       Medical Decision Making  7-year-old male presenting for evaluation of fever, nausea, vomiting, body aches.  Patient's father at home apparently had similar symptoms.  Aunt denies any cough.  No increased work of breathing.  Vitals within normal limits of for mild tachycardia.  Mucous membranes are moist.  No abdominal tenderness  Symptoms are likely viral in nature.  Given no cough, clear lungs and normal oxygen level, do not believe chest x-ray is required at this time.  Will give Tylenol and Motrin.  Patient feeling better after meds.  Viral panel negative.  Point-of-care glucose normal.  Patient ambulated apart without difficulty.  Patient discharged told to follow-up with pediatrician    Problems Addressed:  Fever: acute illness or injury  Nausea & vomiting: acute illness or injury  Viral illness: acute illness or injury    Amount and/or Complexity of Data Reviewed  Labs: ordered.    Risk  OTC drugs.  Prescription drug management.             Medications   ibuprofen (MOTRIN) oral suspension 268 mg (268 mg Oral Given 2/4/25 2106)   ondansetron (ZOFRAN-ODT) dispersible tablet 4 mg (4 mg Oral Given 2/4/25 2108)   acetaminophen (TYLENOL) oral suspension 200 mg (200 mg Oral Given 2/4/25 2104)       ED Risk Strat Scores                                              History of Present Illness       Chief Complaint   Patient presents with    Cold Like Symptoms     Fever  at home, N/V, body aches; father sick with same thing over the last 2 days       Past Medical History:   Diagnosis Date    Chronic diarrhea     Croup     Enterovirus meningitis     rule out     GERD without esophagitis     denies     History of abnormal weight loss     History of acute otitis media     History of acute sinusitis     non-recurrent of other sinus    History of common cold     History of diaper rash     History of difficulty sleeping     History of iron deficiency anemia     History of irritability     History of unexplained fever     History of upper respiratory infection     acute    Mild lead poisoning     accidental or unintentional, initital encounter     Need for lead screening     [place] affected by maternal use of drug of addiction     suspected to be affected by maternal use of other drug of addiction    Respiratory syncytial virus bronchiolitis     Speech delay 2019    Umbilical hernia without obstruction and without gangrene       Past Surgical History:   Procedure Laterality Date    CIRCUMCISION      penis circumcision no clamp/device/dorsal slit     DENTAL SURGERY        Family History   Problem Relation Age of Onset    Drug abuse Mother     Post-traumatic stress disorder Mother     Thyroid disease Father     No Known Problems Sister     No Known Problems Brother     Bipolar disorder Maternal Grandmother     Post-traumatic stress disorder Maternal Grandmother       Social History     Tobacco Use    Smoking status: Never    Smokeless tobacco: Never      E-Cigarette/Vaping      E-Cigarette/Vaping Substances      I have reviewed and agree with the history as documented.     Patient is a 7-year-old male who presents for evaluation of a fever, body aches, nausea, vomiting.  Symptoms started last night.  Uncle says that he had an episode of vomiting last night and once today.  He has been able to tolerate p.o. since then.  Family said that he had a temperature of 102.5 earlier  today.  Says that she gave 250 mg of Tylenol about 2 hours prior to arrival.  They said the patient was complaining of a headache and some lightheadedness.  They said that it seemed that the patient was having difficulty ambulating.  I witnessed the patient walk into the department without any difficulty.  Patient denies any ear pain, mild sore throat.  Denies any abdominal pain        Review of Systems   Constitutional:  Positive for fever. Negative for activity change and chills.   HENT:  Negative for congestion, ear pain, sinus pressure, sinus pain, sore throat, tinnitus and trouble swallowing.    Eyes:  Negative for photophobia, pain, discharge, itching and visual disturbance.   Respiratory:  Negative for cough, shortness of breath, wheezing and stridor.    Cardiovascular:  Negative for chest pain and palpitations.   Gastrointestinal:  Positive for nausea and vomiting. Negative for abdominal distention, abdominal pain, constipation and diarrhea.   Genitourinary:  Negative for difficulty urinating, frequency and hematuria.   Musculoskeletal:  Positive for myalgias. Negative for arthralgias, back pain, neck pain and neck stiffness.   Skin:  Negative for color change, rash and wound.   Neurological:  Negative for dizziness, seizures, light-headedness, numbness and headaches.           Objective       ED Triage Vitals   Temperature Pulse Blood Pressure Respirations SpO2 Patient Position - Orthostatic VS   02/04/25 2023 02/04/25 2023 02/04/25 2023 02/04/25 2023 02/04/25 2023 --   98.4 °F (36.9 °C) (!) 114 103/61 (!) 24 98 %       Temp src Heart Rate Source BP Location FiO2 (%) Pain Score    -- -- -- -- 02/04/25 2104        Med Not Given for Pain - for MAR use only      Vitals      Date and Time Temp Pulse SpO2 Resp BP Pain Score FACES Pain Rating User   02/04/25 2145 -- 107 98 % 18 -- -- -- AM   02/04/25 2106 -- -- -- -- -- Med Not Given for Pain - for MAR use only -- AM   02/04/25 2104 -- -- -- -- -- Med Not Given  for Pain - for MAR use only -- AM   02/04/25 2023 98.4 °F (36.9 °C) 114 98 % 24 103/61 -- -- AF            Physical Exam  Constitutional:       General: He is active. He is not in acute distress.     Appearance: He is not diaphoretic.   HENT:      Right Ear: Tympanic membrane normal. Tympanic membrane is not erythematous.      Left Ear: Tympanic membrane normal. Tympanic membrane is not erythematous.      Mouth/Throat:      Mouth: Mucous membranes are moist.   Eyes:      General:         Right eye: No discharge.         Left eye: No discharge.      Pupils: Pupils are equal, round, and reactive to light.   Cardiovascular:      Rate and Rhythm: Regular rhythm. Tachycardia present.      Heart sounds: S1 normal and S2 normal. No murmur heard.  Pulmonary:      Effort: Pulmonary effort is normal. No respiratory distress or retractions.      Breath sounds: Normal breath sounds.   Abdominal:      General: Bowel sounds are normal. There is no distension.      Palpations: Abdomen is soft. There is no mass.      Tenderness: There is no abdominal tenderness. There is no guarding.   Musculoskeletal:         General: No tenderness or deformity. Normal range of motion.      Cervical back: Normal range of motion and neck supple. No rigidity.   Lymphadenopathy:      Cervical: No cervical adenopathy.   Skin:     General: Skin is warm and dry.      Findings: No petechiae or rash. Rash is not purpuric.   Neurological:      Mental Status: He is alert.      Cranial Nerves: No cranial nerve deficit.      Sensory: No sensory deficit.      Motor: No abnormal muscle tone.         Results Reviewed       Procedure Component Value Units Date/Time    Fingerstick Glucose (POCT) [[id number]]  (Normal) Collected: 02/04/25 2211    Lab Status: Final result Specimen: Blood Updated: 02/04/25 2211     POC Glucose 96 mg/dl     FLU/RSV/COVID - if FLU/RSV clinically relevant (2hr TAT) [[id number]]  (Normal) Collected: 02/04/25 2029    Lab Status: Final  result Specimen: Nares from Nose Updated: 02/04/25 2155     SARS-CoV-2 Negative     INFLUENZA A PCR Negative     INFLUENZA B PCR Negative     RSV PCR Negative    Narrative:      This test has been performed using the CoV-2/Flu/RSV plus assay on the [employer] platform. This test has been validated by the  and verified by the performing laboratory.     This test is designed to amplify and detect the following: nucleocapsid (N), envelope (E), and RNA-dependent RNA polymerase (RdRP) genes of the SARS-CoV-2 genome; matrix (M), basic polymerase (PB2), and acidic protein (PA) segments of the influenza A genome; matrix (M) and non-structural protein (NS) segments of the influenza B genome, and the nucleocapsid genes of RSV A and RSV B.     Positive results are indicative of the presence of Flu A, Flu B, RSV, and/or SARS-CoV-2 RNA. Positive results for SARS-CoV-2 or suspected novel influenza should be reported to state, local, or federal health departments according to local reporting requirements.      All results should be assessed in conjunction with clinical presentation and other laboratory markers for clinical management.     FOR PEDIATRIC PATIENTS - copy/paste COVID Guidelines URL to browser: [email or web address]               No orders to display       Procedures    ED Medication and Procedure Management   Prior to Admission Medications   Prescriptions Last Dose Informant Patient Reported? Taking?   acetaminophen (TYLENOL CHILDRENS CHEWABLES) 160 MG chewable tablet   No No   Sig: Chew 1 tablet (160 mg total) every 6 (six) hours as needed for mild pain   loratadine (CLARITIN) 5 mg/5 mL syrup  Family Member Yes No   Sig: Take 5 mg by mouth daily   Patient not taking: Reported on 8/17/2021   polyethylene glycol (GLYCOLAX) powder   No No   Sig: Take 17 g by mouth daily   Patient not taking: Reported on 1/28/2020      Facility-Administered  Medications: None     Patient's Medications   Discharge Prescriptions    No medications on file     No discharge procedures on file.  ED SEPSIS DOCUMENTATION   Time reflects when diagnosis was documented in both MDM as applicable and the Disposition within this note       Time User Action Codes Description Comment    2/4/2025 10:07 PM Daniel Herrera [B34.9] Viral illness     2/4/2025 10:07 PM Daniel Herrera [R50.9] Fever     2/4/2025 10:07 PM Daniel Herrera [R11.2] Nausea & vomiting                  Daniel Herrera DO  02/04/25 2228     no back pain, no gout, no musculoskeletal pain, no neck pain, and no weakness.

## 2025-03-12 ENCOUNTER — INPATIENT (INPATIENT)
Facility: HOSPITAL | Age: 70
LOS: 2 days | Discharge: HOME | DRG: 424 | End: 2025-03-15
Attending: SURGERY | Admitting: SURGERY
Payer: MEDICARE

## 2025-03-12 VITALS
HEART RATE: 71 BPM | RESPIRATION RATE: 18 BRPM | TEMPERATURE: 98 F | DIASTOLIC BLOOD PRESSURE: 81 MMHG | WEIGHT: 315 LBS | OXYGEN SATURATION: 97 % | SYSTOLIC BLOOD PRESSURE: 142 MMHG

## 2025-03-12 DIAGNOSIS — Z90.5 ACQUIRED ABSENCE OF KIDNEY: Chronic | ICD-10-CM

## 2025-03-12 LAB
ALBUMIN SERPL ELPH-MCNC: 4 G/DL — SIGNIFICANT CHANGE UP (ref 3.5–5.2)
ALP SERPL-CCNC: 381 U/L — HIGH (ref 30–115)
ALT FLD-CCNC: 369 U/L — HIGH (ref 0–41)
ANION GAP SERPL CALC-SCNC: 18 MMOL/L — HIGH (ref 7–14)
AST SERPL-CCNC: 461 U/L — HIGH (ref 0–41)
BASOPHILS # BLD AUTO: 0.04 K/UL — SIGNIFICANT CHANGE UP (ref 0–0.2)
BASOPHILS NFR BLD AUTO: 0.4 % — SIGNIFICANT CHANGE UP (ref 0–1)
BILIRUB DIRECT SERPL-MCNC: 2.6 MG/DL — HIGH (ref 0–0.3)
BILIRUB INDIRECT FLD-MCNC: 1.4 MG/DL — HIGH (ref 0.2–1.2)
BILIRUB SERPL-MCNC: 4 MG/DL — HIGH (ref 0.2–1.2)
BUN SERPL-MCNC: 59 MG/DL — HIGH (ref 10–20)
CALCIUM SERPL-MCNC: 8.8 MG/DL — SIGNIFICANT CHANGE UP (ref 8.4–10.5)
CHLORIDE SERPL-SCNC: 94 MMOL/L — LOW (ref 98–110)
CO2 SERPL-SCNC: 20 MMOL/L — SIGNIFICANT CHANGE UP (ref 17–32)
CREAT SERPL-MCNC: 2.4 MG/DL — HIGH (ref 0.7–1.5)
EGFR: 28 ML/MIN/1.73M2 — LOW
EGFR: 28 ML/MIN/1.73M2 — LOW
EOSINOPHIL # BLD AUTO: 0.08 K/UL — SIGNIFICANT CHANGE UP (ref 0–0.7)
EOSINOPHIL NFR BLD AUTO: 0.7 % — SIGNIFICANT CHANGE UP (ref 0–8)
GLUCOSE SERPL-MCNC: 347 MG/DL — HIGH (ref 70–99)
HCT VFR BLD CALC: 40.8 % — LOW (ref 42–52)
HGB BLD-MCNC: 13.7 G/DL — LOW (ref 14–18)
IMM GRANULOCYTES NFR BLD AUTO: 0.4 % — HIGH (ref 0.1–0.3)
LACTATE SERPL-SCNC: 2.6 MMOL/L — HIGH (ref 0.7–2)
LYMPHOCYTES # BLD AUTO: 0.4 K/UL — LOW (ref 1.2–3.4)
LYMPHOCYTES # BLD AUTO: 3.5 % — LOW (ref 20.5–51.1)
MCHC RBC-ENTMCNC: 30.4 PG — SIGNIFICANT CHANGE UP (ref 27–31)
MCHC RBC-ENTMCNC: 33.6 G/DL — SIGNIFICANT CHANGE UP (ref 32–37)
MCV RBC AUTO: 90.7 FL — SIGNIFICANT CHANGE UP (ref 80–94)
MONOCYTES # BLD AUTO: 1.1 K/UL — HIGH (ref 0.1–0.6)
MONOCYTES NFR BLD AUTO: 9.7 % — HIGH (ref 1.7–9.3)
NEUTROPHILS # BLD AUTO: 9.72 K/UL — HIGH (ref 1.4–6.5)
NEUTROPHILS NFR BLD AUTO: 85.3 % — HIGH (ref 42.2–75.2)
NRBC BLD AUTO-RTO: 0 /100 WBCS — SIGNIFICANT CHANGE UP (ref 0–0)
PLATELET # BLD AUTO: 204 K/UL — SIGNIFICANT CHANGE UP (ref 130–400)
PMV BLD: 11.1 FL — HIGH (ref 7.4–10.4)
POTASSIUM SERPL-MCNC: 5.7 MMOL/L — HIGH (ref 3.5–5)
POTASSIUM SERPL-SCNC: 5.7 MMOL/L — HIGH (ref 3.5–5)
PROT SERPL-MCNC: 7.3 G/DL — SIGNIFICANT CHANGE UP (ref 6–8)
RBC # BLD: 4.5 M/UL — LOW (ref 4.7–6.1)
RBC # FLD: 14.4 % — SIGNIFICANT CHANGE UP (ref 11.5–14.5)
SODIUM SERPL-SCNC: 132 MMOL/L — LOW (ref 135–146)
WBC # BLD: 11.39 K/UL — HIGH (ref 4.8–10.8)
WBC # FLD AUTO: 11.39 K/UL — HIGH (ref 4.8–10.8)

## 2025-03-12 PROCEDURE — 76705 ECHO EXAM OF ABDOMEN: CPT | Mod: 26

## 2025-03-12 PROCEDURE — 99285 EMERGENCY DEPT VISIT HI MDM: CPT

## 2025-03-12 PROCEDURE — 74176 CT ABD & PELVIS W/O CONTRAST: CPT | Mod: 26

## 2025-03-12 RX ORDER — SODIUM CHLORIDE 9 G/1000ML
2000 INJECTION, SOLUTION INTRAVENOUS ONCE
Refills: 0 | Status: COMPLETED | OUTPATIENT
Start: 2025-03-12 | End: 2025-03-12

## 2025-03-12 RX ADMIN — Medication 4 MILLIGRAM(S): at 22:17

## 2025-03-12 RX ADMIN — SODIUM CHLORIDE 2000 MILLILITER(S): 9 INJECTION, SOLUTION INTRAVENOUS at 22:17

## 2025-03-13 DIAGNOSIS — Z98.84 BARIATRIC SURGERY STATUS: Chronic | ICD-10-CM

## 2025-03-13 LAB
A1C WITH ESTIMATED AVERAGE GLUCOSE RESULT: 7.3 % — HIGH (ref 4–5.6)
ALBUMIN SERPL ELPH-MCNC: 3.4 G/DL — LOW (ref 3.5–5.2)
ALBUMIN SERPL ELPH-MCNC: 3.5 G/DL — SIGNIFICANT CHANGE UP (ref 3.5–5.2)
ALP SERPL-CCNC: 305 U/L — HIGH (ref 30–115)
ALP SERPL-CCNC: 312 U/L — HIGH (ref 30–115)
ALT FLD-CCNC: 304 U/L — HIGH (ref 0–41)
ALT FLD-CCNC: 329 U/L — HIGH (ref 0–41)
ANION GAP SERPL CALC-SCNC: 15 MMOL/L — HIGH (ref 7–14)
ANION GAP SERPL CALC-SCNC: 16 MMOL/L — HIGH (ref 7–14)
APTT BLD: 27.8 SEC — SIGNIFICANT CHANGE UP (ref 27–39.2)
APTT BLD: 31.1 SEC — SIGNIFICANT CHANGE UP (ref 27–39.2)
AST SERPL-CCNC: 273 U/L — HIGH (ref 0–41)
AST SERPL-CCNC: 358 U/L — HIGH (ref 0–41)
B-OH-BUTYR SERPL-SCNC: 0.2 MMOL/L — SIGNIFICANT CHANGE UP
BASOPHILS # BLD AUTO: 0.02 K/UL — SIGNIFICANT CHANGE UP (ref 0–0.2)
BASOPHILS NFR BLD AUTO: 0.3 % — SIGNIFICANT CHANGE UP (ref 0–1)
BILIRUB DIRECT SERPL-MCNC: 2.8 MG/DL — HIGH (ref 0–0.3)
BILIRUB DIRECT SERPL-MCNC: 2.8 MG/DL — HIGH (ref 0–0.3)
BILIRUB INDIRECT FLD-MCNC: 1 MG/DL — SIGNIFICANT CHANGE UP (ref 0.2–1.2)
BILIRUB INDIRECT FLD-MCNC: 1.5 MG/DL — HIGH (ref 0.2–1.2)
BILIRUB SERPL-MCNC: 3.8 MG/DL — HIGH (ref 0.2–1.2)
BILIRUB SERPL-MCNC: 4.3 MG/DL — HIGH (ref 0.2–1.2)
BUN SERPL-MCNC: 57 MG/DL — HIGH (ref 10–20)
BUN SERPL-MCNC: 59 MG/DL — HIGH (ref 10–20)
CALCIUM SERPL-MCNC: 8.4 MG/DL — SIGNIFICANT CHANGE UP (ref 8.4–10.5)
CALCIUM SERPL-MCNC: 9 MG/DL — SIGNIFICANT CHANGE UP (ref 8.4–10.5)
CHLORIDE SERPL-SCNC: 95 MMOL/L — LOW (ref 98–110)
CHLORIDE SERPL-SCNC: 97 MMOL/L — LOW (ref 98–110)
CHOLEST SERPL-MCNC: 109 MG/DL — SIGNIFICANT CHANGE UP
CO2 SERPL-SCNC: 20 MMOL/L — SIGNIFICANT CHANGE UP (ref 17–32)
CO2 SERPL-SCNC: 22 MMOL/L — SIGNIFICANT CHANGE UP (ref 17–32)
CREAT SERPL-MCNC: 2.3 MG/DL — HIGH (ref 0.7–1.5)
CREAT SERPL-MCNC: 2.9 MG/DL — HIGH (ref 0.7–1.5)
EGFR: 22 ML/MIN/1.73M2 — LOW
EGFR: 22 ML/MIN/1.73M2 — LOW
EGFR: 30 ML/MIN/1.73M2 — LOW
EGFR: 30 ML/MIN/1.73M2 — LOW
EOSINOPHIL # BLD AUTO: 0.07 K/UL — SIGNIFICANT CHANGE UP (ref 0–0.7)
EOSINOPHIL NFR BLD AUTO: 0.9 % — SIGNIFICANT CHANGE UP (ref 0–8)
ESTIMATED AVERAGE GLUCOSE: 163 MG/DL — HIGH (ref 68–114)
GLUCOSE SERPL-MCNC: 244 MG/DL — HIGH (ref 70–99)
GLUCOSE SERPL-MCNC: 364 MG/DL — HIGH (ref 70–99)
HCT VFR BLD CALC: 37 % — LOW (ref 42–52)
HCT VFR BLD CALC: 37.4 % — LOW (ref 42–52)
HDLC SERPL-MCNC: 35 MG/DL — LOW
HGB BLD-MCNC: 12.2 G/DL — LOW (ref 14–18)
HGB BLD-MCNC: 12.4 G/DL — LOW (ref 14–18)
IMM GRANULOCYTES NFR BLD AUTO: 0.6 % — HIGH (ref 0.1–0.3)
INR BLD: 1.05 RATIO — SIGNIFICANT CHANGE UP (ref 0.65–1.3)
INR BLD: 1.08 RATIO — SIGNIFICANT CHANGE UP (ref 0.65–1.3)
LACTATE SERPL-SCNC: 2.1 MMOL/L — HIGH (ref 0.7–2)
LDLC SERPL-MCNC: 56 MG/DL — SIGNIFICANT CHANGE UP
LIDOCAIN IGE QN: >3000 U/L — HIGH (ref 7–60)
LIPID PNL WITH DIRECT LDL SERPL: 56 MG/DL — SIGNIFICANT CHANGE UP
LYMPHOCYTES # BLD AUTO: 0.46 K/UL — LOW (ref 1.2–3.4)
LYMPHOCYTES # BLD AUTO: 5.8 % — LOW (ref 20.5–51.1)
MAGNESIUM SERPL-MCNC: 1.6 MG/DL — LOW (ref 1.8–2.4)
MAGNESIUM SERPL-MCNC: 1.8 MG/DL — SIGNIFICANT CHANGE UP (ref 1.8–2.4)
MCHC RBC-ENTMCNC: 30.5 PG — SIGNIFICANT CHANGE UP (ref 27–31)
MCHC RBC-ENTMCNC: 30.8 PG — SIGNIFICANT CHANGE UP (ref 27–31)
MCHC RBC-ENTMCNC: 33 G/DL — SIGNIFICANT CHANGE UP (ref 32–37)
MCHC RBC-ENTMCNC: 33.2 G/DL — SIGNIFICANT CHANGE UP (ref 32–37)
MCV RBC AUTO: 92.5 FL — SIGNIFICANT CHANGE UP (ref 80–94)
MCV RBC AUTO: 92.8 FL — SIGNIFICANT CHANGE UP (ref 80–94)
MONOCYTES # BLD AUTO: 0.24 K/UL — SIGNIFICANT CHANGE UP (ref 0.1–0.6)
MONOCYTES NFR BLD AUTO: 3 % — SIGNIFICANT CHANGE UP (ref 1.7–9.3)
NEUTROPHILS # BLD AUTO: 7.15 K/UL — HIGH (ref 1.4–6.5)
NEUTROPHILS NFR BLD AUTO: 89.4 % — HIGH (ref 42.2–75.2)
NONHDLC SERPL-MCNC: 74 MG/DL — SIGNIFICANT CHANGE UP
NRBC BLD AUTO-RTO: 0 /100 WBCS — SIGNIFICANT CHANGE UP (ref 0–0)
NRBC BLD AUTO-RTO: 0 /100 WBCS — SIGNIFICANT CHANGE UP (ref 0–0)
PHOSPHATE SERPL-MCNC: 3.6 MG/DL — SIGNIFICANT CHANGE UP (ref 2.1–4.9)
PHOSPHATE SERPL-MCNC: 3.8 MG/DL — SIGNIFICANT CHANGE UP (ref 2.1–4.9)
PLATELET # BLD AUTO: 160 K/UL — SIGNIFICANT CHANGE UP (ref 130–400)
PLATELET # BLD AUTO: 188 K/UL — SIGNIFICANT CHANGE UP (ref 130–400)
PMV BLD: 10.8 FL — HIGH (ref 7.4–10.4)
PMV BLD: 10.8 FL — HIGH (ref 7.4–10.4)
POTASSIUM SERPL-MCNC: 4.6 MMOL/L — SIGNIFICANT CHANGE UP (ref 3.5–5)
POTASSIUM SERPL-MCNC: 4.9 MMOL/L — SIGNIFICANT CHANGE UP (ref 3.5–5)
POTASSIUM SERPL-SCNC: 4.6 MMOL/L — SIGNIFICANT CHANGE UP (ref 3.5–5)
POTASSIUM SERPL-SCNC: 4.9 MMOL/L — SIGNIFICANT CHANGE UP (ref 3.5–5)
PROT SERPL-MCNC: 6.9 G/DL — SIGNIFICANT CHANGE UP (ref 6–8)
PROT SERPL-MCNC: 6.9 G/DL — SIGNIFICANT CHANGE UP (ref 6–8)
PROTHROM AB SERPL-ACNC: 12.4 SEC — SIGNIFICANT CHANGE UP (ref 9.95–12.87)
PROTHROM AB SERPL-ACNC: 12.8 SEC — SIGNIFICANT CHANGE UP (ref 9.95–12.87)
RBC # BLD: 4 M/UL — LOW (ref 4.7–6.1)
RBC # BLD: 4.03 M/UL — LOW (ref 4.7–6.1)
RBC # FLD: 14.5 % — SIGNIFICANT CHANGE UP (ref 11.5–14.5)
RBC # FLD: 14.7 % — HIGH (ref 11.5–14.5)
SODIUM SERPL-SCNC: 132 MMOL/L — LOW (ref 135–146)
SODIUM SERPL-SCNC: 133 MMOL/L — LOW (ref 135–146)
TRIGL SERPL-MCNC: 90 MG/DL — SIGNIFICANT CHANGE UP
WBC # BLD: 7.99 K/UL — SIGNIFICANT CHANGE UP (ref 4.8–10.8)
WBC # BLD: 9.07 K/UL — SIGNIFICANT CHANGE UP (ref 4.8–10.8)
WBC # FLD AUTO: 7.99 K/UL — SIGNIFICANT CHANGE UP (ref 4.8–10.8)
WBC # FLD AUTO: 9.07 K/UL — SIGNIFICANT CHANGE UP (ref 4.8–10.8)

## 2025-03-13 PROCEDURE — 74018 RADEX ABDOMEN 1 VIEW: CPT

## 2025-03-13 PROCEDURE — 86901 BLOOD TYPING SEROLOGIC RH(D): CPT

## 2025-03-13 PROCEDURE — 84100 ASSAY OF PHOSPHORUS: CPT

## 2025-03-13 PROCEDURE — 85730 THROMBOPLASTIN TIME PARTIAL: CPT

## 2025-03-13 PROCEDURE — 93010 ELECTROCARDIOGRAM REPORT: CPT

## 2025-03-13 PROCEDURE — C1773: CPT

## 2025-03-13 PROCEDURE — 99223 1ST HOSP IP/OBS HIGH 75: CPT | Mod: 57

## 2025-03-13 PROCEDURE — C9399: CPT

## 2025-03-13 PROCEDURE — 43262 ENDO CHOLANGIOPANCREATOGRAPH: CPT | Mod: XU

## 2025-03-13 PROCEDURE — 80048 BASIC METABOLIC PNL TOTAL CA: CPT

## 2025-03-13 PROCEDURE — 73630 X-RAY EXAM OF FOOT: CPT | Mod: RT

## 2025-03-13 PROCEDURE — 83036 HEMOGLOBIN GLYCOSYLATED A1C: CPT

## 2025-03-13 PROCEDURE — 85610 PROTHROMBIN TIME: CPT

## 2025-03-13 PROCEDURE — 80076 HEPATIC FUNCTION PANEL: CPT

## 2025-03-13 PROCEDURE — 86850 RBC ANTIBODY SCREEN: CPT

## 2025-03-13 PROCEDURE — 36415 COLL VENOUS BLD VENIPUNCTURE: CPT

## 2025-03-13 PROCEDURE — 85025 COMPLETE CBC W/AUTO DIFF WBC: CPT

## 2025-03-13 PROCEDURE — 93005 ELECTROCARDIOGRAM TRACING: CPT

## 2025-03-13 PROCEDURE — 43274 ERCP DUCT STENT PLACEMENT: CPT

## 2025-03-13 PROCEDURE — 84540 ASSAY OF URINE/UREA-N: CPT

## 2025-03-13 PROCEDURE — 43264 ERCP REMOVE DUCT CALCULI: CPT | Mod: XU

## 2025-03-13 PROCEDURE — 85027 COMPLETE CBC AUTOMATED: CPT

## 2025-03-13 PROCEDURE — 81003 URINALYSIS AUTO W/O SCOPE: CPT

## 2025-03-13 PROCEDURE — 84133 ASSAY OF URINE POTASSIUM: CPT

## 2025-03-13 PROCEDURE — 84156 ASSAY OF PROTEIN URINE: CPT

## 2025-03-13 PROCEDURE — 83735 ASSAY OF MAGNESIUM: CPT

## 2025-03-13 PROCEDURE — 83935 ASSAY OF URINE OSMOLALITY: CPT

## 2025-03-13 PROCEDURE — C2625: CPT

## 2025-03-13 PROCEDURE — 84300 ASSAY OF URINE SODIUM: CPT

## 2025-03-13 PROCEDURE — 74328 X-RAY BILE DUCT ENDOSCOPY: CPT | Mod: 26

## 2025-03-13 PROCEDURE — 82570 ASSAY OF URINE CREATININE: CPT

## 2025-03-13 PROCEDURE — 82962 GLUCOSE BLOOD TEST: CPT

## 2025-03-13 PROCEDURE — 86900 BLOOD TYPING SEROLOGIC ABO: CPT

## 2025-03-13 PROCEDURE — 99223 1ST HOSP IP/OBS HIGH 75: CPT | Mod: 25

## 2025-03-13 PROCEDURE — 82010 KETONE BODYS QUAN: CPT

## 2025-03-13 PROCEDURE — C1769: CPT

## 2025-03-13 RX ORDER — INSULIN LISPRO 100 U/ML
4 INJECTION, SOLUTION INTRAVENOUS; SUBCUTANEOUS ONCE
Refills: 0 | Status: COMPLETED | OUTPATIENT
Start: 2025-03-13 | End: 2025-03-13

## 2025-03-13 RX ORDER — GLUCAGON 3 MG/1
1 POWDER NASAL ONCE
Refills: 0 | Status: DISCONTINUED | OUTPATIENT
Start: 2025-03-13 | End: 2025-03-15

## 2025-03-13 RX ORDER — ACETAMINOPHEN 500 MG/5ML
650 LIQUID (ML) ORAL EVERY 6 HOURS
Refills: 0 | Status: DISCONTINUED | OUTPATIENT
Start: 2025-03-13 | End: 2025-03-15

## 2025-03-13 RX ORDER — ATORVASTATIN CALCIUM 80 MG/1
20 TABLET, FILM COATED ORAL AT BEDTIME
Refills: 0 | Status: DISCONTINUED | OUTPATIENT
Start: 2025-03-13 | End: 2025-03-15

## 2025-03-13 RX ORDER — INSULIN LISPRO 100 U/ML
INJECTION, SOLUTION INTRAVENOUS; SUBCUTANEOUS EVERY 6 HOURS
Refills: 0 | Status: DISCONTINUED | OUTPATIENT
Start: 2025-03-13 | End: 2025-03-15

## 2025-03-13 RX ORDER — DEXTROSE 50 % IN WATER 50 %
12.5 SYRINGE (ML) INTRAVENOUS ONCE
Refills: 0 | Status: DISCONTINUED | OUTPATIENT
Start: 2025-03-13 | End: 2025-03-15

## 2025-03-13 RX ORDER — DEXTROSE 50 % IN WATER 50 %
25 SYRINGE (ML) INTRAVENOUS ONCE
Refills: 0 | Status: DISCONTINUED | OUTPATIENT
Start: 2025-03-13 | End: 2025-03-15

## 2025-03-13 RX ORDER — PIPERACILLIN-TAZO-DEXTROSE,ISO 3.375G/5
3.38 IV SOLUTION, PIGGYBACK PREMIX FROZEN(ML) INTRAVENOUS ONCE
Refills: 0 | Status: COMPLETED | OUTPATIENT
Start: 2025-03-13 | End: 2025-03-13

## 2025-03-13 RX ORDER — ALBUTEROL SULFATE 2.5 MG/3ML
2.5 VIAL, NEBULIZER (ML) INHALATION ONCE
Refills: 0 | Status: COMPLETED | OUTPATIENT
Start: 2025-03-13 | End: 2025-03-13

## 2025-03-13 RX ORDER — DEXTROSE 50 % IN WATER 50 %
15 SYRINGE (ML) INTRAVENOUS ONCE
Refills: 0 | Status: DISCONTINUED | OUTPATIENT
Start: 2025-03-13 | End: 2025-03-15

## 2025-03-13 RX ORDER — INSULIN LISPRO 100 U/ML
30 INJECTION, SOLUTION INTRAVENOUS; SUBCUTANEOUS
Refills: 0 | Status: DISCONTINUED | OUTPATIENT
Start: 2025-03-13 | End: 2025-03-14

## 2025-03-13 RX ORDER — INDOMETHACIN 50 MG
50 CAPSULE ORAL ONCE
Refills: 0 | Status: COMPLETED | OUTPATIENT
Start: 2025-03-13 | End: 2025-03-13

## 2025-03-13 RX ORDER — MAGNESIUM SULFATE 500 MG/ML
1 SYRINGE (ML) INJECTION ONCE
Refills: 0 | Status: COMPLETED | OUTPATIENT
Start: 2025-03-13 | End: 2025-03-14

## 2025-03-13 RX ORDER — LOSARTAN POTASSIUM 100 MG/1
100 TABLET, FILM COATED ORAL DAILY
Refills: 0 | Status: DISCONTINUED | OUTPATIENT
Start: 2025-03-13 | End: 2025-03-13

## 2025-03-13 RX ORDER — DIPHENHYDRAMINE HCL 12.5MG/5ML
25 ELIXIR ORAL EVERY 4 HOURS
Refills: 0 | Status: DISCONTINUED | OUTPATIENT
Start: 2025-03-13 | End: 2025-03-15

## 2025-03-13 RX ORDER — HEPARIN SODIUM 1000 [USP'U]/ML
5000 INJECTION INTRAVENOUS; SUBCUTANEOUS EVERY 8 HOURS
Refills: 0 | Status: DISCONTINUED | OUTPATIENT
Start: 2025-03-13 | End: 2025-03-15

## 2025-03-13 RX ORDER — SODIUM CHLORIDE 9 G/1000ML
1000 INJECTION, SOLUTION INTRAVENOUS
Refills: 0 | Status: DISCONTINUED | OUTPATIENT
Start: 2025-03-13 | End: 2025-03-14

## 2025-03-13 RX ORDER — INSULIN GLARGINE-YFGN 100 [IU]/ML
40 INJECTION, SOLUTION SUBCUTANEOUS EVERY MORNING
Refills: 0 | Status: DISCONTINUED | OUTPATIENT
Start: 2025-03-14 | End: 2025-03-14

## 2025-03-13 RX ORDER — SODIUM ZIRCONIUM CYCLOSILICATE 5 G/5G
5 POWDER, FOR SUSPENSION ORAL ONCE
Refills: 0 | Status: COMPLETED | OUTPATIENT
Start: 2025-03-13 | End: 2025-03-13

## 2025-03-13 RX ORDER — SODIUM CHLORIDE 9 G/1000ML
1000 INJECTION, SOLUTION INTRAVENOUS
Refills: 0 | Status: DISCONTINUED | OUTPATIENT
Start: 2025-03-13 | End: 2025-03-15

## 2025-03-13 RX ORDER — INSULIN GLARGINE-YFGN 100 [IU]/ML
70 INJECTION, SOLUTION SUBCUTANEOUS AT BEDTIME
Refills: 0 | Status: DISCONTINUED | OUTPATIENT
Start: 2025-03-13 | End: 2025-03-14

## 2025-03-13 RX ORDER — PIPERACILLIN-TAZO-DEXTROSE,ISO 3.375G/5
3.38 IV SOLUTION, PIGGYBACK PREMIX FROZEN(ML) INTRAVENOUS EVERY 8 HOURS
Refills: 0 | Status: DISCONTINUED | OUTPATIENT
Start: 2025-03-13 | End: 2025-03-14

## 2025-03-13 RX ADMIN — INSULIN GLARGINE-YFGN 70 UNIT(S): 100 INJECTION, SOLUTION SUBCUTANEOUS at 21:46

## 2025-03-13 RX ADMIN — Medication 650 MILLIGRAM(S): at 17:31

## 2025-03-13 RX ADMIN — INSULIN LISPRO 30 UNIT(S): 100 INJECTION, SOLUTION INTRAVENOUS; SUBCUTANEOUS at 17:31

## 2025-03-13 RX ADMIN — Medication 200 GRAM(S): at 03:07

## 2025-03-13 RX ADMIN — INSULIN LISPRO 4 UNIT(S): 100 INJECTION, SOLUTION INTRAVENOUS; SUBCUTANEOUS at 22:43

## 2025-03-13 RX ADMIN — Medication 25 MILLIGRAM(S): at 19:33

## 2025-03-13 RX ADMIN — Medication 25 GRAM(S): at 21:46

## 2025-03-13 RX ADMIN — Medication 25 GRAM(S): at 14:45

## 2025-03-13 RX ADMIN — Medication 200 MILLIGRAM(S): at 14:38

## 2025-03-13 RX ADMIN — Medication 50 MILLIGRAM(S): at 13:11

## 2025-03-13 RX ADMIN — Medication 650 MILLIGRAM(S): at 18:01

## 2025-03-13 RX ADMIN — SODIUM CHLORIDE 125 MILLILITER(S): 9 INJECTION, SOLUTION INTRAVENOUS at 14:38

## 2025-03-13 RX ADMIN — INSULIN LISPRO 4: 100 INJECTION, SOLUTION INTRAVENOUS; SUBCUTANEOUS at 17:31

## 2025-03-13 RX ADMIN — HEPARIN SODIUM 5000 UNIT(S): 1000 INJECTION INTRAVENOUS; SUBCUTANEOUS at 14:43

## 2025-03-13 RX ADMIN — Medication 25 GRAM(S): at 06:00

## 2025-03-13 RX ADMIN — INSULIN LISPRO 6: 100 INJECTION, SOLUTION INTRAVENOUS; SUBCUTANEOUS at 23:53

## 2025-03-13 RX ADMIN — HEPARIN SODIUM 5000 UNIT(S): 1000 INJECTION INTRAVENOUS; SUBCUTANEOUS at 21:47

## 2025-03-13 RX ADMIN — SODIUM CHLORIDE 125 MILLILITER(S): 9 INJECTION, SOLUTION INTRAVENOUS at 02:30

## 2025-03-13 RX ADMIN — Medication 2.5 MILLIGRAM(S): at 01:44

## 2025-03-13 RX ADMIN — ATORVASTATIN CALCIUM 20 MILLIGRAM(S): 80 TABLET, FILM COATED ORAL at 21:47

## 2025-03-13 RX ADMIN — SODIUM ZIRCONIUM CYCLOSILICATE 5 GRAM(S): 5 POWDER, FOR SUSPENSION ORAL at 01:54

## 2025-03-14 LAB
A1C WITH ESTIMATED AVERAGE GLUCOSE RESULT: 7.5 % — HIGH (ref 4–5.6)
ANION GAP SERPL CALC-SCNC: 12 MMOL/L — SIGNIFICANT CHANGE UP (ref 7–14)
ANION GAP SERPL CALC-SCNC: 12 MMOL/L — SIGNIFICANT CHANGE UP (ref 7–14)
ANION GAP SERPL CALC-SCNC: 13 MMOL/L — SIGNIFICANT CHANGE UP (ref 7–14)
APPEARANCE UR: CLEAR — SIGNIFICANT CHANGE UP
BASOPHILS # BLD AUTO: 0.01 K/UL — SIGNIFICANT CHANGE UP (ref 0–0.2)
BASOPHILS NFR BLD AUTO: 0.1 % — SIGNIFICANT CHANGE UP (ref 0–1)
BILIRUB UR-MCNC: NEGATIVE — SIGNIFICANT CHANGE UP
BUN SERPL-MCNC: 62 MG/DL — CRITICAL HIGH (ref 10–20)
BUN SERPL-MCNC: 62 MG/DL — CRITICAL HIGH (ref 10–20)
BUN SERPL-MCNC: 64 MG/DL — CRITICAL HIGH (ref 10–20)
CALCIUM SERPL-MCNC: 8.4 MG/DL — SIGNIFICANT CHANGE UP (ref 8.4–10.5)
CALCIUM SERPL-MCNC: 8.4 MG/DL — SIGNIFICANT CHANGE UP (ref 8.4–10.5)
CALCIUM SERPL-MCNC: 8.9 MG/DL — SIGNIFICANT CHANGE UP (ref 8.4–10.5)
CHLORIDE SERPL-SCNC: 101 MMOL/L — SIGNIFICANT CHANGE UP (ref 98–110)
CHLORIDE SERPL-SCNC: 99 MMOL/L — SIGNIFICANT CHANGE UP (ref 98–110)
CHLORIDE SERPL-SCNC: 99 MMOL/L — SIGNIFICANT CHANGE UP (ref 98–110)
CO2 SERPL-SCNC: 21 MMOL/L — SIGNIFICANT CHANGE UP (ref 17–32)
CO2 SERPL-SCNC: 21 MMOL/L — SIGNIFICANT CHANGE UP (ref 17–32)
CO2 SERPL-SCNC: 23 MMOL/L — SIGNIFICANT CHANGE UP (ref 17–32)
COLOR SPEC: YELLOW — SIGNIFICANT CHANGE UP
CREAT ?TM UR-MCNC: 64 MG/DL — SIGNIFICANT CHANGE UP
CREAT SERPL-MCNC: 3.1 MG/DL — HIGH (ref 0.7–1.5)
CREAT SERPL-MCNC: 3.3 MG/DL — HIGH (ref 0.7–1.5)
CREAT SERPL-MCNC: 3.4 MG/DL — HIGH (ref 0.7–1.5)
DIFF PNL FLD: NEGATIVE — SIGNIFICANT CHANGE UP
EGFR: 19 ML/MIN/1.73M2 — LOW
EGFR: 21 ML/MIN/1.73M2 — LOW
EGFR: 21 ML/MIN/1.73M2 — LOW
EOSINOPHIL # BLD AUTO: 0.08 K/UL — SIGNIFICANT CHANGE UP (ref 0–0.7)
EOSINOPHIL NFR BLD AUTO: 0.7 % — SIGNIFICANT CHANGE UP (ref 0–8)
ESTIMATED AVERAGE GLUCOSE: 169 MG/DL — HIGH (ref 68–114)
GLUCOSE SERPL-MCNC: 178 MG/DL — HIGH (ref 70–99)
GLUCOSE SERPL-MCNC: 190 MG/DL — HIGH (ref 70–99)
GLUCOSE SERPL-MCNC: 230 MG/DL — HIGH (ref 70–99)
GLUCOSE UR QL: NEGATIVE MG/DL — SIGNIFICANT CHANGE UP
HCT VFR BLD CALC: 35.8 % — LOW (ref 42–52)
HGB BLD-MCNC: 11.9 G/DL — LOW (ref 14–18)
IMM GRANULOCYTES NFR BLD AUTO: 0.5 % — HIGH (ref 0.1–0.3)
KETONES UR-MCNC: NEGATIVE MG/DL — SIGNIFICANT CHANGE UP
LEUKOCYTE ESTERASE UR-ACNC: NEGATIVE — SIGNIFICANT CHANGE UP
LYMPHOCYTES # BLD AUTO: 0.64 K/UL — LOW (ref 1.2–3.4)
LYMPHOCYTES # BLD AUTO: 5.7 % — LOW (ref 20.5–51.1)
MAGNESIUM SERPL-MCNC: 2.1 MG/DL — SIGNIFICANT CHANGE UP (ref 1.8–2.4)
MAGNESIUM SERPL-MCNC: 2.2 MG/DL — SIGNIFICANT CHANGE UP (ref 1.8–2.4)
MCHC RBC-ENTMCNC: 30.3 PG — SIGNIFICANT CHANGE UP (ref 27–31)
MCHC RBC-ENTMCNC: 33.2 G/DL — SIGNIFICANT CHANGE UP (ref 32–37)
MCV RBC AUTO: 91.1 FL — SIGNIFICANT CHANGE UP (ref 80–94)
MONOCYTES # BLD AUTO: 0.74 K/UL — HIGH (ref 0.1–0.6)
MONOCYTES NFR BLD AUTO: 6.6 % — SIGNIFICANT CHANGE UP (ref 1.7–9.3)
NEUTROPHILS # BLD AUTO: 9.72 K/UL — HIGH (ref 1.4–6.5)
NEUTROPHILS NFR BLD AUTO: 86.4 % — HIGH (ref 42.2–75.2)
NITRITE UR-MCNC: NEGATIVE — SIGNIFICANT CHANGE UP
NRBC BLD AUTO-RTO: 0 /100 WBCS — SIGNIFICANT CHANGE UP (ref 0–0)
OSMOLALITY UR: 350 MOS/KG — SIGNIFICANT CHANGE UP (ref 50–1200)
PH UR: 6 — SIGNIFICANT CHANGE UP (ref 5–8)
PHOSPHATE SERPL-MCNC: 3.2 MG/DL — SIGNIFICANT CHANGE UP (ref 2.1–4.9)
PHOSPHATE SERPL-MCNC: 3.5 MG/DL — SIGNIFICANT CHANGE UP (ref 2.1–4.9)
PLATELET # BLD AUTO: 181 K/UL — SIGNIFICANT CHANGE UP (ref 130–400)
PMV BLD: 10.6 FL — HIGH (ref 7.4–10.4)
POTASSIUM SERPL-MCNC: 4.6 MMOL/L — SIGNIFICANT CHANGE UP (ref 3.5–5)
POTASSIUM SERPL-MCNC: 4.8 MMOL/L — SIGNIFICANT CHANGE UP (ref 3.5–5)
POTASSIUM SERPL-MCNC: 4.9 MMOL/L — SIGNIFICANT CHANGE UP (ref 3.5–5)
POTASSIUM SERPL-SCNC: 4.6 MMOL/L — SIGNIFICANT CHANGE UP (ref 3.5–5)
POTASSIUM SERPL-SCNC: 4.8 MMOL/L — SIGNIFICANT CHANGE UP (ref 3.5–5)
POTASSIUM SERPL-SCNC: 4.9 MMOL/L — SIGNIFICANT CHANGE UP (ref 3.5–5)
POTASSIUM UR-SCNC: 20 MMOL/L — SIGNIFICANT CHANGE UP
PROT ?TM UR-MCNC: 26 MG/DL — SIGNIFICANT CHANGE UP
PROT UR-MCNC: SIGNIFICANT CHANGE UP MG/DL
PROT/CREAT UR-RTO: 0.4 RATIO — HIGH (ref 0–0.2)
RBC # BLD: 3.93 M/UL — LOW (ref 4.7–6.1)
RBC # FLD: 14.2 % — SIGNIFICANT CHANGE UP (ref 11.5–14.5)
SODIUM SERPL-SCNC: 132 MMOL/L — LOW (ref 135–146)
SODIUM SERPL-SCNC: 134 MMOL/L — LOW (ref 135–146)
SODIUM SERPL-SCNC: 135 MMOL/L — SIGNIFICANT CHANGE UP (ref 135–146)
SODIUM UR-SCNC: 39 MMOL/L — SIGNIFICANT CHANGE UP
SP GR SPEC: 1.01 — SIGNIFICANT CHANGE UP (ref 1–1.03)
UROBILINOGEN FLD QL: 0.2 MG/DL — SIGNIFICANT CHANGE UP (ref 0.2–1)
UUN UR-MCNC: 606 MG/DL — SIGNIFICANT CHANGE UP
WBC # BLD: 11.25 K/UL — HIGH (ref 4.8–10.8)
WBC # FLD AUTO: 11.25 K/UL — HIGH (ref 4.8–10.8)

## 2025-03-14 PROCEDURE — 99232 SBSQ HOSP IP/OBS MODERATE 35: CPT

## 2025-03-14 PROCEDURE — 99233 SBSQ HOSP IP/OBS HIGH 50: CPT

## 2025-03-14 PROCEDURE — 99221 1ST HOSP IP/OBS SF/LOW 40: CPT

## 2025-03-14 RX ORDER — LATANOPROST PF 0.05 MG/ML
1 SOLUTION/ DROPS OPHTHALMIC AT BEDTIME
Refills: 0 | Status: DISCONTINUED | OUTPATIENT
Start: 2025-03-14 | End: 2025-03-15

## 2025-03-14 RX ORDER — TIMOLOL MALEATE 6.8 MG/ML
1 SOLUTION OPHTHALMIC DAILY
Refills: 0 | Status: DISCONTINUED | OUTPATIENT
Start: 2025-03-14 | End: 2025-03-15

## 2025-03-14 RX ORDER — DEXTROSE 50 % IN WATER 50 %
12.5 SYRINGE (ML) INTRAVENOUS ONCE
Refills: 0 | Status: DISCONTINUED | OUTPATIENT
Start: 2025-03-14 | End: 2025-03-15

## 2025-03-14 RX ORDER — DORZOLAMIDE 20 MG/ML
1 SOLUTION/ DROPS OPHTHALMIC ONCE
Refills: 0 | Status: COMPLETED | OUTPATIENT
Start: 2025-03-14 | End: 2025-03-14

## 2025-03-14 RX ORDER — INSULIN LISPRO 100 U/ML
35 INJECTION, SOLUTION INTRAVENOUS; SUBCUTANEOUS
Refills: 0 | Status: DISCONTINUED | OUTPATIENT
Start: 2025-03-14 | End: 2025-03-15

## 2025-03-14 RX ORDER — DEXTROSE 50 % IN WATER 50 %
15 SYRINGE (ML) INTRAVENOUS ONCE
Refills: 0 | Status: DISCONTINUED | OUTPATIENT
Start: 2025-03-14 | End: 2025-03-15

## 2025-03-14 RX ORDER — INSULIN LISPRO 100 U/ML
30 INJECTION, SOLUTION INTRAVENOUS; SUBCUTANEOUS ONCE
Refills: 0 | Status: COMPLETED | OUTPATIENT
Start: 2025-03-14 | End: 2025-03-14

## 2025-03-14 RX ORDER — SODIUM CHLORIDE 9 G/1000ML
1000 INJECTION, SOLUTION INTRAVENOUS
Refills: 0 | Status: DISCONTINUED | OUTPATIENT
Start: 2025-03-14 | End: 2025-03-15

## 2025-03-14 RX ORDER — TIMOLOL MALEATE 6.8 MG/ML
1 SOLUTION OPHTHALMIC DAILY
Refills: 0 | Status: DISCONTINUED | OUTPATIENT
Start: 2025-03-14 | End: 2025-03-14

## 2025-03-14 RX ORDER — DEXTROSE 50 % IN WATER 50 %
25 SYRINGE (ML) INTRAVENOUS ONCE
Refills: 0 | Status: DISCONTINUED | OUTPATIENT
Start: 2025-03-14 | End: 2025-03-15

## 2025-03-14 RX ORDER — INSULIN GLARGINE-YFGN 100 [IU]/ML
70 INJECTION, SOLUTION SUBCUTANEOUS AT BEDTIME
Refills: 0 | Status: DISCONTINUED | OUTPATIENT
Start: 2025-03-14 | End: 2025-03-15

## 2025-03-14 RX ORDER — AMOXICILLIN AND CLAVULANATE POTASSIUM 500; 125 MG/1; MG/1
1 TABLET, FILM COATED ORAL
Refills: 0 | Status: DISCONTINUED | OUTPATIENT
Start: 2025-03-14 | End: 2025-03-15

## 2025-03-14 RX ORDER — INSULIN GLARGINE-YFGN 100 [IU]/ML
40 INJECTION, SOLUTION SUBCUTANEOUS EVERY MORNING
Refills: 0 | Status: DISCONTINUED | OUTPATIENT
Start: 2025-03-14 | End: 2025-03-15

## 2025-03-14 RX ADMIN — INSULIN LISPRO 30 UNIT(S): 100 INJECTION, SOLUTION INTRAVENOUS; SUBCUTANEOUS at 01:15

## 2025-03-14 RX ADMIN — Medication 200 MILLIGRAM(S): at 11:38

## 2025-03-14 RX ADMIN — HEPARIN SODIUM 5000 UNIT(S): 1000 INJECTION INTRAVENOUS; SUBCUTANEOUS at 22:34

## 2025-03-14 RX ADMIN — HEPARIN SODIUM 5000 UNIT(S): 1000 INJECTION INTRAVENOUS; SUBCUTANEOUS at 05:03

## 2025-03-14 RX ADMIN — ATORVASTATIN CALCIUM 20 MILLIGRAM(S): 80 TABLET, FILM COATED ORAL at 22:32

## 2025-03-14 RX ADMIN — LATANOPROST PF 1 DROP(S): 0.05 SOLUTION/ DROPS OPHTHALMIC at 22:34

## 2025-03-14 RX ADMIN — INSULIN LISPRO 2: 100 INJECTION, SOLUTION INTRAVENOUS; SUBCUTANEOUS at 17:35

## 2025-03-14 RX ADMIN — INSULIN GLARGINE-YFGN 40 UNIT(S): 100 INJECTION, SOLUTION SUBCUTANEOUS at 08:38

## 2025-03-14 RX ADMIN — INSULIN LISPRO 30 UNIT(S): 100 INJECTION, SOLUTION INTRAVENOUS; SUBCUTANEOUS at 08:39

## 2025-03-14 RX ADMIN — INSULIN GLARGINE-YFGN 70 UNIT(S): 100 INJECTION, SOLUTION SUBCUTANEOUS at 22:31

## 2025-03-14 RX ADMIN — TIMOLOL MALEATE 1 DROP(S): 6.8 SOLUTION OPHTHALMIC at 22:34

## 2025-03-14 RX ADMIN — Medication 25 GRAM(S): at 05:03

## 2025-03-14 RX ADMIN — HEPARIN SODIUM 5000 UNIT(S): 1000 INJECTION INTRAVENOUS; SUBCUTANEOUS at 13:20

## 2025-03-14 RX ADMIN — Medication 100 MILLILITER(S): at 05:02

## 2025-03-14 RX ADMIN — AMOXICILLIN AND CLAVULANATE POTASSIUM 1 TABLET(S): 500; 125 TABLET, FILM COATED ORAL at 17:32

## 2025-03-14 RX ADMIN — Medication 650 MILLIGRAM(S): at 05:03

## 2025-03-14 RX ADMIN — INSULIN LISPRO 30 UNIT(S): 100 INJECTION, SOLUTION INTRAVENOUS; SUBCUTANEOUS at 11:40

## 2025-03-14 RX ADMIN — Medication 100 GRAM(S): at 05:03

## 2025-03-14 RX ADMIN — INSULIN LISPRO 4: 100 INJECTION, SOLUTION INTRAVENOUS; SUBCUTANEOUS at 11:40

## 2025-03-14 RX ADMIN — DORZOLAMIDE 1 DROP(S): 20 SOLUTION/ DROPS OPHTHALMIC at 22:34

## 2025-03-14 RX ADMIN — INSULIN LISPRO 35 UNIT(S): 100 INJECTION, SOLUTION INTRAVENOUS; SUBCUTANEOUS at 17:34

## 2025-03-15 ENCOUNTER — TRANSCRIPTION ENCOUNTER (OUTPATIENT)
Age: 70
End: 2025-03-15

## 2025-03-15 VITALS
RESPIRATION RATE: 18 BRPM | TEMPERATURE: 97 F | SYSTOLIC BLOOD PRESSURE: 118 MMHG | HEART RATE: 58 BPM | DIASTOLIC BLOOD PRESSURE: 74 MMHG | OXYGEN SATURATION: 96 %

## 2025-03-15 LAB
ANION GAP SERPL CALC-SCNC: 12 MMOL/L — SIGNIFICANT CHANGE UP (ref 7–14)
BUN SERPL-MCNC: 63 MG/DL — CRITICAL HIGH (ref 10–20)
CALCIUM SERPL-MCNC: 8.2 MG/DL — LOW (ref 8.4–10.5)
CHLORIDE SERPL-SCNC: 100 MMOL/L — SIGNIFICANT CHANGE UP (ref 98–110)
CO2 SERPL-SCNC: 23 MMOL/L — SIGNIFICANT CHANGE UP (ref 17–32)
CREAT SERPL-MCNC: 2.9 MG/DL — HIGH (ref 0.7–1.5)
EGFR: 22 ML/MIN/1.73M2 — LOW
EGFR: 22 ML/MIN/1.73M2 — LOW
GLUCOSE SERPL-MCNC: 167 MG/DL — HIGH (ref 70–99)
HCT VFR BLD CALC: 35.7 % — LOW (ref 42–52)
HGB BLD-MCNC: 12.1 G/DL — LOW (ref 14–18)
MAGNESIUM SERPL-MCNC: 2.2 MG/DL — SIGNIFICANT CHANGE UP (ref 1.8–2.4)
MCHC RBC-ENTMCNC: 30.5 PG — SIGNIFICANT CHANGE UP (ref 27–31)
MCHC RBC-ENTMCNC: 33.9 G/DL — SIGNIFICANT CHANGE UP (ref 32–37)
MCV RBC AUTO: 89.9 FL — SIGNIFICANT CHANGE UP (ref 80–94)
NRBC BLD AUTO-RTO: 0 /100 WBCS — SIGNIFICANT CHANGE UP (ref 0–0)
PHOSPHATE SERPL-MCNC: 3.3 MG/DL — SIGNIFICANT CHANGE UP (ref 2.1–4.9)
PLATELET # BLD AUTO: 214 K/UL — SIGNIFICANT CHANGE UP (ref 130–400)
PMV BLD: 11.3 FL — HIGH (ref 7.4–10.4)
POTASSIUM SERPL-MCNC: 4.3 MMOL/L — SIGNIFICANT CHANGE UP (ref 3.5–5)
POTASSIUM SERPL-SCNC: 4.3 MMOL/L — SIGNIFICANT CHANGE UP (ref 3.5–5)
RBC # BLD: 3.97 M/UL — LOW (ref 4.7–6.1)
RBC # FLD: 14.1 % — SIGNIFICANT CHANGE UP (ref 11.5–14.5)
SODIUM SERPL-SCNC: 135 MMOL/L — SIGNIFICANT CHANGE UP (ref 135–146)
WBC # BLD: 10.5 K/UL — SIGNIFICANT CHANGE UP (ref 4.8–10.8)
WBC # FLD AUTO: 10.5 K/UL — SIGNIFICANT CHANGE UP (ref 4.8–10.8)

## 2025-03-15 PROCEDURE — 73630 X-RAY EXAM OF FOOT: CPT | Mod: 26,RT

## 2025-03-15 RX ORDER — AMOXICILLIN AND CLAVULANATE POTASSIUM 500; 125 MG/1; MG/1
875 TABLET, FILM COATED ORAL
Qty: 14 | Refills: 0
Start: 2025-03-15 | End: 2025-03-21

## 2025-03-15 RX ORDER — DORZOLAMIDE 20 MG/ML
1 SOLUTION/ DROPS OPHTHALMIC ONCE
Refills: 0 | Status: COMPLETED | OUTPATIENT
Start: 2025-03-15 | End: 2025-03-15

## 2025-03-15 RX ORDER — AMLODIPINE BESYLATE 10 MG/1
1 TABLET ORAL
Qty: 5 | Refills: 0
Start: 2025-03-15 | End: 2025-03-19

## 2025-03-15 RX ADMIN — DORZOLAMIDE 1 DROP(S): 20 SOLUTION/ DROPS OPHTHALMIC at 11:38

## 2025-03-15 RX ADMIN — AMOXICILLIN AND CLAVULANATE POTASSIUM 1 TABLET(S): 500; 125 TABLET, FILM COATED ORAL at 06:20

## 2025-03-15 RX ADMIN — INSULIN GLARGINE-YFGN 40 UNIT(S): 100 INJECTION, SOLUTION SUBCUTANEOUS at 08:10

## 2025-03-15 RX ADMIN — HEPARIN SODIUM 5000 UNIT(S): 1000 INJECTION INTRAVENOUS; SUBCUTANEOUS at 06:20

## 2025-03-15 RX ADMIN — INSULIN LISPRO 35 UNIT(S): 100 INJECTION, SOLUTION INTRAVENOUS; SUBCUTANEOUS at 12:21

## 2025-03-15 RX ADMIN — TIMOLOL MALEATE 1 DROP(S): 6.8 SOLUTION OPHTHALMIC at 11:29

## 2025-03-15 RX ADMIN — Medication 200 MILLIGRAM(S): at 11:26

## 2025-03-15 RX ADMIN — INSULIN LISPRO 35 UNIT(S): 100 INJECTION, SOLUTION INTRAVENOUS; SUBCUTANEOUS at 06:18

## 2025-03-15 RX ADMIN — AMOXICILLIN AND CLAVULANATE POTASSIUM 1 TABLET(S): 500; 125 TABLET, FILM COATED ORAL at 17:44

## 2025-03-15 RX ADMIN — HEPARIN SODIUM 5000 UNIT(S): 1000 INJECTION INTRAVENOUS; SUBCUTANEOUS at 13:32

## 2025-03-15 RX ADMIN — INSULIN LISPRO 2: 100 INJECTION, SOLUTION INTRAVENOUS; SUBCUTANEOUS at 12:22

## 2025-03-17 PROBLEM — I72.8 ANEURYSM OF OTHER SPECIFIED ARTERIES: Chronic | Status: ACTIVE | Noted: 2025-03-13

## 2025-03-21 DIAGNOSIS — H40.9 UNSPECIFIED GLAUCOMA: ICD-10-CM

## 2025-03-21 DIAGNOSIS — K85.10 BILIARY ACUTE PANCREATITIS WITHOUT NECROSIS OR INFECTION: ICD-10-CM

## 2025-03-21 DIAGNOSIS — E66.01 MORBID (SEVERE) OBESITY DUE TO EXCESS CALORIES: ICD-10-CM

## 2025-03-21 DIAGNOSIS — N17.9 ACUTE KIDNEY FAILURE, UNSPECIFIED: ICD-10-CM

## 2025-03-21 DIAGNOSIS — N18.4 CHRONIC KIDNEY DISEASE, STAGE 4 (SEVERE): ICD-10-CM

## 2025-03-21 DIAGNOSIS — Z90.5 ACQUIRED ABSENCE OF KIDNEY: ICD-10-CM

## 2025-03-21 DIAGNOSIS — Z86.718 PERSONAL HISTORY OF OTHER VENOUS THROMBOSIS AND EMBOLISM: ICD-10-CM

## 2025-03-21 DIAGNOSIS — E11.22 TYPE 2 DIABETES MELLITUS WITH DIABETIC CHRONIC KIDNEY DISEASE: ICD-10-CM

## 2025-03-21 DIAGNOSIS — Z79.4 LONG TERM (CURRENT) USE OF INSULIN: ICD-10-CM

## 2025-03-21 DIAGNOSIS — E11.65 TYPE 2 DIABETES MELLITUS WITH HYPERGLYCEMIA: ICD-10-CM

## 2025-03-21 DIAGNOSIS — K80.70 CALCULUS OF GALLBLADDER AND BILE DUCT WITHOUT CHOLECYSTITIS WITHOUT OBSTRUCTION: ICD-10-CM

## 2025-03-21 DIAGNOSIS — M10.9 GOUT, UNSPECIFIED: ICD-10-CM

## 2025-03-21 DIAGNOSIS — Z98.84 BARIATRIC SURGERY STATUS: ICD-10-CM

## 2025-03-21 DIAGNOSIS — L97.519 NON-PRESSURE CHRONIC ULCER OF OTHER PART OF RIGHT FOOT WITH UNSPECIFIED SEVERITY: ICD-10-CM

## 2025-03-21 DIAGNOSIS — E78.5 HYPERLIPIDEMIA, UNSPECIFIED: ICD-10-CM

## 2025-03-21 DIAGNOSIS — E83.42 HYPOMAGNESEMIA: ICD-10-CM

## 2025-03-21 DIAGNOSIS — E87.5 HYPERKALEMIA: ICD-10-CM

## 2025-03-21 DIAGNOSIS — E11.621 TYPE 2 DIABETES MELLITUS WITH FOOT ULCER: ICD-10-CM

## 2025-03-21 DIAGNOSIS — I12.9 HYPERTENSIVE CHRONIC KIDNEY DISEASE WITH STAGE 1 THROUGH STAGE 4 CHRONIC KIDNEY DISEASE, OR UNSPECIFIED CHRONIC KIDNEY DISEASE: ICD-10-CM

## 2025-04-14 ENCOUNTER — APPOINTMENT (OUTPATIENT)
Dept: GASTROENTEROLOGY | Facility: CLINIC | Age: 70
End: 2025-04-14

## 2025-04-29 NOTE — CONSULT NOTE ADULT - ASSESSMENT
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 Nodular enlarged right thyroid lobe. Recommend evaluation outpatient sonography. 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 Nodular enlarged right thyroid lobe. Recommend evaluation outpatient sonography. Additional findings in the body of the report  - COVID negative x 2 on 12/4  #Elevated D-DImer  #AMS - resolved    #CKD  #Hx of DVT  #Obesity BMI (kg/m2): 53.8  #Abx allergy: NKDA    RECOMMENDATIONS  - unclear etiology of fevers; Intermediate suspicion of COVID, can repeat test in 24 hours from previous test  - agree with V/Q scan  - trend inflammatory markers  - no antibiotics for now; can start vanc/cetriaxone if with clinical decompensation   - follow-up blood cultures    Please call or message on Microsoft Teams if with any questions.  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 Nodular enlarged right thyroid lobe. Recommend evaluation outpatient sonography. Additional findings in the body of the report  - COVID negative x 2 on 12/4  #Elevated D-DImer  #AMS - resolved    #CKD  #Hx of DVT  #Obesity BMI (kg/m2): 53.8  #Abx allergy: NKDA    RECOMMENDATIONS  - unclear etiology of fevers; CT Chest with no opacities to suggest lower respiratory tract infection; Intermediate suspicion of COVID, can repeat test in 24 hours from previous test  - agree with V/Q scan  - trend inflammatory markers  - no antibiotics for now; can start vanc/cetriaxone if with clinical decompensation   - follow-up blood cultures    Please call or message on Microsoft Teams if with any questions.  Spectra [phone]

## 2025-05-06 ENCOUNTER — TRANSCRIPTION ENCOUNTER (OUTPATIENT)
Age: 70
End: 2025-05-06

## 2025-05-06 ENCOUNTER — OUTPATIENT (OUTPATIENT)
Dept: OUTPATIENT SERVICES | Facility: HOSPITAL | Age: 70
LOS: 1 days | Discharge: HOME | End: 2025-05-06
Payer: MEDICARE

## 2025-05-06 VITALS
HEIGHT: 70 IN | OXYGEN SATURATION: 97 % | TEMPERATURE: 97 F | HEART RATE: 72 BPM | RESPIRATION RATE: 18 BRPM | SYSTOLIC BLOOD PRESSURE: 171 MMHG | DIASTOLIC BLOOD PRESSURE: 91 MMHG | WEIGHT: 315 LBS

## 2025-05-06 VITALS
OXYGEN SATURATION: 95 % | SYSTOLIC BLOOD PRESSURE: 147 MMHG | RESPIRATION RATE: 18 BRPM | DIASTOLIC BLOOD PRESSURE: 74 MMHG | HEART RATE: 65 BPM

## 2025-05-06 DIAGNOSIS — Z98.84 BARIATRIC SURGERY STATUS: Chronic | ICD-10-CM

## 2025-05-06 DIAGNOSIS — Z90.5 ACQUIRED ABSENCE OF KIDNEY: Chronic | ICD-10-CM

## 2025-05-06 DIAGNOSIS — K83.09 OTHER CHOLANGITIS: ICD-10-CM

## 2025-05-06 LAB
GLUCOSE BLDC GLUCOMTR-MCNC: 101 MG/DL — HIGH (ref 70–99)
GLUCOSE BLDC GLUCOMTR-MCNC: 98 MG/DL — SIGNIFICANT CHANGE UP (ref 70–99)

## 2025-05-06 PROCEDURE — 43275 ERCP REMOVE FORGN BODY DUCT: CPT

## 2025-05-06 PROCEDURE — 74018 RADEX ABDOMEN 1 VIEW: CPT

## 2025-05-06 PROCEDURE — C1769: CPT

## 2025-05-06 PROCEDURE — C1889: CPT

## 2025-05-06 PROCEDURE — 82962 GLUCOSE BLOOD TEST: CPT

## 2025-05-06 PROCEDURE — 43264 ERCP REMOVE DUCT CALCULI: CPT | Mod: XU

## 2025-05-06 PROCEDURE — 74328 X-RAY BILE DUCT ENDOSCOPY: CPT | Mod: 26

## 2025-05-06 RX ORDER — BIMATOPROST 0.3 MG/ML
1 SOLUTION/ DROPS OPHTHALMIC
Refills: 0 | DISCHARGE

## 2025-05-06 RX ORDER — TIMOLOL MALEATE 6.8 MG/ML
1 SOLUTION OPHTHALMIC
Refills: 0 | DISCHARGE

## 2025-05-06 RX ORDER — SODIUM CHLORIDE 9 G/1000ML
1000 INJECTION, SOLUTION INTRAVENOUS
Refills: 0 | Status: DISCONTINUED | OUTPATIENT
Start: 2025-05-06 | End: 2025-05-06

## 2025-05-06 RX ORDER — DORZOLAMIDE 20 MG/ML
0 SOLUTION/ DROPS OPHTHALMIC
Refills: 0 | DISCHARGE

## 2025-05-06 NOTE — ASU DISCHARGE PLAN (ADULT/PEDIATRIC) - FINANCIAL ASSISTANCE
[facility] provides services at a reduced cost to those who are determined to be eligible through [facility]’s financial assistance program. Information regarding [facility]’s financial assistance program can be found by going to [email or web address] or by calling [phone].

## 2025-05-06 NOTE — ASU DISCHARGE PLAN (ADULT/PEDIATRIC) - NS MD DC FALL RISK RISK
For information on Fall & Injury Prevention, visit: [email or web address] OR  [email or web address] OR  https://www.cdc.gov/steadi/patient.html

## 2025-05-06 NOTE — ASU PATIENT PROFILE, ADULT - NSICDXPASTMEDICALHX_GEN_ALL_CORE_FT
PAST MEDICAL HISTORY:  CKD (chronic kidney disease)     Diabetes mellitus     DVT (deep venous thrombosis)     HLD (hyperlipidemia)     HTN (hypertension)     Splenic artery aneurysm

## 2025-05-06 NOTE — PACU DISCHARGE NOTE - HYDRATION STATUS:
Care Coordination Discharge Plan Note     Discharge Needs Assessment  Concerns to be Addressed: care coordination/care conferences, discharge planning  Current Discharge Risk: chronically ill    Anticipated Discharge Plan  Anticipated Discharge Disposition: home with home health  Type of Home Care Services: home OT, home PT, nursing        Patient Choice  Offered/Gave Vendor List: yes  Patient's Choice of Community Agency(s): [facility]    Patient and/or patient guardian/advocate was made aware of their right to choose a provider. A list of eligible providers was presented and reviewed with the patient and/or patient guardian/advocate in written and/or verbal form. The list delineates providers in the patient’s desired geographic area who are participating in the Medicare program and/or providers contracted with the patient’s primary insurance. The Medicare list and quality ratings were obtained from the Medicare.gov [medicare.gov] website.    ---------------------------------------------------------------------------------------------------------------------    Interdisciplinary Discharge Plan Review:  Participants:     Concerns Comments: Per DPR, the patient is tentative for discharge tomorrow. [facility] updated. Patient has transportation arranged, son will provide. Spoke to the patient and his son Marko and they are agreeable to the dispo plan. CC will continue to follow for transition of care needs until discharge is complete    Discharge Plan:   Disposition/Destination: Home Health Care - [facility] / Home  Discharge Facility:    Community Resources:      Discharge Transportation:  Is Out of Hospital DNR needed at Discharge:    Does patient need discharge transport? No         Satisfactory

## 2025-05-08 DIAGNOSIS — I12.9 HYPERTENSIVE CHRONIC KIDNEY DISEASE WITH STAGE 1 THROUGH STAGE 4 CHRONIC KIDNEY DISEASE, OR UNSPECIFIED CHRONIC KIDNEY DISEASE: ICD-10-CM

## 2025-05-08 DIAGNOSIS — Z79.85 LONG-TERM (CURRENT) USE OF INJECTABLE NON-INSULIN ANTIDIABETIC DRUGS: ICD-10-CM

## 2025-05-08 DIAGNOSIS — K83.8 OTHER SPECIFIED DISEASES OF BILIARY TRACT: ICD-10-CM

## 2025-05-08 DIAGNOSIS — Z46.59 ENCOUNTER FOR FITTING AND ADJUSTMENT OF OTHER GASTROINTESTINAL APPLIANCE AND DEVICE: ICD-10-CM

## 2025-05-08 DIAGNOSIS — E78.5 HYPERLIPIDEMIA, UNSPECIFIED: ICD-10-CM

## 2025-05-08 DIAGNOSIS — E11.22 TYPE 2 DIABETES MELLITUS WITH DIABETIC CHRONIC KIDNEY DISEASE: ICD-10-CM

## 2025-05-08 DIAGNOSIS — Z79.4 LONG TERM (CURRENT) USE OF INSULIN: ICD-10-CM

## 2025-05-08 DIAGNOSIS — Z86.718 PERSONAL HISTORY OF OTHER VENOUS THROMBOSIS AND EMBOLISM: ICD-10-CM

## 2025-07-01 NOTE — PATIENT PROFILE ADULT - NSPROPTRIGHTSUPPORTPERSON_GEN_A_NUR
[place] [address] [facility] [address]  Phone: [phone]  Fax: [phone]    July 2, 2025        Ellie Cooper  [address] [place] [address]          To Whom It May Concern:    Ellie Cooper was treated in our facility from 7/1/2025 to 7/2/2025. Please excuse her from work.    She may return to work without restriction on 7/7/2025. If you have questions or concerns about this message please contact Ms. Cooper directly for more information.    Sincerely,    Patricia Gimenez MD          
yes